# Patient Record
Sex: FEMALE | Race: BLACK OR AFRICAN AMERICAN | Employment: OTHER | URBAN - METROPOLITAN AREA
[De-identification: names, ages, dates, MRNs, and addresses within clinical notes are randomized per-mention and may not be internally consistent; named-entity substitution may affect disease eponyms.]

---

## 2016-06-09 LAB — MAMMOGRAPHY, EXTERNAL: NORMAL

## 2018-04-01 ENCOUNTER — HOSPITAL ENCOUNTER (OUTPATIENT)
Dept: MRI IMAGING | Age: 48
Discharge: HOME | End: 2018-04-01
Attending: ORTHOPAEDIC SURGERY
Payer: OTHER MISCELLANEOUS

## 2018-04-01 DIAGNOSIS — M54.2 PAIN IN NECK: ICD-10-CM

## 2018-04-01 PROCEDURE — 72141 MRI NECK SPINE W/O DYE: CPT

## 2018-05-08 ENCOUNTER — HOSPITAL ENCOUNTER (EMERGENCY)
Age: 48
Discharge: HOME | End: 2018-05-08
Attending: EMERGENCY MEDICINE | Admitting: EMERGENCY MEDICINE
Payer: COMMERCIAL

## 2018-05-08 ENCOUNTER — APPOINTMENT (OUTPATIENT)
Dept: CT IMAGING | Age: 48
End: 2018-05-08
Attending: EMERGENCY MEDICINE
Payer: COMMERCIAL

## 2018-05-08 VITALS
BODY MASS INDEX: 46.99 KG/M2 | TEMPERATURE: 99.4 F | HEART RATE: 81 BPM | SYSTOLIC BLOOD PRESSURE: 129 MMHG | RESPIRATION RATE: 16 BRPM | WEIGHT: 265.21 LBS | OXYGEN SATURATION: 100 % | DIASTOLIC BLOOD PRESSURE: 82 MMHG | HEIGHT: 63 IN

## 2018-05-08 DIAGNOSIS — R68.84 JAW PAIN, NON-TMJ: Primary | ICD-10-CM

## 2018-05-08 PROCEDURE — 70486 CT MAXILLOFACIAL W/O DYE: CPT

## 2018-05-08 PROCEDURE — 99283 EMERGENCY DEPT VISIT LOW MDM: CPT

## 2018-05-08 PROCEDURE — 74011250637 HC RX REV CODE- 250/637: Performed by: EMERGENCY MEDICINE

## 2018-05-08 PROCEDURE — 72125 CT NECK SPINE W/O DYE: CPT

## 2018-05-08 PROCEDURE — 70450 CT HEAD/BRAIN W/O DYE: CPT

## 2018-05-08 RX ORDER — DIAZEPAM 5 MG/1
5 TABLET ORAL
Status: COMPLETED | OUTPATIENT
Start: 2018-05-08 | End: 2018-05-08

## 2018-05-08 RX ORDER — HYDROCODONE BITARTRATE AND HOMATROPINE METHYLBROMIDE 1.5; 5 MG/5ML; MG/5ML
5 SYRUP ORAL
Status: DISCONTINUED | OUTPATIENT
Start: 2018-05-08 | End: 2018-05-08

## 2018-05-08 RX ORDER — DIAZEPAM 5 MG/1
5 TABLET ORAL
Qty: 15 TAB | Refills: 0 | Status: SHIPPED | OUTPATIENT
Start: 2018-05-08 | End: 2018-12-20

## 2018-05-08 RX ADMIN — DIAZEPAM 5 MG: 5 TABLET ORAL at 12:15

## 2018-05-08 NOTE — ED NOTES
Discharge instructions reviewed with patient, copy given by Dr. Karen Duarte. Pt is accomponied by family, denies use of wheelchair.

## 2018-05-08 NOTE — ED NOTES
Dr Valeria Phoenix in triage to evaluate patient. Pt currently has soft cervical collar on.  States spasms in jaw, pain in left shoulder, headache, pain in her neck

## 2018-05-08 NOTE — DISCHARGE INSTRUCTIONS
Head or Face Pain: Care Instructions  Your Care Instructions    Common causes of head or face pain are allergies, stress, and injuries. Other causes include tooth problems and sinus infections. Eating certain foods, such as chocolate or cheese, or drinking certain liquids, such as coffee or cola, can cause head pain for some people. If you have mild head pain, you may not need treatment. It is important to watch your symptoms and talk to your doctor if your pain continues or gets worse. Follow-up care is a key part of your treatment and safety. Be sure to make and go to all appointments, and call your doctor if you are having problems. It's also a good idea to know your test results and keep a list of the medicines you take. How can you care for yourself at home? · Take pain medicines exactly as directed. ¨ If the doctor gave you a prescription medicine for pain, take it as prescribed. ¨ If you are not taking a prescription pain medicine, ask your doctor if you can take an over-the-counter pain medicine. · Take it easy for the next few days or longer if you are not feeling well. · Use a warm, moist towel or heating pad set on low to relax tight muscles in your shoulder and neck. Have someone gently massage your neck and shoulders. · Put ice or a cold pack on the area for 10 to 20 minutes at a time. Put a thin cloth between the ice and your skin. When should you call for help? Call 911 anytime you think you may need emergency care. For example, call if:  ? · You have twitching, jerking, or a seizure. ? · You passed out (lost consciousness). ? · You have symptoms of a stroke. These may include:  ¨ Sudden numbness, tingling, weakness, or loss of movement in your face, arm, or leg, especially on only one side of your body. ¨ Sudden vision changes. ¨ Sudden trouble speaking. ¨ Sudden confusion or trouble understanding simple statements. ¨ Sudden problems with walking or balance.   ¨ A sudden, severe headache that is different from past headaches. ? · You have jaw pain and pain in your chest, shoulder, neck, or arm. ?Call your doctor now or seek immediate medical care if:  ? · You have a fever with a stiff neck or a severe headache. ? · You have nausea and vomiting, or you cannot keep food or liquids down. ? Watch closely for changes in your health, and be sure to contact your doctor if:  ? · Your head or face pain does not get better as expected. Where can you learn more? Go to http://willis-bianka.info/. Enter P568 in the search box to learn more about \"Head or Face Pain: Care Instructions. \"  Current as of: March 20, 2017  Content Version: 11.4  © 4937-1490 [employer]. Care instructions adapted under license by [employer] (which disclaims liability or warranty for this information). If you have questions about a medical condition or this instruction, always ask your healthcare professional. [place] [address] any warranty or liability for your use of this information.
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
## 2018-06-04 ENCOUNTER — HOSPITAL ENCOUNTER (EMERGENCY)
Age: 48
Discharge: HOME | End: 2018-06-04
Attending: EMERGENCY MEDICINE
Payer: COMMERCIAL

## 2018-06-04 VITALS
BODY MASS INDEX: 26.93 KG/M2 | OXYGEN SATURATION: 100 % | RESPIRATION RATE: 16 BRPM | HEIGHT: 63 IN | DIASTOLIC BLOOD PRESSURE: 82 MMHG | WEIGHT: 152 LBS | TEMPERATURE: 97.6 F | SYSTOLIC BLOOD PRESSURE: 130 MMHG | HEART RATE: 102 BPM

## 2018-06-04 DIAGNOSIS — J98.01 ACUTE BRONCHOSPASM: Primary | ICD-10-CM

## 2018-06-04 PROCEDURE — 74011250637 HC RX REV CODE- 250/637: Performed by: EMERGENCY MEDICINE

## 2018-06-04 PROCEDURE — 99283 EMERGENCY DEPT VISIT LOW MDM: CPT

## 2018-06-04 PROCEDURE — 77030029684 HC NEB SM VOL KT MONA -A

## 2018-06-04 PROCEDURE — 74011000250 HC RX REV CODE- 250: Performed by: NURSE PRACTITIONER

## 2018-06-04 PROCEDURE — 94640 AIRWAY INHALATION TREATMENT: CPT

## 2018-06-04 RX ORDER — CETIRIZINE HCL 10 MG
10 TABLET ORAL DAILY
Qty: 30 TAB | Refills: 0 | Status: SHIPPED | OUTPATIENT
Start: 2018-06-04 | End: 2018-12-20 | Stop reason: DRUGHIGH

## 2018-06-04 RX ORDER — ALBUTEROL SULFATE 90 UG/1
2 AEROSOL, METERED RESPIRATORY (INHALATION)
Qty: 1 INHALER | Refills: 0 | Status: SHIPPED | OUTPATIENT
Start: 2018-06-04 | End: 2020-03-28 | Stop reason: SDUPTHER

## 2018-06-04 RX ORDER — BETAMETHASONE SODIUM PHOSPHATE AND BETAMETHASONE ACETATE 3; 3 MG/ML; MG/ML
6 INJECTION, SUSPENSION INTRA-ARTICULAR; INTRALESIONAL; INTRAMUSCULAR; SOFT TISSUE ONCE
Status: DISCONTINUED | OUTPATIENT
Start: 2018-06-04 | End: 2018-06-04 | Stop reason: HOSPADM

## 2018-06-04 RX ORDER — CETIRIZINE HCL 10 MG
10 TABLET ORAL
Status: COMPLETED | OUTPATIENT
Start: 2018-06-04 | End: 2018-06-04

## 2018-06-04 RX ADMIN — CETIRIZINE HYDROCHLORIDE 10 MG: 10 TABLET, FILM COATED ORAL at 12:24

## 2018-06-04 RX ADMIN — ALBUTEROL SULFATE 1 DOSE: 2.5 SOLUTION RESPIRATORY (INHALATION) at 11:52

## 2018-06-04 NOTE — ED NOTES
Pt given discharge instructions, patient education, prescriptions, and follow up information. Pt states understanding. All questions answered. Pt discharged to home in private vehicle, ambulatory. Pt A/Ox4, RA, pain controlled. Pt anxious to leave r/t family concerns at home, refused IM medication, see MAR.

## 2018-06-04 NOTE — DISCHARGE INSTRUCTIONS
Wheezing or Bronchoconstriction: Care Instructions  Your Care Instructions  Wheezing is a whistling noise made during breathing. It occurs when the small airways, or bronchial tubes, that lead to your lungs swell or contract (spasm) and become narrow. This narrowing is called bronchoconstriction. When your airways constrict, it is hard for air to pass through and this makes it hard for you to breathe. Wheezing and bronchoconstriction can be caused by many problems, including:  · An infection such as the flu or a cold. · Allergies such as hay fever. · Diseases such as asthma or chronic obstructive pulmonary disease. · Smoking. Treatment for your wheezing depends on what is causing the problem. Your wheezing may get better without treatment. But you may need to pay attention to things that cause your wheezing and avoid them. Or you may need medicine to help treat the wheezing and to reduce the swelling or to relieve spasms in your lungs. Follow-up care is a key part of your treatment and safety. Be sure to make and go to all appointments, and call your doctor if you are having problems. It is also a good idea to know your test results and keep a list of the medicines you take. How can you care for yourself at home? · Take your medicine exactly as prescribed. Call your doctor if you think you are having a problem with your medicine. You will get more details on the specific medicine your doctor prescribes. · If your doctor prescribed antibiotics, take them as directed. Do not stop taking them just because you feel better. You need to take the full course of antibiotics. · Breathe moist air from a humidifier, hot shower, or sink filled with hot water. This may help ease your symptoms and make it easier for you to breathe. · If you have congestion in your nose and throat, drinking plenty of fluids, especially hot fluids, may help relieve your symptoms.  If you have kidney, heart, or liver disease and have to limit fluids, talk with your doctor before you increase the amount of fluids you drink. · If you have mucus in your airways, it may help to breathe deeply and cough. · Do not smoke or allow others to smoke around you. Smoking can make your wheezing worse. If you need help quitting, talk to your doctor about stop-smoking programs and medicines. These can increase your chances of quitting for good. · Avoid things that may cause your wheezing. These may include colds, smoke, air pollution, dust, pollen, pets, cockroaches, stress, and cold air. When should you call for help? Call 911 anytime you think you may need emergency care. For example, call if:  ? · You have severe trouble breathing. ? · You passed out (lost consciousness). ?Call your doctor now or seek immediate medical care if:  ? · You cough up yellow, dark brown, or bloody mucus (sputum). ? · You have new or worse shortness of breath. ? · Your wheezing is not getting better or it gets worse after you start taking your medicine. ? Watch closely for changes in your health, and be sure to contact your doctor if:  ? · You do not get better as expected. Where can you learn more? Go to http://willis-bianka.info/. Enter 454 8935 in the search box to learn more about \"Wheezing or Bronchoconstriction: Care Instructions. \"  Current as of: May 12, 2017  Content Version: 11.4  © 7516-2630 [employer]. Care instructions adapted under license by [employer] (which disclaims liability or warranty for this information). If you have questions about a medical condition or this instruction, always ask your healthcare professional. [address] any warranty or liability for your use of this information.

## 2018-06-04 NOTE — ED PROVIDER NOTES
Patient is a 51 y.o. female presenting with wheezing. Wheezing    Associated symptoms include chest pain. Pertinent negatives include no abdominal pain, no vomiting, no diarrhea, no dysuria, no headaches, no sore throat and no neck pain. Pt reports that she has been packing up household items from her apartment and has been exposed to large amounts of mold. She states that her asthma has been well controlled until this exposure. She was attempting to have her prescheduled physical therapy this morning but was audibly wheezing. Denies fever, cold symptoms, jheadache,neck pain, visual changes, focal weakness or rash. Denies any difficulty breathing, difficulty swallowing, SOB, chest pain or abdominal pain. Denies any nausea, vomiting or diarrhea. Pt. Reports that she has not had any medications today prior to arrival.  Old charts reviewed.         Past Medical History:   Diagnosis Date    Arthritis     Asthma     B[facility] disorder ([facility] [address]     Depression     IBS (irritable bowel syndrome)     Migraine     Neurological disorder     migraines    Peptic ulcer     Polyp cervix     Snoring     TIA (transient ischemic attack)        Past Surgical History:   Procedure Laterality Date    HX CERVICAL FUSION      C4-C7    HX CHOLECYSTECTOMY      HX CYST REMOVAL      from under both arms    HX HERNIA REPAIR  2009    HX ORTHOPAEDIC      left knee meniscus repair     HX ORTHOPAEDIC      ORIF left fibula    HX TUBAL LIGATION      HYSTEROSCOPY DIAGNOSTIC      x2         Family History:   Problem Relation Age of Onset    Stroke Father     Hypertension Father     Heart Disease Maternal Grandmother     Cancer Maternal Grandmother      brain and colon    Cancer Maternal Uncle      5 w/ brain Luis Fernando  MS Other      1st cousin    Bipolar Disorder Other        Social History     Social History    Marital status: SINGLE     Spouse name: N/A    Number of children: N/A    Years of education: N/A     Occupational History    Not on file. Social History Main Topics    Smoking status: Current Some Day Smoker     Packs/day: 0.25    Smokeless tobacco: Never Used    Alcohol use Yes      Comment: rare    Drug use: No    Sexual activity: Yes     Other Topics Concern    Not on file     Social History Narrative         ALLERGIES: Latex; Peanut; Shellfish containing products; Morphine; Nystatin; Dilaudid [hydromorphone (bulk)]; Flexeril [cyclobenzaprine]; Other medication; Phenergan [promethazine]; and Robaxin [methocarbamol]    Review of Systems   Constitutional: Negative for activity change and appetite change. HENT: Negative for facial swelling, sore throat and trouble swallowing. Eyes: Negative. Respiratory: Positive for wheezing. Negative for shortness of breath. Cardiovascular: Positive for chest pain. Gastrointestinal: Negative for abdominal pain, diarrhea and vomiting. Genitourinary: Negative for dysuria. Musculoskeletal: Negative for back pain and neck pain. Skin: Negative for color change. Neurological: Negative for headaches. Psychiatric/Behavioral: Negative. Vitals:    06/04/18 1123   BP: 118/84   Pulse: 89   Resp: 18   Temp: 98.2 °F (36.8 °C)   SpO2: 100%   Weight: 68.9 kg (152 lb)   Height: 5' 3\" (1.6 m)            Physical Exam   Constitutional: She is oriented to person, place, and time. She appears well-nourished. Black female; non smoker   HENT:   Head: Normocephalic. Mouth/Throat: Oropharynx is clear and moist.   Eyes: Pupils are equal, round, and reactive to light. Neck: Normal range of motion. Neck supple. Cardiovascular: Normal rate and regular rhythm. Pulmonary/Chest: Effort normal. She has wheezes. She exhibits tenderness. Abdominal: Soft. Bowel sounds are normal.   Musculoskeletal: Normal range of motion. Neurological: She is alert and oriented to person, place, and time. Skin: Skin is warm and dry. Nursing note and vitals reviewed.        [facility]      ED Course       Procedures      12:20 PM  Pt has been re-examined after nebulizer treatments and states that they are feeling better and have no new complaints. On auscultation, wheezing is significantly improved. Medications, x-rays, diagnosis, follow up plan and return instructions have been reviewed and discussed with the patient. Pt has had the opportunity to ask questions about her care. Patient expresses understanding and agreement with care plan, including zyrtec, one time celestone, nebulizer or inhaler use, follow up and return instructions. Patient agrees to return in 24 hours if her symptoms are not improving or immediately if she has any change in her condition including worsening wheezing or any signs of increasing work of breathing. Thee Naranjo, MARIAM

## 2018-10-11 ENCOUNTER — OFFICE VISIT (OUTPATIENT)
Dept: INTERNAL MEDICINE CLINIC | Age: 48
End: 2018-10-11

## 2018-10-11 VITALS
HEART RATE: 97 BPM | OXYGEN SATURATION: 98 % | BODY MASS INDEX: 48.9 KG/M2 | RESPIRATION RATE: 16 BRPM | DIASTOLIC BLOOD PRESSURE: 84 MMHG | HEIGHT: 63 IN | SYSTOLIC BLOOD PRESSURE: 114 MMHG | WEIGHT: 276 LBS | TEMPERATURE: 98.4 F

## 2018-10-11 DIAGNOSIS — E66.01 OBESITY, MORBID (HCC): ICD-10-CM

## 2018-10-11 DIAGNOSIS — M54.16 LUMBAR RADICULOPATHY, ACUTE: Primary | ICD-10-CM

## 2018-10-11 DIAGNOSIS — F43.10 PTSD (POST-TRAUMATIC STRESS DISORDER): ICD-10-CM

## 2018-10-11 LAB
CHOLEST SERPL-MCNC: 159 MG/DL
GLUCOSE POC: 107 MG/DL
HBA1C MFR BLD HPLC: 5.7 %
HDLC SERPL-MCNC: 59 MG/DL
LDL CHOLESTEROL POC: 78 MG/DL
NON-HDL CHOLESTEROL, 011976: 100
TCHOL/HDL RATIO (POC): 2.7
TRIGL SERPL-MCNC: 111 MG/DL

## 2018-10-11 RX ORDER — PAROXETINE 10 MG/1
10 TABLET, FILM COATED ORAL DAILY
Qty: 30 TAB | Refills: 3 | Status: SHIPPED | OUTPATIENT
Start: 2018-10-11 | End: 2018-12-20

## 2018-10-11 RX ORDER — DICLOFENAC SODIUM 75 MG/1
75 TABLET, DELAYED RELEASE ORAL 2 TIMES DAILY
Qty: 75 TAB | Refills: 6 | Status: SHIPPED | OUTPATIENT
Start: 2018-10-11 | End: 2018-12-20

## 2018-10-11 NOTE — PROGRESS NOTES
1. Have you been to the ER, urgent care clinic since your last visit? Hospitalized since your last visit? 7/8/18/[facility]//back problems 2. Have you seen or consulted any other health care providers outside of the [address] [place] [address] since your last visit? Include any pap smears or colon screening. no 
 
PHQ over the last two weeks 10/11/2018 Little interest or pleasure in doing things Nearly every day Feeling down, depressed, irritable, or hopeless Nearly every day Total Score PHQ 2 6 Trouble falling or staying asleep, or sleeping too much Nearly every day Feeling tired or having little energy Several days Poor appetite, weight loss, or overeating Nearly every day Feeling bad about yourself - or that you are a failure or have let yourself or your family down Nearly every day Trouble concentrating on things such as school, work, reading, or watching TV Nearly every day Moving or speaking so slowly that other people could have noticed; or the opposite being so fidgety that others notice Not at all Thoughts of being better off dead, or hurting yourself in some way Not at all PHQ 9 Score 19 How difficult have these problems made it for you to do your work, take care of your home and get along with others Very difficult Patient goes to behavior health Chief Complaint Patient presents with Ritchie Damon [facility] Kushal  Depression
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
## 2018-10-11 NOTE — MR AVS SNAPSHOT
[address] [place] [id number] 
[phone] Patient: Rosteta Barrientos MRN: [id number] IIU:[id number] Visit Information Date & Time Provider Department Dept. Phone Encounter #  
 10/11/2018  8:45 AM Heydi Crump MD [facility] [phone] [id number] Follow-up Instructions Return in about 2 weeks (around 10/25/2018), or if symptoms worsen or fail to improve. Upcoming Health Maintenance Date Due Pneumococcal 19-64 Medium Risk (1 of 1 - PPSV23) 1/15/1988 DTaP/Tdap/Td series (1 - Tdap) 1/15/1990 PAP AKA CERVICAL CYTOLOGY 1/15/1990 Influenza Age 5 to Adult 8/1/2018 Allergies as of 10/11/2018  Review Complete On: 10/11/2018 By: Becca Rodrigues LPN Severity Noted Reaction Type Reactions Latex  06/16/2010    Hives Peanut High 02/08/2012    Swelling Shellfish Containing Products High 02/08/2012    Anaphylaxis Morphine Medium 10/14/2011    Itching She has had morphine but premedicates with benadryl Nystatin Medium 12/24/2011   Side Effect Rash Dilaudid [Hydromorphone (Bulk)]  06/16/2010    Itching Must take with benadryl Flexeril [Cyclobenzaprine]  10/14/2011    Rash Other Medication  02/08/2012    Rash All fruits except apple, oranges, and pineapple Phenergan [Promethazine]  04/04/2011    Other (comments) Rapid hr  
 Robaxin [Methocarbamol]  10/14/2011    Rash Current Immunizations  Never Reviewed No immunizations on file. Not reviewed this visit You Were Diagnosed With   
  
 Codes Comments Lumbar radiculopathy, acute    -  Primary ICD-10-CM: M54.16 
ICD-9-CM: 724.4 Obesity, morbid ([facility] [address]     ICD-10-CM: E66.01 
ICD-9-CM: 278.01   
 PTSD (post-traumatic stress disorder)     ICD-10-CM: F43.10 ICD-9-CM: 309.81 Vitals BP Pulse Temp Resp Height(growth percentile) Weight(growth percentile) 114/84 97 98.4 °F (36.9 °C) (Oral) 16 5' 3\" (1.6 m) 276 lb (125.2 kg) SpO2 BMI OB Status Smoking Status 98% 48.89 kg/m2 Having regular periods Current Every Day Smoker BMI and BSA Data Body Mass Index Body Surface Area  
 48.89 kg/m 2 2.36 m 2 Preferred Pharmacy Pharmacy Name Phone [address], [address] [phone] Your Updated Medication List  
  
   
This list is accurate as of 10/11/18 10:09 AM.  Always use your most recent med list.  
  
  
  
  
 albuterol 90 mcg/actuation inhaler Commonly known as:  PROVENTIL HFA, VENTOLIN HFA, PROAIR HFA Take 2 Puffs by inhalation every four (4) hours as needed for Wheezing or Shortness of Breath (cough). cetirizine 10 mg tablet Commonly known as:  ZyrTEC Take 1 Tab by mouth daily. diazePAM 5 mg tablet Commonly known as:  VALIUM Take 1 Tab by mouth every eight (8) hours as needed (spasm). Max Daily Amount: 15 mg.  
  
 diclofenac EC 75 mg EC tablet Commonly known as:  VOLTAREN Take 1 Tab by mouth two (2) times a day. Ferrous Sulfate 47.5 mg iron Tber tablet Commonly known as:  SLOW FE Take 1 Tab by mouth daily. LASIX 40 mg tablet Generic drug:  furosemide Take 40 mg by mouth daily. PARoxetine 10 mg tablet Commonly known as:  PAXIL Take 1 Tab by mouth daily. potassium chloride SA 15 mEq tablet Commonly known as:  KLOR-CON M15 Take 10 mEq by mouth daily. TOPAMAX PO Take  by mouth. Prescriptions Sent to Pharmacy Refills PARoxetine (PAXIL) 10 mg tablet 3 Sig: Take 1 Tab by mouth daily. Class: Normal  
 Pharmacy: [facility], [address] Ph #: [phone] Route: Oral  
 diclofenac EC (VOLTAREN) 75 mg EC tablet 6 Sig: Take 1 Tab by mouth two (2) times a day. Class: Normal  
 Pharmacy: [facility], [address] Ph #: [phone] Route: Oral  
  
We Performed the Following AMB POC GLUCOSE BLOOD, BY GLUCOSE MONITORING DEVICE [28284 CPT(R)] AMB POC HEMOGLOBIN A1C [27543 CPT(R)] AMB POC LIPID PROFILE [97548 CPT(R)] CBC W/O DIFF [62183 CPT(R)] METABOLIC PANEL, COMPREHENSIVE [62105 CPT(R)] Follow-up Instructions Return in about 2 weeks (around 10/25/2018), or if symptoms worsen or fail to improve. Introducing [facility] SERVICES! Dear Mahnaz Ledezma: Thank you for requesting a [employer] account. Our records indicate that you already have an active [employer] account. You can access your account anytime at [email or web address] Did you know that you can access your hospital and ER discharge instructions at any time in [employer]? You can also review all of your test results from your hospital stay or ER visit. Additional Information If you have questions, please visit the Frequently Asked Questions section of the [employer] website at [email or web address] Remember, [employer] is NOT to be used for urgent needs. For medical emergencies, dial 911. Now available from your iPhone and Android! Please provide this summary of care documentation to your next provider. Your primary care clinician is listed as NONE. If you have any questions after today's visit, please call [phone].

## 2018-10-31 ENCOUNTER — OFFICE VISIT (OUTPATIENT)
Dept: INTERNAL MEDICINE CLINIC | Age: 48
End: 2018-10-31

## 2018-10-31 VITALS
WEIGHT: 266 LBS | OXYGEN SATURATION: 99 % | RESPIRATION RATE: 18 BRPM | SYSTOLIC BLOOD PRESSURE: 110 MMHG | HEIGHT: 63 IN | DIASTOLIC BLOOD PRESSURE: 74 MMHG | BODY MASS INDEX: 47.13 KG/M2 | TEMPERATURE: 98.2 F | HEART RATE: 84 BPM

## 2018-10-31 DIAGNOSIS — M47.817 LUMBAR AND SACRAL OSTEOARTHRITIS: ICD-10-CM

## 2018-10-31 DIAGNOSIS — J01.01 ACUTE RECURRENT MAXILLARY SINUSITIS: Primary | ICD-10-CM

## 2018-10-31 DIAGNOSIS — E66.01 OBESITY, MORBID (HCC): ICD-10-CM

## 2018-10-31 DIAGNOSIS — J45.20 MILD INTERMITTENT ASTHMA WITHOUT COMPLICATION: ICD-10-CM

## 2018-10-31 RX ORDER — FLUTICASONE PROPIONATE 50 MCG
2 SPRAY, SUSPENSION (ML) NASAL DAILY
Qty: 1 BOTTLE | Refills: 12 | Status: SHIPPED | OUTPATIENT
Start: 2018-10-31 | End: 2018-12-20 | Stop reason: DRUGHIGH

## 2018-10-31 RX ORDER — PREDNISONE 10 MG/1
TABLET ORAL
Qty: 21 TAB | Refills: 0 | Status: SHIPPED | OUTPATIENT
Start: 2018-10-31 | End: 2018-12-20 | Stop reason: ALTCHOICE

## 2018-10-31 RX ORDER — AMOXICILLIN AND CLAVULANATE POTASSIUM 875; 125 MG/1; MG/1
1 TABLET, FILM COATED ORAL 2 TIMES DAILY
Qty: 14 TAB | Refills: 0 | Status: SHIPPED | OUTPATIENT
Start: 2018-10-31 | End: 2018-11-07

## 2018-10-31 RX ORDER — ALBUTEROL SULFATE 90 UG/1
2 AEROSOL, METERED RESPIRATORY (INHALATION)
Qty: 1 INHALER | Refills: 12 | Status: SHIPPED | OUTPATIENT
Start: 2018-10-31 | End: 2018-12-20 | Stop reason: SDUPTHER

## 2018-10-31 NOTE — PROGRESS NOTES
Osmani Allen is a 51 y.o. female and presents with Back Pain and Sinus Infection  . Subjective:  Sinus Pain  Patient complains of achiness, congestion, coryza, facial pain and post nasal drip. Onset of symptoms was a few days ago, gradually worsening since that time. She is drinking plenty of fluids. .  Past history is significant for asthma. Patient is  A  smoker    Back Pain Review:  Patient presents for evaluation of low back problems. Symptoms have been present for several weeks and include pain in lower back (dull, mild in character;9 /10 in severity). Initial inciting event: unknown. Symptoms are worst: at times. Alleviating factors identifiable by patient are lying flat, medication . Exacerbating factors identifiable by patient are bending forwards, bending backwards. Treatments so far initiated by patient: medication Previous lower back problems: reported. Previous workup: noted     Asthma Review:  The patient is being seen for follow up of asthma,  currently stable. Asthma symptoms occur:frequently. Wheezing when present is described as mild and easily relieved with rescue bronchodilator. The patient reports use of a steroid inhaler. Frequency of use of quick-relief meds: rarely. Regimen compliance: The patient reports adherence to this regimen.         Review of Systems  Constitutional: negative for fevers, chills, anorexia and weight loss  Eyes:   negative for visual disturbance and irritation  ENT:   ,nasal congestion,  Respiratory:  cough,  dyspnea,wheezing  CV:   negative for chest pain, palpitations, lower extremity edema  GI:   negative for nausea, vomiting, diarrhea, abdominal pain,melena  Endo:               negative for polyuria,polydipsia,polyphagia,heat intolerance  Genitourinary: negative for frequency, dysuria and hematuria  Integument:  negative for rash and pruritus  Hematologic:  negative for easy bruising and gum/nose bleeding  Musculoskel: negative for myalgias, arthralgias, back pain, muscle weakness, joint pain  Neurological:  negative for headaches, dizziness, vertigo, memory problems and gait   Behavl/Psych: negative for feelings of anxiety, depression, mood changes    Past Medical History:   Diagnosis Date    Arthritis     Asthma     Bipolar disorder (HCC)     Depression     IBS (irritable bowel syndrome)     Migraine     Neurological disorder     migraines    Peptic ulcer     Polyp cervix     Snoring     TIA (transient ischemic attack)      Past Surgical History:   Procedure Laterality Date    HX CERVICAL FUSION      C4-C7    HX CHOLECYSTECTOMY      HX CYST REMOVAL      from under both arms    HX HERNIA REPAIR  2009    HX ORTHOPAEDIC      left knee meniscus repair     HX ORTHOPAEDIC      ORIF left fibula    HX TUBAL LIGATION      HYSTEROSCOPY DIAGNOSTIC      x2     Social History     Socioeconomic History    Marital status: SINGLE     Spouse name: Not on file    Number of children: Not on file    Years of education: Not on file    Highest education level: Not on file   Social Needs    Financial resource strain: Not on file    Food insecurity - worry: Not on file    Food insecurity - inability: Not on file   [employer] needs - medical: Not on file   [employer] needs - non-medical: Not on file   Occupational History    Not on file   Tobacco Use    Smoking status: Current Every Day Smoker     Packs/day: 0.25    Smokeless tobacco: Never Used   Substance and Sexual Activity    Alcohol use: Yes     Comment: rare    Drug use: No    Sexual activity: Not Currently   Other Topics Concern    Not on file   Social History Narrative    Not on file     Family History   Problem Relation Age of Onset    Stroke Father     Hypertension Father     Heart Disease Maternal Grandmother     Cancer Maternal Grandmother         brain and colon    Cancer Maternal Uncle         5 w/ brain Daryle Paget MS Other         1st cousin    Bipolar Disorder Other Current Outpatient Medications   Medication Sig Dispense Refill    albuterol (PROVENTIL HFA, VENTOLIN HFA, PROAIR HFA) 90 mcg/actuation inhaler Take 2 Puffs by inhalation every four (4) hours as needed for Wheezing. 1 Inhaler 12    amoxicillin-clavulanate (AUGMENTIN) 875-125 mg per tablet Take 1 Tab by mouth two (2) times a day for 7 days. 14 Tab 0    fluticasone (FLONASE) 50 mcg/actuation nasal spray 2 Sprays by Both Nostrils route daily. 1 Bottle 12    predniSONE (DELTASONE) 10 mg tablet 6 tabs today and reduce by 1 tab daily 21 Tab 0    Ferrous Sulfate (SLOW FE) 47.5 mg iron TbER tablet Take 1 Tab by mouth daily.  PARoxetine (PAXIL) 10 mg tablet Take 1 Tab by mouth daily. 30 Tab 3    diclofenac EC (VOLTAREN) 75 mg EC tablet Take 1 Tab by mouth two (2) times a day. 75 Tab 6    albuterol (PROVENTIL HFA, VENTOLIN HFA, PROAIR HFA) 90 mcg/actuation inhaler Take 2 Puffs by inhalation every four (4) hours as needed for Wheezing or Shortness of Breath (cough). 1 Inhaler 0    TOPIRAMATE (TOPAMAX PO) Take  by mouth.  potassium chloride SA (KLOR-CON M15) 15 mEq tablet Take 10 mEq by mouth daily.  furosemide (LASIX) 40 mg tablet Take 40 mg by mouth daily.  cetirizine (ZYRTEC) 10 mg tablet Take 1 Tab by mouth daily. (Patient not taking: Reported on 10/31/2018) 30 Tab 0    diazePAM (VALIUM) 5 mg tablet Take 1 Tab by mouth every eight (8) hours as needed (spasm). Max Daily Amount: 15 mg.  (Patient not taking: Reported on 10/31/2018) 15 Tab 0     Allergies   Allergen Reactions    Latex Hives    Peanut Swelling    Shellfish Containing Products Anaphylaxis    Morphine Itching     She has had morphine but premedicates with benadryl    Nystatin Rash    Dilaudid [Hydromorphone (Bulk)] Itching     Must take with benadryl    Flexeril [Cyclobenzaprine] Rash    Other Medication Rash     All fruits except apple, oranges, and pineapple    Phenergan [Promethazine] Other (comments)     Rapid hr    Robaxin [Methocarbamol] Rash       Objective:  Visit Vitals  /74   Pulse 84   Temp 98.2 °F (36.8 °C) (Oral)   Resp 18   Ht 5' 3\" (1.6 m)   Wt 266 lb (120.7 kg)   SpO2 99%   BMI 47.12 kg/m²     Physical Exam:   General appearance - alert, well appearing, and in no distress  Mental status - alert, oriented to person, place, and time  EYE-BRIGIDA, EOMI, corneas normal, no foreign bodies  ENT-ENT exam normal, no neck nodes or sinus tenderness  Nose -Turbinates boggy and mucoid with erythema and edema noted  Mouth - mucous membranes moist, pharynx normal without lesions  Neck - supple, no significant adenopathy   Chest - rare wheezes,, symmetric air entry   Heart - normal rate, regular rhythm, normal S1, S2, no murmurs, rubs, clicks or gallops   Abdomen - soft, nontender, nondistended, no masses or organomegaly  Lymph- no adenopathy palpable  Ext-peripheral pulses normal, no pedal edema, no clubbing or cyanosis  Skin-Warm and dry. no hyperpigmentation, vitiligo, or suspicious lesions  Neuro -alert, oriented, normal speech, no focal findings or movement disorder noted  Neck-normal C-spine, no tenderness, full ROM without pain  Feet-no nail deformities or callus formation with good pulses noted  Back-tenderness lower lumbar spine and sacral spine noted,forward flexion,hyperextension impaired,negative straight leg raise      Results for orders placed or performed in visit on 10/11/18   AMB POC GLUCOSE BLOOD, BY GLUCOSE MONITORING DEVICE   Result Value Ref Range    Glucose  mg/dL   AMB POC HEMOGLOBIN A1C   Result Value Ref Range    Hemoglobin A1c (POC) 5.7 %   AMB POC LIPID PROFILE   Result Value Ref Range    Cholesterol (POC) 159     Triglycerides (POC) 111     HDL Cholesterol (POC) 59     Non-HDL Cholesterol 100     LDL Cholesterol (POC) 78 MG/DL    TChol/HDL Ratio (POC) 2.7        Assessment/Plan:    ICD-10-CM ICD-9-CM    1. Acute recurrent maxillary sinusitis J01.01 461.0    2.  Mild intermittent asthma without complication M36.27 492.66    3. Obesity, morbid ([facility] [address] E66.01 278.01      Orders Placed This Encounter    albuterol (PROVENTIL HFA, VENTOLIN HFA, PROAIR HFA) 90 mcg/actuation inhaler     Sig: Take 2 Puffs by inhalation every four (4) hours as needed for Wheezing. Dispense:  1 Inhaler     Refill:  12    amoxicillin-clavulanate (AUGMENTIN) 875-125 mg per tablet     Sig: Take 1 Tab by mouth two (2) times a day for 7 days. Dispense:  14 Tab     Refill:  0    fluticasone (FLONASE) 50 mcg/actuation nasal spray     Si Sprays by Both Nostrils route daily. Dispense:  1 Bottle     Refill:  12    predniSONE (DELTASONE) 10 mg tablet     Si tabs today and reduce by 1 tab daily     Dispense:  21 Tab     Refill:  0     lose weight, increase physical activity, follow low fat diet, follow low salt diet, continue present plan,Take 81mg aspirin daily  Patient Instructions   [employer]-front[employer]k Activation    Thank you for requesting access to [employer]front[employer]k. Please follow the instructions below to securely access and download your online medical record. [employer] allows you to send messages to your doctor, view your test results, renew your prescriptions, schedule appointments, and more. How Do I Sign Up? 1. In your internet browser, go to [email or web address]  2. Click on the First Time User? Click Here link in the Sign In box. You will be redirect to the New Member Sign Up page. 3. Enter your [employer]k Access Code exactly as it appears below. You will not need to use this code after youve completed the sign-up process. If you do not sign up before the expiration date, you must request a new code. [employer]-front[employer]k Access Code: Activation code not generated  Current I-frontdesk Status: Active (This is the date your I-frontdesk access code will )    4. Enter the last four digits of your Social Security Number (xxxx) and Date of Birth (mm/dd/yyyy) as indicated and click Submit. You will be taken to the next sign-up page.   5. Create a MyChart ID. This will be your [employer] login ID and cannot be changed, so think of one that is secure and easy to remember. 6. Create a [employer] password. You can change your password at any time. 7. Enter your Password Reset Question and Answer. This can be used at a later time if you forget your password. 8. Enter your e-mail address. You will receive e-mail notification when new information is available in [address] 9. Click Sign Up. You can now view and download portions of your medical record. 10. Click the Download Summary menu link to download a portable copy of your medical information. Additional Information    If you have questions, please visit the Frequently Asked Questions section of the [employer] website at [email or web address] Remember, [employer] is NOT to be used for urgent needs. For medical emergencies, dial 911. Follow-up Disposition:  Return in about 4 weeks (around 11/28/2018), or if symptoms worsen or fail to improve. I have reviewed with the patient details of the assessment and plan and all questions were answered. Relevent patient education was performed. The most recent lab findings were reviewed with the patient. An After Visit Summary was printed and given to the patient.

## 2018-10-31 NOTE — PROGRESS NOTES
1. Have you been to the ER, urgent care clinic since your last visit? Hospitalized since your last visit?no    2. Have you seen or consulted any other health care providers outside of the [facility] since your last visit? Include any pap smears or colon screening.  No    PHQ over the last two weeks 10/11/2018   PHQ Not Done Patient Decline   Little interest or pleasure in doing things Several days   Feeling down, depressed, irritable, or hopeless Several days   Total Score PHQ 2 2   Trouble falling or staying asleep, or sleeping too much Several days   Feeling tired or having little energy Several days   Poor appetite, weight loss, or overeating Not at all   Feeling bad about yourself - or that you are a failure or have let yourself or your family down Not at all   Trouble concentrating on things such as school, work, reading, or watching TV Not at all   Moving or speaking so slowly that other people could have noticed; or the opposite being so fidgety that others notice Not at all   Thoughts of being better off dead, or hurting yourself in some way Not at all   PHQ 9 Score 4   How difficult have these problems made it for you to do your work, take care of your home and get along with others Not difficult at all       Chief Complaint   Patient presents with    Back Pain    Sinus Infection

## 2018-10-31 NOTE — PATIENT INSTRUCTIONS
[employer] Activation    Thank you for requesting access to [employer]. Please follow the instructions below to securely access and download your online medical record. Thu[employer] allows you to send messages to your doctor, view your test results, renew your prescriptions, schedule appointments, and more. How Do I Sign Up? 1. In your internet browser, go to [email or web address]  2. Click on the First Time User? Click Here link in the Sign In box. You will be redirect to the New Member Sign Up page. 3. Enter your [employer] Access Code exactly as it appears below. You will not need to use this code after youve completed the sign-up process. If you do not sign up before the expiration date, you must request a new code. [employer]cy Access Code: Activation code not generated  Current Thucy Status: Active (This is the date your [employer] access code will )    4. Enter the last four digits of your Social Security Number (xxxx) and Date of Birth (mm/dd/yyyy) as indicated and click Submit. You will be taken to the next sign-up page. 5. Create a [employer] ID. This will be your [employer] login ID and cannot be changed, so think of one that is secure and easy to remember. 6. Create a [employer] password. You can change your password at any time. 7. Enter your Password Reset Question and Answer. This can be used at a later time if you forget your password. 8. Enter your e-mail address. You will receive e-mail notification when new information is available in [address] 9. Click Sign Up. You can now view and download portions of your medical record. 10. Click the Download Summary menu link to download a portable copy of your medical information. Additional Information    If you have questions, please visit the Frequently Asked Questions section of the [employer] website at [email or web address] Remember, [employer] is NOT to be used for urgent needs. For medical emergencies, dial 911.

## 2018-11-16 ENCOUNTER — OFFICE VISIT (OUTPATIENT)
Dept: INTERNAL MEDICINE CLINIC | Age: 48
End: 2018-11-16

## 2018-11-16 VITALS
RESPIRATION RATE: 16 BRPM | TEMPERATURE: 98.1 F | SYSTOLIC BLOOD PRESSURE: 110 MMHG | DIASTOLIC BLOOD PRESSURE: 84 MMHG | BODY MASS INDEX: 47.66 KG/M2 | OXYGEN SATURATION: 98 % | HEART RATE: 73 BPM | WEIGHT: 269 LBS | HEIGHT: 63 IN

## 2018-11-16 DIAGNOSIS — M54.16 LUMBAR RADICULAR PAIN: ICD-10-CM

## 2018-11-16 DIAGNOSIS — Z23 ENCOUNTER FOR IMMUNIZATION: Primary | ICD-10-CM

## 2018-11-16 RX ORDER — POTASSIUM CHLORIDE 1125 MG/1
10 TABLET, EXTENDED RELEASE ORAL DAILY
Status: CANCELLED | OUTPATIENT
Start: 2018-11-16

## 2018-11-16 RX ORDER — POTASSIUM CHLORIDE 20 MEQ/1
20 TABLET, EXTENDED RELEASE ORAL 2 TIMES DAILY
Qty: 60 TAB | Refills: 12 | Status: SHIPPED | OUTPATIENT
Start: 2018-11-16 | End: 2018-12-20 | Stop reason: DRUGHIGH

## 2018-11-16 NOTE — PROGRESS NOTES
1. Have you been to the ER, urgent care clinic since your last visit? Hospitalized since your last visit?no    2. Have you seen or consulted any other health care providers outside of the [facility] since your last visit? Include any pap smears or colon screening. No    PHQ over the last two weeks 10/11/2018   PHQ Not Done Patient Decline   Little interest or pleasure in doing things Several days   Feeling down, depressed, irritable, or hopeless Several days   Total Score PHQ 2 2   Trouble falling or staying asleep, or sleeping too much Several days   Feeling tired or having little energy Several days   Poor appetite, weight loss, or overeating Not at all   Feeling bad about yourself - or that you are a failure or have let yourself or your family down Not at all   Trouble concentrating on things such as school, work, reading, or watching TV Not at all   Moving or speaking so slowly that other people could have noticed; or the opposite being so fidgety that others notice Not at all   Thoughts of being better off dead, or hurting yourself in some way Not at all   PHQ 9 Score 4   How difficult have these problems made it for you to do your work, take care of your home and get along with others Not difficult at all     Dr. Kelly Tyson. Is aware of patients positive depression screening. After obtaining consent, and per verbal order from Dr. Mata Parker., patient received influenza vaccine given by Becca Chowdary LPN in Right Deltoid. Influenza Vaccine 0.5 mL IM now. Patient was observed for 10 minutes post injection. Patient tolerated injection well.

## 2018-11-16 NOTE — PATIENT INSTRUCTIONS
[employer] Activation    Thank you for requesting access to ADOP. Please follow the instructions below to securely access and download your online medical record. ADOP allows you to send messages to your doctor, view your test results, renew your prescriptions, schedule appointments, and more. How Do I Sign Up? 1. In your internet browser, go to [email or web address]  2. Click on the First Time User? Click Here link in the Sign In box. You will be redirect to the New Member Sign Up page. 3. Enter your ADOP Access Code exactly as it appears below. You will not need to use this code after youve completed the sign-up process. If you do not sign up before the expiration date, you must request a new code. ADOP Access Code: Activation code not generated  Current ADOP Status: Active (This is the date your ADOP access code will )    4. Enter the last four digits of your Social Security Number (xxxx) and Date of Birth (mm/dd/yyyy) as indicated and click Submit. You will be taken to the next sign-up page. 5. Create a ADOP ID. This will be your ADOP login ID and cannot be changed, so think of one that is secure and easy to remember. 6. Create a ADOP password. You can change your password at any time. 7. Enter your Password Reset Question and Answer. This can be used at a later time if you forget your password. 8. Enter your e-mail address. You will receive e-mail notification when new information is available in [address] 9. Click Sign Up. You can now view and download portions of your medical record. 10. Click the Download Summary menu link to download a portable copy of your medical information. Additional Information    If you have questions, please visit the Frequently Asked Questions section of the ADOP website at [email or web address] Remember, ADOP is NOT to be used for urgent needs. For medical emergencies, dial 911.
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
## 2018-11-29 ENCOUNTER — HOSPITAL ENCOUNTER (OUTPATIENT)
Dept: PHYSICAL THERAPY | Age: 48
Discharge: HOME | End: 2018-11-29
Payer: SUBSIDIZED

## 2018-11-29 PROCEDURE — 97162 PT EVAL MOD COMPLEX 30 MIN: CPT | Performed by: PHYSICAL THERAPIST

## 2018-11-29 PROCEDURE — 97014 ELECTRIC STIMULATION THERAPY: CPT | Performed by: PHYSICAL THERAPIST
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
## 2018-12-04 ENCOUNTER — HOSPITAL ENCOUNTER (OUTPATIENT)
Dept: PHYSICAL THERAPY | Age: 48
Discharge: HOME | End: 2018-12-04
Payer: SUBSIDIZED

## 2018-12-04 PROCEDURE — 97014 ELECTRIC STIMULATION THERAPY: CPT

## 2018-12-04 NOTE — PROGRESS NOTES
PT DAILY TREATMENT NOTE - [facility] 2-15    Patient Name: Chana Tanner  Date:2018  : 1970  [x]  Patient  Verified  Payor: Inga Howard / Plan: [employer] % / Product Type: Helga /    In time:10:30A  Out time:11:00A  Total Treatment Time (min): 30  Total Timed Codes (min): 30  1:1 Treatment Time ( only): --   Visit #: 2     Treatment Area: Back pain [M54.9]    SUBJECTIVE  Pain Level (0-10 scale): 10/10  Any medication changes, allergies to medications, adverse drug reactions, diagnosis change, or new procedure performed?: [x] No    [] Yes (see summary sheet for update)  Subjective functional status/changes:   [] No changes reported  Pt reported feeling worse after last session stating she feels her \"spine has shifted to the R\". Additionally pt reports difficulty with breathing. Will have moments when back will spasm on R side and she has to stay still until it subsides. Did take a tramadol 10 min before therapy session. Relieving factors include laying on R side.  Pt reports if she lays on R side if feels like her spine is being \"pushed back where it is supposed to be\"     OBJECTIVE    Modality rationale: decrease pain and increase tissue extensibility to improve the patients ability to decrease R sided back pain   Type Additional Details   15[x] Estim: []Att   [x]Unatt    []TENS instruct                  [x]IFC  []Premod   []NMES                     []Other:  []w/US   []w/ice   [x]w/heat  Position:R S/L with pillow between knees  Location: R side of low back   []  Traction: [] Cervical       []Lumbar                       [] Prone          []Supine                       []Intermittent   []Continuous Lbs:  [] before manual  [] after manual  []w/heat   []  Ultrasound: []Continuous   [] Pulsed                       at: []1MHz   []3MHz Location:  W/cm2:   [] Paraffin         Location:   []w/heat   []  Ice     []  Heat  []  Ice massage Position:  Location:   []  Laser  []  Other: Position:  Location: []  Vasopneumatic Device Pressure:       [] lo [] med [] hi   Temperature:      [x] Skin assessment post-treatment:  [x]intact []redness- no adverse reaction    []redness - adverse reaction:     -- min Therapeutic Exercise:  [x] See flow sheet :   Rationale: increase ROM, increase strength and improve coordination to improve the patients ability to improve function and mobility    With   [] TE   [] TA   [] neuro   [] other: Patient Education: [x] Review HEP    [] Progressed/Changed HEP based on:   [] positioning   [] body mechanics   [] transfers   [] heat/ice application    [] other:      Other Objective/Functional Measures: Pt's sitting posture shifted to R side with L trunk sidebending. Pain Level (0-10 scale) post treatment: 7/10    ASSESSMENT/Changes in Function:   Evaluating therapist Karen Yin, PT, DPT spoke with pt at beginning of session regarding current status. Pt was advised by PT to FU with MD instead of participation in PT session. Pt reported she did not want to miss a PT session. No exercises today due to pt's pain level and intolerance to activity. Heat and IFC only. Pt reported once laying on her R side she could breath better. Pt reported her asthma may be a contributing factor to her breathing,  though she reports she does not usually have problems with it during this time of year. Pt educated on shifting away from R side while in seated position and reorient herself to mid line. Pt reported sitting in neutral was too painful and she did not want to fight her body. \"I have been trying, but it does not want to sit right. \" by end of session pt was sitting more oriented to midline with reports of less pain. Pt advised to call MD regarding trouble with breathing.      Patient will continue to benefit from skilled PT services to modify and progress therapeutic interventions, address functional mobility deficits, address ROM deficits, address strength deficits, analyze and address soft tissue restrictions, analyze and cue movement patterns, analyze and modify body mechanics/ergonomics and assess and modify postural abnormalities to attain remaining goals. [x]  See Plan of Care  []  See progress note/recertification  []  See Discharge Summary         Progress towards goals / Updated goals:  Short Term Goals: To be accomplished in 4 weeks:  1) Pt will be Independent with HEP  2) Pt will be able to perform supine exercises   3) Pt will be able to Ambulate greater than 10  minutes without pain greater than 5/10     Long Term Goals: To be accomplished in 6-8 weeks:  1)  Pt will be able to Sit greater than 20 minutes without increased pain  2) Pt will be able to Stand greater than 10  minutes without increase of pain  3) Pt will be able to Ambulate greater than 20 minutes without increase of pain  4) Pt will have FOTO improvement by 10 points.          PLAN  [x]  Upgrade activities as tolerated     [x]  Continue plan of care  []  Update interventions per flow sheet       []  Discharge due to:_  []  Other:_      Kiersten Herbert PTA, OPTA 12/4/2018  11:26 AM

## 2018-12-06 ENCOUNTER — HOSPITAL ENCOUNTER (OUTPATIENT)
Dept: PHYSICAL THERAPY | Age: 48
Discharge: HOME | End: 2018-12-06
Payer: SUBSIDIZED

## 2018-12-06 PROCEDURE — 97530 THERAPEUTIC ACTIVITIES: CPT

## 2018-12-06 PROCEDURE — 97110 THERAPEUTIC EXERCISES: CPT

## 2018-12-06 NOTE — PROGRESS NOTES
PT DAILY TREATMENT NOTE - [facility] 2-15    Patient Name: Philip Adrian  Date:2018  : 1970  [x]  Patient  Verified  Payor: Pili Congress / Plan: [facility] % / Product Type: Irina Satsuma /    In time:11:00A  Out time:11:40A  Total Treatment Time (min): 40  Total Timed Codes (min): 40  1:1 Treatment Time ( only): --   Visit #: 3     Treatment Area: Back pain [M54.9]    SUBJECTIVE  Pain Level (0-10 scale): 7/10  Any medication changes, allergies to medications, adverse drug reactions, diagnosis change, or new procedure performed?: [x] No    [] Yes (see summary sheet for update)  Subjective functional status/changes:   [] No changes reported  Pt reports that she has a respiratory infection which is why she was having the pain and difficulty breathing. Pt still reports high level of pain on R side. Pt reports that this morning she could not bear weight on the R side when sitting on something hard.  Pt presented with increased trunk flexion and hesitated movements when trying to  a cup she dropped onto the floor    OBJECTIVE    Modality rationale: decrease pain and increase tissue extensibility to improve the patients ability to decrease low back pain   Min Type Additional Details   --   [x] Estim: []Att   [x]Unatt    []TENS instruct                  [x]IFC  []Premod   []NMES                     []Other:  []w/US   []w/ice   [x]w/heat  Position:R S/L  Location: low back      []  Traction: [] Cervical       []Lumbar                       [] Prone          []Supine                       []Intermittent   []Continuous Lbs:  [] before manual  [] after manual  []w/heat    []  Ultrasound: []Continuous   [] Pulsed                       at: []1MHz   []3MHz Location:  W/cm2:    [] Paraffin         Location:   []w/heat    []  Ice     []  Heat  []  Ice massage Position:  Location:    []  Laser  []  Other: Position:  Location:      []  Vasopneumatic Device Pressure:       [] lo [] med [] hi   Temperature:      [x] Skin assessment post-treatment:  [x]intact []redness- no adverse reaction    15 min Therapeutic Exercise:  [x] See flow sheet :   Rationale: increase ROM, increase strength and improve coordination to improve the patients ability to improve function and mobility    25 min Therapeutic Activity:  [x] See flow sheet : sitting posture relative to midline training. Training in proper sit to supine transfers, sit to stand trasnfers   Rationale: increase ROM, increase strength and improve coordination to improve the patients ability to improve function and mobility    With   [] TE   [] TA   [] neuro   [] other: Patient Education: [x] Review HEP    [] Progressed/Changed HEP based on:   [] positioning   [] body mechanics   [] transfers   [] heat/ice application    [] other:      Other Objective/Functional Measures: pt presentation at beginning of session for seated posture increase L trunk SB with decrease weight on R glut and increase R shoulder hike. Pain Level (0-10 scale) post treatment: 7/10    ASSESSMENT/Changes in Function:   Pt reported increase in pain when trying to shift to midline initially. Pt was pushing into R trunk SB causing an increase in pain/spasm in R sided lumbar spine. Pt given tactile cues for correct positioning of sitting and pt's pain dissipated allowing her to sit with neutral posture for 5 min. Pt encouraged to use a mirror and work on orientation to midline at home. Pt able to get into supine position and tolerated LTR and PPT with minimal increase in pain and discomfort. Pt sat up with increase in R lateral shift and increase in L SB with reports of pain along lower L ribs. Pt stated \"my spine is shifted to the R pulling on my L side keeping me from sitting up straight. \" pt required tactile cueing for slow reorientation to midline again. Pt declined modalities at the end of today's session stating the e-stim makes her hurt worse after she leaves. May try again on Monday.  Pt demonstrated increase in lumbar flexion, L SB and trunk rotation and used hands to walk up legs during sit to stand trasnfers. Pt encouraged to keep weight even through B LE and squeeze gluts when standing to offload back. 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 Pt is concerned she is not going to be able to do it because of the required sit to stand and lifting 50 pounds. Patient will continue to benefit from skilled PT services to modify and progress therapeutic interventions, address functional mobility deficits, address ROM deficits, address strength deficits, analyze and address soft tissue restrictions, analyze and cue movement patterns, analyze and modify body mechanics/ergonomics and assess and modify postural abnormalities to attain remaining goals. [x]  See Plan of Care  []  See progress note/recertification  []  See Discharge Summary         Progress towards goals / Updated goals:  Short Term Goals: To be accomplished in 4 weeks:  1) Pt will be Independent with HEP  2) Pt will be able to perform supine exercises   3) Pt will be able to Ambulate greater than 10  minutes without pain greater than 5/10     Long Term Goals: To be accomplished in 6-8 weeks:  1)  Pt will be able to Sit greater than 20 minutes without increased pain  2) Pt will be able to Stand greater than 10  minutes without increase of pain  3) Pt will be able to Ambulate greater than 20 minutes without increase of pain  4) Pt will have FOTO improvement by 10 points.          PLAN  [x]  Upgrade activities as tolerated     [x]  Continue plan of care  []  Update interventions per flow sheet       []  Discharge due to:_  []  Other:_      Jessica Witt PTA, OPTA 12/6/2018  11:26 AM

## 2018-12-11 ENCOUNTER — APPOINTMENT (OUTPATIENT)
Dept: PHYSICAL THERAPY | Age: 48
End: 2018-12-11
Payer: SUBSIDIZED

## 2018-12-13 ENCOUNTER — HOSPITAL ENCOUNTER (OUTPATIENT)
Dept: PHYSICAL THERAPY | Age: 48
Discharge: HOME | End: 2018-12-13
Payer: SUBSIDIZED

## 2018-12-13 PROCEDURE — 97116 GAIT TRAINING THERAPY: CPT

## 2018-12-13 PROCEDURE — 97535 SELF CARE MNGMENT TRAINING: CPT

## 2018-12-13 NOTE — PROGRESS NOTES
PT DAILY TREATMENT NOTE - [facility] 2-15    Patient Name: Racquel Conway  Date:2018  : 1970  [x]  Patient  Verified  Payor: Trenton Kelly / Plan: [facility] % / Product Type: Aurea Smyth /    In time:11:10A  Out time: 12:00P  Total Treatment Time (min): 50  Total Timed Codes (min): 50  1:1 Treatment Time ( only): --   Visit #: 4     Treatment Area: Back pain [M54.9]    SUBJECTIVE  Pain Level (0-10 scale): 9/10  Any medication changes, allergies to medications, adverse drug reactions, diagnosis change, or new procedure performed?: [x] No    [] Yes (see summary sheet for update)  Subjective functional status/changes:   [] No changes reported  Pt reported that she did not feel better or worse after last session and into Friday morning. Pt reported that she took her last Tramadol before bed Thursday night. Pt reported riding 2 hours Friday night to [place] to visit brother in senior care on Saturday. Pt stated Friday night she sept on sister's couch. She reports feeling a pop in her back when she sat down in a chair at the senior care which increased her pain. She cut her visit with her brother short to lay down in the back seat of her car. Pt reported riding back from [place] Saturday afternoon. Pt reported that Saturday into  she got out of bed and felt another pop. Pt states that \"my spine is slipping forward some more, I can feel it\" Since then she is only able to lay on her stomach for relief. Pt reports she is not able to put a pillow under her stomach. She sleeps with her arms tucked underneath.     OBJECTIVE    Modality rationale: decrease pain and increase tissue extensibility to improve the patients ability to decrease low back pain   Min Type Additional Details   --   [x] Estim: []Att   [x]Unatt    []TENS instruct                  [x]IFC  []Premod   []NMES                     []Other:  []w/US   []w/ice   [x]w/heat  Position:R S/L  Location: low back      []  Traction: [] Cervical       []Lumbar [] Prone          []Supine                       []Intermittent   []Continuous Lbs:  [] before manual  [] after manual  []w/heat    []  Ultrasound: []Continuous   [] Pulsed                       at: []1MHz   []3MHz Location:  W/cm2:    [] Paraffin         Location:   []w/heat    []  Ice     []  Heat  []  Ice massage Position:  Location:    []  Laser  []  Other: Position:  Location:      []  Vasopneumatic Device Pressure:       [] lo [] med [] hi   Temperature:      [x] Skin assessment post-treatment:  [x]intact []redness- no adverse reaction    -- min Therapeutic Exercise:  [x] See flow sheet :   Rationale: increase ROM, increase strength and improve coordination to improve the patients ability to improve function and mobility    -- min Therapeutic Activity:  [x] See flow sheet : sitting posture relative to midline training. Training in proper sit to supine transfers, sit to stand trasnfers   Rationale: increase ROM, increase strength and improve coordination to improve the patients ability to improve function and mobility    15 min Gait Training:  [x] See flow sheet : training on use of SPC ambulated 15' x 2 MIN A x2 with VC on proper sequence of AD and foot placement, focus on weight shifting using SPC to allow R foot clearance.     Rationale: increase ROM, increase strength and improve coordination to improve the patients ability to improve function and mobility    35  With   [] TE   [] TA   [] neuro   [] other: Patient Education: [] Review HEP    [] Progressed/Changed HEP based on:   [x] positioning   [x] body mechanics   [x] transfers   [] heat/ice application    [x] other: discussed at length of symptom management taking rest breaks during trips, clinic's cancellation policy, safety precautions with use of AD to maximize safety during ambulation       Other Objective/Functional Measures: --    Pain Level (0-10 scale) post treatment: 7/10    ASSESSMENT/Changes in Function:   Attempted seated toe raises, LAQ and seated marches with patient today. Pt unable to perform on R side secondary to pain and reports that \"I cannot move it. I am trying but it won't move. \" Pt ambulated into clinic demonstrating increase R toe drag and minimal foot clearing on R side. Pt reported throwing away her walker and cane last month after her last episode where her toe dragged. Pt reported she does not want to use AD because \" they do not look cute. \" pt broke down in tears when education on the importance of using AD for safety stating \"I hate them, I don't want to use them\". Pt demonstrated increased difficulty with proper use of SPC stating that holding the cane in her L hand \"did not feel right. \" pt stated she is not going to get another walker but may consider getting another SPC. Pt reported driving herself to therapy today. Pt wanted to cancel her appointment due her pain and inability to [facility] well\" but stated that she was fearful if she cancelled her appointment she would be kicked out of therapy. Pt informed on clinic's cancellation policy. Pt did not want to take another method of transportation home stating its she would be extra careful driving. Patient will continue to benefit from skilled PT services to modify and progress therapeutic interventions, address functional mobility deficits, address ROM deficits, address strength deficits, analyze and address soft tissue restrictions, analyze and cue movement patterns, analyze and modify body mechanics/ergonomics and assess and modify postural abnormalities to attain remaining goals. [x]  See Plan of Care  []  See progress note/recertification  []  See Discharge Summary         Progress towards goals / Updated goals:  Short Term Goals: To be accomplished in 4 weeks:  1) Pt will be Independent with HEP  2) Pt will be able to perform supine exercises   3) Pt will be able to Ambulate greater than 10  minutes without pain greater than 5/10     Long Term Goals:  To be accomplished in 6-8 weeks:  1)  Pt will be able to Sit greater than 20 minutes without increased pain  2) Pt will be able to Stand greater than 10  minutes without increase of pain  3) Pt will be able to Ambulate greater than 20 minutes without increase of pain  4) Pt will have FOTO improvement by 10 points.          PLAN  [x]  Upgrade activities as tolerated     [x]  Continue plan of care  []  Update interventions per flow sheet       []  Discharge due to:_  []  Other:_      Glen Schmidt PTA, OPTA 12/13/2018  11:26 AM

## 2018-12-14 ENCOUNTER — OFFICE VISIT (OUTPATIENT)
Dept: INTERNAL MEDICINE CLINIC | Age: 48
End: 2018-12-14

## 2018-12-14 VITALS
HEART RATE: 87 BPM | OXYGEN SATURATION: 99 % | SYSTOLIC BLOOD PRESSURE: 104 MMHG | WEIGHT: 268 LBS | BODY MASS INDEX: 47.48 KG/M2 | RESPIRATION RATE: 16 BRPM | HEIGHT: 63 IN | DIASTOLIC BLOOD PRESSURE: 74 MMHG | TEMPERATURE: 98.1 F

## 2018-12-14 DIAGNOSIS — R29.898 WEAKNESS OF RIGHT LOWER EXTREMITY: ICD-10-CM

## 2018-12-14 DIAGNOSIS — M54.16 LUMBAR RADICULOPATHY: Primary | ICD-10-CM

## 2018-12-14 NOTE — PROGRESS NOTES
Leg Problem (right)       Subjective:   HPI     51year old Ms. Nava Dominique presents today with c/o worsening right leg weakness and lumbar pain she describes as an 8/10. Back Pain   She is using her cane, but walking and bending are difficult. It takes about a minute for her to get up from a sitting position, and straighten her spine to be able to walk. She continues with physical therapy 2 times a week and at this point she does not know if it is helping. She reports she does not have an appt with Dr. Dru Pearson or cannot schedule until January as he does not accept her [facility] or [facility]. This provider called other providers on patient's behalf and they,  as well, do not accept the Care Card. [employer] [address] [place] [address] was also accessed to obtain referral or resources in the [employer] system who may be able to see patient and was informed there is none in the orthopedic or neurosurgery realm. I explained to patient some of the specialist may accept a payment plan. She states she is not able to use [facility] services due pending legal issues. Past Medical History:   Diagnosis Date    Arthritis     Asthma     Bipolar disorder ([facility] [address]     Depression     IBS (irritable bowel syndrome)     Migraine     Neurological disorder     migraines    Peptic ulcer     Polyp cervix     Snoring     TIA (transient ischemic attack)        Past Surgical History:   Procedure Laterality Date    HX CERVICAL FUSION      C4-C7    HX CHOLECYSTECTOMY      HX CYST REMOVAL      from under both arms    HX HERNIA REPAIR  2009    HX ORTHOPAEDIC      left knee meniscus repair     HX ORTHOPAEDIC      ORIF left fibula    HX TUBAL LIGATION      HYSTEROSCOPY DIAGNOSTIC      x2       Prior to Admission medications    Medication Sig Start Date End Date Taking? Authorizing Provider   Ferrous Sulfate (SLOW FE) 47.5 mg iron TbER tablet Take 1 Tab by mouth nightly.    Yes Provider, Historical   albuterol (PROVENTIL HFA, VENTOLIN HFA, PROAIR HFA) 90 mcg/actuation inhaler Take 2 Puffs by inhalation every four (4) hours as needed for Wheezing or Shortness of Breath (cough). 6/4/18  Yes Zeferino Shin NP   furosemide (LASIX) 40 mg tablet Take 40 mg by mouth daily. Yes Other, MD Amina   cetirizine (ZYRTEC) 10 mg tablet Take 10 mg by mouth daily as needed for Allergies. Provider, Historical   fluticasone (FLONASE ALLERGY RELIEF) 50 mcg/actuation nasal spray 2 Sprays by Both Nostrils route daily as needed for Rhinitis. Provider, Historical   topiramate (TOPAMAX) 200 mg tablet Take 200 mg by mouth daily. Provider, Historical   potassium chloride SR (KLOR-CON 10) 10 mEq tablet Take 20 mEq by mouth daily. With Lasix. Provider, Historical   cyanocobalamin (VITAMIN B12) 100 mcg tablet Take 100 mcg by mouth daily. Provider, Historical   therapeutic multivitamin (THERA) tablet Take 1 Tab by mouth daily.     Provider, Historical        Allergies   Allergen Reactions    Latex Hives    Peanut Swelling    Shellfish Containing Products Anaphylaxis    Morphine Itching     She has had morphine but premedicates with benadryl    Nystatin Rash    Dilaudid [Hydromorphone (Bulk)] Itching     Must take with benadryl    Flexeril [Cyclobenzaprine] Rash    Other Medication Rash     All fruits except apple, oranges, and pineapple    Phenergan [Promethazine] Other (comments)     Rapid hr    Robaxin [Methocarbamol] Rash        Social History     Socioeconomic History    Marital status: SINGLE     Spouse name: Not on file    Number of children: Not on file    Years of education: Not on file    Highest education level: Not on file   Social Needs    Financial resource strain: Not on file    Food insecurity - worry: Not on file    Food insecurity - inability: Not on file   [employer] needs - medical: Not on file   [employer] needs - non-medical: Not on file   Occupational History    Not on file   Tobacco Use    Smoking status: Current Every Day Smoker     Packs/day: 0.25    Smokeless tobacco: Current User   Substance and Sexual Activity    Alcohol use: Yes     Comment: rare    Drug use: No    Sexual activity: Not Currently   Other Topics Concern    Not on file   Social History Narrative    Not on file        Family History   Problem Relation Age of Onset    Stroke Father     Hypertension Father     Heart Disease Maternal Grandmother     Cancer Maternal Grandmother         brain and colon    Cancer Maternal Uncle         5 w/ brain Tamar Plunk    MS Other         1st cousin    Bipolar Disorder Other           Review of Systems   Constitutional: Negative for chills, fever and malaise/fatigue. HENT: Negative for congestion, ear discharge, ear pain, nosebleeds, sinus pain and sore throat. Eyes: Negative for blurred vision, pain, discharge and redness. Respiratory: Negative for cough, shortness of breath and wheezing. Cardiovascular: Negative for chest pain, palpitations and leg swelling. Gastrointestinal: Negative for abdominal pain, constipation, diarrhea, heartburn, nausea and vomiting. Genitourinary: Negative for dysuria and flank pain. Musculoskeletal: Positive for back pain. Lower back pain with sciatica and right lower extremity weakness. She drags her right foot. Skin: Negative for rash. Neurological: Negative for dizziness, weakness and headaches. Psychiatric/Behavioral: Negative for depression. Objective:     Vitals:    12/14/18 0838   BP: 104/74   Pulse: 87   Resp: 16   Temp: 98.1 °F (36.7 °C)   TempSrc: Oral   SpO2: 99%   Weight: 268 lb (121.6 kg)   Height: 5' 3\" (1.6 m)        Physical Exam   Constitutional:   obesity   HENT:   Head: Normocephalic and atraumatic. Eyes: Conjunctivae are normal. Pupils are equal, round, and reactive to light. Neck: Normal range of motion. Neck supple. Cardiovascular: Normal rate, regular rhythm and normal heart sounds.    Pulmonary/Chest: Effort normal and breath sounds normal. No respiratory distress. She has no wheezes. She exhibits no tenderness. Musculoskeletal: She exhibits tenderness and deformity. Tenderness and pain in the lower back on palpation and movement with right leg weakness and foot drop and dragging of the right foot. Nursing note and vitals reviewed. Assessment/ Plan:       ICD-10-CM ICD-9-CM    1. Lumbar radiculopathy M54.16 724.4    2. Weakness of right lower extremity R29.898 729.89 AMB SUPPLY ORDER        Orders Placed This Encounter    AMB SUPPLY ORDER     One Back Brace      DX: Lumbar radiculopathy with Chronic Back Pain, right lower extremity weakness        I have reviewed the patient's medical history in detail and updated the computerized patient record. Discussed with patient the possibility of use of a back brace which may be beneficial as patient states she hears a \"pop\" sometimes when she changes position. An order was given, but encouraged her to also speak with the physical therapist about its use. Patient was cautioned about driving and the increased risk for falls due to weakness in her right extremity. TC to Physical Therapist Assistant, Margaux Fought concerning order for Back Brace and recommendations for other equipment that may be helpful due to right leg weakness and foot drop. She will discuss with Hernan Montgomery PT. We had a prolonged discussion about these complex clinical issues and went over the various important aspects to consider. All questions were answered. Advised her to call back,  return to office, or go to the ER if symptoms do not improve, change in nature, or persist, otherwise schedule f/u with either Dr. Zoie Marshall or myself in approximately 2-3 weeks. She was given an after visit summary or informed of [employer] Access which includes patient instructions, diagnoses, current medications, & vitals.     She expressed understanding with the diagnosis and plan and was given the opportunity to ask questions.     Schuyler Soriano, DNP

## 2018-12-14 NOTE — PATIENT INSTRUCTIONS
Back Pain: Care Instructions  Your Care Instructions    Back pain has many possible causes. It is often related to problems with muscles and ligaments of the back. It may also be related to problems with the nerves, discs, or bones of the back. Moving, lifting, standing, sitting, or sleeping in an awkward way can strain the back. Sometimes you don't notice the injury until later. Arthritis is another common cause of back pain. Although it may hurt a lot, back pain usually improves on its own within several weeks. Most people recover in 12 weeks or less. Using good home treatment and being careful not to stress your back can help you feel better sooner. Follow-up care is a key part of your treatment and safety. Be sure to make and go to all appointments, and call your doctor if you are having problems. It's also a good idea to know your test results and keep a list of the medicines you take. How can you care for yourself at home? · Sit or lie in positions that are most comfortable and reduce your pain. Try one of these positions when you lie down:  ? Lie on your back with your knees bent and supported by large pillows. ? Lie on the floor with your legs on the seat of a sofa or chair. ? Lie on your side with your knees and hips bent and a pillow between your legs. ? Lie on your stomach if it does not make pain worse. · Do not sit up in bed, and avoid soft couches and twisted positions. Bed rest can help relieve pain at first, but it delays healing. Avoid bed rest after the first day of back pain. · Change positions every 30 minutes. If you must sit for long periods of time, take breaks from sitting. Get up and walk around, or lie in a comfortable position. · Try using a heating pad on a low or medium setting for 15 to 20 minutes every 2 or 3 hours. Try a warm shower in place of one session with the heating pad. · You can also try an ice pack for 10 to 15 minutes every 2 to 3 hours.  Put a thin cloth between the ice pack and your skin. · Take pain medicines exactly as directed. ? If the doctor gave you a prescription medicine for pain, take it as prescribed. ? If you are not taking a prescription pain medicine, ask your doctor if you can take an over-the-counter medicine. · Take short walks several times a day. You can start with 5 to 10 minutes, 3 or 4 times a day, and work up to longer walks. Walk on level surfaces and avoid hills and stairs until your back is better. · Return to work and other activities as soon as you can. Continued rest without activity is usually not good for your back. · To prevent future back pain, do exercises to stretch and strengthen your back and stomach. Learn how to use good posture, safe lifting techniques, and proper body mechanics. When should you call for help? Call your doctor now or seek immediate medical care if:    · You have new or worsening numbness in your legs.     · You have new or worsening weakness in your legs. (This could make it hard to stand up.)     · You lose control of your bladder or bowels.    Watch closely for changes in your health, and be sure to contact your doctor if:    · You have a fever, lose weight, or don't feel well.     · You do not get better as expected. Where can you learn more? Go to http://willis-bianka.info/. Enter Z652 in the search box to learn more about \"Back Pain: Care Instructions. \"  Current as of: November 29, 2017  Content Version: 11.8  © 1127-8867 [employer]. Care instructions adapted under license by [employer] (which disclaims liability or warranty for this information). If you have questions about a medical condition or this instruction, always ask your healthcare professional. [place] [address] any warranty or liability for your use of this information.          Getting Back to Normal After Low Back Pain: Care Instructions  Your Care Instructions  Almost everyone has low back pain at some time. The good news is that most low back pain will go away in a few days or weeks with some basic self-care. Some people are afraid that doing too much may make their pain worse. In the past, people stayed in bed, thinking this would help their backs. Now doctors think that, in most cases, getting back to your normal activities is good for your back, as long as you avoid doing things that make your pain worse. Follow-up care is a key part of your treatment and safety. Be sure to make and go to all appointments, and call your doctor if you are having problems. It's also a good idea to know your test results and keep a list of the medicines you take. How can you care for yourself at home? Ease back into daily activities  · For the first day or two of pain, take it easy. But as soon as possible, get back to your normal daily life and activities. · Get gentle exercise, such as walking. Movement keeps your spine flexible and helps your muscles stay strong. · If you are an athlete, return to your activity carefully. Choose a low-impact option until your pain is under control. Avoid or change activities that cause pain  · Try to avoid too much bending, heavy lifting, or reaching. These movements put extra stress on your back. · In bed, try lying on your side with a pillow between your knees. Or lie on your back on the floor with a pillow under your knees. · When you sit, place a small pillow, a rolled-up towel, or a lumbar roll in the curve of your back for extra support. · Try putting one foot up on a stool or changing positions every few minutes if you have to stand still for a period of time. Pay attention to body mechanics and posture  Body mechanics are the way you use your body. Posture is the way you sit or stand. · Take extra care when you lift. When you must lift, bend your knees and keep your back straight.  Avoid twisting, and keep the load close to your body.  · Stand or sit tall, with your shoulders back and your stomach pulled in to support your back. Get support when you need it  · Let people know when you need a helping hand. Get family members or friends to help out with tasks you cannot do right now. · Be honest with your doctor about how the pain affects you. · If you have had to take time off work, talk to your doctor and boss about a gradual [id number] plan. Find out if there are other ways you could do your job to avoid hurting your back again. Reduce stress  Worrying about the pain can cause you to tense the muscles in your lower back. This in turn causes more pain. Here are a few things you can do to relax your mind and your muscles:  · Take 10 to 15 minutes to sit quietly and breathe deeply. Try to focus only on your breathing. If you cannot keep thoughts away, think about things that make you feel good. · Get involved in your favorite hobby, or try something new. · Talk to a friend, read a book, or listen to your favorite music. · Find a counselor you like and trust. Talk openly and honestly about your problems. Be willing to make some changes. When should you call for help? Call 911 anytime you think you may need emergency care. For example, call if:    · You are unable to move a leg at all.   [facility] your doctor now or seek immediate medical care if:    · You have new or worse symptoms in your legs, belly, or buttocks. Symptoms may include:  ? Numbness or tingling. ? Weakness. ? Pain.     · You lose bladder or bowel control.    Watch closely for changes in your health, and be sure to contact your doctor if:    · You have a fever, lose weight, or don't feel well.     · You are not getting better as expected. Where can you learn more? Go to http://willis-bianka.info/. Enter X576 in the search box to learn more about \"Getting Back to Normal After Low Back Pain: Care Instructions. \"  Current as of: November 29, 2017  Content Version: 11.8  © 0370-5614 [employer]. Care instructions adapted under license by [employer] (which disclaims liability or warranty for this information). If you have questions about a medical condition or this instruction, always ask your healthcare professional. [address] any warranty or liability for your use of this information.

## 2018-12-14 NOTE — PROGRESS NOTES
1. Have you been to the ER, urgent care clinic since your last visit? Hospitalized since your last visit?no    2. Have you seen or consulted any other health care providers outside of the [address] [place] [address] since your last visit? Include any pap smears or colon screening. No    PHQ over the last two weeks 10/11/2018   PHQ Not Done Patient Decline   Little interest or pleasure in doing things Several days   Feeling down, depressed, irritable, or hopeless Several days   Total Score PHQ 2 2   Trouble falling or staying asleep, or sleeping too much Several days   Feeling tired or having little energy Several days   Poor appetite, weight loss, or overeating Not at all   Feeling bad about yourself - or that you are a failure or have let yourself or your family down Not at all   Trouble concentrating on things such as school, work, reading, or watching TV Not at all   Moving or speaking so slowly that other people could have noticed; or the opposite being so fidgety that others notice Not at all   Thoughts of being better off dead, or hurting yourself in some way Not at all   PHQ 9 Score 4   How difficult have these problems made it for you to do your work, take care of your home and get along with others Not difficult at all       Per Taya Cervantes NP,  verbal order given for needed amb poc labs.   Chief Complaint   Patient presents with    Leg Problem

## 2018-12-18 ENCOUNTER — HOSPITAL ENCOUNTER (OUTPATIENT)
Dept: PHYSICAL THERAPY | Age: 48
Discharge: HOME | End: 2018-12-18
Payer: SUBSIDIZED

## 2018-12-18 PROCEDURE — 97014 ELECTRIC STIMULATION THERAPY: CPT | Performed by: PHYSICAL THERAPIST

## 2018-12-18 NOTE — PROGRESS NOTES
PT DAILY TREATMENT NOTE 2-15    Patient Name: Alexandre Arcos  Date:2018  : 1970  [x]  Patient  Verified  Payor: JM / Plan: [facility] 100% / Product Type: Jm /    In time:9:30a Out time:10:30a  Total Treatment Time (min):60  Visit #: 5    Treatment Area: Back pain [M54.9]    SUBJECTIVE  Pain Level (0-10 scale): 9/10  Any medication changes, allergies to medications, adverse drug reactions, diagnosis change, or new procedure performed?: [x] No    [] Yes (see summary sheet for update)  Subjective functional status/changes:   [] No changes reported  The pt ambulates into PT with SPC that she purchased since last session. She is dragging her right toe along the ground with poor hip flexion and minimal DF. She reports that since she heard and felt a pop last week that her right leg is feeling weaker and she has more numbness in it. She presents with a prescription for a back brace written by PCP on Friday reporting that they want me to be seen by a neuro surgeon, but my insurance doesn't cover one until affer the first of the year. The back brace will help with stability until she is able to be seen.      OBJECTIVE            Modality rationale: decrease pain and increase tissue extensibility to improve the patients ability to decrease low back pain   Min Type Additional Details    15    [x] Estim: []Att   [x]Unatt    []TENS instruct                  [x]IFC  []Premod   []NMES                     []Other:  []w/US   []w/ice   [x]w/heat  Position:prone  Location: low back         []  Traction: [] Cervical       []Lumbar                       [] Prone          []Supine                       []Intermittent   []Continuous Lbs:  [] before manual  [] after manual  []w/heat      []  Ultrasound: []Continuous   [] Pulsed                       at: []1MHz   []3MHz Location:  W/cm2:      [] Paraffin         Location:   []w/heat      []  Ice     []  Heat  []  Ice massage Position:  Location:      []  Laser  []  Other: Position:  Location:         []  Vasopneumatic Device Pressure:       [] lo [] med [] hi   Temperature:        [x] Skin assessment post-treatment:  [x]intact []redness- no adverse reaction     -- min Therapeutic Exercise:  [x] See flow sheet :   Rationale: increase ROM, increase strength and improve coordination to improve the patients ability to improve function and mobility     -- min Therapeutic Activity:  [x] See flow sheet : sitting posture relative to midline training. Training in proper sit to supine transfers, sit to stand trasnfers   Rationale: increase ROM, increase strength and improve coordination to improve the patients ability to improve function and mobility     - min Gait Training:  [x] See flow sheet : training on use of SPC ambulated 15' x 2 MIN A x2 with VC on proper sequence of AD and foot placement, focus on weight shifting using SPC to allow R foot clearance. Rationale: increase ROM, increase strength and improve coordination to improve the patients ability to improve function and mobility     -  With   [] TE   [] TA   [] neuro   [] other: Patient Education: [] Review HEP    [] Progressed/Changed HEP based on:   [] positioning   [] body mechanics   [x] transfers   [] heat/ice application    [x] other:          Other Objective/Functional Measures: --     Pain Level (0-10 scale) post treatment: 7/10     ASSESSMENT/Changes in Function:   Pt had poor tolerance to PT today. Unable to perform any exercises in supine and was unable to tolerate exercises such as passive/active assisted knee flexion, TA, prone on elbows. Feels most comfortable in prone, but increased pain beyond neutral.   Pain in her leg and back  with resisted DF NWB today limiting strength testing. My concern is that her right LE s/s are progressively gotten worse since I have evaluated her and after her recent complaint of her back popping again.   She has not been able to tolerated Therapeutic exercises, or manual techniques to address her impairments. She continues to report she was told her back is unstable and has shifted. I feel it is best to refer her back to MD at this time and will hold PT  until she follows up with MD and or/ neuro surgeon due to severity of her s/s. Patient agreed and feels this is best as well. Recommended she return to MD if any symptoms worsen. Pt has MD appointment scheduled for Thursday Morning for follow up. We have educated her on posture, body mechanics and gait thus far and feel she has a good understanding of our teachings. Plan to reach out in reference to brace to clinic rep.        Patient will continue to benefit from skilled PT services to modify and progress therapeutic interventions, address functional mobility deficits, address ROM deficits, address strength deficits, analyze and address soft tissue restrictions, analyze and cue movement patterns, analyze and modify body mechanics/ergonomics and assess and modify postural abnormalities to attain remaining goals. [x]  See Plan of Care  []  See progress note/recertification  []  See Discharge Summary         Progress towards goals / Updated goals: NOT MET  Short Term Goals: To be accomplished in 4 weeks:  1) Pt will be Independent with HEP  2) Pt will be able to perform supine exercises   3) Pt will be able to Ambulate greater than 10  minutes without pain greater than 5/10     Long Term Goals: To be accomplished in 6-8 weeks: NOT MET  1)  Pt will be able to Sit greater than 20 minutes without increased pain  2) Pt will be able to Stand greater than 10  minutes without increase of pain  3) Pt will be able to Ambulate greater than 20 minutes without increase of pain  4) Pt will have FOTO improvement by 10 points.            PLAN  []  Upgrade activities as tolerated     []  Continue plan of care  []  Update interventions per flow sheet       [x]  Discharge due to:HOLD PT at this time.   []  Other:_ Padma Zuñiga 12/18/2018  11:36 AM

## 2018-12-20 ENCOUNTER — APPOINTMENT (OUTPATIENT)
Dept: PHYSICAL THERAPY | Age: 48
End: 2018-12-20
Payer: SUBSIDIZED

## 2018-12-20 ENCOUNTER — APPOINTMENT (OUTPATIENT)
Dept: MRI IMAGING | Age: 48
DRG: 552 | End: 2018-12-20
Attending: NURSE PRACTITIONER
Payer: SUBSIDIZED

## 2018-12-20 ENCOUNTER — APPOINTMENT (OUTPATIENT)
Dept: CT IMAGING | Age: 48
DRG: 552 | End: 2018-12-20
Attending: NURSE PRACTITIONER
Payer: SUBSIDIZED

## 2018-12-20 ENCOUNTER — OFFICE VISIT (OUTPATIENT)
Dept: INTERNAL MEDICINE CLINIC | Age: 48
End: 2018-12-20

## 2018-12-20 ENCOUNTER — APPOINTMENT (OUTPATIENT)
Dept: GENERAL RADIOLOGY | Age: 48
DRG: 552 | End: 2018-12-20
Attending: NEUROLOGICAL SURGERY
Payer: SUBSIDIZED

## 2018-12-20 ENCOUNTER — HOSPITAL ENCOUNTER (INPATIENT)
Age: 48
LOS: 7 days | Discharge: HOME HEALTH | DRG: 552 | End: 2018-12-27
Attending: EMERGENCY MEDICINE | Admitting: INTERNAL MEDICINE
Payer: SUBSIDIZED

## 2018-12-20 VITALS
RESPIRATION RATE: 16 BRPM | TEMPERATURE: 96.4 F | HEART RATE: 68 BPM | OXYGEN SATURATION: 99 % | BODY MASS INDEX: 47.47 KG/M2 | DIASTOLIC BLOOD PRESSURE: 69 MMHG | SYSTOLIC BLOOD PRESSURE: 128 MMHG | HEIGHT: 63 IN

## 2018-12-20 DIAGNOSIS — M54.50 LUMBAR BACK PAIN: ICD-10-CM

## 2018-12-20 DIAGNOSIS — R29.898 WEAKNESS OF RIGHT LOWER EXTREMITY: ICD-10-CM

## 2018-12-20 DIAGNOSIS — M54.16 LUMBAR RADICULOPATHY: ICD-10-CM

## 2018-12-20 DIAGNOSIS — M54.16 LUMBAR RADICULOPATHY: Primary | ICD-10-CM

## 2018-12-20 DIAGNOSIS — R29.818 NEUROLOGICAL DEFICIT PRESENT: Primary | ICD-10-CM

## 2018-12-20 PROBLEM — G43.909 MIGRAINE: Status: ACTIVE | Noted: 2018-12-20

## 2018-12-20 PROBLEM — M53.9 MULTILEVEL DEGENERATIVE DISC DISEASE: Status: ACTIVE | Noted: 2018-12-20

## 2018-12-20 PROBLEM — M21.371 RIGHT FOOT DROP: Status: ACTIVE | Noted: 2018-12-20

## 2018-12-20 LAB
APPEARANCE UR: ABNORMAL
BACTERIA URNS QL MICRO: NEGATIVE /HPF
BILIRUB UR QL: NEGATIVE
COLOR UR: ABNORMAL
EPITH CASTS URNS QL MICRO: ABNORMAL /LPF
GLUCOSE UR STRIP.AUTO-MCNC: NEGATIVE MG/DL
HCG UR QL: NEGATIVE
HGB UR QL STRIP: ABNORMAL
KETONES UR QL STRIP.AUTO: NEGATIVE MG/DL
LEUKOCYTE ESTERASE UR QL STRIP.AUTO: NEGATIVE
MUCOUS THREADS URNS QL MICRO: ABNORMAL /LPF
NITRITE UR QL STRIP.AUTO: NEGATIVE
PH UR STRIP: 6 [PH] (ref 5–8)
PROT UR STRIP-MCNC: NEGATIVE MG/DL
RBC #/AREA URNS HPF: ABNORMAL /HPF (ref 0–5)
SP GR UR REFRACTOMETRY: 1.03 (ref 1–1.03)
UR CULT HOLD, URHOLD: NORMAL
UROBILINOGEN UR QL STRIP.AUTO: 0.2 EU/DL (ref 0.2–1)
WBC URNS QL MICRO: ABNORMAL /HPF (ref 0–4)

## 2018-12-20 PROCEDURE — 72120 X-RAY BEND ONLY L-S SPINE: CPT

## 2018-12-20 PROCEDURE — 51798 US URINE CAPACITY MEASURE: CPT

## 2018-12-20 PROCEDURE — 74011250636 HC RX REV CODE- 250/636: Performed by: NEUROLOGICAL SURGERY

## 2018-12-20 PROCEDURE — 96374 THER/PROPH/DIAG INJ IV PUSH: CPT

## 2018-12-20 PROCEDURE — 83735 ASSAY OF MAGNESIUM: CPT

## 2018-12-20 PROCEDURE — 36415 COLL VENOUS BLD VENIPUNCTURE: CPT

## 2018-12-20 PROCEDURE — 96375 TX/PRO/DX INJ NEW DRUG ADDON: CPT

## 2018-12-20 PROCEDURE — 72131 CT LUMBAR SPINE W/O DYE: CPT

## 2018-12-20 PROCEDURE — 72148 MRI LUMBAR SPINE W/O DYE: CPT

## 2018-12-20 PROCEDURE — 72114 X-RAY EXAM L-S SPINE BENDING: CPT

## 2018-12-20 PROCEDURE — 74011250636 HC RX REV CODE- 250/636: Performed by: FAMILY MEDICINE

## 2018-12-20 PROCEDURE — 81001 URINALYSIS AUTO W/SCOPE: CPT

## 2018-12-20 PROCEDURE — 65270000029 HC RM PRIVATE

## 2018-12-20 PROCEDURE — 74011250636 HC RX REV CODE- 250/636: Performed by: INTERNAL MEDICINE

## 2018-12-20 PROCEDURE — 99284 EMERGENCY DEPT VISIT MOD MDM: CPT

## 2018-12-20 PROCEDURE — 81025 URINE PREGNANCY TEST: CPT

## 2018-12-20 PROCEDURE — 96376 TX/PRO/DX INJ SAME DRUG ADON: CPT

## 2018-12-20 PROCEDURE — 74011250636 HC RX REV CODE- 250/636: Performed by: NURSE PRACTITIONER

## 2018-12-20 PROCEDURE — 85025 COMPLETE CBC W/AUTO DIFF WBC: CPT

## 2018-12-20 PROCEDURE — 80053 COMPREHEN METABOLIC PANEL: CPT

## 2018-12-20 PROCEDURE — 83605 ASSAY OF LACTIC ACID: CPT

## 2018-12-20 RX ORDER — SODIUM CHLORIDE 0.9 % (FLUSH) 0.9 %
5-10 SYRINGE (ML) INJECTION AS NEEDED
Status: DISCONTINUED | OUTPATIENT
Start: 2018-12-20 | End: 2018-12-27 | Stop reason: HOSPADM

## 2018-12-20 RX ORDER — CETIRIZINE HCL 10 MG
10 TABLET ORAL
COMMUNITY
End: 2020-03-03 | Stop reason: ALTCHOICE

## 2018-12-20 RX ORDER — KETOROLAC TROMETHAMINE 30 MG/ML
30 INJECTION, SOLUTION INTRAMUSCULAR; INTRAVENOUS
Status: COMPLETED | OUTPATIENT
Start: 2018-12-20 | End: 2018-12-20

## 2018-12-20 RX ORDER — MORPHINE SULFATE 10 MG/ML
2 INJECTION, SOLUTION INTRAMUSCULAR; INTRAVENOUS
Status: DISCONTINUED | OUTPATIENT
Start: 2018-12-20 | End: 2018-12-27 | Stop reason: HOSPADM

## 2018-12-20 RX ORDER — TOPIRAMATE 200 MG/1
200 TABLET ORAL DAILY
COMMUNITY
End: 2019-08-07 | Stop reason: SDUPTHER

## 2018-12-20 RX ORDER — DEXAMETHASONE SODIUM PHOSPHATE 4 MG/ML
4 INJECTION, SOLUTION INTRA-ARTICULAR; INTRALESIONAL; INTRAMUSCULAR; INTRAVENOUS; SOFT TISSUE EVERY 6 HOURS
Status: DISCONTINUED | OUTPATIENT
Start: 2018-12-20 | End: 2018-12-22

## 2018-12-20 RX ORDER — POTASSIUM CHLORIDE 750 MG/1
20 TABLET, FILM COATED, EXTENDED RELEASE ORAL DAILY
COMMUNITY
End: 2021-06-07

## 2018-12-20 RX ORDER — MORPHINE SULFATE 2 MG/ML
2 INJECTION, SOLUTION INTRAMUSCULAR; INTRAVENOUS
Status: COMPLETED | OUTPATIENT
Start: 2018-12-20 | End: 2018-12-20

## 2018-12-20 RX ORDER — SODIUM CHLORIDE 0.9 % (FLUSH) 0.9 %
5-10 SYRINGE (ML) INJECTION EVERY 8 HOURS
Status: DISCONTINUED | OUTPATIENT
Start: 2018-12-20 | End: 2018-12-27 | Stop reason: HOSPADM

## 2018-12-20 RX ORDER — THERA TABS 400 MCG
1 TAB ORAL DAILY
COMMUNITY

## 2018-12-20 RX ORDER — HEPARIN SODIUM 5000 [USP'U]/ML
5000 INJECTION, SOLUTION INTRAVENOUS; SUBCUTANEOUS EVERY 8 HOURS
Status: DISCONTINUED | OUTPATIENT
Start: 2018-12-20 | End: 2018-12-22

## 2018-12-20 RX ORDER — KETOROLAC TROMETHAMINE 30 MG/ML
30 INJECTION, SOLUTION INTRAMUSCULAR; INTRAVENOUS
Status: DISCONTINUED | OUTPATIENT
Start: 2018-12-20 | End: 2018-12-20

## 2018-12-20 RX ORDER — NALOXONE HYDROCHLORIDE 0.4 MG/ML
0.4 INJECTION, SOLUTION INTRAMUSCULAR; INTRAVENOUS; SUBCUTANEOUS AS NEEDED
Status: DISCONTINUED | OUTPATIENT
Start: 2018-12-20 | End: 2018-12-27 | Stop reason: HOSPADM

## 2018-12-20 RX ORDER — LORAZEPAM 2 MG/ML
1 INJECTION INTRAMUSCULAR ONCE
Status: COMPLETED | OUTPATIENT
Start: 2018-12-20 | End: 2018-12-20

## 2018-12-20 RX ORDER — DIPHENHYDRAMINE HYDROCHLORIDE 50 MG/ML
12.5 INJECTION, SOLUTION INTRAMUSCULAR; INTRAVENOUS
Status: DISCONTINUED | OUTPATIENT
Start: 2018-12-20 | End: 2018-12-27 | Stop reason: HOSPADM

## 2018-12-20 RX ORDER — FLUTICASONE PROPIONATE 50 MCG
2 SPRAY, SUSPENSION (ML) NASAL
COMMUNITY
End: 2021-08-06 | Stop reason: SDUPTHER

## 2018-12-20 RX ORDER — UREA 10 %
100 LOTION (ML) TOPICAL AS NEEDED
COMMUNITY

## 2018-12-20 RX ADMIN — HEPARIN SODIUM 5000 UNITS: 5000 INJECTION INTRAVENOUS; SUBCUTANEOUS at 19:37

## 2018-12-20 RX ADMIN — MORPHINE SULFATE 2 MG: 10 INJECTION INTRAVENOUS at 19:38

## 2018-12-20 RX ADMIN — SODIUM CHLORIDE 500 ML: 900 INJECTION, SOLUTION INTRAVENOUS at 23:21

## 2018-12-20 RX ADMIN — KETOROLAC TROMETHAMINE 30 MG: 30 INJECTION, SOLUTION INTRAMUSCULAR at 09:56

## 2018-12-20 RX ADMIN — DEXAMETHASONE SODIUM PHOSPHATE 4 MG: 4 INJECTION, SOLUTION INTRAMUSCULAR; INTRAVENOUS at 19:37

## 2018-12-20 RX ADMIN — LORAZEPAM 1 MG: 2 INJECTION INTRAMUSCULAR; INTRAVENOUS at 15:13

## 2018-12-20 RX ADMIN — MORPHINE SULFATE 2 MG: 2 INJECTION, SOLUTION INTRAMUSCULAR; INTRAVENOUS at 11:26

## 2018-12-20 RX ADMIN — MORPHINE SULFATE 2 MG: 2 INJECTION, SOLUTION INTRAMUSCULAR; INTRAVENOUS at 14:56

## 2018-12-20 NOTE — H&P
Hospitalist Admission Note    NAME:  Evy Sin   :   1970   MRN:   [id number]     Date/Time:  2018 6:05 PM    Subjective:     CHIEF COMPLAINT:    Chief Complaint   Patient presents with    Back Pain       HISTORY OF PRESENT ILLNESS:     Mahnaz Ledezma is a 51 y.o.  female with h/o low back pain,morbid obesity,presented to ED complaining of low back pain in the right side associatated with right leg numbness. Patient says that last Friday she was walking to the bathroom and felt a pop in her lower back. Since then she has been dragging her right leg. She has spent most of her time in the bed. [place] days ago she started having weakness of right leg. Also pain sensation in medial aspect of right thigh. Yesterday her daughter bought her a cane since she had difficulty walking. Today morning she felt another pop in the lower back and the pain was so severe prompting her ED visit. Patient says that she had a similar episode in 2018 and spent a week at [facility] ,then was discharged to a rehab facility where she spent one week. At [facility] she was told that she had arthritis and bulging discs. In ED today,CT of lumbar area showed multilevel lumbosacral degenerative disc disease greatest at L4-5,no acute process. ED consulted neurosurgery who recommended starting patient on steroids and will see patient. Patient denying urinary or fecal incontinence.     Past Medical History:   Diagnosis Date    Arthritis     Asthma     Bipolar disorder ([facility] [address]     Depression     IBS (irritable bowel syndrome)     Migraine     Neurological disorder     migraines    Peptic ulcer     Polyp cervix     Snoring     TIA (transient ischemic attack)         Social History     Tobacco Use    Smoking status: Current Every Day Smoker     Packs/day: 0.25    Smokeless tobacco: Current User   Substance Use Topics    Alcohol use: Yes     Comment: rare        Family History   Problem Relation Age of Onset    Stroke Father     Hypertension Father     Heart Disease Maternal Grandmother     Cancer Maternal Grandmother         brain and colon    Cancer Maternal Uncle         5 w/ brain Marlyne Senior    MS Other         1st cousin    Bipolar Disorder Other         Allergies   Allergen Reactions    Latex Hives    Peanut Swelling    Shellfish Containing Products Anaphylaxis    Morphine Itching     She has had morphine but premedicates with benadryl    Nystatin Rash    Dilaudid [Hydromorphone (Bulk)] Itching     Must take with benadryl    Flexeril [Cyclobenzaprine] Rash    Other Medication Rash     All fruits except apple, oranges, and pineapple    Phenergan [Promethazine] Other (comments)     Rapid hr    Robaxin [Methocarbamol] Rash        Prior to Admission medications    Medication Sig Start Date End Date Taking? Authorizing Provider   cetirizine (ZYRTEC) 10 mg tablet Take 10 mg by mouth daily as needed for Allergies. Yes Provider, Historical   fluticasone (FLONASE ALLERGY RELIEF) 50 mcg/actuation nasal spray 2 Sprays by Both Nostrils route daily as needed for Rhinitis. Yes Provider, Historical   topiramate (TOPAMAX) 200 mg tablet Take 200 mg by mouth daily. Yes Provider, Historical   potassium chloride SR (KLOR-CON 10) 10 mEq tablet Take 20 mEq by mouth daily. With Lasix. Yes Provider, Historical   cyanocobalamin (VITAMIN B12) 100 mcg tablet Take 100 mcg by mouth daily. Yes Provider, Historical   therapeutic multivitamin (THERA) tablet Take 1 Tab by mouth daily. Yes Provider, Historical   Ferrous Sulfate (SLOW FE) 47.5 mg iron TbER tablet Take 1 Tab by mouth nightly. Yes Provider, Historical   albuterol (PROVENTIL HFA, VENTOLIN HFA, PROAIR HFA) 90 mcg/actuation inhaler Take 2 Puffs by inhalation every four (4) hours as needed for Wheezing or Shortness of Breath (cough). 6/4/18  Yes rock [facility], NP   furosemide (LASIX) 40 mg tablet Take 40 mg by mouth daily.    Yes Other, MD Amina       REVIEW OF SYSTEMS:    Constitutional:  No fever or weight loss  HEENT:  No headache or visual changes  Cardiovascular:  No chest pain, no palpitations. Respiratory:  No coughing, wheezing, or shortness of breath. GI:  No abdominal pain. No nausea or vomiting. No diarrhea  :  No hematuria or dysuria. No frequency, retention, urinary incontinence. Skin:  No rashes or moles  Musculoskeletal:severe lower back pain. Neuro:  Numbness sensation rt leg. No seizures or syncope  Hematological:  No bruising or bleeding  Endocrine:  No diabetes or thyroid disease  Objective:   VITALS:       Visit Vitals  /84   Pulse 83   Temp 98.1 °F (36.7 °C)   Resp 16   Ht 5' 3\" (1.6 m)   Wt 121.6 kg (268 lb)   SpO2 100%   BMI 47.47 kg/m²       O2 Device: Room air    PHYSICAL EXAM:   General:    Lying in bed in no acute distress. HEENT:  Pupils equal.  Sclera anicteric. Conjunctiva pink. Mucous membranes                           moist  Neck:  Supple. Trachea midline. No accessory muscle use. No thyromegaly. No jugular venous distention  CV:                  Regular rate and rhythm. Lungs:   Clear to auscultation bilaterally. No Wheezing or Rhonchi. No rales. Normal to percussion  Abdomen:   Soft, non-tender. Not distended. Bowel sounds normal. No organomegaly  Extremities: Rt foot drop. No cyanosis. No edema. No clubbing  Back:               Diffuse tenderness lumbar area,staright leg raise positive right at 30 degrees. Neurologic: Alert and oriented X 3. Rt foot drop. Staright leg raise positive. Skin:                Warm and dry. No rashes.        LAB DATA REVIEWED:    Recent Results (from the past 24 hour(s))   URINALYSIS W/MICROSCOPIC    Collection Time: 12/20/18  9:57 AM   Result Value Ref Range    Color YELLOW/STRAW      Appearance CLOUDY (A) CLEAR      Specific gravity 1.029 1.003 - 1.030      pH (UA) 6.0 5.0 - 8.0      Protein NEGATIVE  NEG mg/dL    Glucose NEGATIVE  NEG mg/dL    Ketone NEGATIVE  NEG mg/dL Bilirubin NEGATIVE  NEG      Blood SMALL (A) NEG      Urobilinogen 0.2 0.2 - 1.0 EU/dL    Nitrites NEGATIVE  NEG      Leukocyte Esterase NEGATIVE  NEG      WBC 0-4 0 - 4 /hpf    RBC 5-10 0 - 5 /hpf    Epithelial cells MANY (A) FEW /lpf    Bacteria NEGATIVE  NEG /hpf    Mucus 1+ (A) NEG /lpf   URINE CULTURE HOLD SAMPLE    Collection Time: 12/20/18  9:57 AM   Result Value Ref Range    Urine culture hold        URINE ON HOLD IN MICROBIOLOGY DEPT FOR 3 DAYS. IF UNPRESERVED URINE IS SUBMITTED, IT CANNOT BE USED FOR ADDITIONAL TESTING AFTER 24 HRS, RECOLLECTION WILL BE REQUIRED. HCG URINE, QL. - POC    Collection Time: 12/20/18  9:58 AM   Result Value Ref Range    Pregnancy test,urine (POC) NEGATIVE  NEG         Assessment/Plan:      Principal Problem:    Lumbar radiculopathy (12/20/2018)    Active Problems:    Obesity, morbid ([address] (10/11/2018)      Right foot drop (12/20/2018)      Lower back pain (12/20/2018)      Migraine (12/20/2018)      Multilevel degenerative disc disease (12/20/2018)       ___________________________________________________  PLAN:    1. Lumbar radiculopathy/Intractable lower back pain   -CT lumbar c/w multilevel lumbosacral degenerative disc disease greatest at L4-5. No acute abnormality.   -Morphine for pain prn   -Zofran for nausea. -Decadron 4 mg iv q 6 hrs.   -Neurosurgery consulted,will see patient   -PT/OT    2. Right foot drop   -PT/OT   -May need a splint    3. Obesity, morbid   -Supportive care   -Advised weight loss    4. Migraine   -On topamax    DVT prophylaxis:sc heparin  Full code  Disposition:tbd    __________________________________________________  Admitting Physician: Lily Oshea MD

## 2018-12-20 NOTE — PROGRESS NOTES
Admission Medication Reconciliation:    Information obtained from: Patient and Rx Query    Significant PMH/Disease States:   Past Medical History:   Diagnosis Date    Arthritis     Asthma     Bipolar disorder ([address]     Depression     IBS (irritable bowel syndrome)     Migraine     Neurological disorder     migraines    Peptic ulcer     Polyp cervix     Snoring     TIA (transient ischemic attack)        Chief Complaint for this Admission:    Chief Complaint   Patient presents with    Back Pain       Allergies:  Latex; Peanut; Shellfish containing products; Morphine; Nystatin; Dilaudid [hydromorphone (bulk)]; Flexeril [cyclobenzaprine]; Other medication; Phenergan [promethazine]; and Robaxin [methocarbamol]    Prior to Admission Medications:   Prior to Admission Medications   Prescriptions Last Dose Informant Patient Reported? Taking? Ferrous Sulfate (SLOW FE) 47.5 mg iron TbER tablet 2018 at Unknown time  Yes Yes   Sig: Take 1 Tab by mouth nightly. albuterol (PROVENTIL HFA, VENTOLIN HFA, PROAIR HFA) 90 mcg/actuation inhaler   No Yes   Sig: Take 2 Puffs by inhalation every four (4) hours as needed for Wheezing or Shortness of Breath (cough). cetirizine (ZYRTEC) 10 mg tablet   Yes Yes   Sig: Take 10 mg by mouth daily as needed for Allergies. cyanocobalamin (VITAMIN B12) 100 mcg tablet 2018 at Unknown time  Yes Yes   Sig: Take 100 mcg by mouth daily. fluticasone (FLONASE ALLERGY RELIEF) 50 mcg/actuation nasal spray   Yes Yes   Si Sprays by Both Nostrils route daily as needed for Rhinitis. furosemide (LASIX) 40 mg tablet 2018 at Unknown time  Yes Yes   Sig: Take 40 mg by mouth daily. potassium chloride SR (KLOR-CON 10) 10 mEq tablet 2018 at Unknown time  Yes Yes   Sig: Take 20 mEq by mouth daily. With Lasix. therapeutic multivitamin (THERA) tablet 2018 at Unknown time  Yes Yes   Sig: Take 1 Tab by mouth daily.    topiramate (TOPAMAX) 200 mg tablet 2018 at Unknown time  Yes Yes   Sig: Take 200 mg by mouth daily. Facility-Administered Medications: None         Comments/Recommendations:     Spoke with patient regarding allergies and PTA medications. 1) Reviewed and confirmed allergies. 2) Updated PTA medication list. Added B12 and multivitamin. Modified Zyrtec (changed from daily to daily prn), Slow Fe (changed from daily to QHS), Flonase (changed from daily to daily prn), potassium (changed from 20 mEq BID to daily), and topiramate (added dose and frequency). Removed duplicate albuterol and potassium, diazepam, diclofenac, and paroxetine. Last dose information listed in table above.       Rabia Fleming, PharmD

## 2018-12-20 NOTE — PROGRESS NOTES
Referral Request and Back Pain (slipped, fell; pain result of bending over and reaching for pan )       Subjective:   HPI     51year old Ms. Ira Harris presents in excrusiating back pain and right lower extremity weakness to the point she is dragging her foot and leg. Her pain is rated a 10/10. She is walking with assistance of a cane. PT has discharged her due to these worsening symptoms. She was referred to neurosurgery for further evaluation but cannot afford the office visit and her Medicaid insurance does not start until January. She is unable to stand, sit or walk for prolonged periods. Past Medical History:   Diagnosis Date    Arthritis     Asthma     B[address] [address]     Depression     IBS (irritable bowel syndrome)     Migraine     Neurological disorder     migraines    Peptic ulcer     Polyp cervix     Snoring     TIA (transient ischemic attack)        Past Surgical History:   Procedure Laterality Date    HX CERVICAL FUSION      C4-C7    HX CHOLECYSTECTOMY      HX CYST REMOVAL      from under both arms    HX HERNIA REPAIR  2009    HX ORTHOPAEDIC      left knee meniscus repair     HX ORTHOPAEDIC      ORIF left fibula    HX TUBAL LIGATION      HYSTEROSCOPY DIAGNOSTIC      x2       Prior to Admission medications    Medication Sig Start Date End Date Taking? Authorizing Provider   Ferrous Sulfate (SLOW FE) 47.5 mg iron TbER tablet Take 1 Tab by mouth nightly. Yes Provider, Historical   albuterol (PROVENTIL HFA, VENTOLIN HFA, PROAIR HFA) 90 mcg/actuation inhaler Take 2 Puffs by inhalation every four (4) hours as needed for Wheezing or Shortness of Breath (cough). 6/4/18  Yes Kerri Shin, NP   furosemide (LASIX) 40 mg tablet Take 40 mg by mouth daily. Yes Other, MD Amina   cetirizine (ZYRTEC) 10 mg tablet Take 10 mg by mouth daily as needed for Allergies.     Provider, Historical   fluticasone (FLONASE ALLERGY RELIEF) 50 mcg/actuation nasal spray 2 Sprays by Both Nostrils route daily as needed for Rhinitis. Provider, Historical   topiramate (TOPAMAX) 200 mg tablet Take 200 mg by mouth daily. Provider, Historical   potassium chloride SR (KLOR-CON 10) 10 mEq tablet Take 20 mEq by mouth daily. With Lasix. Provider, Historical   cyanocobalamin (VITAMIN B12) 100 mcg tablet Take 100 mcg by mouth daily. Provider, Historical   therapeutic multivitamin (THERA) tablet Take 1 Tab by mouth daily.     Provider, Historical        Allergies   Allergen Reactions    Latex Hives    Peanut Swelling    Shellfish Containing Products Anaphylaxis    Morphine Itching     She has had morphine but premedicates with benadryl    Nystatin Rash    Dilaudid [Hydromorphone (Bulk)] Itching     Must take with benadryl    Flexeril [Cyclobenzaprine] Rash    Other Medication Rash     All fruits except apple, oranges, and pineapple    Phenergan [Promethazine] Other (comments)     Rapid hr    Robaxin [Methocarbamol] Rash        Social History     Socioeconomic History    Marital status: SINGLE     Spouse name: Not on file    Number of children: Not on file    Years of education: Not on file    Highest education level: Not on file   Social Needs    Financial resource strain: Not on file    Food insecurity - worry: Not on file    Food insecurity - inability: Not on file   [employer] needs - medical: Not on file   [employer] needs - non-medical: Not on file   Occupational History    Not on file   Tobacco Use    Smoking status: Current Every Day Smoker     Packs/day: 0.25    Smokeless tobacco: Current User   Substance and Sexual Activity    Alcohol use: Yes     Comment: rare    Drug use: No    Sexual activity: Not Currently   Other Topics Concern    Not on file   Social History Narrative    Not on file        Family History   Problem Relation Age of Onset    Stroke Father     Hypertension Father     Heart Disease Maternal Grandmother     Cancer Maternal Grandmother         brain and colon    Cancer Maternal Uncle         5 w/ brain Denys Marley    MS Other         1st cousin    Bipolar Disorder Other           Review of Systems   Constitutional: Negative for chills, fever and malaise/fatigue. HENT: Negative for congestion, ear pain, nosebleeds and sore throat. Eyes: Negative for blurred vision, pain, discharge and redness. Respiratory: Negative for cough, shortness of breath and wheezing. Cardiovascular: Negative for chest pain and palpitations. Gastrointestinal: Negative for abdominal pain, nausea and vomiting. Genitourinary: Negative for dysuria. Musculoskeletal: Positive for back pain and myalgias. Severe back back   Skin: Negative for rash. Neurological: Positive for weakness. Negative for dizziness, tingling, sensory change, speech change and headaches. Right lower extremity weakness   Psychiatric/Behavioral: Negative for depression. Objective:     Vitals:    12/20/18 0801   BP: 128/69   Pulse: 68   Resp: 16   Temp: 96.4 °F (35.8 °C)   TempSrc: Oral   SpO2: 99%   Height: 5' 3\" (1.6 m)   PainSc:  10 - Worst pain ever   PainLoc: Back        Physical Exam   Constitutional: She is oriented to person, place, and time. She appears well-nourished. She appears distressed. Morbid obesity   HENT:   Head: Normocephalic and atraumatic. Eyes: Conjunctivae are normal. Pupils are equal, round, and reactive to light. Neck: Normal range of motion. Neck supple. Cardiovascular: Normal rate, regular rhythm and normal heart sounds. Pulmonary/Chest: Effort normal and breath sounds normal.   Musculoskeletal: She exhibits tenderness. She exhibits no edema. Impaired mobility due severe back pain and left lower extremity pain and weakness. Neurological: She is alert and oriented to person, place, and time. No cranial nerve deficit. Left lower extremity pain and weakness with dragging of the left leg and foot. Skin: Skin is warm and dry.  She is not diaphoretic. Psychiatric: She has a normal mood and affect. Nursing note and vitals reviewed. Assessment/ Plan:       ICD-10-CM ICD-9-CM    1. Lumbar radiculopathy M54.16 724.4 MRI LUMB SPINE W WO CONT   2. Weakness of right lower extremity R29.898 729.89 MRI LUMB SPINE W WO CONT        Orders Placed This Encounter    MRI LUMB SPINE W WO CONT     Standing Status:   Future     Standing Expiration Date:   1/20/2020     Order Specific Question:   Is Patient Allergic to Contrast Dye? Answer:   Unknown     Order Specific Question:   Is Patient Pregnant? Answer:   Unknown     Order Specific Question:   STAT Creatinine as indicated     Answer:   Yes      She was advised to call Dr. Tank Jamison office (neurosurgeon) to possibly work out a payment plan until her Medicaid is effective. Patient was advised to go to the ER for urgent evaluation of worsening symptoms, including possible MRI. I have reviewed the patient's medical history in detail and updated the computerized patient record. We had a prolonged discussion about these complex clinical issues and went over the various important aspects to consider. All questions were answered. Advised her to call back, return to office, or go to the ER if symptoms do not improve, change in nature, or persist. Schedule appt in 4-6 wks for f/u back pain, radiculopathy. She was given an after visit summary or informed of Contacts+ Access which includes patient instructions, diagnoses, current medications, & vitals. She expressed understanding with the diagnosis and plan and was given the opportunity to ask questions.     Samir Velazquez DNP

## 2018-12-20 NOTE — ED TRIAGE NOTES
Patient c/o feeling a \"pop\" to her L lower back yesterday while in the kitchen. Reports L leg weakness since the pop.

## 2018-12-20 NOTE — PROGRESS NOTES
Chief Complaint   Patient presents with    Referral Request     1. Have you been to the ER, urgent care clinic since your last visit? Hospitalized since your last visit? No    2. Have you seen or consulted any other health care providers outside of the [facility] since your last visit? Include any pap smears or colon screening.  No

## 2018-12-20 NOTE — ED NOTES
Bedside shift change report given to Amelia Gomez RN (oncoming nurse) by Tam Hernandez RN (offgoing nurse). Report included the following information SBAR, ED Summary, Intake/Output, MAR and Recent Results.

## 2018-12-20 NOTE — PATIENT INSTRUCTIONS
Back Pain: Care Instructions  Your Care Instructions    Back pain has many possible causes. It is often related to problems with muscles and ligaments of the back. It may also be related to problems with the nerves, discs, or bones of the back. Moving, lifting, standing, sitting, or sleeping in an awkward way can strain the back. Sometimes you don't notice the injury until later. Arthritis is another common cause of back pain. Although it may hurt a lot, back pain usually improves on its own within several weeks. Most people recover in 12 weeks or less. Using good home treatment and being careful not to stress your back can help you feel better sooner. Follow-up care is a key part of your treatment and safety. Be sure to make and go to all appointments, and call your doctor if you are having problems. It's also a good idea to know your test results and keep a list of the medicines you take. How can you care for yourself at home? · Sit or lie in positions that are most comfortable and reduce your pain. Try one of these positions when you lie down:  ? Lie on your back with your knees bent and supported by large pillows. ? Lie on the floor with your legs on the seat of a sofa or chair. ? Lie on your side with your knees and hips bent and a pillow between your legs. ? Lie on your stomach if it does not make pain worse. · Do not sit up in bed, and avoid soft couches and twisted positions. Bed rest can help relieve pain at first, but it delays healing. Avoid bed rest after the first day of back pain. · Change positions every 30 minutes. If you must sit for long periods of time, take breaks from sitting. Get up and walk around, or lie in a comfortable position. · Try using a heating pad on a low or medium setting for 15 to 20 minutes every 2 or 3 hours. Try a warm shower in place of one session with the heating pad. · You can also try an ice pack for 10 to 15 minutes every 2 to 3 hours.  Put a thin cloth between the ice pack and your skin. · Take pain medicines exactly as directed. ? If the doctor gave you a prescription medicine for pain, take it as prescribed. ? If you are not taking a prescription pain medicine, ask your doctor if you can take an over-the-counter medicine. · Take short walks several times a day. You can start with 5 to 10 minutes, 3 or 4 times a day, and work up to longer walks. Walk on level surfaces and avoid hills and stairs until your back is better. · Return to work and other activities as soon as you can. Continued rest without activity is usually not good for your back. · To prevent future back pain, do exercises to stretch and strengthen your back and stomach. Learn how to use good posture, safe lifting techniques, and proper body mechanics. When should you call for help? Call your doctor now or seek immediate medical care if:    · You have new or worsening numbness in your legs.     · You have new or worsening weakness in your legs. (This could make it hard to stand up.)     · You lose control of your bladder or bowels.    Watch closely for changes in your health, and be sure to contact your doctor if:    · You have a fever, lose weight, or don't feel well.     · You do not get better as expected. Where can you learn more? Go to http://willis-bianka.info/. Enter M055 in the search box to learn more about \"Back Pain: Care Instructions. \"  Current as of: November 29, 2017  Content Version: 11.8  © 1182-6742 [employer]. Care instructions adapted under license by [employer] (which disclaims liability or warranty for this information). If you have questions about a medical condition or this instruction, always ask your healthcare professional. [place] [address] any warranty or liability for your use of this information.

## 2018-12-20 NOTE — ED PROVIDER NOTES
Martha Medellin is a 51 y.o. female who presents by way of wheelchair to the ED with  c/o right lower back pain. Patient states the pain started on Friday when she was walking to the bathroom and heard a \"pop. \" Patient states she felt immediate pain and spent the weekend in bed. Patient got up Monday and noticed numbness and tingling in her right leg and heard another \"pop. \" She returned to bed and then last night got up and heard another \"pop. \" This pop resulted in right foot drop and numbness and tingling in the right leg. Patient states her foot \"drags. \" Denies any loss of bowel or bladder, states she has positive sensation. Denies fever or chills, denies any nausea or vomiting. Denies use of IV drugs. Has taken 3000 mg Tylenol for pain each day with no relief. PCP: Felicitas Watts., MD    PMHx significant for: Past Medical History:  No date: Arthritis  No date: Asthma  No date: Bipolar disorder ([facility])  No date: Depression  No date: IBS (irritable bowel syndrome)  No date: Migraine  No date: Neurological disorder      Comment:  migraines  No date: Peptic ulcer  No date: Polyp cervix  No date: Snoring  No date: TIA (transient ischemic attack)    PSHx significant for: Past Surgical History:  No date: HX CERVICAL FUSION      Comment:  C4-C7  No date: HX CHOLECYSTECTOMY  No date: HX CYST REMOVAL      Comment:  from under both arms  2009: HX HERNIA REPAIR  No date: HX ORTHOPAEDIC      Comment:  left knee meniscus repair   No date: HX ORTHOPAEDIC      Comment:  ORIF left fibula  No date: HX TUBAL LIGATION  No date: HYSTEROSCOPY DIAGNOSTIC      Comment:  x2      There are no further complaints or symptoms at this time.                 Past Medical History:   Diagnosis Date    Arthritis     Asthma     B[address] ([facility] [address]     Depression     IBS (irritable bowel syndrome)     Migraine     Neurological disorder     migraines    Peptic ulcer     Polyp cervix     Snoring     TIA (transient ischemic attack)        Past Surgical History:   Procedure Laterality Date    HX CERVICAL FUSION      C4-C7    HX CHOLECYSTECTOMY      HX CYST REMOVAL      from under both arms    HX HERNIA REPAIR  2009    HX ORTHOPAEDIC      left knee meniscus repair     HX ORTHOPAEDIC      ORIF left fibula    HX TUBAL LIGATION      HYSTEROSCOPY DIAGNOSTIC      x2         Family History:   Problem Relation Age of Onset    Stroke Father     Hypertension Father     Heart Disease Maternal Grandmother     Cancer Maternal Grandmother         brain and colon    Cancer Maternal Uncle         5 w/ brain [place] MS Other         1st cousin    Bipolar Disorder Other        Social History     Socioeconomic History    Marital status: SINGLE     Spouse name: Not on file    Number of children: Not on file    Years of education: Not on file    Highest education level: Not on file   Social Needs    Financial resource strain: Not on file    Food insecurity - worry: Not on file    Food insecurity - inability: Not on file   [employer] needs - medical: Not on file   [employer] needs - non-medical: Not on file   Occupational History    Not on file   Tobacco Use    Smoking status: Current Every Day Smoker     Packs/day: 0.25    Smokeless tobacco: Never Used   Substance and Sexual Activity    Alcohol use: Yes     Comment: rare    Drug use: No    Sexual activity: Not Currently   Other Topics Concern    Not on file   Social History Narrative    Not on file         ALLERGIES: Latex; Peanut; Shellfish containing products; Morphine; Nystatin; Dilaudid [hydromorphone (bulk)]; Flexeril [cyclobenzaprine]; Other medication; Phenergan [promethazine]; and Robaxin [methocarbamol]    Review of Systems   Constitutional: Positive for activity change (unable to weight bear right lower extremity). Negative for appetite change, chills, diaphoresis, fatigue and fever.    HENT: Negative for congestion, ear discharge, ear pain, sinus pressure, sinus pain, sore throat and trouble swallowing. Eyes: Negative for photophobia, pain, redness and visual disturbance. Respiratory: Negative for chest tightness, shortness of breath and wheezing. Cardiovascular: Negative for chest pain and palpitations. Gastrointestinal: Negative for abdominal distention, abdominal pain, nausea and vomiting. Endocrine: Negative. Genitourinary: Negative for difficulty urinating, flank pain, frequency and urgency. Musculoskeletal: Positive for back pain (lumbar spine). Negative for neck pain and neck stiffness. Skin: Negative for color change, pallor, rash and wound. Allergic/Immunologic: Negative. Neurological: Positive for weakness (right lower extremity with foot drop noted) and numbness (right lower extremity). Negative for dizziness, speech difficulty and headaches. Hematological: Does not bruise/bleed easily. Psychiatric/Behavioral: Negative for behavioral problems. The patient is not nervous/anxious. Vitals:    12/20/18 0916 12/20/18 0929   BP:  130/79   Pulse: 75 78   Resp:  16   Temp:  98.2 °F (36.8 °C)   SpO2: 98% 100%   Height:  5' 3\" (1.6 m)            Physical Exam   Constitutional: She is oriented to person, place, and time. She appears well-developed and well-nourished. She appears distressed (moderate distress related to pain in back). HENT:   Head: Normocephalic and atraumatic. Right Ear: External ear normal.   Left Ear: External ear normal.   Nose: Nose normal.   Mouth/Throat: Oropharynx is clear and moist.   Eyes: Conjunctivae and EOM are normal. Pupils are equal, round, and reactive to light. Right eye exhibits no discharge. Left eye exhibits no discharge. Neck: Normal range of motion. Neck supple. No JVD present. No tracheal deviation present. Cardiovascular: Normal rate, regular rhythm, normal heart sounds and intact distal pulses. Exam reveals no gallop. No murmur heard.   Pulmonary/Chest: Effort normal and breath sounds normal. No respiratory distress. She has no wheezes. She has no rales. She exhibits no tenderness. Abdominal: Soft. Bowel sounds are normal. She exhibits no distension. There is no tenderness. There is no rebound and no guarding. Genitourinary:   Genitourinary Comments: Negative. No s/s cauda equina, no saddle anesthesia. Musculoskeletal: Normal range of motion. She exhibits tenderness (lumbar spine with pain on palpation. No step offs, no deformities. ). She exhibits no edema. Neurological: She is alert and oriented to person, place, and time. Numbness and tingling to the right lower extremity with right foot drop noted. Patient unable to weight bear and right foot is dragging. Skin: Skin is warm and dry. No rash noted. No erythema. No pallor. Psychiatric: She has a normal mood and affect. Her behavior is normal. Judgment and thought content normal.   Nursing note and vitals reviewed. MDM  Number of Diagnoses or Management Options  Lumbar back pain: new and requires workup  Neurological deficit present: new and requires workup  Diagnosis management comments: Plan:  Admit to hospital for IV steroids to hospitalist service. Neurosurgery consulted. Patient in agreement with plan of care. Amount and/or Complexity of Data Reviewed  Clinical lab tests: ordered and reviewed  Tests in the radiology section of CPT®: ordered and reviewed  Discuss the patient with other providers: yes (Discussed plan of care with Dr. Mercedes Garcia, Dr. Donita Boyce and Dr. Lorie Pettit.)          1500:   Awaiting MRI. 1650:  Reviewed MRI.    5:15 PM  Spoke with Dr. Donita Boyce from neurosurgery who will see patient on consult. Admit to hospitalist service. Patient in agreement with plan of care. 5:22 PM  Spoke with Dr. Lorie Pettit who will admit patient to the hospital for IV steroids.     Procedures

## 2018-12-21 LAB
ALBUMIN SERPL-MCNC: 3 G/DL (ref 3.5–5)
ALBUMIN/GLOB SERPL: 0.6 {RATIO} (ref 1.1–2.2)
ALP SERPL-CCNC: 78 U/L (ref 45–117)
ALT SERPL-CCNC: 14 U/L (ref 12–78)
ANION GAP SERPL CALC-SCNC: 8 MMOL/L (ref 5–15)
AST SERPL-CCNC: 12 U/L (ref 15–37)
BASOPHILS # BLD: 0 K/UL (ref 0–0.1)
BASOPHILS NFR BLD: 0 % (ref 0–1)
BILIRUB SERPL-MCNC: 0.2 MG/DL (ref 0.2–1)
BUN SERPL-MCNC: 17 MG/DL (ref 6–20)
BUN/CREAT SERPL: 18 (ref 12–20)
CALCIUM SERPL-MCNC: 8.4 MG/DL (ref 8.5–10.1)
CHLORIDE SERPL-SCNC: 108 MMOL/L (ref 97–108)
CO2 SERPL-SCNC: 22 MMOL/L (ref 21–32)
CREAT SERPL-MCNC: 0.95 MG/DL (ref 0.55–1.02)
DIFFERENTIAL METHOD BLD: ABNORMAL
EOSINOPHIL # BLD: 0 K/UL (ref 0–0.4)
EOSINOPHIL NFR BLD: 0 % (ref 0–7)
ERYTHROCYTE [DISTWIDTH] IN BLOOD BY AUTOMATED COUNT: 13.9 % (ref 11.5–14.5)
GLOBULIN SER CALC-MCNC: 5.1 G/DL (ref 2–4)
GLUCOSE SERPL-MCNC: 139 MG/DL (ref 65–100)
HCT VFR BLD AUTO: 33.4 % (ref 35–47)
HGB BLD-MCNC: 10.6 G/DL (ref 11.5–16)
IMM GRANULOCYTES # BLD: 0 K/UL (ref 0–0.04)
IMM GRANULOCYTES NFR BLD AUTO: 0 % (ref 0–0.5)
LACTATE SERPL-SCNC: 1.7 MMOL/L (ref 0.4–2)
LYMPHOCYTES # BLD: 0.7 K/UL (ref 0.8–3.5)
LYMPHOCYTES NFR BLD: 11 % (ref 12–49)
MAGNESIUM SERPL-MCNC: 1.8 MG/DL (ref 1.6–2.4)
MCH RBC QN AUTO: 30.4 PG (ref 26–34)
MCHC RBC AUTO-ENTMCNC: 31.7 G/DL (ref 30–36.5)
MCV RBC AUTO: 95.7 FL (ref 80–99)
MONOCYTES # BLD: 0.1 K/UL (ref 0–1)
MONOCYTES NFR BLD: 2 % (ref 5–13)
NEUTS SEG # BLD: 6 K/UL (ref 1.8–8)
NEUTS SEG NFR BLD: 87 % (ref 32–75)
NRBC # BLD: 0 K/UL (ref 0–0.01)
NRBC BLD-RTO: 0 PER 100 WBC
PLATELET # BLD AUTO: 271 K/UL (ref 150–400)
PMV BLD AUTO: 11.5 FL (ref 8.9–12.9)
POTASSIUM SERPL-SCNC: 4.1 MMOL/L (ref 3.5–5.1)
PROT SERPL-MCNC: 8.1 G/DL (ref 6.4–8.2)
RBC # BLD AUTO: 3.49 M/UL (ref 3.8–5.2)
RBC MORPH BLD: ABNORMAL
SODIUM SERPL-SCNC: 138 MMOL/L (ref 136–145)
WBC # BLD AUTO: 6.8 K/UL (ref 3.6–11)

## 2018-12-21 PROCEDURE — 97162 PT EVAL MOD COMPLEX 30 MIN: CPT

## 2018-12-21 PROCEDURE — 74011250636 HC RX REV CODE- 250/636: Performed by: NEUROLOGICAL SURGERY

## 2018-12-21 PROCEDURE — 51798 US URINE CAPACITY MEASURE: CPT

## 2018-12-21 PROCEDURE — 97116 GAIT TRAINING THERAPY: CPT

## 2018-12-21 PROCEDURE — 65270000029 HC RM PRIVATE

## 2018-12-21 PROCEDURE — 97530 THERAPEUTIC ACTIVITIES: CPT

## 2018-12-21 PROCEDURE — 74011250636 HC RX REV CODE- 250/636: Performed by: INTERNAL MEDICINE

## 2018-12-21 RX ADMIN — MORPHINE SULFATE 2 MG: 10 INJECTION INTRAVENOUS at 11:27

## 2018-12-21 RX ADMIN — MORPHINE SULFATE 2 MG: 10 INJECTION INTRAVENOUS at 21:06

## 2018-12-21 RX ADMIN — DEXAMETHASONE SODIUM PHOSPHATE 4 MG: 4 INJECTION, SOLUTION INTRAMUSCULAR; INTRAVENOUS at 18:15

## 2018-12-21 RX ADMIN — DEXAMETHASONE SODIUM PHOSPHATE 4 MG: 4 INJECTION, SOLUTION INTRAMUSCULAR; INTRAVENOUS at 23:39

## 2018-12-21 RX ADMIN — Medication 10 ML: at 14:00

## 2018-12-21 RX ADMIN — MORPHINE SULFATE 2 MG: 10 INJECTION INTRAVENOUS at 16:45

## 2018-12-21 RX ADMIN — DEXAMETHASONE SODIUM PHOSPHATE 4 MG: 4 INJECTION, SOLUTION INTRAMUSCULAR; INTRAVENOUS at 11:27

## 2018-12-21 RX ADMIN — Medication 10 ML: at 01:01

## 2018-12-21 RX ADMIN — DEXAMETHASONE SODIUM PHOSPHATE 4 MG: 4 INJECTION, SOLUTION INTRAMUSCULAR; INTRAVENOUS at 06:56

## 2018-12-21 RX ADMIN — HEPARIN SODIUM 5000 UNITS: 5000 INJECTION INTRAVENOUS; SUBCUTANEOUS at 11:27

## 2018-12-21 RX ADMIN — MORPHINE SULFATE 2 MG: 10 INJECTION INTRAVENOUS at 01:01

## 2018-12-21 RX ADMIN — Medication 10 ML: at 06:56

## 2018-12-21 RX ADMIN — MORPHINE SULFATE 2 MG: 10 INJECTION INTRAVENOUS at 06:56

## 2018-12-21 RX ADMIN — HEPARIN SODIUM 5000 UNITS: 5000 INJECTION INTRAVENOUS; SUBCUTANEOUS at 03:00

## 2018-12-21 RX ADMIN — Medication 10 ML: at 23:46

## 2018-12-21 RX ADMIN — Medication 10 ML: at 18:23

## 2018-12-21 RX ADMIN — HEPARIN SODIUM 5000 UNITS: 5000 INJECTION INTRAVENOUS; SUBCUTANEOUS at 18:21

## 2018-12-21 RX ADMIN — DEXAMETHASONE SODIUM PHOSPHATE 4 MG: 4 INJECTION, SOLUTION INTRAMUSCULAR; INTRAVENOUS at 01:01

## 2018-12-21 RX ADMIN — Medication 10 ML: at 21:06

## 2018-12-21 NOTE — PROGRESS NOTES
ANEUDY from [address] [place] [address] NP at [facility] to discuss patient. She request information from [address] [place] [address] records if we have any. Informed at this point no records. If we obtain records will be happy to share.

## 2018-12-21 NOTE — ROUTINE PROCESS
TRANSFER - OUT REPORT:    Verbal report given to Claudia Zhu RN (name) on Tesfaye Richard  being transferred to [facility](unit) for routine care. Report consisted of patients Situation, Background, Assessment and   Recommendations(SBAR). Information from the following report(s) SBAR and Recent Results was reviewed with the receiving nurse. Lines:   Peripheral IV 12/20/18 Left Antecubital (Active)   Site Assessment Clean, dry, & intact 12/20/2018  9:47 AM   Phlebitis Assessment 0 12/20/2018  9:47 AM   Infiltration Assessment 4 12/20/2018  9:47 AM   Dressing Type 4 X 4;Tape;Transparent 12/20/2018  9:47 AM   Hub Color/Line Status Pink;Flushed;Patent 12/20/2018  9:47 AM   Action Taken Blood drawn 12/20/2018  9:47 AM        Opportunity for questions and clarification was provided.       Patient transported with:   [employer]

## 2018-12-21 NOTE — PROGRESS NOTES
TRANSFER - IN REPORT:    Verbal report received from [facility](name) on Jose Manuel Man  being received from ED(unit) for routine progression of care      Report consisted of patients Situation, Background, Assessment and   Recommendations(SBAR). Information from the following report(s) SBAR was reviewed with the receiving nurse. Opportunity for questions and clarification was provided. Assessment completed upon patients arrival to unit and care assumed.

## 2018-12-21 NOTE — PROGRESS NOTES
TRANSFER - OUT REPORT:    Verbal report given to [address] RN(name) on Nidia Arango  being transferred to [facility](unit) for routine progression of care       Report consisted of patients Situation, Background, Assessment and   Recommendations(SBAR). Information from the following report(s) SBAR, Kardex, ED Summary, Intake/Output and MAR was reviewed with the receiving nurse. Lines:   Peripheral IV 12/21/18 Right;Upper Arm (Active)   Site Assessment Clean, dry, & intact 12/21/2018 12:21 PM   Phlebitis Assessment 0 12/21/2018 12:21 PM   Infiltration Assessment 0 12/21/2018 12:21 PM   Dressing Status Clean, dry, & intact 12/21/2018 12:21 PM   Dressing Type Transparent;Tape 12/21/2018 12:21 PM   Hub Color/Line Status Infusing 12/21/2018 12:21 PM   Action Taken Open ports on tubing capped 12/21/2018 12:21 PM   Alcohol Cap Used Yes 12/21/2018 12:21 PM        Opportunity for questions and clarification was provided.       Patient transported with:   Registered Nurse
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
## 2018-12-21 NOTE — ROUTINE PROCESS
Bedside shift change report given to [address] (oncoming nurse) by Lorin Miller (offgoing nurse). Report included the following information SBAR.

## 2018-12-21 NOTE — PROGRESS NOTES
.Bedside and Verbal shift change report given to Dustin Gunter (oncoming nurse) by Anant Mortensen (offgoing nurse). Report included the following information SBAR.

## 2018-12-21 NOTE — PROGRESS NOTES
Primary Nurse Jeremy Vasquez RN and Lisa Patino RN performed a dual skin assessment on this patient No impairment noted  Phani score is 21

## 2018-12-21 NOTE — ROUTINE PROCESS
TRANSFER - IN REPORT:    Verbal report received from [address] [place] [address] RN(name) [facility]  being received from [facility](unit) for routine progression of care      Report consisted of patients Situation, Background, Assessment and   Recommendations(SBAR). Information from the following report(s) SBAR, Kardex, ED Summary, Procedure Summary, Intake/Output, MAR, Accordion and Recent Results was reviewed with the receiving nurse. Opportunity for questions and clarification was provided. Assessment completed upon patients arrival to unit and care assumed.

## 2018-12-21 NOTE — CONSULTS
Spoke to Er md and films reviewed. I do not see any lumbar anatomic pathology that would explain foot drop. Will consult.   Recommend phys therapy and steroids

## 2018-12-21 NOTE — PROGRESS NOTES
Bedside and Verbal shift change report given to Hua Clayton RN (oncoming nurse) by Jay Jay Garrison RN (offgoing nurse). Report included the following information SBAR, Kardex and MAR.

## 2018-12-21 NOTE — PROGRESS NOTES
Pt health information request sent to [facility]. Pt requested fax number to unit to have Tima Lagunas fax over additional pt health information.
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
## 2018-12-22 PROCEDURE — 74011250636 HC RX REV CODE- 250/636: Performed by: INTERNAL MEDICINE

## 2018-12-22 PROCEDURE — 93971 EXTREMITY STUDY: CPT

## 2018-12-22 PROCEDURE — 74011250636 HC RX REV CODE- 250/636: Performed by: NEUROLOGICAL SURGERY

## 2018-12-22 PROCEDURE — 65270000029 HC RM PRIVATE

## 2018-12-22 PROCEDURE — 74011250637 HC RX REV CODE- 250/637: Performed by: HOSPITALIST

## 2018-12-22 PROCEDURE — 74011250636 HC RX REV CODE- 250/636: Performed by: HOSPITALIST

## 2018-12-22 RX ORDER — POLYETHYLENE GLYCOL 3350 17 G/17G
17 POWDER, FOR SOLUTION ORAL DAILY
Status: DISCONTINUED | OUTPATIENT
Start: 2018-12-23 | End: 2018-12-25

## 2018-12-22 RX ORDER — TOPIRAMATE 100 MG/1
200 TABLET, FILM COATED ORAL DAILY
Status: DISCONTINUED | OUTPATIENT
Start: 2018-12-22 | End: 2018-12-27 | Stop reason: HOSPADM

## 2018-12-22 RX ORDER — ALBUTEROL SULFATE 0.83 MG/ML
2.5 SOLUTION RESPIRATORY (INHALATION)
Status: DISCONTINUED | OUTPATIENT
Start: 2018-12-22 | End: 2018-12-27 | Stop reason: HOSPADM

## 2018-12-22 RX ORDER — ALBUTEROL SULFATE 90 UG/1
2 AEROSOL, METERED RESPIRATORY (INHALATION)
Status: DISCONTINUED | OUTPATIENT
Start: 2018-12-22 | End: 2018-12-22 | Stop reason: CLARIF

## 2018-12-22 RX ORDER — ENOXAPARIN SODIUM 100 MG/ML
40 INJECTION SUBCUTANEOUS EVERY 24 HOURS
Status: DISCONTINUED | OUTPATIENT
Start: 2018-12-22 | End: 2018-12-27 | Stop reason: HOSPADM

## 2018-12-22 RX ORDER — FLUTICASONE PROPIONATE 50 MCG
2 SPRAY, SUSPENSION (ML) NASAL DAILY
Status: CANCELLED | OUTPATIENT
Start: 2018-12-23

## 2018-12-22 RX ADMIN — MORPHINE SULFATE 2 MG: 10 INJECTION INTRAVENOUS at 18:29

## 2018-12-22 RX ADMIN — MORPHINE SULFATE 2 MG: 10 INJECTION INTRAVENOUS at 03:26

## 2018-12-22 RX ADMIN — DEXAMETHASONE SODIUM PHOSPHATE 4 MG: 4 INJECTION, SOLUTION INTRAMUSCULAR; INTRAVENOUS at 12:23

## 2018-12-22 RX ADMIN — DEXAMETHASONE SODIUM PHOSPHATE 4 MG: 4 INJECTION, SOLUTION INTRAMUSCULAR; INTRAVENOUS at 06:41

## 2018-12-22 RX ADMIN — Medication 10 ML: at 03:32

## 2018-12-22 RX ADMIN — DEXAMETHASONE SODIUM PHOSPHATE 4 MG: 4 INJECTION, SOLUTION INTRAMUSCULAR; INTRAVENOUS at 18:29

## 2018-12-22 RX ADMIN — MORPHINE SULFATE 2 MG: 10 INJECTION INTRAVENOUS at 14:19

## 2018-12-22 RX ADMIN — HEPARIN SODIUM 5000 UNITS: 5000 INJECTION INTRAVENOUS; SUBCUTANEOUS at 03:30

## 2018-12-22 RX ADMIN — TOPIRAMATE 200 MG: 100 TABLET, FILM COATED ORAL at 09:49

## 2018-12-22 RX ADMIN — LACTULOSE 10 G: 20 SOLUTION ORAL at 18:29

## 2018-12-22 RX ADMIN — Medication 10 ML: at 12:24

## 2018-12-22 RX ADMIN — ENOXAPARIN SODIUM 40 MG: 40 INJECTION SUBCUTANEOUS at 12:23

## 2018-12-22 RX ADMIN — MORPHINE SULFATE 2 MG: 10 INJECTION INTRAVENOUS at 09:49

## 2018-12-22 NOTE — CONSULTS
NEUROLOGY  12/22/2018     Consulted by: Mariza Earl MD        Patient ID:  Mikayla Murrieta  [id number]  51 y.o.  1970    Chief Complaint   Patient presents with    Back Pain       HPI    Mrs. Elisa Mack is a 79-year-old woman here because of worsening low back pain with right leg radicular pain and weakness. She has a very long complicated history. Her records on her chart from [facility] extensive. In summary, she tells me that she was involved in a slip and fall at work in 2004 and she developed bilateral upper extremity radicular symptoms. Ultimately in 2011 she had C4C7 ACDF. Earlier this year in summer time she developed severe pain and subsequent quadriplegia. She was evaluated at [facility]. Neurology and orthopedic spine was involved. Looking at neurology notes it seems like she had some improvement in her symptoms and there was concern for functional deficits. She gets very upset when talking about [facility] and reports that there is some form of investigation going on about records being falsified. She tells me that all the records from [facility] sent here were at the request of her . At the moment she complains of exquisite right-sided low back pain such that she cannot lie supine. She cannot apply any pressure to her low back mainly on the right. Pain and radiating numbness begins in the right groin down to the foot. Cannot lift her leg. MRI lumbar spine done 2 days ago with multilevel changes w/notable facet edema/inflammation. Review of Systems   Eyes: Negative for double vision. Gastrointestinal: Negative for nausea. Musculoskeletal: Positive for back pain. Neurological: Positive for tingling, sensory change and focal weakness. Negative for headaches. All other systems reviewed and are negative.       Past Medical History:   Diagnosis Date    Arthritis     Asthma     Bipolar disorder ([facility] [address]     Depression     IBS (irritable bowel syndrome)     Migraine     Neurological disorder migraines    Peptic ulcer     Polyp cervix     Snoring     TIA (transient ischemic attack)      Family History   Problem Relation Age of Onset    Stroke Father     Hypertension Father     Heart Disease Maternal Grandmother     Cancer Maternal Grandmother         brain and colon    Cancer Maternal Uncle         5 w/ brain Kathy Betancurl    MS Other         1st cousin    Bipolar Disorder Other      Social History     Socioeconomic History    Marital status: SINGLE     Spouse name: Not on file    Number of children: Not on file    Years of education: Not on file    Highest education level: Not on file   Social Needs    Financial resource strain: Not on file    Food insecurity - worry: Not on file    Food insecurity - inability: Not on file   [employer] needs - medical: Not on file   [employer] needs - non-medical: Not on file   Occupational History    Not on file   Tobacco Use    Smoking status: Current Every Day Smoker     Packs/day: 0.25    Smokeless tobacco: Current User   Substance and Sexual Activity    Alcohol use: Yes     Comment: rare    Drug use: No    Sexual activity: Not Currently   Other Topics Concern    Not on file   Social History Narrative    Not on file     Current Facility-Administered Medications   Medication Dose Route Frequency    acetaminophen (TYLENOL) solution 650 mg  650 mg Oral Q4H PRN    topiramate (TOPAMAX) tablet 200 mg  200 mg Oral DAILY    albuterol (PROVENTIL VENTOLIN) nebulizer solution 2.5 mg  2.5 mg Nebulization Q6H PRN    dexamethasone (DECADRON) 4 mg/mL injection 4 mg  4 mg IntraVENous Q6H    sodium chloride (NS) flush 5-10 mL  5-10 mL IntraVENous Q8H    sodium chloride (NS) flush 5-10 mL  5-10 mL IntraVENous PRN    morphine 10 mg/ml injection 2 mg  2 mg IntraVENous Q4H PRN    naloxone (NARCAN) injection 0.4 mg  0.4 mg IntraVENous PRN    diphenhydrAMINE (BENADRYL) injection 12.5 mg  12.5 mg IntraVENous Q4H PRN    heparin (porcine) injection 5,000 Units  5,000 Units SubCUTAneous Q8H     Allergies   Allergen Reactions    Latex Hives    Peanut Swelling    Shellfish Containing Products Anaphylaxis    Morphine Itching     She has had morphine but premedicates with benadryl    Nystatin Rash    Dilaudid [Hydromorphone (Bulk)] Itching     Must take with benadryl    Flexeril [Cyclobenzaprine] Rash    Other Medication Rash     All fruits except apple, oranges, and pineapple    Phenergan [Promethazine] Other (comments)     Rapid hr    Robaxin [Methocarbamol] Rash       Visit Vitals  /75   Pulse 61   Temp 98.7 °F (37.1 °C)   Resp 16   Ht 5' 3\" (1.6 m)   Wt 121.6 kg (268 lb)   SpO2 100%   BMI 47.47 kg/m²     Physical Exam   Constitutional: She is oriented to person, place, and time. She appears well-developed and well-nourished. Very talkative, direct, apropriate   Cardiovascular: Normal rate. Pulmonary/Chest: Effort normal.   Neurological: She is oriented to person, place, and time. Skin: Skin is warm and dry. Psychiatric: Her behavior is normal.   Vitals reviewed. Neurologic Exam     Mental Status   Oriented to person, place, and time. Level of consciousness: alert    Cranial Nerves   Cranial nerves II through XII intact. Motor Exam BUE intact, good spontaneous movt symm    RLE she cannot lift, she can mildly activate R PF of which I can feel volitional movement.    She gives little power to EHL DF, 1/4 approx    LLE intact    No atrophy on extremities, no fasics     Sensory Exam   Reduced RLE     Gait, Coordination, and Reflexes     Gait  Gait: (def 2/2 RLE weakness)Trace DTRs throughout w/o suggestion of myelopathy            Lab Results   Component Value Date/Time    WBC 6.8 12/20/2018 11:12 PM    HGB 10.6 (L) 12/20/2018 11:12 PM    HCT 33.4 (L) 12/20/2018 11:12 PM    PLATELET 052 26/80/3832 11:12 PM    MCV 95.7 12/20/2018 11:12 PM     Lab Results   Component Value Date/Time    Glucose 139 (H) 12/20/2018 11:12 PM Glucose  10/11/2018 10:23 AM    Creatinine 0.95 12/20/2018 11:12 PM      Lab Results   Component Value Date/Time    Cholesterol (POC) 159 10/11/2018 10:24 AM    LDL Cholesterol (POC) 78 10/11/2018 10:24 AM    Triglycerides (POC) 111 10/11/2018 10:24 AM     Lab Results   Component Value Date/Time    ALT (SGPT) 14 12/20/2018 11:12 PM    AST (SGOT) 12 (L) 12/20/2018 11:12 PM    Alk. phosphatase 78 12/20/2018 11:12 PM    Bilirubin, direct 0.1 03/27/2009 06:35 PM    Bilirubin, total 0.2 12/20/2018 11:12 PM    Albumin 3.0 (L) 12/20/2018 11:12 PM    Protein, total 8.1 12/20/2018 11:12 PM    INR 1.1 05/08/2009 03:15 PM    Prothrombin time 11.0 05/08/2009 03:15 PM    PLATELET 520 71/24/4862 11:12 PM        CT Results (maximum last 3): Results from [place] encounter on 12/20/18   CT SPINE LUMB WO CONT    Narrative EXAM:  CT LUMBAR SPINE WITHOUT  CONTRAST    INDICATION: back pain with right foot drop. COMPARISON: None. CONTRAST:  None. TECHNIQUE: Multislice helical CT of the lumbar spine was performed without  intravenous contrast administration. Coronal and sagittal reconstructions were  generated. CT dose reduction was achieved through use of a standardized  protocol tailored for this examination and automatic exposure control for dose  modulation. FINDINGS:    The alignment is within normal limits. There is no fracture or subluxation. The  paravertebral soft tissues are within normal limits. Lower thoracic spine: The spinal canal and neural foramina are widely patent. L1-L2: No significant spinal canal or neural foraminal stenosis. L2-L3: No significant spinal canal or neural foraminal stenosis. L3-L4: Degenerative disc disease is noted at this level with mild spinal  stenosis. L4-L5: Greater degenerative disc disease is noted at this level causing moderate  spinal stenosis. Bilateral neural foraminal narrowing is seen along with mild  facet degenerative changes.     L5-S1: Mild degenerative disc disease is noted at this level without spinal  stenosis. Impression IMPRESSION:  Multilevel lumbosacral degenerative disc disease greatest at L4-5. No acute  abnormality. Results from [place] encounter on 05/08/18   CT SPINE CERV WO CONT    Narrative INDICATION:  Neck pain, chronic, abn neuro exam, xray negative; pt with sudden  onset, left arm weakness, left jaw pain and headache     STUDY:  CT SPINE CERV WO CONT     Examination: Cervical spine CT. No contrast or comparisons. Thin section axial  images were obtained with sagittal and coronal reformats. CT dose reduction was  achieved through use of a standardized protocol tailored for this examination  and automatic exposure control for dose modulation. FINDINGS: Alignment of the cervical spine is normal. There is no bony  destructive lesion or evidence of acute fracture. Paraspinous soft tissues are  within normal limits. Patient is post anterior decompression and fusion of  C4-C7. There is solid osseous incorporation of the interbody fusions at C4-5 and  C5-6. No definite osseous bridging at C6-C7. There is disc height loss and  degeneration of the C3-4 disc level with a diffuse disc osteophytic bulge. This  results in mild narrowing of the canal and mild to moderate foraminal narrowing. The left temporomandibular joint is imaged on this study. The visualized  portions are within normal limits. Impression IMPRESSION:  1. No acute bony abnormality  2. Degenerative changes L3-4  3. Status post C4-C7 anterior decompression and fusion. Solid osseous  incorporation at C4-5 and C5-6. No definite osseous bridging at C6-C7. MRI Results (maximum last 3): Results from [place] encounter on 12/20/18   MRI LUMB SPINE WO CONT    Narrative EXAM: MRI LUMB SPINE WO CONT    INDICATION: neurological deficits, lumbar pain.     COMPARISON: CT performed same day    TECHNIQUE: MR imaging of the lumbar spine was performed using the following  sequences: sagittal T1, T2, STIR;  axial T1, T2.     CONTRAST:  None. FINDINGS:    There is normal alignment of the lumbar spine. Vertebral body heights are  maintained. Marrow signal is normal. Mild disc space narrowing desiccation is  seen at L3-L4 and L4-L5. Mild osteophytic endplate changes are also noted at  these levels. The conus medullaris terminates at L1. Signal and caliber of the distal spinal  cord are within normal limits. The paraspinal soft tissues are within normal limits. Lower thoracic spine: No herniation or stenosis. L1-L2: No herniation or stenosis. L2-L3: No herniation or stenosis. L3-L4: Small disc bulge. Mild/moderate bilateral facet arthropathy. Small,  elongated synovial cyst arises from the medial margin of the right facet joint  measuring 4 x 4 x 10 mm causing minimal focal effacement of the right posterior  thecal sac. Increased synovial fluid and edema is noted in the right greater  than left facet joints, related to active degenerative process. Mild right  neuroforaminal narrowing. No significant spinal canal stenosis. [place] L4-L5: Small disc bulge. Moderate left and mild/moderate right facet  arthropathy. Increased synovial fluid and edema is noted in the facet joints,  related to active degenerative process. Mild bilateral neuroforaminal narrowing. No significant spinal canal stenosis. L5-S1: Mild/moderate facet arthropathy. No neuroforaminal narrowing or spinal  canal stenosis. Impression IMPRESSION:  Multilevel degenerative disc disease and degenerative changes, worse at L3-L4  and L4-L5. Edema is associated with the facet joints at these levels. Mild  neuroforaminal narrowing is noted at L3-L4 and L4-L5. No significant spinal  canal stenosis. Results from [place] encounter on 04/01/18   MRI CERV SPINE WO CONT    Narrative EXAM:  MRI CERV SPINE WO CONT  INDICATION:  pain in neck, neck pain.  Numbness in fingers along with tingling  sensation left and right arm. Unable to turn neck to the left. Headache. No  onset 2/3/18. TECHNIQUE: Sagittal T1, T2, STIR and axial T1 and T2-weighted images of the  cervical spine were obtained. COMPARISON: Cervical spine x-ray 2/12/12  FINDINGS:  Straightening of usual cervical lordosis. The craniocervical junction is  unremarkable. C2-C3: Mild right posterior lateral disc bulge and endplate osteophyte  formation. Minimal central stenosis. C3-C4: Chronic appearing loss of disc space height with broad-based posterior  disc bulging and endplate osteophyte formation. Mild spinal stenosis with  minimal/mild cord compression. At least mild bilateral foramina stenosis. C4-C7: Postoperative findings from anterior discectomy and fusion with plate and  screws. Some metal artifact limits detail. Vertebra are in good alignment. The  central canal appears to be adequately decompressed. C7-T1: Minimal disc bulge. No significant stenosis. T1-T2: No significant disc bulge or stenosis. The spinal cord has normal signal throughout. Impression IMPRESSION:   1. No unusual postoperative findings C4-C7 from ACDF. 2. C3-4 chronic degenerative disc findings with mild stenosis and minimal/mild  cord compression. Mild foramina stenosis. VAS/US/Carotid Doppler Results (maximum last 3): Results from [place] encounter on 12/20/18   DUPLEX LOWER EXT VENOUS RIGHT       PET Results (maximum last 3): No results found for this or any previous visit. Assessment and Plan        67-year-old woman with a long history of spine disease who has current exacerbation of her low back pain with right radicular symptoms. I do not think her right leg symptoms are due to stroke or migraine. I do not have a good therapeutic recommendation for her in this case. I think she needs to be heavily managed by pain specialty.   She might benefit from a course of steroid injections to potentially give her some transient relief. Neurosurgery is already consulted on this case and I believe they will be following up as they were waiting for records from [facility] and imaging. I discussed with her clearly that I have no therapy or intervention to offer and she accepted that. I will sign off. Please call if needed.       [address] [facility], DO  NEUROLOGIST  [employer] [place]  12/22/2018

## 2018-12-22 NOTE — PROGRESS NOTES
Occupational therapy 1008 -   12.22.2018    Orders acknowledged and chart reviewed in prep for OT evaluation. Patient currently SATHYA for vascular access. Will f/u later in PM vs. Tomorrow as able and appropriate. Thank you. Sho Saez MS, OTR/L

## 2018-12-22 NOTE — PROGRESS NOTES
Bedside and Verbal shift change report given to Gene Carlos (oncoming nurse) by Erline Boas RN (offgoing nurse). Report included the following information SBAR, Kardex, Intake/Output and MAR.
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
## 2018-12-22 NOTE — PROGRESS NOTES
Bedside, Verbal and Written shift change report given to Brooklynn (oncoming nurse) by Brenda Taylor (offgoing nurse). Report included the following information SBAR, Kardex, ED Summary, Procedure Summary, Intake/Output, MAR, Accordion, Recent Results and Med Rec Status. 0900 - RN assumed pt care. VSS.  1100 - Neurology consult called to Dr. Sánchez Llamas.

## 2018-12-22 NOTE — PROGRESS NOTES
Fay Raza RN attempted an IV catheter insertion since her previous IV catheter had to be removed because it was no longer functioning. Fay Raza RN was unable to obtain access. Patient stated to Fay Raza that she didn't want to be stuck anymore tonight and that she didn't care about the IV pain medication. Fay Raza RN shared that the patient was very adamant about not wanting anyone to try to stick her anymore.

## 2018-12-22 NOTE — PROCEDURES
[facility]  *** FINAL REPORT ***    Name: Nick Chung  MRN: [id number]    Inpatient  : [address] 1970  HIS Order #: [id number]  [address] [facility] Visit #: [id number]  Date: 22 Dec 2018    TYPE OF TEST: Peripheral Venous Testing    REASON FOR TEST  Right lower extremity swelling    Right Leg:-  Deep venous thrombosis:           No  Superficial venous thrombosis:    No  Deep venous insufficiency:        Not examined  Superficial venous insufficiency: Not examined      INTERPRETATION/FINDINGS  PROCEDURE:  Color duplex ultrasound imaging of lower extremity veins. FINDINGS:       Right: The common femoral, deep femoral, femoral, popliteal,  posterior tibial, peroneal, and great saphenous are patent and without   evidence of thrombus; each is is fully compressible and there is no  narrowing of the flow channel on color Doppler imaging. Phasic flow  is observed in the common femoral vein. Left:   The common femoral vein is patent and without evidence of   thrombus. Phasic flow is observed. This extremity was not otherwise   evaluated. IMPRESSION:  No evidence of right lower extremity vein thrombosis. ADDITIONAL COMMENTS    I have personally reviewed the data relevant to the interpretation of  this  study.     TECHNOLOGIST: Joan Benitez RVT  Signed: 2018 10:26 AM    PHYSICIAN: Sejal Polk MD  Signed: 2018 06:35 AM

## 2018-12-23 PROCEDURE — 74011250636 HC RX REV CODE- 250/636: Performed by: HOSPITALIST

## 2018-12-23 PROCEDURE — 74011000250 HC RX REV CODE- 250: Performed by: HOSPITALIST

## 2018-12-23 PROCEDURE — 97530 THERAPEUTIC ACTIVITIES: CPT

## 2018-12-23 PROCEDURE — G8988 SELF CARE GOAL STATUS: HCPCS

## 2018-12-23 PROCEDURE — 97165 OT EVAL LOW COMPLEX 30 MIN: CPT

## 2018-12-23 PROCEDURE — G8987 SELF CARE CURRENT STATUS: HCPCS

## 2018-12-23 PROCEDURE — 74011250637 HC RX REV CODE- 250/637: Performed by: HOSPITALIST

## 2018-12-23 PROCEDURE — 65270000029 HC RM PRIVATE

## 2018-12-23 PROCEDURE — 97535 SELF CARE MNGMENT TRAINING: CPT

## 2018-12-23 RX ORDER — BISACODYL 5 MG
5 TABLET, DELAYED RELEASE (ENTERIC COATED) ORAL DAILY
Status: DISCONTINUED | OUTPATIENT
Start: 2018-12-23 | End: 2018-12-27 | Stop reason: HOSPADM

## 2018-12-23 RX ORDER — OXYCODONE AND ACETAMINOPHEN 10; 325 MG/1; MG/1
1 TABLET ORAL
Status: DISCONTINUED | OUTPATIENT
Start: 2018-12-23 | End: 2018-12-23

## 2018-12-23 RX ORDER — OXYCODONE AND ACETAMINOPHEN 10; 325 MG/1; MG/1
1 TABLET ORAL
Status: DISCONTINUED | OUTPATIENT
Start: 2018-12-23 | End: 2018-12-25

## 2018-12-23 RX ADMIN — LACTULOSE 10 G: 20 SOLUTION ORAL at 09:26

## 2018-12-23 RX ADMIN — TOPIRAMATE 200 MG: 100 TABLET, FILM COATED ORAL at 09:25

## 2018-12-23 RX ADMIN — Medication 10 ML: at 22:00

## 2018-12-23 RX ADMIN — OXYCODONE HYDROCHLORIDE AND ACETAMINOPHEN 1 TABLET: 10; 325 TABLET ORAL at 08:19

## 2018-12-23 RX ADMIN — OXYCODONE HYDROCHLORIDE AND ACETAMINOPHEN 1 TABLET: 10; 325 TABLET ORAL at 11:45

## 2018-12-23 RX ADMIN — OXYCODONE HYDROCHLORIDE AND ACETAMINOPHEN 1 TABLET: 10; 325 TABLET ORAL at 15:53

## 2018-12-23 RX ADMIN — BISACODYL 5 MG: 5 TABLET, COATED ORAL at 09:26

## 2018-12-23 RX ADMIN — ENOXAPARIN SODIUM 40 MG: 40 INJECTION SUBCUTANEOUS at 17:17

## 2018-12-23 RX ADMIN — OXYCODONE HYDROCHLORIDE AND ACETAMINOPHEN 1 TABLET: 10; 325 TABLET ORAL at 20:24
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
## 2018-12-23 NOTE — PROGRESS NOTES
Was called to pt's room by patient. Said that the numbness in her right leg is worse and that she can not even wiggle her toes now. Could not feel me touching the bottom of her right foot and her leg. Pt's right leg has been week and she could not move it very well last night or today. Pt said that when she got the last three injections of decadron it made her vagina burn and her anus burn and she no longer wants it. Talked with Dr Sarah Em and read him test results and neurology's note. He said he went through all of her records from [facility] and  He does not see a structural cause for her weakness. Gave order to DC decadron.

## 2018-12-23 NOTE — PROGRESS NOTES
After talking to Dr Harinder Rodrigues I went to talk to the patient to let her know that the Dr did not know exactly what to do for her. She said \"TTHAT IS IT, I AM LEAVING I AM GOING HOME AND I WILL GET OUT OF HERE ANYWAY THAT I CAN.

## 2018-12-23 NOTE — PROGRESS NOTES
Pt told us that she was not going to leave tonight after [facility] took her to the bathroom. I told her to let me know if she needed anything and she said \"I Won't\"! Checking on pt hourly.
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
## 2018-12-23 NOTE — PROGRESS NOTES
Pt decided to stay after I told her that her insurance may not cover her hospital bill if she leaves against medical advice. She insisted on having her IV taken out and has refused vital signs.  Had voided x1 with assist.

## 2018-12-23 NOTE — PROGRESS NOTES
Dr Noel Dry aware, supervisor is aware and hospitalist is aware that pt is planning on leaving AMA. Pt refused to sign AMA papers for charge nurse and she told me to get out of her room. Encouraged pt to be careful as I do not want her to fall.

## 2018-12-23 NOTE — PROGRESS NOTES
No IV access at beginning of shift as patient insisted it be removed on previous shift as she was upset and wanted to leave. Patient is difficult to obtain working PIVs on, Vascular Access team called, attempted x 4 but unsuccessful; recommends consult [facility] for EJ. Advised Dr. Reid Villanueva, also advised patient's pain appears well controlled on p.o. Percocet. Received order to continue oral pain medication and not pursue EJ placement. Discussed with patient who agreed.

## 2018-12-23 NOTE — PROGRESS NOTES
No complaints of pain all night. Juany Stains out this am wanting her IV restarted and and wanting pain medicine. Reminded pt that she is a really difficult stick and encouraged her to go with something by mouth because her IV is out. Pt agreed but then when  was in her room she asked to have her IV restarted to be on the safe side for pain to be controlled. Took PD percocet until IV can be restarted.

## 2018-12-24 ENCOUNTER — APPOINTMENT (OUTPATIENT)
Dept: PHYSICAL THERAPY | Age: 48
End: 2018-12-24
Payer: SUBSIDIZED

## 2018-12-24 PROCEDURE — 74011250637 HC RX REV CODE- 250/637: Performed by: HOSPITALIST

## 2018-12-24 PROCEDURE — 74011250636 HC RX REV CODE- 250/636: Performed by: HOSPITALIST

## 2018-12-24 PROCEDURE — 65270000029 HC RM PRIVATE

## 2018-12-24 PROCEDURE — 94760 N-INVAS EAR/PLS OXIMETRY 1: CPT

## 2018-12-24 PROCEDURE — 97530 THERAPEUTIC ACTIVITIES: CPT

## 2018-12-24 RX ORDER — ONDANSETRON 4 MG/1
4 TABLET, ORALLY DISINTEGRATING ORAL
Status: DISCONTINUED | OUTPATIENT
Start: 2018-12-24 | End: 2018-12-27 | Stop reason: HOSPADM

## 2018-12-24 RX ORDER — CALCIUM CARBONATE 200(500)MG
200 TABLET,CHEWABLE ORAL
Status: DISCONTINUED | OUTPATIENT
Start: 2018-12-24 | End: 2018-12-27 | Stop reason: HOSPADM

## 2018-12-24 RX ADMIN — CALCIUM CARBONATE (ANTACID) CHEW TAB 500 MG 200 MG: 500 CHEW TAB at 23:05

## 2018-12-24 RX ADMIN — OXYCODONE HYDROCHLORIDE AND ACETAMINOPHEN 1 TABLET: 10; 325 TABLET ORAL at 09:17

## 2018-12-24 RX ADMIN — TOPIRAMATE 200 MG: 100 TABLET, FILM COATED ORAL at 09:16

## 2018-12-24 RX ADMIN — ONDANSETRON 4 MG: 4 TABLET, ORALLY DISINTEGRATING ORAL at 17:41

## 2018-12-24 RX ADMIN — OXYCODONE HYDROCHLORIDE AND ACETAMINOPHEN 1 TABLET: 10; 325 TABLET ORAL at 01:29

## 2018-12-24 RX ADMIN — LACTULOSE 10 G: 20 SOLUTION ORAL at 09:18

## 2018-12-24 RX ADMIN — OXYCODONE HYDROCHLORIDE AND ACETAMINOPHEN 1 TABLET: 10; 325 TABLET ORAL at 05:18

## 2018-12-24 RX ADMIN — ENOXAPARIN SODIUM 40 MG: 40 INJECTION SUBCUTANEOUS at 12:48
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
## 2018-12-24 NOTE — PROGRESS NOTES
Upon hourly rounds RN found pt in the bathroom asked RN to come back. After few min,pt called out using call conn. RN  Veto Fent to room, found pt sitting at the edge of the bed. Pt stated that she used cane to ambulate to bathroom. Advised to call for assist.while ambulating.

## 2018-12-24 NOTE — PROGRESS NOTES
Pt is comfortable and resting quietly, pain is being well managed by PO percocet. States RLE numbness has decreased since earlier this evening.
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
## 2018-12-24 NOTE — PROGRESS NOTES
I reviewed records from extensive work up at [facility] and all imaging from this admission  At this point I do not see any surgical pathology that I can offer to treat her leg and back  Pain.   Physiatry or pain mgmt as oupt may be beneficial
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
## 2018-12-25 PROCEDURE — 74011250637 HC RX REV CODE- 250/637: Performed by: HOSPITALIST

## 2018-12-25 PROCEDURE — 65270000029 HC RM PRIVATE

## 2018-12-25 PROCEDURE — 74011250636 HC RX REV CODE- 250/636: Performed by: HOSPITALIST

## 2018-12-25 RX ORDER — TIZANIDINE 2 MG/1
2 TABLET ORAL
Status: DISCONTINUED | OUTPATIENT
Start: 2018-12-25 | End: 2018-12-27 | Stop reason: HOSPADM

## 2018-12-25 RX ORDER — TRAMADOL HYDROCHLORIDE 50 MG/1
50 TABLET ORAL
Status: DISCONTINUED | OUTPATIENT
Start: 2018-12-25 | End: 2018-12-26

## 2018-12-25 RX ADMIN — LACTULOSE 10 G: 20 SOLUTION ORAL at 18:00

## 2018-12-25 RX ADMIN — CALCIUM CARBONATE (ANTACID) CHEW TAB 500 MG 200 MG: 500 CHEW TAB at 08:32

## 2018-12-25 RX ADMIN — ENOXAPARIN SODIUM 40 MG: 40 INJECTION SUBCUTANEOUS at 12:47

## 2018-12-25 RX ADMIN — CALCIUM CARBONATE (ANTACID) CHEW TAB 500 MG 200 MG: 500 CHEW TAB at 12:00

## 2018-12-25 RX ADMIN — LACTULOSE 10 G: 20 SOLUTION ORAL at 08:33

## 2018-12-25 RX ADMIN — TRAMADOL HYDROCHLORIDE 50 MG: 50 TABLET, FILM COATED ORAL at 18:57

## 2018-12-25 RX ADMIN — OXYCODONE HYDROCHLORIDE AND ACETAMINOPHEN 1 TABLET: 10; 325 TABLET ORAL at 01:49

## 2018-12-25 RX ADMIN — TOPIRAMATE 200 MG: 100 TABLET, FILM COATED ORAL at 08:32

## 2018-12-25 RX ADMIN — CALCIUM CARBONATE (ANTACID) CHEW TAB 500 MG 200 MG: 500 CHEW TAB at 17:00

## 2018-12-25 RX ADMIN — TRAMADOL HYDROCHLORIDE 50 MG: 50 TABLET, FILM COATED ORAL at 12:47

## 2018-12-25 RX ADMIN — OXYCODONE HYDROCHLORIDE AND ACETAMINOPHEN 1 TABLET: 10; 325 TABLET ORAL at 06:52

## 2018-12-25 NOTE — PROGRESS NOTES
Hospitalist Progress Note  Sadi Weldon MD  Answering service: [phone] from in house phone        Date of Service:  2018  NAME:  Alexandre Arcos  :  1970  MRN:  [id number]      Admission Summary:   51 y.o.  female with h/o low back pain,morbid obesity,presented to ED complaining of low back pain in the right side associatated with right leg numbness. Patient says that last Friday she  and felt a pop in her lower back. Since then she has been dragging her right leg. Was admitted for further eval.         Interval history / Subjective:   Patient still has back pain. States that percocet caused her nausea/vomiting and does not want to take it. States that steroid injections will not help her. Patient states that pain is affecting her psychologically -offered talking to psychiatrist  in the hospital -she declined and said does not want to talk to them. Assessment & Plan:     #Low back pain with right lower extremity pain and weakness:  -MRI lumbar spine: Multilevel degenerative disc disease and degenerative changes, worse at L3-L4 and L4-L5. Edema is associated with the facet joints at these levels. Mild  neuroforaminal narrowing is noted at L3-L4 and L4-L5. No significant spinal  canal stenosis.   -Was on steroids which were later stopped. -Discussed with NSY team - Mehdi Peoples said there is no surgical pathology for intervention.  -Neurologist evaluated the patient.  -Patient does not want to take gabapentin as she had confusion/delerium with it in the past.Does not want NSAIDs as she has irritable bowel syndrome  -Patient does not want percocet as it caused nausea. -will start tramadol,lidocaine patches and prn Zanaflex. -PT/OT eval - recommending rehab    #Asthma:  -prn albuterol. #Migraine:  -will continue topomax. #RLE swelling:resolved  - Doppler negative.       Code status: full  DVT prophylaxis: lovenox    Care Plan discussed with: Patient/Family and Nurse  Disposition: TBD     Hospital Problems  Date Reviewed: 12/20/2018          Codes Class Noted POA    * (Principal) Lumbar radiculopathy ICD-10-CM: M54.16  ICD-9-CM: 724.4  12/20/2018 Unknown        Right foot drop ICD-10-CM: M21.371  ICD-9-CM: 736.79  12/20/2018 Yes        Lower back pain ICD-10-CM: M54.5  ICD-9-CM: 724.2  12/20/2018 Yes        Migraine ICD-10-CM: H82.308  ICD-9-CM: 346.90  12/20/2018 Unknown        Multilevel degenerative disc disease ICD-10-CM: M53.9  ICD-9-CM: 722.6  12/20/2018 Yes        Obesity, morbid ([facility] [address] ICD-10-CM: E66.01  ICD-9-CM: 278.01  10/11/2018 Yes                Review of Systems:   As per HPI      Vital Signs:    Last 24hrs VS reviewed since prior progress note. Most recent are:  Visit Vitals  /72 (BP 1 Location: Left arm, BP Patient Position: At rest;Head of bed elevated (Comment degrees))   Pulse 85   Temp 98.3 °F (36.8 °C)   Resp 16   Ht 5' 3\" (1.6 m)   Wt 121.6 kg (268 lb)   SpO2 100%   BMI 47.47 kg/m²       No intake or output data in the 24 hours ending 12/25/18 1617     Physical Examination:             Constitutional:  No acute distress, cooperative, pleasant    ENT:  Oral mucous moist, oropharynx benign. Neck supple,    Resp:  CTA bilaterally. No wheezing/rhonchi/rales. No accessory muscle use   CV:  Regular rhythm, normal rate, no murmurs, gallops, rubs    GI:  Soft, non distended, non tender. normoactive bowel sounds, no hepatosplenomegaly     Musculoskeletal:  No edema, warm, 2+ pulses throughout    Neurologic: Alert and oriented X 3,CN 2-12 intact, b/l UE strength 5/5, sensation intact on the both the sides today, RLE has difficulty in lifting due to pain,LLE -was able to move against gravity.      Skin:  Good turgor, no rashes or ulcers       Data Review:    Review and/or order of clinical lab test  Review and/or order of tests in the medicine section of CPT      Labs:     No results for input(s): WBC, HGB, HCT, PLT, HGBEXT, HCTEXT, PLTEXT, HGBEXT, HCTEXT, PLTEXT in the last 72 hours. No results for input(s): NA, K, CL, CO2, BUN, CREA, GLU, CA, MG, PHOS, URICA in the last 72 hours. No results for input(s): SGOT, GPT, ALT, AP, TBIL, TBILI, TP, ALB, GLOB, GGT, AML, LPSE in the last 72 hours. No lab exists for component: AMYP, HLPSE  No results for input(s): INR, PTP, APTT in the last 72 hours. No lab exists for component: INREXT, INREXT   No results for input(s): FE, TIBC, PSAT, FERR in the last 72 hours. No results found for: FOL, RBCF   No results for input(s): PH, PCO2, PO2 in the last 72 hours. No results for input(s): CPK, CKNDX, TROIQ in the last 72 hours.     No lab exists for component: CPKMB  No results found for: CHOL, CHOLX, CHLST, CHOLV, HDL, LDL, LDLC, DLDLP, TGLX, TRIGL, TRIGP, CHHD, CHHDX  Lab Results   Component Value Date/Time    Glucose  10/11/2018 10:23 AM     Lab Results   Component Value Date/Time    Color YELLOW/STRAW 12/20/2018 09:57 AM    Appearance CLOUDY (A) 12/20/2018 09:57 AM    Specific gravity 1.029 12/20/2018 09:57 AM    Specific gravity 1.020 07/12/2011 12:15 AM    pH (UA) 6.0 12/20/2018 09:57 AM    Protein NEGATIVE  12/20/2018 09:57 AM    Glucose NEGATIVE  12/20/2018 09:57 AM    Ketone NEGATIVE  12/20/2018 09:57 AM    Bilirubin NEGATIVE  12/20/2018 09:57 AM    Urobilinogen 0.2 12/20/2018 09:57 AM    Nitrites NEGATIVE  12/20/2018 09:57 AM    Leukocyte Esterase NEGATIVE  12/20/2018 09:57 AM    Epithelial cells MANY (A) 12/20/2018 09:57 AM    Bacteria NEGATIVE  12/20/2018 09:57 AM    WBC 0-4 12/20/2018 09:57 AM    RBC 5-10 12/20/2018 09:57 AM         Medications Reviewed:     Current Facility-Administered Medications   Medication Dose Route Frequency    traMADol (ULTRAM) tablet 50 mg  50 mg Oral Q6H PRN    tiZANidine (ZANAFLEX) tablet 2 mg  2 mg Oral Q6H PRN    ondansetron (ZOFRAN ODT) tablet 4 mg  4 mg Oral Q6H PRN    calcium carbonate (TUMS) chewable tablet 200 mg [elemental]  200 mg Oral TID WITH MEALS    bisacodyl (DULCOLAX) tablet 5 mg  5 mg Oral DAILY    acetaminophen (TYLENOL) solution 650 mg  650 mg Oral Q4H PRN    topiramate (TOPAMAX) tablet 200 mg  200 mg Oral DAILY    albuterol (PROVENTIL VENTOLIN) nebulizer solution 2.5 mg  2.5 mg Nebulization Q6H PRN    enoxaparin (LOVENOX) injection 40 mg  40 mg SubCUTAneous Q24H    lactulose (CHRONULAC) solution 10 g  15 mL Oral BID    sodium chloride (NS) flush 5-10 mL  5-10 mL IntraVENous Q8H    sodium chloride (NS) flush 5-10 mL  5-10 mL IntraVENous PRN    morphine 10 mg/ml injection 2 mg  2 mg IntraVENous Q4H PRN    naloxone (NARCAN) injection 0.4 mg  0.4 mg IntraVENous PRN    diphenhydrAMINE (BENADRYL) injection 12.5 mg  12.5 mg IntraVENous Q4H PRN     ______________________________________________________________________  EXPECTED LENGTH OF STAY: 3d 0h  ACTUAL LENGTH OF STAY:          5        Addendum:  In the evening,nurse called and said patient has back pain. I went and evaluated the patient. She said she felt a pop in the back and has left LE pain. I discussed about pain management and rehab again. Patient states that she underwent rehab recently and  thinks it would not  make difference. She said she wants to go home AMA.                 Ulysses Lake MD

## 2018-12-26 PROCEDURE — 97530 THERAPEUTIC ACTIVITIES: CPT

## 2018-12-26 PROCEDURE — 65270000029 HC RM PRIVATE

## 2018-12-26 PROCEDURE — 97535 SELF CARE MNGMENT TRAINING: CPT

## 2018-12-26 PROCEDURE — 74011250637 HC RX REV CODE- 250/637: Performed by: HOSPITALIST

## 2018-12-26 PROCEDURE — 74011250636 HC RX REV CODE- 250/636: Performed by: HOSPITALIST

## 2018-12-26 PROCEDURE — 74011250637 HC RX REV CODE- 250/637: Performed by: INTERNAL MEDICINE

## 2018-12-26 RX ORDER — TRAMADOL HYDROCHLORIDE 50 MG/1
100 TABLET ORAL 3 TIMES DAILY
Status: DISCONTINUED | OUTPATIENT
Start: 2018-12-26 | End: 2018-12-27

## 2018-12-26 RX ADMIN — ENOXAPARIN SODIUM 40 MG: 40 INJECTION SUBCUTANEOUS at 11:09

## 2018-12-26 RX ADMIN — TRAMADOL HYDROCHLORIDE 100 MG: 50 TABLET, FILM COATED ORAL at 21:13

## 2018-12-26 RX ADMIN — CALCIUM CARBONATE (ANTACID) CHEW TAB 500 MG 200 MG: 500 CHEW TAB at 08:00

## 2018-12-26 RX ADMIN — LACTULOSE 10 G: 20 SOLUTION ORAL at 09:51

## 2018-12-26 RX ADMIN — TRAMADOL HYDROCHLORIDE 50 MG: 50 TABLET, FILM COATED ORAL at 03:14

## 2018-12-26 RX ADMIN — TRAMADOL HYDROCHLORIDE 50 MG: 50 TABLET, FILM COATED ORAL at 15:22

## 2018-12-26 RX ADMIN — BENZOCAINE AND MENTHOL 1 LOZENGE: 15; 3.6 LOZENGE ORAL at 19:02

## 2018-12-26 RX ADMIN — LACTULOSE 10 G: 20 SOLUTION ORAL at 17:19

## 2018-12-26 RX ADMIN — TRAMADOL HYDROCHLORIDE 50 MG: 50 TABLET, FILM COATED ORAL at 09:51

## 2018-12-26 RX ADMIN — TOPIRAMATE 200 MG: 100 TABLET, FILM COATED ORAL at 09:51

## 2018-12-26 RX ADMIN — BENZOCAINE AND MENTHOL 1 LOZENGE: 15; 3.6 LOZENGE ORAL at 15:18

## 2018-12-26 RX ADMIN — BISACODYL 5 MG: 5 TABLET, COATED ORAL at 09:51

## 2018-12-26 NOTE — PROGRESS NOTES
Hospitalist Progress Note  Gabriel Davis MD  Answering service: [phone] from in house phone        Date of Service:  2018  NAME:  Evy Sin  :  1970  MRN:  [id number]      Admission Summary:   51 y.o.  female with h/o low back pain,morbid obesity,presented to ED complaining of low back pain in the right side associatated with right leg numbness. Patient says that last Friday she  and felt a pop in her lower back. Since then she has been dragging her right leg. Was admitted for further eval.         Interval history / Subjective:   Patient is seen and examined this afternoon. Patient stated she still having back pain. She also stated she acquired URTI form staff. Seen by [facility] and nothing much that can be done. Assessment & Plan:     # Low back pain with right lower extremity pain and weakness  - MRI lumbar spine: Multilevel degenerative disc disease and degenerative changes, worse at L3-L4 and L4-L5. Edema is associated with the facet joints at these levels. Mild  neuroforaminal narrowing is noted at L3-L4 and L4-L5. No significant spinal  canal stenosis. - Was on steroids which were later stopped. - Discussed with NSY team - Marlyn Demarco said there is no surgical pathology for intervention.  - Neurologist evaluated the patient. - Patient does not want to take gabapentin as she had confusion/delerium with it in the past.Does not want NSAIDs as she has irritable bowel syndrome  - Patient does not want percocet as it caused nausea. - will start tramadol,lidocaine patches and prn Zanaflex.  - PT/OT eval - recommending rehab    # Asthma  -prn albuterol. # Migraine  -will continue topomax. # RLE swelling:resolved  - Doppler negative.       Code status: full  DVT prophylaxis: lovenox    Care Plan discussed with: Patient/Family and Nurse  Disposition: likely home tomorrow, kept for further pain control Hospital Problems  Date Reviewed: 12/20/2018          Codes Class Noted POA    * (Principal) Lumbar radiculopathy ICD-10-CM: M54.16  ICD-9-CM: 724.4  12/20/2018 Unknown        Right foot drop ICD-10-CM: M21.371  ICD-9-CM: 736.79  12/20/2018 Yes        Lower back pain ICD-10-CM: M54.5  ICD-9-CM: 724.2  12/20/2018 Yes        Migraine ICD-10-CM: K34.884  ICD-9-CM: 346.90  12/20/2018 Unknown        Multilevel degenerative disc disease ICD-10-CM: M53.9  ICD-9-CM: 722.6  12/20/2018 Yes        Obesity, morbid ([facility] [address] ICD-10-CM: E66.01  ICD-9-CM: 278.01  10/11/2018 Yes                Review of Systems:   As per HPI      Vital Signs:    Last 24hrs VS reviewed since prior progress note. Most recent are:  Visit Vitals  /82 (BP 1 Location: Left arm, BP Patient Position: At rest)   Pulse 90   Temp 98.6 °F (37 °C)   Resp 16   Ht 5' 3\" (1.6 m)   Wt 121.6 kg (268 lb)   SpO2 100%   BMI 47.47 kg/m²       No intake or output data in the 24 hours ending 12/26/18 1642     Physical Examination:             Constitutional:  No acute distress, cooperative, pleasant    ENT:  Oral mucous moist, oropharynx benign. Neck supple,    Resp:  CTA bilaterally. No wheezing/rhonchi/rales. No accessory muscle use   CV:  Regular rhythm, normal rate, no murmurs, gallops, rubs    GI:  Soft, non distended, non tender. normoactive bowel sounds, no hepatosplenomegaly     Musculoskeletal:  No edema, warm, 2+ pulses throughout    Neurologic: Alert and oriented X 3,CN 2-12 intact, b/l UE strength 5/5, sensation intact on the both the sides today, RLE has difficulty in lifting due to pain,LLE -was able to move against gravity. Skin:  Good turgor, no rashes or ulcers       Data Review:    Review and/or order of clinical lab test  Review and/or order of tests in the medicine section of CPT      Labs:     No results for input(s): WBC, HGB, HCT, PLT, HGBEXT, HCTEXT, PLTEXT, HGBEXT, HCTEXT, PLTEXT in the last 72 hours.   No results for input(s): NA, K, CL, CO2, BUN, CREA, GLU, CA, MG, PHOS, URICA in the last 72 hours. No results for input(s): SGOT, GPT, ALT, AP, TBIL, TBILI, TP, ALB, GLOB, GGT, AML, LPSE in the last 72 hours. No lab exists for component: AMYP, HLPSE  No results for input(s): INR, PTP, APTT in the last 72 hours. No lab exists for component: INREXT, INREXT   No results for input(s): FE, TIBC, PSAT, FERR in the last 72 hours. No results found for: FOL, RBCF   No results for input(s): PH, PCO2, PO2 in the last 72 hours. No results for input(s): CPK, CKNDX, TROIQ in the last 72 hours.     No lab exists for component: CPKMB  No results found for: CHOL, CHOLX, CHLST, CHOLV, HDL, LDL, LDLC, DLDLP, TGLX, TRIGL, TRIGP, CHHD, CHHDX  Lab Results   Component Value Date/Time    Glucose  10/11/2018 10:23 AM     Lab Results   Component Value Date/Time    Color YELLOW/STRAW 12/20/2018 09:57 AM    Appearance CLOUDY (A) 12/20/2018 09:57 AM    Specific gravity 1.029 12/20/2018 09:57 AM    Specific gravity 1.020 07/12/2011 12:15 AM    pH (UA) 6.0 12/20/2018 09:57 AM    Protein NEGATIVE  12/20/2018 09:57 AM    Glucose NEGATIVE  12/20/2018 09:57 AM    Ketone NEGATIVE  12/20/2018 09:57 AM    Bilirubin NEGATIVE  12/20/2018 09:57 AM    Urobilinogen 0.2 12/20/2018 09:57 AM    Nitrites NEGATIVE  12/20/2018 09:57 AM    Leukocyte Esterase NEGATIVE  12/20/2018 09:57 AM    Epithelial cells MANY (A) 12/20/2018 09:57 AM    Bacteria NEGATIVE  12/20/2018 09:57 AM    WBC 0-4 12/20/2018 09:57 AM    RBC 5-10 12/20/2018 09:57 AM         Medications Reviewed:     Current Facility-Administered Medications   Medication Dose Route Frequency    benzocaine-menthol (CEPACOL) lozenge 1 Lozenge  1 Lozenge Mucous Membrane PRN    traMADol (ULTRAM) tablet 50 mg  50 mg Oral Q6H PRN    tiZANidine (ZANAFLEX) tablet 2 mg  2 mg Oral Q6H PRN    ondansetron (ZOFRAN ODT) tablet 4 mg  4 mg Oral Q6H PRN    calcium carbonate (TUMS) chewable tablet 200 mg [elemental]  200 mg Oral TID WITH MEALS    bisacodyl (DULCOLAX) tablet 5 mg  5 mg Oral DAILY    acetaminophen (TYLENOL) solution 650 mg  650 mg Oral Q4H PRN    topiramate (TOPAMAX) tablet 200 mg  200 mg Oral DAILY    albuterol (PROVENTIL VENTOLIN) nebulizer solution 2.5 mg  2.5 mg Nebulization Q6H PRN    enoxaparin (LOVENOX) injection 40 mg  40 mg SubCUTAneous Q24H    lactulose (CHRONULAC) solution 10 g  15 mL Oral BID    sodium chloride (NS) flush 5-10 mL  5-10 mL IntraVENous Q8H    sodium chloride (NS) flush 5-10 mL  5-10 mL IntraVENous PRN    morphine 10 mg/ml injection 2 mg  2 mg IntraVENous Q4H PRN    naloxone (NARCAN) injection 0.4 mg  0.4 mg IntraVENous PRN    diphenhydrAMINE (BENADRYL) injection 12.5 mg  12.5 mg IntraVENous Q4H PRN     ______________________________________________________________________  EXPECTED LENGTH OF STAY: 3d 0h  ACTUAL LENGTH OF STAY:          6               Brenda Cruz MD

## 2018-12-26 NOTE — PROGRESS NOTES
Physical Therapy  Chart reviewed and cleared for therapy per RN, spoke with pt who declined mobility at this time due to increased back and BLE pain, pt reports poor pain control and wants to discuss her pain medication with her doctor, had a discussion with pt regarding discharge recommendations, pt reports her insurance does not pay for rehab, pt reports she lives alone and does not have anyone that can assist her and she has 30 + steps to get to her apartment, pt reports she slipped while in the bathroom, pt admits that she did not call out for assistance when OOB, encouraged pt to call out for assistance when attempting to walk in her room.    Maliha Oseguera PT

## 2018-12-26 NOTE — PROGRESS NOTES
Problem: Self Care Deficits Care Plan (Adult)  Goal: *Acute Goals and Plan of Care (Insert Text)  Occupational Therapy Goals  Initiated 12/23/2018  1. Patient will perform lower body dressing with modified independence within 7 day(s). 2.  Patient will perform standing ADLs x5 minutes with modified independence & no fatigue or LOB within 7 day(s). 3.  Patient will gather 5/5 ADL items at varying heights with modified independence using AE PRN within 7 day(s). 4.  Patient will perform toilet transfers with modified independence within 7 day(s). 5.  Patient will perform all aspects of toileting with modified independence within 7 day(s). 6.  Patient will participate in upper extremity therapeutic exercise/activities with independence for 5 minutes within 7 day(s). 7.  Patient will utilize energy conservation techniques during functional activities with verbal cues within 7 day(s). Occupational Therapy TREATMENT  Patient: Nidia Arango (84 y.o. female)  Date: 12/26/2018  Diagnosis: Lumbar radiculopathy  Lumbar radiculopathy Lumbar radiculopathy      Precautions: Fall(encouraged spinal precautions 2* back pain)  Chart, occupational therapy assessment, plan of care, and goals were reviewed. ASSESSMENT:  Patient received sitting EOB, set up for bathing, hoarse voice reporting MD \"passed her cold on. \" With increased time & verbal/visual cues for positioning, patient able to wash BLEs & doff/don socks on both feet with stool support provided. With continued activity & discussion, patient's voice returning to normal throughout the rest of the session (this therapist familiar with patient). Educated on energy conservation techniques d/t generalized weakness and intermittent R flank pain, patient with good carryover. Patient reporting she is planning to d/c tomorrow, worried about having her daughter expect her to care for her 7 month old granddaughter.  Discussed other family support & delegation of tasks to other friends and family as patient needs to focus on her own recovery, unable to carry her granddaughter up/down 30+ stairs with injury to self and possibly granddaughter, patient acknowledging understanding. Patient left sitting EOB to finish upper body bathing, all needs met, RN aware of session. Patient reporting she has been getting up by herself (despite knowing she would benefit from assistance for safety), encouraged staff supervision for all mobility. Anticipate continued progress with further engagement in ADLs & application of energy conservation techniques, recommend d/c home with [address] [place] [address]. Recommend with nursing patient to complete as able in order to maintain strength, endurance and independence: ADLs with supervision/setup, OOB to chair 3x/day and mobilizing to the bathroom for toileting with 1 assist & SPC. Thank you for your assistance. Progression toward goals:  [x]       Improving appropriately and progressing toward goals  []       Improving slowly and progressing toward goals  []       Not making progress toward goals and plan of care will be adjusted     PLAN:  Patient continues to benefit from skilled intervention to address the above impairments. Continue treatment per established plan of care. Discharge Recommendations:  Home Health  Further Equipment Recommendations for Discharge:  None (patient has DME/AE needed)     SUBJECTIVE:   Patient stated The struggle is real. Phew that took a lot of energy.     OBJECTIVE DATA SUMMARY:   Cognitive/Behavioral Status:  Neurologic State: Alert  Orientation Level: Oriented X4  Cognition: Appropriate for age attention/concentration; Follows commands  Perception: Appears intact  Perseveration: No perseveration noted  Safety/Judgement: Awareness of environment; Fall prevention    Functional Mobility and Transfers for ADLs:  Bed Mobility:  Scooting: Supervision(cues for technique & positioning)    Balance:  Sitting: Intact; Without support    ADL Intervention:    Lower Body Bathing  Lower Body : Supervision/set-up; Compensatory technique training(energy conservation & positioning techniques provided)  Position Performed: Seated edge of bed(stool provided for fabian foot support & balance)  Cues: Verbal cues provided;Visual cues provided         Lower Body Dressing Assistance  Dressing Assistance: Supervision/set up(energy conservation & positioning techniques provided)  Underpants: Compensatory technique training  Pants With Elastic Waist: Compensatory technique training  Pants With Button/Zipper: Compensatory technique training  Socks: Supervision/set-up; Compensatory technique training  Leg Crossed Method Used: No  Position Performed: Bending forward method;Seated edge of bed  Cues: Verbal cues provided;Visual cues provided         Cognitive Retraining  Safety/Judgement: Awareness of environment; Fall prevention       Activity Tolerance:   Good despite fatigue    Please refer to the flowsheet for vital signs taken during this treatment.   After treatment:   [x] Patient left in no apparent distress sitting up EOB  [] Patient left in no apparent distress in bed  [x] Call bell left within reach  [x] Nursing notified  [] Caregiver present  [] Bed alarm activated    COMMUNICATION/COLLABORATION:   The patients plan of care was discussed with: Physical Therapist and Registered Nurse    Nelly Munoz OT  Time Calculation: 29 mins

## 2018-12-26 NOTE — PROGRESS NOTES
had a case conference with NP,PT and nursing. Patient declined speaking to a professional about her symptoms. She did state she will need help at home and thinks that going to rehab might help. Patient is self pay and this may not be an option at this time.

## 2018-12-26 NOTE — PROGRESS NOTES
Pt seen with np and hospitalist.  C/o catching cold from staff. Still c/o disabling back and leg pain. senstive to touch in legs causing severe back pain. I repeated to her my impression that based on the imaging and information I have, I do not feel that she ids a good candidate for surgery nor Is there a clear substrate anatomically that would reliably address her symptoms if corrected.
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
## 2018-12-27 ENCOUNTER — HOME HEALTH ADMISSION (OUTPATIENT)
Dept: HOME HEALTH SERVICES | Facility: HOME HEALTH | Age: 48
End: 2018-12-27
Payer: SUBSIDIZED

## 2018-12-27 ENCOUNTER — APPOINTMENT (OUTPATIENT)
Dept: PHYSICAL THERAPY | Age: 48
End: 2018-12-27
Payer: SUBSIDIZED

## 2018-12-27 VITALS
DIASTOLIC BLOOD PRESSURE: 77 MMHG | RESPIRATION RATE: 16 BRPM | SYSTOLIC BLOOD PRESSURE: 110 MMHG | BODY MASS INDEX: 47.48 KG/M2 | HEART RATE: 101 BPM | OXYGEN SATURATION: 100 % | HEIGHT: 63 IN | WEIGHT: 268 LBS | TEMPERATURE: 97.9 F

## 2018-12-27 PROCEDURE — 97116 GAIT TRAINING THERAPY: CPT

## 2018-12-27 PROCEDURE — 74011250637 HC RX REV CODE- 250/637: Performed by: INTERNAL MEDICINE

## 2018-12-27 PROCEDURE — 74011250637 HC RX REV CODE- 250/637: Performed by: HOSPITALIST

## 2018-12-27 PROCEDURE — 97530 THERAPEUTIC ACTIVITIES: CPT

## 2018-12-27 RX ORDER — TRAMADOL HYDROCHLORIDE 50 MG/1
50 TABLET ORAL ONCE
Status: COMPLETED | OUTPATIENT
Start: 2018-12-27 | End: 2018-12-27

## 2018-12-27 RX ORDER — TRAMADOL HYDROCHLORIDE 50 MG/1
100 TABLET ORAL
Status: DISCONTINUED | OUTPATIENT
Start: 2018-12-27 | End: 2018-12-27 | Stop reason: HOSPADM

## 2018-12-27 RX ORDER — TRAMADOL HYDROCHLORIDE 50 MG/1
100 TABLET ORAL
Qty: 30 TAB | Refills: 0 | Status: SHIPPED | OUTPATIENT
Start: 2018-12-27 | End: 2019-01-03 | Stop reason: SDUPTHER

## 2018-12-27 RX ADMIN — LACTULOSE 10 G: 20 SOLUTION ORAL at 08:47

## 2018-12-27 RX ADMIN — TRAMADOL HYDROCHLORIDE 100 MG: 50 TABLET, FILM COATED ORAL at 11:22

## 2018-12-27 RX ADMIN — TRAMADOL HYDROCHLORIDE 100 MG: 50 TABLET, FILM COATED ORAL at 02:56

## 2018-12-27 RX ADMIN — TRAMADOL HYDROCHLORIDE 50 MG: 50 TABLET, FILM COATED ORAL at 15:00

## 2018-12-27 RX ADMIN — BENZOCAINE AND MENTHOL 1 LOZENGE: 15; 3.6 LOZENGE ORAL at 07:31

## 2018-12-27 RX ADMIN — TOPIRAMATE 200 MG: 100 TABLET, FILM COATED ORAL at 08:47

## 2018-12-27 NOTE — PROGRESS NOTES
Bedside shift change report given to Zack Peres (oncoming nurse) by Opal Myers RN (offgoing nurse). Report included the following information SBAR, Kardex, Intake/Output and Recent Results. 2005: Pt lying in bed, voice raspy with c/o constipation. States she will \"sip on orange juice and try to have a BM. \" Explained to pt to call for assistance if she needed to ambulate to bathroom. Pt voiced understanding, very calm and pleasant. 2120: Pt stated she \"finally had a bowel movement and made it to the bathroom, but peed on the way there. \" Stated difficulty in ambulation, but did not call staff to walk into bathroom. BSC next to pt's bed. Pt not hoarse at this time, sitting up on side of bed reading newspaper. No complaints.

## 2018-12-27 NOTE — PROGRESS NOTES
This cm met with nursing, Nursing supervisor, PT and Patient advocate to discuss patients' discharge plans. Patient advocate asked that I wait to meet with patient when she can as well. Patient has discharge orders to go home today, but was only able to navigate 1 step. Cm met with patient and one of her daughters at the bedside to explain role and offer support. Patient voiced concern over the 32 steps she states she has to navigate at home. Cm asked patient if she lived at the address we had listed [facility]) and she stated she did not, that was her parents' home. She now lives at [address], [place] This is an apartment complex and she lives on the 2nd floor. Cm provided multiple options to explore, to help her come up with a solution. We discussed staying at one of her 4 childrens' homes, staying with her parents, moving into an apartment on the 1st floor. Patient had a response for every suggestion, stated that all of her children had steps to enter their house, and she was currently on a waiting list to move to the 1st floor apartment, but they would not have one available until April. Cm asked patient how she has been able to ambulate all this time, with the stairs. She stated her left leg pain is new, she has only had it since 12/22/2018. Patient confirmed she still sees her PCP, Dr. Tiffani Vernon, and stated she was just awarded Medicaid effective January 1st. Cm informed patient I would put the [address] phone number on the discharge instructions for her to arrange her own transportation. Cm asked patient if she was currently open to a home health agency, and she stated no. Daughter confirmed patient had never been sent home with home health before. They also confirmed patient was not at [facility] in the summer, she was at [facility] for one week. Patient then lost her insurance (when she lost her job) and was discharged home.  Cm will send a referral to [facility] as well, however patient may not be discharged until tomorrow. 3:30pm: [address] can accept patient for 3 visits, starting 12/31/18.     [employer] , [place]

## 2018-12-27 NOTE — DISCHARGE INSTRUCTIONS
Back Pain: Care Instructions  Your Care Instructions    Back pain has many possible causes. It is often related to problems with muscles and ligaments of the back. It may also be related to problems with the nerves, discs, or bones of the back. Moving, lifting, standing, sitting, or sleeping in an awkward way can strain the back. Sometimes you don't notice the injury until later. Arthritis is another common cause of back pain. Although it may hurt a lot, back pain usually improves on its own within several weeks. Most people recover in 12 weeks or less. Using good home treatment and being careful not to stress your back can help you feel better sooner. Follow-up care is a key part of your treatment and safety. Be sure to make and go to all appointments, and call your doctor if you are having problems. It's also a good idea to know your test results and keep a list of the medicines you take. How can you care for yourself at home? · Sit or lie in positions that are most comfortable and reduce your pain. Try one of these positions when you lie down:  ? Lie on your back with your knees bent and supported by large pillows. ? Lie on the floor with your legs on the seat of a sofa or chair. ? Lie on your side with your knees and hips bent and a pillow between your legs. ? Lie on your stomach if it does not make pain worse. · Do not sit up in bed, and avoid soft couches and twisted positions. Bed rest can help relieve pain at first, but it delays healing. Avoid bed rest after the first day of back pain. · Change positions every 30 minutes. If you must sit for long periods of time, take breaks from sitting. Get up and walk around, or lie in a comfortable position. · Try using a heating pad on a low or medium setting for 15 to 20 minutes every 2 or 3 hours. Try a warm shower in place of one session with the heating pad. · You can also try an ice pack for 10 to 15 minutes every 2 to 3 hours.  Put a thin cloth between the ice pack and your skin. · Take pain medicines exactly as directed. ? If the doctor gave you a prescription medicine for pain, take it as prescribed. ? If you are not taking a prescription pain medicine, ask your doctor if you can take an over-the-counter medicine. · Take short walks several times a day. You can start with 5 to 10 minutes, 3 or 4 times a day, and work up to longer walks. Walk on level surfaces and avoid hills and stairs until your back is better. · Return to work and other activities as soon as you can. Continued rest without activity is usually not good for your back. · To prevent future back pain, do exercises to stretch and strengthen your back and stomach. Learn how to use good posture, safe lifting techniques, and proper body mechanics. When should you call for help? Call your doctor now or seek immediate medical care if:    · You have new or worsening numbness in your legs.     · You have new or worsening weakness in your legs. (This could make it hard to stand up.)     · You lose control of your bladder or bowels.    Watch closely for changes in your health, and be sure to contact your doctor if:    · You have a fever, lose weight, or don't feel well.     · You do not get better as expected. Where can you learn more? Go to http://willis-bianka.info/. Enter M437 in the search box to learn more about \"Back Pain: Care Instructions. \"  Current as of: November 29, 2017  Content Version: 11.8  © 2542-5434 [employer]. Care instructions adapted under license by [employer] (which disclaims liability or warranty for this information). If you have questions about a medical condition or this instruction, always ask your healthcare professional. [place] [address] any warranty or liability for your use of this information.

## 2018-12-27 NOTE — PROGRESS NOTES
Problem: Mobility Impaired (Adult and Pediatric)  Goal: *Acute Goals and Plan of Care (Insert Text)  Physical Therapy Goals  Initiated 12/21/2018  1. Patient will move from supine to sit and sit to supine  and roll side to side in bed with modified independence within 7 day(s). 2.  Patient will transfer from bed to chair and chair to bed with modified independence using the least restrictive device within 7 day(s). 3.  Patient will perform sit to stand with modified independence within 7 day(s). 4.  Patient will ambulate with modified independence for 150 feet with the least restrictive device within 7 day(s). 5.  Patient will ascend/descend 32 stairs with 1 handrail(s) with modified independence within 7 day(s). physical Therapy TREATMENT  Patient: Racquel Conway (5 y.o. female)  Date: 12/27/2018  Diagnosis: Lumbar radiculopathy  Lumbar radiculopathy Lumbar radiculopathy      Precautions: Fall(encouraged spinal precautions 2* back pain)  Chart, physical therapy assessment, plan of care and goals were reviewed. ASSESSMENT:  Patient received walking from her bathroom back to her bed upon PT arrival, moaning in pain with intermittent stopping and LLE \"giving way\". Returned to bed and reported pain \"moved more\" to LLE today even though her RLE was her c/o upon admission. Therapy focused on gait and stair training as PT informed of tentative d/c home today. Patient completed gait with SPC x 15' to w/c in hallway, very very slowed belia with 4-5 episodes of the L LE \"giving way\" due to increased pain. Patient able to recover each time without assistance in standing. At stairs, patient trained in several different technqiues but unable to tolerate and safely climb (lateral approach, anterior approach with SPC).   Patient with extreme difficulty tolerating SLS on LLE to transfer RLE to first step and then exhibited inability/difficulty advancing LLE up to the step to complete transfer, required PT to manually assist.  Each attempt at the first step, up and down, took approximately 7-8 minutes. Attempt to teach bump up method on her behind deferred 2/2 safety concerns and judgment that patient would not be able to tolerate that deep of squat/trunk flexion to sit on the steps and would also not be able to get back up from the step. Returned to the room and discussed with patient alternative living arrangements. Patient states she lives alone in a 2nd floor apartment with 32 steps to reach front door. Previously she has stated that her daughter lives with her with her granddaughter but today she denies this and denies that anyone will be able to stay with her or allow her to stay with them. At this time, patient would be unsafe and unable to d/c home due to the [address] her home and lack of support/assistance at home. Would benefit from rehab but limited options due to lack of insurance. Progression toward goals:  []    Improving appropriately and progressing toward goals  []    Improving slowly and progressing toward goals  [x]    Not making progress toward goals and plan of care will be adjusted     PLAN:  Patient continues to benefit from skilled intervention to address the above impairments. Continue treatment per established plan of care. Discharge Recommendations:  Rehab  Further Equipment Recommendations for Discharge:  TBD (RW may be beneficial)     SUBJECTIVE:   Patient stated I just want to go home.     OBJECTIVE DATA SUMMARY:   Critical Behavior:  Neurologic State: Alert  Orientation Level: Oriented X4  Cognition: Appropriate decision making  Safety/Judgement: Awareness of environment, Fall prevention  Functional Mobility Training:  Bed Mobility:     Supine to Sit: (received sitting up)              Transfers:  Sit to Stand: Contact guard assistance;Minimum assistance; Additional time  Stand to Sit: Contact guard assistance                             Balance:  Sitting: Intact; Without support  Standing: Impaired  Standing - Static: Fair;Constant support  Standing - Dynamic : Fair  Ambulation/Gait Training:  Distance (ft): 15 Feet (ft)  Assistive Device: Gait belt;Cane, straight  Ambulation - Level of Assistance: Contact guard assistance        Gait Abnormalities: Decreased step clearance; Antalgic; Step to gait        Base of Support: Shift to right     Speed/Belia: Slow;Shuffled  Step Length: Right shortened;Left shortened                    Stairs:  Number of Stairs Trained: 1(3 attempts)  Stairs - Level of Assistance: Minimum assistance; Moderate assistance;Assist X2;Additional time   Rail Use: Both(tried lateral approach and anterior ascent with SPC)      Pain:  Pain Scale 1: Numeric (0 - 10)  Pain Intensity 1: 0  Pain Location 1: Back  Pain Orientation 1: Lower; Left  Pain Description 1: Aching  Pain Intervention(s) 1: Medication (see MAR)  Activity Tolerance:   Low - 15' with SPC and very slow belia    Please refer to the flowsheet for vital signs taken during this treatment.   After treatment:   []    Patient left in no apparent distress sitting up in chair  [x]    Patient left in no apparent distress in bed  [x]    Call bell left within reach  [x]    Nursing notified  []    Caregiver present  []    Bed alarm activated    COMMUNICATION/COLLABORATION:   The patients plan of care was discussed with: Registered Nurse    Abby Holder, PT   Time Calculation: 40 mins

## 2018-12-27 NOTE — DISCHARGE SUMMARY
Discharge Summary       PATIENT ID: Chana Tanner  MRN: [id number]   YOB: 1970    DATE OF ADMISSION: 12/20/2018  9:32 AM    DATE OF DISCHARGE: 12/27/18  PRIMARY CARE PROVIDER: Klaus Graham MD       DISCHARGING PROVIDER: Fermin Desir MD    To contact this individual call [phone] and ask the  to page. If unavailable ask to be transferred the Adult Hospitalist Department. CONSULTATIONS: IP CONSULT TO NEUROSURGERY  IP CONSULT TO NEUROLOGY      PROCEDURES/SURGERIES: * No surgery found *    IMAGING  Xr Spine Lumb Comp W Bend    Result Date: 12/20/2018  Impression: No acute process. Multilevel lumbar degenerative disc disease. Mri Lumb Spine Wo Cont    Result Date: 12/20/2018  IMPRESSION: Multilevel degenerative disc disease and degenerative changes, worse at L3-L4 and L4-L5. Edema is associated with the facet joints at these levels. Mild neuroforaminal narrowing is noted at L3-L4 and L4-L5. No significant spinal canal stenosis. Ct Spine Lumb Wo Cont    Result Date: 12/20/2018  IMPRESSION: Multilevel lumbosacral degenerative disc disease greatest at L4-5. No acute abnormality. [id number] [facility] COURSE:   # Low back pain with right lower extremity pain and weakness  - MRI lumbar spine: Multilevel degenerative disc disease and degenerative changes, worse at L3-L4 and L4-L5. Edema is associated with the facet joints at these levels. Mild  neuroforaminal narrowing is noted at L3-L4 and L4-L5. No significant spinal  canal stenosis. - Was on steroids which were later stopped. - Discussed with [facility] team - Ishan Chung said there is no surgical pathology for intervention.  - Neurologist evaluated the patient.   - Patient does not want to take gabapentin as she had confusion/delerium with it in the past.Does not want NSAIDs as she has irritable bowel syndrome  - Patient does not want percocet as it caused nausea. - will start tramadol,lidocaine patches and prn Zanaflex.  - PT/OT eval - recommending rehab     # Asthma  -prn albuterol.     # Migraine  -will continue topomax.     # RLE swelling:resolved  - Doppler negative.       DISCHARGE DIAGNOSES / PLAN:    # Low back pain due to Multilevel degenerative disc disease   # Asthma  # Migraine  # RLE swelling:resolved    Discharge plan  - Patient sent home with pain medications and continue physical therapy   - She is advised on weight reduction     Patient Active Problem List   Diagnosis Code    Pain in joint, multiple sites M25.50    Gross hematuria R31.0    Microscopic hematuria R31.29    Obesity, morbid ([facility] [address] E66.01    Lumbar radiculopathy M54.16    Right foot drop M21.371    Lower back pain M54.5    Migraine G43.909    Multilevel degenerative disc disease M53.9               PENDING TEST RESULTS:   At the time of discharge the following test results are still pending: None     FOLLOW UP APPOINTMENTS:    Follow-up Information     Follow up With Specialties Details Why Contact Manuel Hightower MD Internal Medicine In 1 week  [address] [facility] [phone]             ADDITIONAL CARE RECOMMENDATIONS: Follow up with PCP    DIET: Healthy heart diet     ACTIVITY: as tolerated     WOUND CARE: None     EQUIPMENT needed: None       DISCHARGE MEDICATIONS:  Current Discharge Medication List      START taking these medications    Details   traMADol (ULTRAM) 50 mg tablet Take 2 Tabs by mouth every eight (8) hours as needed. Max Daily Amount: 300 mg. Qty: 30 Tab, Refills: 0    Associated Diagnoses: Lumbar radiculopathy         CONTINUE these medications which have NOT CHANGED    Details   cetirizine (ZYRTEC) 10 mg tablet Take 10 mg by mouth daily as needed for Allergies. fluticasone (FLONASE ALLERGY RELIEF) 50 mcg/actuation nasal spray 2 Sprays by Both Nostrils route daily as needed for Rhinitis.       topiramate (TOPAMAX) 200 mg tablet Take 200 mg by mouth daily. potassium chloride SR (KLOR-CON 10) 10 mEq tablet Take 20 mEq by mouth daily. With Lasix. cyanocobalamin (VITAMIN B12) 100 mcg tablet Take 100 mcg by mouth daily. therapeutic multivitamin (THERA) tablet Take 1 Tab by mouth daily. Ferrous Sulfate (SLOW FE) 47.5 mg iron TbER tablet Take 1 Tab by mouth nightly. albuterol (PROVENTIL HFA, VENTOLIN HFA, PROAIR HFA) 90 mcg/actuation inhaler Take 2 Puffs by inhalation every four (4) hours as needed for Wheezing or Shortness of Breath (cough). Qty: 1 Inhaler, Refills: 0      furosemide (LASIX) 40 mg tablet Take 40 mg by mouth daily. All Micro Results     Procedure Component Value Units Date/Time    URINE CULTURE HOLD SAMPLE [[id number]] Collected:  12/20/18 0970    Order Status:  Completed Specimen:  Urine from Serum Updated:  12/20/18 1005     Urine culture hold       URINE ON HOLD IN MICROBIOLOGY DEPT FOR 3 DAYS. IF UNPRESERVED URINE IS SUBMITTED, IT CANNOT BE USED FOR ADDITIONAL TESTING AFTER 24 HRS, RECOLLECTION WILL BE REQUIRED. No results found for this or any previous visit (from the past 24 hour(s)). NOTIFY YOUR PHYSICIAN FOR ANY OF THE FOLLOWING:   Fever over 101 degrees for 24 hours. Chest pain, shortness of breath, fever, chills, nausea, vomiting, diarrhea, change in mentation, falling, weakness, bleeding. Severe pain or pain not relieved by medications. Or, any other signs or symptoms that you may have questions about.     DISPOSITION:  x  Home With:   OT x PT  HH  RN       Long term SNF/Inpatient Rehab    Independent/assisted living    Hospice    Other:       PATIENT CONDITION AT DISCHARGE:     Functional status    Poor     Deconditioned    x Independent      Cognition     Lucid     Forgetful     Dementia      Catheters/lines (plus indication)    Bach     PICC     PEG    x None      Code status    x Full code     DNR      PHYSICAL EXAMINATION AT DISCHARGE:  Constitutional:  No acute distress, cooperative, pleasant    ENT:  Oral mucous moist, oropharynx benign. Neck supple,    Resp:  CTA bilaterally. No wheezing/rhonchi/rales. No accessory muscle use   CV:  Regular rhythm, normal rate, no murmurs, gallops, rubs    GI:  Soft, non distended, non tender. normoactive bowel sounds, no hepatosplenomegaly     Musculoskeletal:  No edema, warm, 2+ pulses throughout    Neurologic: Alert and oriented X 3,CN 2-12 intact                         Skin:  Good turgor, no rashes or ulcers                CHRONIC MEDICAL DIAGNOSES:  Problem List as of 12/27/2018 Date Reviewed: 12/20/2018          Codes Class Noted - Resolved    * (Principal) Lumbar radiculopathy ICD-10-CM: M54.16  ICD-9-CM: 724.4  12/20/2018 - Present        Right foot drop ICD-10-CM: M21.371  ICD-9-CM: 736.79  12/20/2018 - Present        Lower back pain ICD-10-CM: M54.5  ICD-9-CM: 724.2  12/20/2018 - Present        Migraine ICD-10-CM: N17.721  ICD-9-CM: 346.90  12/20/2018 - Present        Multilevel degenerative disc disease ICD-10-CM: M53.9  ICD-9-CM: 722.6  12/20/2018 - Present        Obesity, morbid ([facility] [address] ICD-10-CM: E66.01  ICD-9-CM: 278.01  10/11/2018 - Present        Gross hematuria ICD-10-CM: R31.0  ICD-9-CM: 599.71  6/1/2016 - Present        Microscopic hematuria ICD-10-CM: R31.29  ICD-9-CM: 599.72  6/1/2016 - Present        Pain in joint, multiple sites ICD-10-CM: M25.50  ICD-9-CM: 719.49  2/8/2012 - Present                Discussed with patient and family. Explained the importance of following up, Compliance with medications and recommendations on discharge,Side effect profile of medications were explained. Safety precautions at home and while taking pain medications also explained. All questions answered to the satisfaction of the patient/family. Discussed with consultant(s) who are agreeable to the discharge. Verbal and written instructions on discharge given.  Explained about Discharge medications and side effect profile. Advised patient/family to followup with their pcp for medication refills and preauthorization of medications, Home health orders. checkups,screenign programs as appropriate for age.        Thank you Felicitas Ramos MD for taking care of your patient, Please call with any questions.       Greater than 30 minutes were spent with the patient on counseling and coordination of care    Signed:   Bo Gonzalez MD  12/27/2018  9:23 AM

## 2018-12-27 NOTE — PROGRESS NOTES
Patient expressed to the staff that she acquired cold/sore throat after interacting with me yesterday. I wore a mask and gloves during my interaction. I transitioned patient's care to my colleague Thais Else today.

## 2018-12-31 ENCOUNTER — HOME CARE VISIT (OUTPATIENT)
Dept: SCHEDULING | Facility: HOME HEALTH | Age: 48
End: 2018-12-31
Payer: SUBSIDIZED

## 2018-12-31 PROCEDURE — G0151 HHCP-SERV OF PT,EA 15 MIN: HCPCS

## 2018-12-31 PROCEDURE — 400013 HH SOC

## 2019-01-03 ENCOUNTER — OFFICE VISIT (OUTPATIENT)
Dept: INTERNAL MEDICINE CLINIC | Age: 49
End: 2019-01-03

## 2019-01-03 VITALS
RESPIRATION RATE: 24 BRPM | HEART RATE: 80 BPM | SYSTOLIC BLOOD PRESSURE: 124 MMHG | BODY MASS INDEX: 47.48 KG/M2 | HEIGHT: 63 IN | WEIGHT: 268 LBS | DIASTOLIC BLOOD PRESSURE: 82 MMHG | OXYGEN SATURATION: 99 % | TEMPERATURE: 98 F

## 2019-01-03 DIAGNOSIS — M51.36 DDD (DEGENERATIVE DISC DISEASE), LUMBAR: ICD-10-CM

## 2019-01-03 DIAGNOSIS — Z74.09 IMPAIRED MOBILITY AND ACTIVITIES OF DAILY LIVING: ICD-10-CM

## 2019-01-03 DIAGNOSIS — Z09 HOSPITAL DISCHARGE FOLLOW-UP: Primary | ICD-10-CM

## 2019-01-03 DIAGNOSIS — M54.16 LUMBAR RADICULOPATHY: ICD-10-CM

## 2019-01-03 DIAGNOSIS — M21.371 RIGHT FOOT DROP: ICD-10-CM

## 2019-01-03 DIAGNOSIS — Z78.9 IMPAIRED MOBILITY AND ACTIVITIES OF DAILY LIVING: ICD-10-CM

## 2019-01-03 RX ORDER — TRAMADOL HYDROCHLORIDE 50 MG/1
100 TABLET ORAL
Qty: 30 TAB | Refills: 0 | Status: SHIPPED | OUTPATIENT
Start: 2019-01-03 | End: 2019-03-07

## 2019-01-03 RX ORDER — TOPIRAMATE 200 MG/1
200 TABLET ORAL DAILY
Status: CANCELLED | OUTPATIENT
Start: 2019-01-03

## 2019-01-03 NOTE — PATIENT INSTRUCTIONS
Back Pain: Care Instructions  Your Care Instructions    Back pain has many possible causes. It is often related to problems with muscles and ligaments of the back. It may also be related to problems with the nerves, discs, or bones of the back. Moving, lifting, standing, sitting, or sleeping in an awkward way can strain the back. Sometimes you don't notice the injury until later. Arthritis is another common cause of back pain. Although it may hurt a lot, back pain usually improves on its own within several weeks. Most people recover in 12 weeks or less. Using good home treatment and being careful not to stress your back can help you feel better sooner. Follow-up care is a key part of your treatment and safety. Be sure to make and go to all appointments, and call your doctor if you are having problems. It's also a good idea to know your test results and keep a list of the medicines you take. How can you care for yourself at home? · Sit or lie in positions that are most comfortable and reduce your pain. Try one of these positions when you lie down:  ? Lie on your back with your knees bent and supported by large pillows. ? Lie on the floor with your legs on the seat of a sofa or chair. ? Lie on your side with your knees and hips bent and a pillow between your legs. ? Lie on your stomach if it does not make pain worse. · Do not sit up in bed, and avoid soft couches and twisted positions. Bed rest can help relieve pain at first, but it delays healing. Avoid bed rest after the first day of back pain. · Change positions every 30 minutes. If you must sit for long periods of time, take breaks from sitting. Get up and walk around, or lie in a comfortable position. · Try using a heating pad on a low or medium setting for 15 to 20 minutes every 2 or 3 hours. Try a warm shower in place of one session with the heating pad. · You can also try an ice pack for 10 to 15 minutes every 2 to 3 hours.  Put a thin cloth between the ice pack and your skin. · Take pain medicines exactly as directed. ? If the doctor gave you a prescription medicine for pain, take it as prescribed. ? If you are not taking a prescription pain medicine, ask your doctor if you can take an over-the-counter medicine. · Take short walks several times a day. You can start with 5 to 10 minutes, 3 or 4 times a day, and work up to longer walks. Walk on level surfaces and avoid hills and stairs until your back is better. · Return to work and other activities as soon as you can. Continued rest without activity is usually not good for your back. · To prevent future back pain, do exercises to stretch and strengthen your back and stomach. Learn how to use good posture, safe lifting techniques, and proper body mechanics. When should you call for help? Call your doctor now or seek immediate medical care if:    · You have new or worsening numbness in your legs.     · You have new or worsening weakness in your legs. (This could make it hard to stand up.)     · You lose control of your bladder or bowels.    Watch closely for changes in your health, and be sure to contact your doctor if:    · You have a fever, lose weight, or don't feel well.     · You do not get better as expected. Where can you learn more? Go to http://willis-bianka.info/. Enter P622 in the search box to learn more about \"Back Pain: Care Instructions. \"  Current as of: November 29, 2017  Content Version: 11.8  © 5349-1228 [employer]. Care instructions adapted under license by [employer] (which disclaims liability or warranty for this information). If you have questions about a medical condition or this instruction, always ask your healthcare professional. [place] [address] any warranty or liability for your use of this information. Learning About Degenerative Disc Disease  What is degenerative disc disease?     Degenerative disc disease is not really a disease. It's a term used to describe the normal changes in your spinal discs as you age. Spinal discs are small, spongy discs that separate the bones (vertebrae) that make up the spine. The discs act as shock absorbers for the spine, so it can flex, bend, and twist.  Degenerative disc disease can take place in one or more places along the spine. It most often occurs in the discs in the lower back and the neck. What causes it? As we age, our spinal discs break down, or degenerate. This breakdown causes the symptoms of degenerative disc disease in some people. When the discs break down, they can lose fluid and dry out, and their outer layers can have tiny cracks or tears. This leads to less padding and less space between the bones in the spine. The body reacts to this by making bony growths on the spine called bone spurs. These spurs can press on the spinal nerve roots or spinal cord. This can cause pain and can affect how well the nerves work. What are the symptoms? Many people with degenerative disc disease have no pain. But others have severe pain or other symptoms that limit their activities. Some of the most common symptoms are:  · Pain in the back or neck. Where the pain occurs depends on which discs are affected. · Pain that gets worse when you move, such as bending over, reaching up, or twisting. · Pain that may occur in the rear end (buttocks), arm, or leg if a nerve is pinched. · Numbness or tingling in your arm or leg. How is it diagnosed? A doctor can often diagnose degenerative disc disease while doing a physical exam. If your exam shows no signs of a serious condition, imaging tests (such as an X-ray) aren't likely to help your doctor find the cause of your symptoms. Sometimes degenerative disc disease is found when an X-ray is taken for another reason, such as an injury or other health problem.  But even if the doctor finds degenerative disc disease, that doesn't always mean that you will have symptoms. How is it treated? There are several things you can do at home to manage pain from this problem. · To relieve pain, use ice or heat (whichever feels better) on the affected area. ? Put ice or a cold pack on the area for 10 to 20 minutes at a time. Put a thin cloth between the ice and your skin. ? Put a warm water bottle, a heating pad set on low, or a warm cloth on your back. Put a thin cloth between the heating pad and your skin. Do not go to sleep with a heating pad on your skin. · Ask your doctor if you can take acetaminophen (such as Tylenol) or nonsteroidal anti-inflammatory drugs, such as ibuprofen or naproxen. Your doctor can prescribe stronger medicines if needed. Be safe with medicines. Read and follow all instructions on the label. · Get some exercise every day. Exercise is one of the best ways to help your back feel better and stay better. It's best to start each exercise slowly. You may notice a little soreness, and that's okay. But if an exercise makes your pain worse, stop doing it. Here are things you can try:  ? Walking. It's the simplest and maybe the best activity for your back. It gets your blood moving and helps your muscles stay strong. ? Exercises that gently stretch and strengthen your stomach, back, and leg muscles. The stronger those muscles are, the better they're able to protect your back. If you have constant or severe pain in your back or spine, you may need other treatments, such as physical therapy. In some cases, your doctor may suggest surgery. Follow-up care is a key part of your treatment and safety. Be sure to make and go to all appointments, and call your doctor if you are having problems. It's also a good idea to know your test results and keep a list of the medicines you take. Where can you learn more? Go to http://willis-bianka.info/.   Enter Y160 in the search box to learn more about \"Learning About Degenerative Disc Disease. \"  Current as of: November 29, 2017  Content Version: 11.8  © 6355-6136 [employer]. Care instructions adapted under license by [employer] (which disclaims liability or warranty for this information). If you have questions about a medical condition or this instruction, always ask your healthcare professional. [place] any warranty or liability for your use of this information. Learning About Degenerative Disc Disease  What is degenerative disc disease? Degenerative disc disease is not really a disease. It's a term used to describe the normal changes in your spinal discs as you age. Spinal discs are small, spongy discs that separate the bones (vertebrae) that make up the spine. The discs act as shock absorbers for the spine, so it can flex, bend, and twist.  Degenerative disc disease can take place in one or more places along the spine. It most often occurs in the discs in the lower back and the neck. What causes it? As we age, our spinal discs break down, or degenerate. This breakdown causes the symptoms of degenerative disc disease in some people. When the discs break down, they can lose fluid and dry out, and their outer layers can have tiny cracks or tears. This leads to less padding and less space between the bones in the spine. The body reacts to this by making bony growths on the spine called bone spurs. These spurs can press on the spinal nerve roots or spinal cord. This can cause pain and can affect how well the nerves work. What are the symptoms? Many people with degenerative disc disease have no pain. But others have severe pain or other symptoms that limit their activities. Some of the most common symptoms are:  · Pain in the back or neck. Where the pain occurs depends on which discs are affected. · Pain that gets worse when you move, such as bending over, reaching up, or twisting.   · Pain that may occur in the rear end (buttocks), arm, or leg if a nerve is pinched. · Numbness or tingling in your arm or leg. How is it diagnosed? A doctor can often diagnose degenerative disc disease while doing a physical exam. If your exam shows no signs of a serious condition, imaging tests (such as an X-ray) aren't likely to help your doctor find the cause of your symptoms. Sometimes degenerative disc disease is found when an X-ray is taken for another reason, such as an injury or other health problem. But even if the doctor finds degenerative disc disease, that doesn't always mean that you will have symptoms. How is it treated? There are several things you can do at home to manage pain from this problem. · To relieve pain, use ice or heat (whichever feels better) on the affected area. ? Put ice or a cold pack on the area for 10 to 20 minutes at a time. Put a thin cloth between the ice and your skin. ? Put a warm water bottle, a heating pad set on low, or a warm cloth on your back. Put a thin cloth between the heating pad and your skin. Do not go to sleep with a heating pad on your skin. · Ask your doctor if you can take acetaminophen (such as Tylenol) or nonsteroidal anti-inflammatory drugs, such as ibuprofen or naproxen. Your doctor can prescribe stronger medicines if needed. Be safe with medicines. Read and follow all instructions on the label. · Get some exercise every day. Exercise is one of the best ways to help your back feel better and stay better. It's best to start each exercise slowly. You may notice a little soreness, and that's okay. But if an exercise makes your pain worse, stop doing it. Here are things you can try:  ? Walking. It's the simplest and maybe the best activity for your back. It gets your blood moving and helps your muscles stay strong. ? Exercises that gently stretch and strengthen your stomach, back, and leg muscles. The stronger those muscles are, the better they're able to protect your back.   If you have constant or severe pain in your back or spine, you may need other treatments, such as physical therapy. In some cases, your doctor may suggest surgery. Follow-up care is a key part of your treatment and safety. Be sure to make and go to all appointments, and call your doctor if you are having problems. It's also a good idea to know your test results and keep a list of the medicines you take. Where can you learn more? Go to http://willis-bianka.info/. Enter X662 in the search box to learn more about \"Learning About Degenerative Disc Disease. \"  Current as of: November 29, 2017  Content Version: 11.8  © 6724-7767 [employer]. Care instructions adapted under license by [employer] (which disclaims liability or warranty for this information). If you have questions about a medical condition or this instruction, always ask your healthcare professional. [place] [address] any warranty or liability for your use of this information. Learning About Foot Drop  What is foot drop? Foot drop is a problem that makes you unable to lift the front of your foot normally when you walk. It's called foot drop because the front of the foot drops instead of pointing up when you walk. You can have foot drop in one or both feet. What causes it? Normally, your brain, nerves, and muscles work together to help you walk. Foot drop may occur if you have a problem with your:  · Brain. A stroke or other problem can damage the area of your brain that helps you move your legs and feet. This can cause foot drop. · Nerves. Pressure on or damage to the nerves that help you move your leg and foot can lead to foot drop. This can happen near the knee from a cast or from surgery. Or it can happen to the nerves in the lower back from a problem like a herniated disc. · Muscles. Some kinds of muscular dystrophy or other muscle problems can cause foot drop.   What are the symptoms? When you walk, you may need to bring your hip higher than normal to keep your foot from hitting the ground. When your foot does touch the ground, it may flop down toe first instead of heel first.  You may notice that you often trip. This happens because the front of your foot rubs on the ground, especially on a surface that is uneven like a ledge or carpet. How is foot drop diagnosed? Your doctor will do a physical exam. This will include watching how you walk, checking your reflexes to see how your nerves are working, and checking for weakness in your muscles. Your doctor may order tests, including:  · Nerve tests. These tests show how well and how fast the nerves send electrical signals to your muscles. · Imaging tests, such as an MRI or CT scan. These tests can show pressure on a nerve in your back or can help find problems in your brain. How is foot drop treated? The treatment depends on what is causing the foot drop. If it's from pressure on a nerve near the knee or in the back, removing that pressure may make your foot drop better. This could involve removing a cast or even having surgery to repair a damaged disc or nerve. Nerves can take weeks or months to heal. If you avoid crossing your legs or your ankles, you can help reduce pressure on the nerves that can cause foot drop. Your doctor may prescribe a lightweight leg brace and a shoe insert. They can help keep your foot from dropping when you walk. They can help if you have a short-term problem such as pressure on a nerve or a long-term problem like a stroke. Physical therapy and exercises also can help you walk safely. Follow-up care is a key part of your treatment and safety. Be sure to make and go to all appointments, and call your doctor if you are having problems. It's also a good idea to know your test results and keep a list of the medicines you take. Where can you learn more?   Go to http://jordan.info/. Enter 73 [id number] in the search box to learn more about \"Learning About Foot Drop. \"  Current as of: June 4, 2018  Content Version: 11.8  © 8324-6733 [employer]. Care instructions adapted under license by [employer] (which disclaims liability or warranty for this information). If you have questions about a medical condition or this instruction, always ask your healthcare professional. [place] [address] any warranty or liability for your use of this information.

## 2019-01-03 NOTE — PROGRESS NOTES
Chief Complaint   Patient presents with    Back Pain     f/u from ED     1. Have you been to the ER, urgent care clinic since your last visit? Hospitalized since your last visit? Yes When: 12/20/18 Where: [facility] ED Reason for visit: spinal problem    2. Have you seen or consulted any other health care providers outside of the [address] [place] [address] since your last visit? Include any pap smears or colon screening. No    Pt requesting PCA care from [facility].

## 2019-01-04 NOTE — PROGRESS NOTES
Back Pain (f/u from ED)       Subjective:   HPI     51year old Ms. Alejandro Beltran presents for post hospital discharge f/u for lumbar radiculopathy. She continues to have severe pain with right lower extremity weakness and right foot drop. MRI done in the hospital demonstrated DDD. She  was discharged on Tramadol and referred for home physical therapy. She reports she is in the process of scheduling an appt. with a Dr. Mary Kate Joiner, neurosurgeon. Past Medical History:   Diagnosis Date    Arthritis     Asthma     Bipolar disorder ([address]     Depression     IBS (irritable bowel syndrome)     Migraine     Neurological disorder     migraines    Peptic ulcer     Polyp cervix     Snoring     TIA (transient ischemic attack)        Past Surgical History:   Procedure Laterality Date    HX CERVICAL FUSION      C4-C7    HX CHOLECYSTECTOMY      HX CYST REMOVAL      from under both arms    HX HERNIA REPAIR  2009    HX ORTHOPAEDIC      left knee meniscus repair     HX ORTHOPAEDIC      ORIF left fibula    HX TUBAL LIGATION      HYSTEROSCOPY DIAGNOSTIC      x2       Prior to Admission medications    Medication Sig Start Date End Date Taking? Authorizing Provider   traMADol (ULTRAM) 50 mg tablet Take 2 Tabs by mouth every eight (8) hours as needed. Max Daily Amount: 300 mg. 1/3/19  Yes Nicole Sanchez, NP   topiramate (TOPAMAX) 200 mg tablet Take 200 mg by mouth daily. Yes Provider, Historical   potassium chloride SR (KLOR-CON 10) 10 mEq tablet Take 20 mEq by mouth daily. With Lasix. Yes Provider, Historical   cyanocobalamin (VITAMIN B12) 100 mcg tablet Take 100 mcg by mouth daily. Yes Provider, Historical   therapeutic multivitamin (THERA) tablet Take 1 Tab by mouth daily. Yes Provider, Historical   Ferrous Sulfate (SLOW FE) 47.5 mg iron TbER tablet Take 1 Tab by mouth nightly.    Yes Provider, Historical   albuterol (PROVENTIL HFA, VENTOLIN HFA, PROAIR HFA) 90 mcg/actuation inhaler Take 2 Puffs by inhalation every four (4) hours as needed for Wheezing or Shortness of Breath (cough). 6/4/18  Yes Rolly Shin NP   furosemide (LASIX) 40 mg tablet Take 40 mg by mouth daily. Yes Other, MD Amina   cetirizine (ZYRTEC) 10 mg tablet Take 10 mg by mouth daily as needed for Allergies. Provider, Historical   fluticasone (FLONASE ALLERGY RELIEF) 50 mcg/actuation nasal spray 2 Sprays by Both Nostrils route daily as needed for Rhinitis.     Provider, Historical        Allergies   Allergen Reactions    Latex Hives    Other Food Rash     All fruits except apple, oranges, and pineapple (recorded as Other Medication 2/8/2012)    Peanut Swelling    Shellfish Containing Products Anaphylaxis    Morphine Itching     She has had morphine but premedicates with benadryl    Nystatin Rash    Dilaudid [Hydromorphone (Bulk)] Itching     Must take with benadryl    Flexeril [Cyclobenzaprine] Rash    Other Medication Rash     All fruits except apple, oranges, and pineapple    Phenergan [Promethazine] Other (comments)     Rapid hr    Robaxin [Methocarbamol] Rash        Social History     Socioeconomic History    Marital status: SINGLE     Spouse name: Not on file    Number of children: Not on file    Years of education: Not on file    Highest education level: Not on file   Social Needs    Financial resource strain: Not on file    Food insecurity - worry: Not on file    Food insecurity - inability: Not on file   [employer] needs - medical: Not on file   [employer] needs - non-medical: Not on file   Occupational History    Not on file   Tobacco Use    Smoking status: Current Every Day Smoker     Packs/day: 0.25    Smokeless tobacco: Current User   Substance and Sexual Activity    Alcohol use: Yes     Comment: rare    Drug use: No    Sexual activity: Not Currently   Other Topics Concern    Not on file   Social History Narrative    Not on file        Family History   Problem Relation Age of Onset    Stroke Father     Hypertension Father     Heart Disease Maternal Grandmother     Cancer Maternal Grandmother         brain and colon    Cancer Maternal Uncle         5 w/ brain Shanon Sera    MS Other         1st cousin    Bipolar Disorder Other           Review of Systems   Constitutional: Negative for chills, diaphoresis, fever, malaise/fatigue and weight loss. HENT: Negative for congestion, ear discharge, ear pain, nosebleeds, sinus pain and sore throat. Eyes: Negative for blurred vision, pain, discharge and redness. Respiratory: Negative for cough, shortness of breath and wheezing. Cardiovascular: Negative for chest pain and palpitations. Gastrointestinal: Negative for abdominal pain, constipation, diarrhea, heartburn, nausea and vomiting. Genitourinary: Negative for dysuria. Musculoskeletal: Positive for back pain and myalgias. Skin: Negative for rash. Neurological: Negative for dizziness, tingling, weakness and headaches. Psychiatric/Behavioral: Negative for depression. Objective:     Vitals:    01/03/19 1222   BP: 124/82   Pulse: 80   Resp: 24   Temp: 98 °F (36.7 °C)   TempSrc: Oral   SpO2: 99%   Weight: 268 lb (121.6 kg)   Height: 5' 3\" (1.6 m)   PainSc:  10 - Worst pain ever   PainLoc: Leg   LMP: 01/02/2019        Physical Exam   Constitutional: She is oriented to person, place, and time. She appears well-nourished. HENT:   Head: Normocephalic and atraumatic. Neck: Normal range of motion. Neck supple. Cardiovascular: Normal rate, regular rhythm and normal heart sounds. Pulmonary/Chest: Effort normal and breath sounds normal. No respiratory distress. Musculoskeletal: She exhibits tenderness. Severe impairment in mobility due to back pain and weakness of the right side with foot drop. Neurological: She is alert and oriented to person, place, and time. Skin: Skin is warm and dry. Psychiatric: She has a normal mood and affect. Nursing note and vitals reviewed. Assessment/ Plan:       ICD-10-CM ICD-9-CM    1. Hospital discharge follow-up Z09 V67.59    2. Lumbar radiculopathy M54.16 724.4 traMADol (ULTRAM) 50 mg tablet      REFERRAL TO HOME HEALTH   3. Right foot drop M21.371 736.79 REFERRAL TO HOME HEALTH   4. DDD (degenerative disc disease), lumbar M51.36 722.52 REFERRAL TO [address]   5. Impaired mobility and activities of daily living Z74.09 799.89 [address] This Encounter   [address]     Referral Priority:   Routine     Referral Type:   Home Health Evaluation     Referral Reason:   Continuity of Care     Number of Visits Requested:   1    traMADol (ULTRAM) 50 mg tablet     Sig: Take 2 Tabs by mouth every eight (8) hours as needed. Max Daily Amount: 300 mg. Dispense:  30 Tab     Refill:  0        I have reviewed the patient's medical history in detail and updated the computerized patient record. [facility]. She states she needs an order which was given. We had a prolonged discussion about these complex clinical issues and went over the various important aspects to consider. All questions were answered. Advised her to call back,  return to office, or go to the ER if symptoms do not improve, change in nature, or persist, otherwise schedule appt in one month to f/u lumbar radiculopathy and check blood glucose. She was given an after visit summary or informed of [facility] Access which includes patient instructions, diagnoses, current medications, & vitals. She expressed understanding with the diagnosis and plan and was given the opportunity to ask questions.     Adan Whitley, DNP

## 2019-01-09 ENCOUNTER — HOME CARE VISIT (OUTPATIENT)
Dept: SCHEDULING | Facility: HOME HEALTH | Age: 49
End: 2019-01-09
Payer: SUBSIDIZED

## 2019-01-09 PROCEDURE — G0151 HHCP-SERV OF PT,EA 15 MIN: HCPCS

## 2019-01-10 VITALS
HEART RATE: 67 BPM | TEMPERATURE: 98.2 F | RESPIRATION RATE: 16 BRPM | SYSTOLIC BLOOD PRESSURE: 143 MMHG | DIASTOLIC BLOOD PRESSURE: 67 MMHG | OXYGEN SATURATION: 97 %

## 2019-01-17 ENCOUNTER — HOME CARE VISIT (OUTPATIENT)
Dept: SCHEDULING | Facility: HOME HEALTH | Age: 49
End: 2019-01-17
Payer: SUBSIDIZED

## 2019-01-17 PROCEDURE — G0151 HHCP-SERV OF PT,EA 15 MIN: HCPCS

## 2019-02-18 RX ORDER — FUROSEMIDE 20 MG/1
TABLET ORAL
Qty: 60 TAB | Refills: 0 | Status: SHIPPED | OUTPATIENT
Start: 2019-02-18 | End: 2019-03-24 | Stop reason: SDUPTHER
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
## 2019-03-07 ENCOUNTER — HOSPITAL ENCOUNTER (EMERGENCY)
Age: 49
Discharge: HOME | End: 2019-03-07
Attending: EMERGENCY MEDICINE
Payer: MEDICAID

## 2019-03-07 ENCOUNTER — OFFICE VISIT (OUTPATIENT)
Dept: INTERNAL MEDICINE CLINIC | Age: 49
End: 2019-03-07

## 2019-03-07 VITALS
SYSTOLIC BLOOD PRESSURE: 101 MMHG | DIASTOLIC BLOOD PRESSURE: 80 MMHG | RESPIRATION RATE: 17 BRPM | WEIGHT: 272.2 LBS | HEIGHT: 63 IN | BODY MASS INDEX: 48.23 KG/M2

## 2019-03-07 VITALS
HEART RATE: 75 BPM | SYSTOLIC BLOOD PRESSURE: 127 MMHG | TEMPERATURE: 98 F | OXYGEN SATURATION: 100 % | RESPIRATION RATE: 11 BRPM | DIASTOLIC BLOOD PRESSURE: 73 MMHG

## 2019-03-07 DIAGNOSIS — N93.8 DYSFUNCTIONAL UTERINE BLEEDING: Primary | ICD-10-CM

## 2019-03-07 DIAGNOSIS — R55 SYNCOPE AND COLLAPSE: Primary | ICD-10-CM

## 2019-03-07 DIAGNOSIS — N92.1 MENORRHAGIA WITH IRREGULAR CYCLE: ICD-10-CM

## 2019-03-07 DIAGNOSIS — R42 DIZZINESS: ICD-10-CM

## 2019-03-07 DIAGNOSIS — R42 VERTIGO: ICD-10-CM

## 2019-03-07 LAB
ATRIAL RATE: 75 BPM
CALCULATED P AXIS, ECG09: 0 DEGREES
CALCULATED R AXIS, ECG10: 43 DEGREES
CALCULATED T AXIS, ECG11: 2 DEGREES
DIAGNOSIS, 93000: NORMAL
HGB BLD-MCNC: 11.1 G/DL
P-R INTERVAL, ECG05: 126 MS
Q-T INTERVAL, ECG07: 370 MS
QRS DURATION, ECG06: 78 MS
QTC CALCULATION (BEZET), ECG08: 413 MS
VENTRICULAR RATE, ECG03: 75 BPM

## 2019-03-07 PROCEDURE — 93005 ELECTROCARDIOGRAM TRACING: CPT

## 2019-03-07 PROCEDURE — 99283 EMERGENCY DEPT VISIT LOW MDM: CPT

## 2019-03-07 RX ORDER — SODIUM CHLORIDE 0.9 % (FLUSH) 0.9 %
5-40 SYRINGE (ML) INJECTION EVERY 8 HOURS
Status: DISCONTINUED | OUTPATIENT
Start: 2019-03-07 | End: 2019-03-07 | Stop reason: HOSPADM

## 2019-03-07 RX ORDER — SODIUM CHLORIDE 0.9 % (FLUSH) 0.9 %
5-40 SYRINGE (ML) INJECTION AS NEEDED
Status: DISCONTINUED | OUTPATIENT
Start: 2019-03-07 | End: 2019-03-07 | Stop reason: HOSPADM

## 2019-03-07 NOTE — DISCHARGE INSTRUCTIONS
Patient Education        Lightheadedness or Faintness: Care Instructions  Your Care Instructions  Lightheadedness is a feeling that you are about to faint or \"pass out. \" You do not feel as if you or your surroundings are moving. It is different from vertigo, which is the feeling that you or things around you are spinning or tilting. Lightheadedness usually goes away or gets better when you lie down. If lightheadedness gets worse, it can lead to a fainting spell. It is common to feel lightheaded from time to time. Lightheadedness usually is not caused by a serious problem. It often is caused by a short-lasting drop in blood pressure and blood flow to your head that occurs when you get up too quickly from a seated or lying position. Follow-up care is a key part of your treatment and safety. Be sure to make and go to all appointments, and call your doctor if you are having problems. It's also a good idea to know your test results and keep a list of the medicines you take. How can you care for yourself at home? · Lie down for 1 or 2 minutes when you feel lightheaded. After lying down, sit up slowly and remain sitting for 1 to 2 minutes before slowly standing up. · Avoid movements, positions, or activities that have made you lightheaded in the past.  · Get plenty of rest, especially if you have a cold or flu, which can cause lightheadedness. · Make sure you drink plenty of fluids, especially if you have a fever or have been sweating. · Do not drive or put yourself and others in danger while you feel lightheaded. When should you call for help? Call 911 anytime you think you may need emergency care. For example, call if:    · You have symptoms of a stroke. These may include:  ? Sudden numbness, tingling, weakness, or loss of movement in your face, arm, or leg, especially on only one side of your body. ? Sudden vision changes. ? Sudden trouble speaking.   ? Sudden confusion or trouble understanding simple statements. ? Sudden problems with walking or balance. ? A sudden, severe headache that is different from past headaches.     · You have symptoms of a heart attack. These may include:  ? Chest pain or pressure, or a strange feeling in the chest.  ? Sweating. ? Shortness of breath. ? Nausea or vomiting. ? Pain, pressure, or a strange feeling in the back, neck, jaw, or upper belly or in one or both shoulders or arms. ? Lightheadedness or sudden weakness. ? A fast or irregular heartbeat. After you call 911, the  may tell you to chew 1 adult-strength or 2 to 4 low-dose aspirin. Wait for an ambulance. Do not try to drive yourself.    Watch closely for changes in your health, and be sure to contact your doctor if:    · Your lightheadedness gets worse or does not get better with home care. Where can you learn more? Go to http://willis-bianka.info/. Enter D461 in the search box to learn more about \"Lightheadedness or Faintness: Care Instructions. \"  Current as of: September 23, 2018  Content Version: 11.9  © 7409-9767 [employer]. Care instructions adapted under license by [employer] (which disclaims liability or warranty for this information). If you have questions about a medical condition or this instruction, always ask your healthcare professional. [address] any warranty or liability for your use of this information.

## 2019-03-07 NOTE — PROGRESS NOTES
Dizziness (approx 2 days); Fatigue (periods have been heavier than usual); and Head Pain (increased headaches)       Subjective:   HPI   47year old presents with heavy bleeding from menses and dizziness for 2 days. States head was spinning. Hx of anemia. Denies chest pain. Will get Hbg. Patient lost consciousness for about 2 minutes. Palpable pulse. Called 911. Patient lost consciousness on 2 more occasions while trying to obtain Hbg and at one point became combatant with mood changing to calmness and pleasantry. Patient has a hx of bipolar without current mental f/u or treatment. Past Medical History:   Diagnosis Date    Arthritis     Asthma     Bipolar disorder ([facility] [address]     Depression     IBS (irritable bowel syndrome)     Migraine     Neurological disorder     migraines    Peptic ulcer     Polyp cervix     Snoring     TIA (transient ischemic attack)        Past Surgical History:   Procedure Laterality Date    HX CERVICAL FUSION      C4-C7    HX CHOLECYSTECTOMY      HX CYST REMOVAL      from under both arms    HX HERNIA REPAIR  2009    HX ORTHOPAEDIC      left knee meniscus repair     HX ORTHOPAEDIC      ORIF left fibula    HX TUBAL LIGATION      HYSTEROSCOPY DIAGNOSTIC      x2       Prior to Admission medications    Medication Sig Start Date End Date Taking? Authorizing Provider   furosemide (LASIX) 20 mg tablet TAKE 1 TABLET BY MOUTH TWICE DAILY 2/18/19  Yes Valerie Burleson MD   cetirizine (ZYRTEC) 10 mg tablet Take 10 mg by mouth daily as needed for Allergies. Yes Provider, Historical   fluticasone (FLONASE ALLERGY RELIEF) 50 mcg/actuation nasal spray 2 Sprays by Both Nostrils route daily as needed for Rhinitis. Yes Provider, Historical   topiramate (TOPAMAX) 200 mg tablet Take 200 mg by mouth daily. Yes Provider, Historical   potassium chloride SR (KLOR-CON 10) 10 mEq tablet Take 20 mEq by mouth daily. With Lasix.    Yes Provider, Historical   cyanocobalamin (VITAMIN B12) 100 mcg tablet Take 100 mcg by mouth daily. Yes Provider, Historical   therapeutic multivitamin (THERA) tablet Take 1 Tab by mouth daily. Yes Provider, Historical   Ferrous Sulfate (SLOW FE) 47.5 mg iron TbER tablet Take 1 Tab by mouth nightly. Yes Provider, Historical   albuterol (PROVENTIL HFA, VENTOLIN HFA, PROAIR HFA) 90 mcg/actuation inhaler Take 2 Puffs by inhalation every four (4) hours as needed for Wheezing or Shortness of Breath (cough). 6/4/18  Yes China Shin NP   traMADol (ULTRAM) 50 mg tablet Take 2 Tabs by mouth every eight (8) hours as needed. Max Daily Amount: 300 mg. 1/3/19   Darwin Sanchez NP   furosemide (LASIX) 40 mg tablet Take 40 mg by mouth daily.     Other, MD Amina        Allergies   Allergen Reactions    Latex Hives    Other Food Rash     All fruits except apple, oranges, and pineapple (recorded as Other Medication 2/8/2012)    Peanut Swelling    Shellfish Containing Products Anaphylaxis    Morphine Itching     She has had morphine but premedicates with benadryl    Nystatin Rash    Dilaudid [Hydromorphone (Bulk)] Itching     Must take with benadryl    Flexeril [Cyclobenzaprine] Rash    Other Medication Rash     All fruits except apple, oranges, and pineapple    Phenergan [Promethazine] Other (comments)     Rapid hr    Robaxin [Methocarbamol] Rash        Social History     Socioeconomic History    Marital status: SINGLE     Spouse name: Not on file    Number of children: Not on file    Years of education: Not on file    Highest education level: Not on file   Social Needs    Financial resource strain: Not on file    Food insecurity - worry: Not on file    Food insecurity - inability: Not on file   [employer] needs - medical: Not on file   [employer] needs - non-medical: Not on file   Occupational History    Not on file   Tobacco Use    Smoking status: Current Every Day Smoker     Packs/day: 0.25    Smokeless tobacco: Current User   Substance and Sexual Activity    Alcohol use: Yes     Comment: rare    Drug use: No    Sexual activity: Not Currently   Other Topics Concern    Not on file   Social History Narrative    Not on file        Family History   Problem Relation Age of Onset    Stroke Father     Hypertension Father     Heart Disease Maternal Grandmother     Cancer Maternal Grandmother         brain and colon    Cancer Maternal Uncle         5 w/ brain [place]by [place]oked MS Other         1st cousin    Bipolar Disorder Other           Review of Systems   Constitutional: Positive for malaise/fatigue. Negative for diaphoresis and fever. HENT: Negative for congestion, ear discharge, ear pain, nosebleeds, sinus pain, sore throat and tinnitus. Eyes: Negative for blurred vision. Respiratory: Negative for cough, shortness of breath and wheezing. Cardiovascular: Negative for chest pain and palpitations. Gastrointestinal: Negative for nausea and vomiting. Genitourinary: Negative for dysuria. Musculoskeletal: Positive for back pain. Hx of back pain   Skin: Negative for rash. Neurological: Positive for dizziness, loss of consciousness and headaches. Negative for tingling, tremors, sensory change, speech change, focal weakness and weakness. Psychiatric/Behavioral: Negative for depression, hallucinations, substance abuse and suicidal ideas. The patient is nervous/anxious. Objective:     Vitals:    03/07/19 1047   BP: 101/80   Resp: 17   Weight: 272 lb 3.2 oz (123.5 kg)   Height: 5' 3\" (1.6 m)   LMP: 03/05/2019        Physical Exam   Constitutional: She appears well-nourished. Morbid obesity   HENT:   Head: Normocephalic and atraumatic. Eyes: Pupils are equal, round, and reactive to light. Neck: Normal range of motion. Neck supple. Cardiovascular: Regular rhythm and normal heart sounds. Pulmonary/Chest: Effort normal and breath sounds normal.   Neurological: She is alert. Alert except at loss of consciousness x3.    Skin: Skin is warm and dry. Nursing note and vitals reviewed. Assessment/ Plan:       ICD-10-CM ICD-9-CM    1. Dysfunctional uterine bleeding N93.8 626.8    2. Dizziness R42 780.4    3. Menorrhagia with irregular cycle N92.1 626.2         No orders of the defined types were placed in this encounter. I have reviewed the patient's medical history in detail and updated the computerized patient record. 911 called and patient transported to the ER. We had a prolonged discussion about these complex clinical issues and went over the various important aspects to consider. All questions were answered. Advised her to call back or return to office if symptoms do not improve, change in nature, or persist. Patient to schedule f/u s/p ER evaluation and treatment. She was given an after visit summary or informed of [employer] [facility] which includes patient instructions, diagnoses, current medications, & vitals. She expressed understanding with the diagnosis and plan and was given the opportunity to ask questions.     Shena Marcus, DNP

## 2019-03-07 NOTE — PROGRESS NOTES
Chief Complaint   Patient presents with    Dizziness     approx 2 days    Fatigue     periods have been heavier than usual    Head Pain     increased headaches     1. Have you been to the ER, urgent care clinic since your last visit? Hospitalized since your last visit? No    2. Have you seen or consulted any other health care providers outside of the [address] [place] [address] since your last visit? Include any pap smears or colon screening.  No

## 2019-03-07 NOTE — ED NOTES
Pt brought in via EMS. EMS reports she was at doctors office for an appointment and was getting blood drawn and passed out. Office called EMS and was brought to ED. Has passed out x3 upon arrival to ED. EMS reports possible seizure like possibility.

## 2019-03-07 NOTE — ED NOTES
Discharge instructions reviewed with patient, copy given by Dr. Magali Thompson. Pt to call for uber ride, pt ambulated out to exit area.

## 2019-03-07 NOTE — ED PROVIDER NOTES
EMERGENCY DEPARTMENT HISTORY AND PHYSICAL EXAM      Date: 3/7/2019  Patient Name: Linda Garza    History of Presenting Illness     Chief Complaint   Patient presents with    Syncope       History Provided By: Patient and EMS    HPI: Linda Garza, 47 y.o. female with PMHx significant for migraines, bipolar disorder, TIA, IBS, presents via EMS to the ED with cc of 3 syncopal episodes PTA. EMS reports possible seizure-like activity en route to ED. Pt reports constant lightheadedness and dizziness x 2 days. She reports abnormal menstrual cycle, heavier than usual. Per EMS, pt was at PCP's office for evaluation of sxs when she passed out after a blood draw. Pt passed out a second time and staff called EMS. Upon arrival, pt's vitals were normal and pt did not want to be transported to the ER but she passed out again in the presence of EMS. Pt denies associated CP, SOB, or rash. She denies PMHx of seizures. There are no other complaints, changes, or physical findings at this time. PCP: Talia Sanford MD    No current facility-administered medications on file prior to encounter. Current Outpatient Medications on File Prior to Encounter   Medication Sig Dispense Refill    furosemide (LASIX) 20 mg tablet TAKE 1 TABLET BY MOUTH TWICE DAILY 60 Tab 0    cetirizine (ZYRTEC) 10 mg tablet Take 10 mg by mouth daily as needed for Allergies.  fluticasone (FLONASE ALLERGY RELIEF) 50 mcg/actuation nasal spray 2 Sprays by Both Nostrils route daily as needed for Rhinitis.  topiramate (TOPAMAX) 200 mg tablet Take 200 mg by mouth daily.  potassium chloride SR (KLOR-CON 10) 10 mEq tablet Take 20 mEq by mouth daily. With Lasix.  cyanocobalamin (VITAMIN B12) 100 mcg tablet Take 100 mcg by mouth daily.  therapeutic multivitamin (THERA) tablet Take 1 Tab by mouth daily.  Ferrous Sulfate (SLOW FE) 47.5 mg iron TbER tablet Take 1 Tab by mouth nightly.       albuterol (PROVENTIL HFA, VENTOLIN HFA, PROAIR HFA) 90 mcg/actuation inhaler Take 2 Puffs by inhalation every four (4) hours as needed for Wheezing or Shortness of Breath (cough). 1 Inhaler 0    furosemide (LASIX) 40 mg tablet Take 40 mg by mouth daily. Past History     Past Medical History:  Past Medical History:   Diagnosis Date    Arthritis     Asthma     Bipolar disorder ([address]     Depression     IBS (irritable bowel syndrome)     Migraine     Neurological disorder     migraines    Peptic ulcer     Polyp cervix     Snoring     TIA (transient ischemic attack)        Past Surgical History:  Past Surgical History:   Procedure Laterality Date    HX CERVICAL FUSION      C4-C7    HX CHOLECYSTECTOMY      HX CYST REMOVAL      from under both arms    HX HERNIA REPAIR  2009    HX ORTHOPAEDIC      left knee meniscus repair     HX ORTHOPAEDIC      ORIF left fibula    HX TUBAL LIGATION      HYSTEROSCOPY DIAGNOSTIC      x2       Family History:  Family History   Problem Relation Age of Onset    Stroke Father     Hypertension Father     Heart Disease Maternal Grandmother     Cancer Maternal Grandmother         brain and colon    Cancer Maternal Uncle         5 w/ brain [place]    MS Other         1st cousin    Bipolar Disorder Other        Social History:  Social History     Tobacco Use    Smoking status: Current Every Day Smoker     Packs/day: 0.25    Smokeless tobacco: Current User   Substance Use Topics    Alcohol use: Yes     Comment: rare    Drug use: No       Allergies:   Allergies   Allergen Reactions    Latex Hives    Other Food Rash     All fruits except apple, oranges, and pineapple (recorded as Other Medication 2/8/2012)    Peanut Swelling    Shellfish Containing Products Anaphylaxis    Morphine Itching     She has had morphine but premedicates with benadryl    Nystatin Rash    Dilaudid [Hydromorphone (Bulk)] Itching     Must take with benadryl    Flexeril [Cyclobenzaprine] Rash    Other Medication Rash     All fruits except apple, oranges, and pineapple    Phenergan [Promethazine] Other (comments)     Rapid hr    Robaxin [Methocarbamol] Rash         Review of Systems   Review of Systems   Constitutional: Negative. Negative for activity change, appetite change, chills, fatigue, fever and unexpected weight change. HENT: Negative. Negative for congestion, hearing loss, rhinorrhea, sneezing and voice change. Eyes: Negative. Negative for pain and visual disturbance. Respiratory: Negative. Negative for apnea, cough, choking, chest tightness and shortness of breath. Cardiovascular: Negative. Negative for chest pain and palpitations. Gastrointestinal: Negative. Negative for abdominal distention, abdominal pain, blood in stool, diarrhea, nausea and vomiting. Genitourinary: Positive for menstrual problem (heavier than normal). Negative for difficulty urinating, flank pain, frequency and urgency. No discharge   Musculoskeletal: Negative. Negative for arthralgias, back pain, myalgias and neck stiffness. Skin: Negative. Negative for color change and rash. Neurological: Positive for dizziness, seizures (possible activity), syncope and light-headedness. Negative for speech difficulty, weakness, numbness and headaches. Hematological: Negative for adenopathy. Psychiatric/Behavioral: Negative. Negative for agitation, behavioral problems, dysphoric mood and suicidal ideas. The patient is not nervous/anxious. Physical Exam   Physical Exam   Constitutional: She is oriented to person, place, and time. She appears well-developed and well-nourished. Vomiting on exam. Appears weak. HENT:   Head: Normocephalic and atraumatic. Mouth/Throat: Oropharynx is clear and moist. No oropharyngeal exudate. Eyes: Conjunctivae and EOM are normal. Pupils are equal, round, and reactive to light. Right eye exhibits no discharge. Left eye exhibits no discharge.    Neck: Normal range of motion. Neck supple. Cardiovascular: Normal rate, regular rhythm and intact distal pulses. Exam reveals no gallop and no friction rub. No murmur heard. Pulmonary/Chest: Effort normal and breath sounds normal. No respiratory distress. She has no wheezes. She has no rales. She exhibits no tenderness. Abdominal: Soft. Bowel sounds are normal. She exhibits no distension and no mass. There is no tenderness. There is no rebound and no guarding. Musculoskeletal: Normal range of motion. She exhibits no edema. Lymphadenopathy:     She has no cervical adenopathy. Neurological: She is alert and oriented to person, place, and time. No cranial nerve deficit. Coordination normal.   Skin: Skin is warm and dry. No rash noted. No erythema. Psychiatric: She has a normal mood and affect. Nursing note and vitals reviewed. Diagnostic Study Results     Labs -     Recent Results (from the past 12 hour(s))   EKG, 12 LEAD, INITIAL    Collection Time: 03/07/19 12:48 PM   Result Value Ref Range    Ventricular Rate 75 BPM    Atrial Rate 75 BPM    P-R Interval 126 ms    QRS Duration 78 ms    Q-T Interval 370 ms    QTC Calculation (Bezet) 413 ms    Calculated P Axis 0 degrees    Calculated R Axis 43 degrees    Calculated T Axis 2 degrees    Diagnosis       Normal sinus rhythm  Junctional ST depression, probably normal  When compared with ECG of 23-APR-2012 20:19,  No significant change was found  Confirmed by MINE Shah ([id number]) on 3/7/2019 1:35:14 PM           Medical Decision Making   I am the first provider for this patient. I reviewed the vital signs, available nursing notes, past medical history, past surgical history, family history and social history. Vital Signs-Reviewed the patient's vital signs.   Patient Vitals for the past 12 hrs:   Temp Pulse Resp BP SpO2   03/07/19 1345  75 11 127/73 100 %   03/07/19 1257 98 °F (36.7 °C) 73 14 139/84 100 %   03/07/19 1255    120/81        EKG interpretation: (Preliminary) 12:48  Rhythm: normal sinus rhythm; and regular . Rate (approx.): 75; Axis: normal; AK interval: normal; QRS interval: normal ; ST/T wave: normal.    Records Reviewed: Nursing Notes, Old Medical Records, Previous Radiology Studies and Previous Laboratory Studies    Provider Notes (Medical Decision Making):   DDx: dehydration, electrolyte abnormality, acute blood loss anemia, UTI    ED Course:   Initial assessment performed. The patients presenting problems have been discussed, and they are in agreement with the care plan formulated and outlined with them. I have encouraged them to ask questions as they arise throughout their visit. 2:16 PM  Pt does not want any more treatment. She states she feels okay and wants to get an [employer] home. 2:33 PM  Asked the pt again if she still wishes to go home without further treatment. She states she does. Disposition:  2:33 PM  Patient is leaving against medical advice. Will discharge with instructions and have discussed the risks. PLAN:  1. Current Discharge Medication List        2. Follow-up Information     Follow up With Specialties Details Why Contact Info    Rolando James MD Internal Medicine Call today  [facility]  [phone]      [facility] EMERGENCY DEPT Emergency Medicine Call today  [address] [place] [id number] [phone]        Return to ED if worse     Diagnosis     Clinical Impression:   1. Syncope and collapse        Attestations: This note is prepared by Stephanie Christy, acting as a Scribe for [employer]ima Payton MD: The scribe's documentation has been prepared under my direction and personally reviewed by me in its entirety. I confirm that the notes above accurately reflects all work, treatment, procedures, and medical decision making performed by me.

## 2019-03-07 NOTE — ED NOTES
While performing sitting orthostatic BP, pt had passed out again with seizure like activity. Dr. Stevie Thibodeaux notified.

## 2019-03-07 NOTE — ED NOTES
Walked into pt's room and pt pulling off monitor leads and pt states \"Im going home, I think this is because of my Topamax. \" Discussed with Dr. Clint Khan.

## 2019-03-07 NOTE — ED NOTES
Pt had another near syncopal episode, not responding to verbal stimuli, ammonia capsule broken near pt's nose and pt aroused quickly and is a/o x 4.

## 2019-03-07 NOTE — ED NOTES
While assessing pt and speaking with her, she had multiple episodes of dozing off and lower limb shaking, pt aroused herself each time and is alert.  Tearful and states \"Im just so sacred\"

## 2019-03-25 RX ORDER — FUROSEMIDE 20 MG/1
TABLET ORAL
Qty: 60 TAB | Refills: 0 | Status: SHIPPED | OUTPATIENT
Start: 2019-03-25 | End: 2019-04-28 | Stop reason: SDUPTHER

## 2019-04-05 ENCOUNTER — OFFICE VISIT (OUTPATIENT)
Dept: INTERNAL MEDICINE CLINIC | Age: 49
End: 2019-04-05

## 2019-04-05 VITALS
DIASTOLIC BLOOD PRESSURE: 70 MMHG | HEART RATE: 82 BPM | SYSTOLIC BLOOD PRESSURE: 90 MMHG | TEMPERATURE: 98 F | RESPIRATION RATE: 14 BRPM | HEIGHT: 63 IN | WEIGHT: 275 LBS | OXYGEN SATURATION: 99 % | BODY MASS INDEX: 48.73 KG/M2

## 2019-04-05 DIAGNOSIS — M54.16 LUMBAR RADICULOPATHY: Primary | ICD-10-CM

## 2019-04-05 RX ORDER — AZITHROMYCIN 250 MG/1
TABLET, FILM COATED ORAL
Qty: 6 TAB | Refills: 0 | Status: SHIPPED | OUTPATIENT
Start: 2019-04-05 | End: 2019-04-25 | Stop reason: SDUPTHER
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
## 2019-04-05 NOTE — PROGRESS NOTES
1. Have you been to the ER, urgent care clinic since your last visit? Hospitalized since your last visit? YES    2. Have you seen or consulted any other health care providers outside of the [address] [place] [address] since your last visit? Include any pap smears or colon screening. no    Chief Complaint   Patient presents with    Back Pain    Referral Request    TIFFANIE Villatoro       Per Dr. Trupti Hightower.,  verbal order given for needed amb poc labs.     3 most recent PHQ Screens 10/11/2018   PHQ Not Done Patient Decline   Little interest or pleasure in doing things Several days   Feeling down, depressed, irritable, or hopeless Several days   Total Score PHQ 2 2   Trouble falling or staying asleep, or sleeping too much Several days   Feeling tired or having little energy Several days   Poor appetite, weight loss, or overeating Not at all   Feeling bad about yourself - or that you are a failure or have let yourself or your family down Not at all   Trouble concentrating on things such as school, work, reading, or watching TV Not at all   Moving or speaking so slowly that other people could have noticed; or the opposite being so fidgety that others notice Not at all   Thoughts of being better off dead, or hurting yourself in some way Not at all   PHQ 9 Score 4   How difficult have these problems made it for you to do your work, take care of your home and get along with others Not difficult at all

## 2019-04-05 NOTE — PATIENT INSTRUCTIONS
Back Care and Preventing Injuries: Care Instructions  Your Care Instructions    You can hurt your back doing many everyday activities: lifting a heavy box, bending down to garden, exercising at the gym, and even getting out of bed. But you can keep your back strong and healthy by doing some exercises. You also can follow a few tips for sitting, sleeping, and lifting to avoid hurting your back again. Talk to your doctor before you start an exercise program. Ask for help if you want to learn more about keeping your back healthy. Follow-up care is a key part of your treatment and safety. Be sure to make and go to all appointments, and call your doctor if you are having problems. It's also a good idea to know your test results and keep a list of the medicines you take. How can you care for yourself at home? · Stay at a healthy weight to avoid strain on your lower back. · Do not smoke. Smoking increases the risk of osteoporosis, which weakens the spine. If you need help quitting, talk to your doctor about stop-smoking programs and medicines. These can increase your chances of quitting for good. · Make sure you sleep in a position that maintains your back's normal curves and on a mattress that feels comfortable. Sleep on your side with a pillow between your knees, or sleep on your back with a pillow under your knees. These positions can reduce strain on your back. · When you get out of bed, lie on your side and bend both knees. Drop your feet over the edge of the bed as you push up with both arms. Scoot to the edge of the bed. Make sure your feet are in line with your rear end (buttocks), and then stand up. · If you must stand for a long time, put one foot on a stool, ledge, or box. Exercise to strengthen your back and other muscles  · Get at least 30 minutes of exercise on most days of the week. Walking is a good choice.  You also may want to do other activities, such as running, swimming, cycling, or playing tennis or team sports. · Stretch your back muscles. Here are few exercises to try:  ? Lie on your back with your knees bent and your feet flat on the floor. Gently pull one bent knee to your chest. Put that foot back on the floor, and then pull the other knee to your chest. Hold for 15 to 30 seconds. Repeat 2 to 4 times. ? Do pelvic tilts. Lie on your back with your knees bent. Tighten your stomach muscles. Pull your belly button (navel) in and up toward your ribs. You should feel like your back is pressing to the floor and your hips and pelvis are slightly lifting off the floor. Hold for 6 seconds while breathing smoothly. · Keep your core muscles strong. The muscles of your back, belly (abdomen), and buttocks support your spine. ? Pull in your belly, and imagine pulling your navel toward your spine. Hold this for 6 seconds, then relax. Remember to keep breathing normally as you tense your muscles. ? Do curl-ups. Always do them with your knees bent. Keep your low back on the floor, and curl your shoulders toward your knees using a smooth, slow motion. Keep your arms folded across your chest. If this bothers your neck, try putting your hands behind your neck (not your head), with your elbows spread apart. ? Lie on your back with your knees bent and your feet flat on the floor. Tighten your belly muscles, and then push with your feet and raise your buttocks up a few inches. Hold this position 6 seconds as you continue to breathe normally, then lower yourself slowly to the floor. Repeat 8 to 12 times. ? If you like group exercise, try Pilates or yoga. These classes have poses that strengthen the core muscles. Protect your back when you sit  · Place a small pillow, a rolled-up towel, or a lumbar roll in the curve of your back if you need extra support. · Sit in a chair that is low enough to let you place both feet flat on the floor with both knees nearly level with your hips.  If your chair or desk is too high, use a foot rest to raise your knees. · When driving, keep your knees nearly level with your hips. Sit straight, and drive with both hands on the steering wheel. Your arms should be in a slightly bent position. · Try a kneeling chair, which helps tilt your hips forward. This takes pressure off your lower back. · Try sitting on an exercise ball. It can rock from side to side, which helps keep your back loose. Lift properly  · Squat down, bending at the hips and knees only. If you need to, put one knee to the floor and extend your other knee in front of you, bent at a right angle (half kneeling). · Press your chest straight forward. This helps keep your upper back straight while keeping a slight arch in your low back. · Hold the load as close to your body as possible, at the level of your navel. · Use your feet to change direction, taking small steps. · Lead with your hips as you change direction. Keep your shoulders in line with your hips as you move. Do not twist your body. · Set down your load carefully, squatting with your knees and hips only. When should you call for help? Watch closely for changes in your health, and be sure to contact your doctor if you have any problems. Where can you learn more? Go to http://willis-bianka.info/. Enter S810 in the search box to learn more about \"Back Care and Preventing Injuries: Care Instructions. \"  Current as of: September 20, 2018  Content Version: 11.9  © 6310-4272 [employer]. Care instructions adapted under license by [email or web address] (which disclaims liability or warranty for this information). If you have questions about a medical condition or this instruction, always ask your healthcare professional. [place] [address] any warranty or liability for your use of this information.          Therapeutic Ball: Back Exercises  Your Care Instructions  Here are some examples of typical exercises for your condition. Start each exercise slowly. Ease off the exercise if you start to have pain. Your doctor or physical therapist will tell you when you can start these exercises and which ones will work best for you. To prepare, make sure that your ball is the right size for you. When inflated and firm, it should allow you to sit with your hips and knees bent at about a 90-degree angle (like the letter L). How to do the exercises  Seated position on ball    1. Use this exercise to get used to moving on the ball and to find your best sitting position. 2. Sit comfortably on the ball with your feet about hip-width apart. If you feel unsteady, rest your hands on the ball near your hips. 3. As you do this exercise, try to keep your shoulders and upper body relaxed and still. 4. Using your stomach and back muscles to move your pelvis, roll the ball forward. This will round your back. 5. Still using your stomach and back muscles, roll the ball back. You will arch your back. 6. Repeat this rounding-arching motion a few times. 7. Stop in between the two positions, where your back is not rounded or arched. This is called your neutral position. Pelvic rotation    1. Sit tall on the ball. 2. Slowly rotate your hips in a [place] pattern. Keep the movement focused at your hips. 3. Repeat, but [place] in the other direction. 4. Repeat 8 to 12 times. Postural sitting    1. Use this position to find a stable, relaxed posture on the ball. You can use this position as your starting point for other ball exercises. If you feel unsteady on the ball, start on a chair first.  2. Sit on a ball or chair, with your feet planted straight in front of you. 3. Imagine that a string at the top of your head is pulling you straight up. Think of yourself as 2 inches taller than you are.  4. Slightly tuck your chin. 5. Keep your shoulders back and relaxed. Knee extension    1.  Sit tall on the ball with your feet planted in front of you, hip-width apart. As you do this exercise, avoid slumping your shoulders and arching your back. 2. Rest your hands on the ball near your hip or a steady object next to you. (If you feel very stable on the ball, rest your hands in your lap or at your side.)  3. Slowly straighten one leg at the knee. Slowly lower it back down. Repeat with the other leg. 4. Repeat this exercise 8 to 12 times. Roll-ups    1. Lie on your back with your knees bent, feet resting on the floor. 2. Lay the ball on your thighs. Rest your hands up high on the ball. 3. Raising your head and shoulder blades, roll the ball up your thighs. Exhale as you roll up. 4. If this is hard on your neck, gently support your lower head and upper neck with one hand. Don't use that hand to pull your head up. 5. Repeat 8 to 12 times. Ball curls    1. Lie on your back with your ankles resting on the ball, knees straight. 2. Use your legs to roll the exercise ball toward you. Allow your knees to bend and move closer to your chest.  3. Pause briefly, and then roll the ball to the starting position. Try to keep the ball rolling straight. You will feel the muscles in your lower belly working. 4. Repeat 8 to 12 times. Bridge with ball under legs    1. Lie on your back with your legs up, calves resting on the ball. For more challenge, rest your heels on the ball. 2. Look up at the ceiling, and keep your chin relaxed. You can place a small pillow under your head or neck for comfort. 3. With your arms by your side, press your hands onto the floor for stability. 4. Tighten your belly muscles by pulling in your belly button toward your spine. 5. Push your heels down toward the floor, squeeze your buttocks, and lift your hips off the floor until your shoulders, hips, and knees are all in a straight line. 6. Try to keep the ball steady. Hold for about 6 seconds as you continue to breathe normally.   7. Slowly lower your hips back down to the floor.  8. Repeat 8 to 12 times. Ball curls with bridge    1. Start flat on your back with your ankles resting on the ball. 2. Look up at the ceiling, and keep your chin relaxed. You can place a small pillow under your head or neck for comfort. 3. With your arms by your side, press your hands onto the floor for stability. 4. Tighten your belly muscles by pulling in your belly button toward your spine. 5. Push your heels down toward the floor, squeeze your buttocks, and lift your hips off the floor until your shoulders, hips, and knees are all in a straight line. 6. While holding the bridge position, roll the ball toward you with your heels. Keep your hips as level as you can. 7. Pause briefly, and then roll the ball back out. Try to keep the ball rolling straight. You will feel the muscles in your lower belly working as you straighten your legs. 8. Lower your hips, and return to your starting position. 9. Repeat 8 to 12 times. 10. When you can keep your body and the ball steady throughout this exercise, you're ready for more challenge. Try keeping your hips raised while rolling the ball out, holding the bridge, and rolling back, a few times in a row. Praying amelia    1. Kneel upright with the ball in front of you. 2. To start, clasp your hands together. Rest them on the ball in front of you. 3. As you do this exercise, keep your back and hips straight and tighten your belly and buttocks muscles. Keep your knees in place. 4. Press on the ball with your arms. Lean forward from the knees. This rolls the ball forward. You will bear most of your weight on your arms. 5. If your back starts to ache, you've gone too far. Pull back a bit. 6. Roll back to the start position. 7. Repeat 8 to 12 times. Walk-out plank on ball    1. Kneel over the ball. Place your hands on the floor in front of you. 2. Walk your hands forward until your legs are straight on the ball. This is the plank position.   3. When in plank position, hold your body straight and tighten your belly and buttocks muscles. Keep your chin slightly tucked. 4. Roll as far forward as you can without losing your balance or letting your hips drop. You may stop with the ball under your thighs, or even under your knees or shins. 5. Hold a few seconds, then walk your hands back and return to the start position. 6. Repeat 8 to 12 times. Push-up with thighs on ball    1. Kneel over the ball. Place your hands on the floor in front of you. 2. Walk your hands forward until your legs are straight on the ball. This is the plank position. 3. When in plank position, hold your body straight and tighten your belly and buttocks muscles. Keep your chin slightly tucked. 4. Roll as far forward as you can without losing your balance or letting your hips drop. You may stop with the ball under your thighs, or even under your knees or shins. 5. Bend your elbows. Slowly lower your body toward the ground as far as you can without losing your balance. 6. If your wrists hurt, try moving your hands a little farther apart so they're not right under your shoulders. 7. Slowly straighten your arms. 8. Do 8 to 12 of these push-ups. Wall squat with ball    1. Stand facing away from a wall. Place your feet about shoulder-width apart. 2. Place the ball between your middle back and the wall. Move your feet out in front of you so they are about a foot in front of your hips. 3. Keep your arms at your sides, or put your hands on your hips. 4. Slowly squat down as if you are going to sit in a chair, rolling your back over the ball as you squat. The ball should move with you but stay pressed into the wall. 5. Be sure that your knees do not go in front of your toes as you squat. 6. Hold for 6 seconds. 7. Slowly rise to your standing position. 8. Repeat 8 to 12 times. Child's pose with ball    1.  Kneeling upright with your back straight, rest your hands on the ball in front of you. 2. Breathe out as you bend at the hips, and roll the ball forward. Lower your chest toward the ground, and drop your hips back toward your heels. 3. To stretch your upper back and shoulders, hold this position for 15 to 30 seconds. 4. Repeat 2 to 4 times. Follow-up care is a key part of your treatment and safety. Be sure to make and go to all appointments, and call your doctor if you are having problems. It's also a good idea to know your test results and keep a list of the medicines you take. Where can you learn more? Go to http://willis-bianka.info/. Enter P949 in the search box to learn more about \"Therapeutic Ball: Back Exercises. \"  Current as of: September 20, 2018  Content Version: 11.9  © 4959-4811 [employer]. Care instructions adapted under license by [employer] (which disclaims liability or warranty for this information). If you have questions about a medical condition or this instruction, always ask your healthcare professional. [address] any warranty or liability for your use of this information.

## 2019-04-16 ENCOUNTER — APPOINTMENT (OUTPATIENT)
Dept: PHYSICAL THERAPY | Age: 49
End: 2019-04-16

## 2019-04-25 RX ORDER — AZITHROMYCIN 250 MG/1
TABLET, FILM COATED ORAL
Qty: 6 TAB | Refills: 0 | Status: SHIPPED | OUTPATIENT
Start: 2019-04-25 | End: 2019-05-20

## 2019-04-29 RX ORDER — FUROSEMIDE 20 MG/1
TABLET ORAL
Qty: 60 TAB | Refills: 0 | Status: SHIPPED | OUTPATIENT
Start: 2019-04-29 | End: 2019-05-27 | Stop reason: SDUPTHER

## 2019-05-03 ENCOUNTER — OFFICE VISIT (OUTPATIENT)
Dept: INTERNAL MEDICINE CLINIC | Age: 49
End: 2019-05-03

## 2019-05-03 VITALS
HEART RATE: 81 BPM | RESPIRATION RATE: 16 BRPM | HEIGHT: 63 IN | WEIGHT: 272 LBS | TEMPERATURE: 98.4 F | DIASTOLIC BLOOD PRESSURE: 70 MMHG | SYSTOLIC BLOOD PRESSURE: 110 MMHG | BODY MASS INDEX: 48.2 KG/M2 | OXYGEN SATURATION: 98 %

## 2019-05-03 DIAGNOSIS — M54.16 LUMBAR RADICULOPATHY: ICD-10-CM

## 2019-05-03 DIAGNOSIS — F41.8 DEPRESSION WITH ANXIETY: ICD-10-CM

## 2019-05-03 DIAGNOSIS — M51.36 DDD (DEGENERATIVE DISC DISEASE), LUMBAR: ICD-10-CM

## 2019-05-03 DIAGNOSIS — N80.9 ENDOMETRIOSIS: ICD-10-CM

## 2019-05-03 DIAGNOSIS — Z12.11 ENCOUNTER FOR SCREENING COLONOSCOPY: Primary | ICD-10-CM

## 2019-05-03 NOTE — PATIENT INSTRUCTIONS
[employer]ware Activation    Thank you for requesting access to [facility]. Please follow the instructions below to securely access and download your online medical record. [facility] allows you to send messages to your doctor, view your test results, renew your prescriptions, schedule appointments, and more. How Do I Sign Up? 1. In your internet browser, go to [email or web address]  2. Click on the First Time User? Click Here link in the Sign In box. You will be redirect to the New Member Sign Up page. 3. Enter your [facility] Access Code exactly as it appears below. You will not need to use this code after youve completed the sign-up process. If you do not sign up before the expiration date, you must request a new code. [facility] Access Code: Activation code not generated  Current [facility] Status: Active (This is the date your [facility] access code will )    4. Enter the last four digits of your Social Security Number (xxxx) and Date of Birth (mm/dd/yyyy) as indicated and click Submit. You will be taken to the next sign-up page. 5. Create a [facility] ID. This will be your [facility] login ID and cannot be changed, so think of one that is secure and easy to remember. 6. Create a [facility] password. You can change your password at any time. 7. Enter your Password Reset Question and Answer. This can be used at a later time if you forget your password. 8. Enter your e-mail address. You will receive e-mail notification when new information is available in [address] 9. Click Sign Up. You can now view and download portions of your medical record. 10. Click the Download Summary menu link to download a portable copy of your medical information. Additional Information    If you have questions, please visit the Frequently Asked Questions section of the [facility] website at [email or web address] Remember, [facility] is NOT to be used for urgent needs. For medical emergencies, dial 911.

## 2019-05-03 NOTE — PROGRESS NOTES
1. Have you been to the ER, urgent care clinic since your last visit? Hospitalized since your last visitno      2. Have you seen or consulted any other health care providers outside of the [address] [place] [address] since your last visit? Include any pap smears or colon screening.  no  3 most recent PHQ Screens 10/11/2018   PHQ Not Done Patient Decline   Little interest or pleasure in doing things Several days   Feeling down, depressed, irritable, or hopeless Several days   Total Score PHQ 2 2   Trouble falling or staying asleep, or sleeping too much Several days   Feeling tired or having little energy Several days   Poor appetite, weight loss, or overeating Not at all   Feeling bad about yourself - or that you are a failure or have let yourself or your family down Not at all   Trouble concentrating on things such as school, work, reading, or watching TV Not at all   Moving or speaking so slowly that other people could have noticed; or the opposite being so fidgety that others notice Not at all   Thoughts of being better off dead, or hurting yourself in some way Not at all   PHQ 9 Score 4   How difficult have these problems made it for you to do your work, take care of your home and get along with others Not difficult at all     Chief Complaint   Patient presents with    Back Pain
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
## 2019-05-13 ENCOUNTER — HOSPITAL ENCOUNTER (EMERGENCY)
Age: 49
Discharge: LEFT BEFORE DISCHARGE | End: 2019-05-13
Attending: EMERGENCY MEDICINE
Payer: MEDICAID

## 2019-05-13 PROCEDURE — 75810000275 HC EMERGENCY DEPT VISIT NO LEVEL OF CARE

## 2019-05-20 ENCOUNTER — HOSPITAL ENCOUNTER (EMERGENCY)
Age: 49
Discharge: HOME | End: 2019-05-20
Attending: EMERGENCY MEDICINE | Admitting: EMERGENCY MEDICINE
Payer: MEDICAID

## 2019-05-20 ENCOUNTER — APPOINTMENT (OUTPATIENT)
Dept: ULTRASOUND IMAGING | Age: 49
End: 2019-05-20
Attending: EMERGENCY MEDICINE
Payer: MEDICAID

## 2019-05-20 VITALS
BODY MASS INDEX: 48.44 KG/M2 | RESPIRATION RATE: 16 BRPM | DIASTOLIC BLOOD PRESSURE: 111 MMHG | TEMPERATURE: 98.7 F | HEIGHT: 63 IN | OXYGEN SATURATION: 96 % | SYSTOLIC BLOOD PRESSURE: 199 MMHG | HEART RATE: 93 BPM | WEIGHT: 273.37 LBS

## 2019-05-20 DIAGNOSIS — D25.0 FIBROIDS, SUBMUCOSAL: Primary | ICD-10-CM

## 2019-05-20 DIAGNOSIS — N80.9 ENDOMETRIOSIS: ICD-10-CM

## 2019-05-20 PROCEDURE — 74011250637 HC RX REV CODE- 250/637: Performed by: EMERGENCY MEDICINE

## 2019-05-20 PROCEDURE — 99283 EMERGENCY DEPT VISIT LOW MDM: CPT

## 2019-05-20 PROCEDURE — 76830 TRANSVAGINAL US NON-OB: CPT

## 2019-05-20 RX ORDER — OXYCODONE AND ACETAMINOPHEN 5; 325 MG/1; MG/1
1 TABLET ORAL
Status: COMPLETED | OUTPATIENT
Start: 2019-05-20 | End: 2019-05-20

## 2019-05-20 RX ADMIN — OXYCODONE HYDROCHLORIDE AND ACETAMINOPHEN 1 TABLET: 5; 325 TABLET ORAL at 20:31

## 2019-05-20 NOTE — ED NOTES
Pt arrived from triage having vaginal bleeding x3 weeks. Pt states she has a history of endometriosis. Pt states she is having some lower abd pain, worse with palpation. Pt states her bleeding has stopped.

## 2019-05-21 NOTE — ED NOTES
Patient discharged by Ghada Brennan MD. Patient provided with discharge instructions Rx and instructions on follow up care. Patient out of ED ambulatory accompanied by self.

## 2019-05-21 NOTE — DISCHARGE INSTRUCTIONS
Patient Education        Endometriosis: Care Instructions  Your Care Instructions    Cells that are like the cells that line the inside your womb (uterus) sometimes grow on the outside of the uterus. This is called endometriosis. These clumps of cells can cause pain and problems with your periods. They can become inflamed and may bleed. Scar tissue that forms over time can make it difficult to get pregnant. Medicines and sometimes surgery can relieve pain and help women who want to get pregnant. Follow-up care is a key part of your treatment and safety. Be sure to make and go to all appointments, and call your doctor if you are having problems. It's also a good idea to know your test results and keep a list of the medicines you take. How can you care for yourself at home? · Take your medicines exactly as prescribed. Call your doctor if you think you are having a problem with your medicine. · Take pain medicines exactly as directed. ? If the doctor gave you a prescription medicine for pain, take it as prescribed. ? If you are not taking a prescription pain medicine, ask your doctor if you can take an over-the-counter medicine. · Apply heat, such as a hot water bottle or a heating pad set on low, to your lower belly. Or take a warm bath. Heat may relieve pain. · Lie down and put a pillow under your knees to raise your legs. This will relieve pressure on your back. When should you call for help? Call your doctor now or seek immediate medical care if:    · You have severe vaginal bleeding.     · You have new or worse pain in your belly or pelvis.    Watch closely for changes in your health, and be sure to contact your doctor if:    · You have unusual vaginal bleeding.     · You do not get better as expected. Where can you learn more? Go to http://willis-bianka.info/. Enter L699 in the search box to learn more about \"Endometriosis: Care Instructions. \"  Current as of:  May 14, 2018  Content Version: 11.9  © 1165-2660 [employer]. Care instructions adapted under license by [employer] (which disclaims liability or warranty for this information). If you have questions about a medical condition or this instruction, always ask your healthcare professional. [address] any warranty or liability for your use of this information.

## 2019-05-21 NOTE — ED PROVIDER NOTES
EMERGENCY DEPARTMENT HISTORY AND PHYSICAL EXAM      Date: 5/20/2019  Patient Name: Portia Keyes  Patient Age and Sex: 47 y.o. female     History of Presenting Illness     Chief Complaint   Patient presents with    Vaginal Bleeding     pt reports vaginal bleeding x3 weeks with cramping       History Provided By: Patient    HPI: Portia Keyes is a 50-year-old female with a history of endometriosis and irregular menstruation, presenting with vaginal bleeding for 3 weeks. Patient states that since April has been having on and off heavy bleeding associated with suprapubic abdominal cramping. Does have diclofenac for her back and took that to try to help with pain but it did not resolve the pain. Patient states that she [facility] who did a biopsy but unfortunately it was an insufficient sample. 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 Patient states that she had heavy bleeding today, however it improved by the time she got to the ER. Patient states that she has never had a transfusion because she has refused it every time. She takes iron pills whenever she feels like her blood levels are low. There are no other complaints, changes, or physical findings at this time. PCP: Bernice Valentino., MD    No current facility-administered medications on file prior to encounter. Current Outpatient Medications on File Prior to Encounter   Medication Sig Dispense Refill    furosemide (LASIX) 20 mg tablet TAKE 1 TABLET BY MOUTH TWICE DAILY 60 Tab 0    cetirizine (ZYRTEC) 10 mg tablet Take 10 mg by mouth daily as needed for Allergies.  fluticasone (FLONASE ALLERGY RELIEF) 50 mcg/actuation nasal spray 2 Sprays by Both Nostrils route daily as needed for Rhinitis.       topiramate (TOPAMAX) 200 mg tablet Take 200 mg by mouth daily.  potassium chloride SR (KLOR-CON 10) 10 mEq tablet Take 20 mEq by mouth daily. With Lasix.  cyanocobalamin (VITAMIN B12) 100 mcg tablet Take 100 mcg by mouth daily.  therapeutic multivitamin (THERA) tablet Take 1 Tab by mouth daily.  Ferrous Sulfate (SLOW FE) 47.5 mg iron TbER tablet Take 1 Tab by mouth nightly.  albuterol (PROVENTIL HFA, VENTOLIN HFA, PROAIR HFA) 90 mcg/actuation inhaler Take 2 Puffs by inhalation every four (4) hours as needed for Wheezing or Shortness of Breath (cough). 1 Inhaler 0    furosemide (LASIX) 40 mg tablet Take 40 mg by mouth daily. Past History     Past Medical History:  Past Medical History:   Diagnosis Date    Arthritis     Asthma     Bipolar disorder ([facility] [address]     Depression     IBS (irritable bowel syndrome)     Migraine     Neurological disorder     migraines    Peptic ulcer     Polyp cervix     Snoring     TIA (transient ischemic attack)        Past Surgical History:  Past Surgical History:   Procedure Laterality Date    HX CERVICAL FUSION      C4-C7    HX CHOLECYSTECTOMY      HX CYST REMOVAL      from under both arms    HX HERNIA REPAIR  2009    HX ORTHOPAEDIC      left knee meniscus repair     HX ORTHOPAEDIC      ORIF left fibula    HX TUBAL LIGATION      HYSTEROSCOPY DIAGNOSTIC      x2       Family History:  Family History   Problem Relation Age of Onset    Stroke Father     Hypertension Father     Heart Disease Maternal Grandmother     Cancer Maternal Grandmother         brain and colon    Cancer Maternal Uncle         5 w/ brain [place] Wiley    MS Other         1st cousin    Bipolar Disorder Other        Social History:  Social History     Tobacco Use    Smoking status: Current Some Day Smoker     Packs/day: 0.25    Smokeless tobacco: Current User   Substance Use Topics    Alcohol use: Yes     Comment: rare    Drug use: No       Allergies:   Allergies   Allergen Reactions    Latex Hives    Other Food Rash All fruits except apple, oranges, and pineapple (recorded as Other Medication 2/8/2012)    Peanut Swelling    Shellfish Containing Products Anaphylaxis    Morphine Itching     She has had morphine but premedicates with benadryl    Nystatin Rash    Dilaudid [Hydromorphone (Bulk)] Itching     Must take with benadryl    Flexeril [Cyclobenzaprine] Rash    Other Medication Rash     All fruits except apple, oranges, and pineapple    Phenergan [Promethazine] Other (comments)     Rapid hr    Robaxin [Methocarbamol] Rash         Review of Systems   Review of Systems   Constitutional: Negative for chills and fever. Respiratory: Negative for cough and shortness of breath. Cardiovascular: Negative for chest pain. Gastrointestinal: Positive for abdominal pain. Negative for constipation, diarrhea, nausea and vomiting. Genitourinary: Positive for vaginal bleeding. Negative for vaginal discharge and vaginal pain. Neurological: Negative for weakness and numbness. All other systems reviewed and are negative. Physical Exam   Physical Exam   Constitutional: She is oriented to person, place, and time. She appears well-developed and well-nourished. HENT:   Head: Normocephalic and atraumatic. No conjunctival pallor   Eyes: Conjunctivae and EOM are normal.   Neck: Normal range of motion. Neck supple. Cardiovascular: Normal rate and regular rhythm. Pulmonary/Chest: Effort normal and breath sounds normal. No respiratory distress. Abdominal: Soft. She exhibits no distension. There is tenderness. Tender to palpation over the suprapubic region   Musculoskeletal: Normal range of motion. Neurological: She is alert and oriented to person, place, and time. Skin: Skin is warm and dry. Psychiatric: She has a normal mood and affect. Nursing note and vitals reviewed. Diagnostic Study Results     Labs -   No results found for this or any previous visit (from the past 12 hour(s)).     Radiologic Studies -   US TRANSVAGINAL   Final Result   IMPRESSION:        1. Two subserosal fibroids measuring 2.5 cm and 2.2 cm.   2. Normal appearance of the left ovary. The right ovary was not well-visualized   due to overlying bowel gas. CT Results  (Last 48 hours)    None        CXR Results  (Last 48 hours)    None            Medical Decision Making   I am the first provider for this patient. I reviewed the vital signs, available nursing notes, past medical history, past surgical history, family history and social history. Vital Signs-Reviewed the patient's vital signs. Patient Vitals for the past 12 hrs:   Temp Pulse Resp BP SpO2   05/20/19 1849 98.7 °F (37.1 °C) 93 16 (!) 199/111 96 %       Records Reviewed: Nursing Notes and Old Medical Records    Provider Notes (Medical Decision Making):   Patient presents with lower abdominal pain and vaginal bleeding. DDx: pregnancy, ectopic, ovarian torsion, cyst, endometriosis, fibroid, PCOS, dysmenorrhea/menorrhagia. ED Course:   Initial assessment performed. The patients presenting problems have been discussed, and they are in agreement with the care plan formulated and outlined with them. I have encouraged them to ask questions as they arise throughout their visit. Critical Care Time:   0    Disposition:  Discharge Note:  The patient has been re-evaluated and is ready for discharge. Reviewed available results with patient. Counseled patient on diagnosis and care plan. Patient has expressed understanding, and all questions have been answered. Patient agrees with plan and agrees to follow up as recommended, or to return to the ED if their symptoms worsen. Discharge instructions have been provided and explained to the patient, along with reasons to return to the ED. PLAN:  1. Discharge Medication List as of 5/20/2019  8:16 PM        2.    Follow-up Information     Follow up With Specialties Details Why Contact Info    OB GYN  Schedule an appointment as soon as possible for a visit          3. Return to ED if worse     Diagnosis     Clinical Impression:   1. Fibroids, submucosal    2. Endometriosis        Attestations:    Michelle Lepe M.D. Please note that this dictation was completed with [employer]ioSeal, the computer voice recognition software. Quite often unanticipated grammatical, syntax, homophones, and other interpretive errors are inadvertently transcribed by the computer software. Please disregard these errors. Please excuse any errors that have escaped final proofreading. Thank you.

## 2019-05-28 RX ORDER — FUROSEMIDE 20 MG/1
TABLET ORAL
Qty: 60 TAB | Refills: 0 | Status: SHIPPED | OUTPATIENT
Start: 2019-05-28 | End: 2019-08-07 | Stop reason: ALTCHOICE

## 2019-07-14 ENCOUNTER — APPOINTMENT (OUTPATIENT)
Dept: ULTRASOUND IMAGING | Age: 49
End: 2019-07-14
Attending: EMERGENCY MEDICINE
Payer: MEDICAID

## 2019-07-14 ENCOUNTER — HOSPITAL ENCOUNTER (EMERGENCY)
Age: 49
Discharge: HOME | End: 2019-07-14
Attending: EMERGENCY MEDICINE
Payer: MEDICAID

## 2019-07-14 VITALS
SYSTOLIC BLOOD PRESSURE: 129 MMHG | OXYGEN SATURATION: 100 % | BODY MASS INDEX: 47.73 KG/M2 | TEMPERATURE: 97.6 F | DIASTOLIC BLOOD PRESSURE: 75 MMHG | HEIGHT: 63 IN | WEIGHT: 269.4 LBS | RESPIRATION RATE: 16 BRPM | HEART RATE: 82 BPM

## 2019-07-14 DIAGNOSIS — D25.9 UTERINE LEIOMYOMA, UNSPECIFIED LOCATION: ICD-10-CM

## 2019-07-14 DIAGNOSIS — R10.2 PELVIC PAIN: ICD-10-CM

## 2019-07-14 DIAGNOSIS — Z72.0 TOBACCO ABUSE: ICD-10-CM

## 2019-07-14 DIAGNOSIS — R10.2 VAGINAL PAIN: ICD-10-CM

## 2019-07-14 DIAGNOSIS — Z97.5 IUD (INTRAUTERINE DEVICE) IN PLACE: Primary | ICD-10-CM

## 2019-07-14 DIAGNOSIS — Z71.6 ENCOUNTER FOR SMOKING CESSATION COUNSELING: ICD-10-CM

## 2019-07-14 PROCEDURE — 74011250637 HC RX REV CODE- 250/637: Performed by: EMERGENCY MEDICINE

## 2019-07-14 PROCEDURE — 96372 THER/PROPH/DIAG INJ SC/IM: CPT

## 2019-07-14 PROCEDURE — 99283 EMERGENCY DEPT VISIT LOW MDM: CPT

## 2019-07-14 PROCEDURE — 76830 TRANSVAGINAL US NON-OB: CPT

## 2019-07-14 PROCEDURE — 74011250636 HC RX REV CODE- 250/636: Performed by: EMERGENCY MEDICINE

## 2019-07-14 RX ORDER — NAPROXEN SODIUM 220 MG
220 TABLET ORAL 2 TIMES DAILY WITH MEALS
Qty: 20 TAB | Refills: 0 | Status: SHIPPED | OUTPATIENT
Start: 2019-07-14 | End: 2019-08-07 | Stop reason: ALTCHOICE

## 2019-07-14 RX ORDER — OXYCODONE HYDROCHLORIDE 5 MG/1
5 TABLET ORAL
Status: COMPLETED | OUTPATIENT
Start: 2019-07-14 | End: 2019-07-14

## 2019-07-14 RX ORDER — KETOROLAC TROMETHAMINE 30 MG/ML
30 INJECTION, SOLUTION INTRAMUSCULAR; INTRAVENOUS
Status: COMPLETED | OUTPATIENT
Start: 2019-07-14 | End: 2019-07-14

## 2019-07-14 RX ADMIN — KETOROLAC TROMETHAMINE 30 MG: 30 INJECTION, SOLUTION INTRAMUSCULAR at 14:53

## 2019-07-14 RX ADMIN — OXYCODONE HYDROCHLORIDE 5 MG: 5 TABLET ORAL at 14:52

## 2019-07-14 NOTE — ED PROVIDER NOTES
EMERGENCY DEPARTMENT HISTORY AND PHYSICAL EXAM      Date: 7/14/2019  Patient Name: Jacquelynne Kehr  Patient Age and Sex: 47 y.o. female    History of Presenting Illness     Chief Complaint   Patient presents with    Vaginal Pain     complains of vaginal pain: per pt she believes that her IUD has come out       History Provided By: Patient    HPI: Jacquelynne Kehr, 47 y.o. female with past medical history as documented below presents to the ED with c/o of acute onset of vaginal pain and discomfort. Patient states that she had an IUD placed 2 days ago by her OB/GYN. She states that tonight she felt onset of lower abdominal discomfort and cramping. She thought that her IUD came out. She has noticed some blood in her pads. She denies any recent vigorous sexual intercourse. She denies any vaginal discharge. She denies any concerns for pregnancy. She reports pain is currently 3 out of 10 intensity and mild. She has taken no medications prior to arrival for symptoms. She denies any concerns for STDs. Pt denies any other alleviating or exacerbating factors. Additionally, pt specifically denies any recent fever, chills, headache, nausea, vomiting, CP, SOB, lightheadedness, dizziness, numbness, weakness, BLE swelling, heart palpitations, urinary sxs, diarrhea, constipation, melena, hematochezia, cough, or congestion. PCP: Manas Polk MD    There are no other complaints, changes or physical findings at this time.      Past History   Past Medical History:  Past Medical History:   Diagnosis Date    Arthritis     Asthma     Bipolar disorder ([facility] [address]     Depression     IBS (irritable bowel syndrome)     Migraine     Neurological disorder     migraines    Peptic ulcer     Polyp cervix     Snoring     TIA (transient ischemic attack)        Past Surgical History:  Past Surgical History:   Procedure Laterality Date    HX CERVICAL FUSION      C4-C7    HX CHOLECYSTECTOMY      HX CYST REMOVAL from under both arms    HX HERNIA REPAIR  2009    HX ORTHOPAEDIC      left knee meniscus repair     HX ORTHOPAEDIC      ORIF left fibula    HX TUBAL LIGATION      HYSTEROSCOPY DIAGNOSTIC      x2       Family History:  Family History   Problem Relation Age of Onset    Stroke Father     Hypertension Father     Heart Disease Maternal Grandmother     Cancer Maternal Grandmother         brain and colon    Cancer Maternal Uncle         5 w/ brain Elnor Messing MS Other         1st cousin    Bipolar Disorder Other        Social History:  Social History     Tobacco Use    Smoking status: Current Some Day Smoker     Packs/day: 0.25    Smokeless tobacco: Current User   Substance Use Topics    Alcohol use: Yes     Comment: rare    Drug use: No       Allergies: Allergies   Allergen Reactions    Latex Hives    Other Food Rash     All fruits except apple, oranges, and pineapple (recorded as Other Medication 2/8/2012)    Peanut Swelling    Shellfish Containing Products Anaphylaxis    Morphine Itching     She has had morphine but premedicates with benadryl    Nystatin Rash    Dilaudid [Hydromorphone (Bulk)] Itching     Must take with benadryl    Flexeril [Cyclobenzaprine] Rash    Other Medication Rash     All fruits except apple, oranges, and pineapple    Phenergan [Promethazine] Other (comments)     Rapid hr    Robaxin [Methocarbamol] Rash       Current Medications:  No current facility-administered medications on file prior to encounter. Current Outpatient Medications on File Prior to Encounter   Medication Sig Dispense Refill    furosemide (LASIX) 20 mg tablet TAKE 1 TABLET BY MOUTH TWICE DAILY 60 Tab 0    cetirizine (ZYRTEC) 10 mg tablet Take 10 mg by mouth daily as needed for Allergies.  fluticasone (FLONASE ALLERGY RELIEF) 50 mcg/actuation nasal spray 2 Sprays by Both Nostrils route daily as needed for Rhinitis.  topiramate (TOPAMAX) 200 mg tablet Take 200 mg by mouth daily.       potassium chloride SR (KLOR-CON 10) 10 mEq tablet Take 20 mEq by mouth daily. With Lasix.  cyanocobalamin (VITAMIN B12) 100 mcg tablet Take 100 mcg by mouth daily.  therapeutic multivitamin (THERA) tablet Take 1 Tab by mouth daily.  Ferrous Sulfate (SLOW FE) 47.5 mg iron TbER tablet Take 1 Tab by mouth nightly.  albuterol (PROVENTIL HFA, VENTOLIN HFA, PROAIR HFA) 90 mcg/actuation inhaler Take 2 Puffs by inhalation every four (4) hours as needed for Wheezing or Shortness of Breath (cough). 1 Inhaler 0    furosemide (LASIX) 40 mg tablet Take 40 mg by mouth daily. Review of Systems   Review of Systems   Constitutional: Negative. Negative for chills and fever. HENT: Negative. Negative for congestion, facial swelling, rhinorrhea, sore throat, trouble swallowing and voice change. Eyes: Negative. Respiratory: Negative. Negative for apnea, cough, chest tightness, shortness of breath and wheezing. Cardiovascular: Negative. Negative for chest pain, palpitations and leg swelling. Gastrointestinal: Negative. Negative for abdominal distention, abdominal pain, blood in stool, constipation, diarrhea, nausea and vomiting. Endocrine: Negative. Negative for cold intolerance, heat intolerance and polyuria. Genitourinary: Positive for vaginal pain. Negative for difficulty urinating, dysuria, flank pain, frequency, hematuria and urgency. Musculoskeletal: Negative. Negative for arthralgias, back pain, myalgias, neck pain and neck stiffness. Skin: Negative. Negative for color change and rash. Neurological: Negative. Negative for dizziness, syncope, facial asymmetry, speech difficulty, weakness, light-headedness, numbness and headaches. Hematological: Negative. Does not bruise/bleed easily. Psychiatric/Behavioral: Negative. Negative for confusion and self-injury. The patient is not nervous/anxious.         Physical Exam   Physical Exam   Constitutional: She is oriented to person, place, and time. She appears well-developed and well-nourished. No distress. HENT:   Head: Normocephalic and atraumatic. Mouth/Throat: Oropharynx is clear and moist. No oropharyngeal exudate. Eyes: Pupils are equal, round, and reactive to light. Conjunctivae and EOM are normal.   Neck: Normal range of motion. Cardiovascular: Normal rate, regular rhythm and normal heart sounds. Exam reveals no gallop and no friction rub. No murmur heard. Pulmonary/Chest: Effort normal and breath sounds normal. No respiratory distress. She has no wheezes. She has no rales. She exhibits no tenderness. Abdominal: Soft. Bowel sounds are normal. She exhibits no distension and no mass. There is no tenderness. There is no rebound and no guarding. Genitourinary:   Genitourinary Comments: Pelvic exam chaperoned by RN:    No bleeding, no lesions;  IUD strings in place, cervical os closed  Bimanual without adnexal masses, no CMT   Musculoskeletal: Normal range of motion. She exhibits no edema, tenderness or deformity. Neurological: She is alert and oriented to person, place, and time. She displays normal reflexes. No cranial nerve deficit. She exhibits normal muscle tone. Coordination normal.   Skin: Skin is warm. No rash noted. She is not diaphoretic. Psychiatric: She has a normal mood and affect. Nursing note and vitals reviewed. Diagnostic Study Results     Labs -  No results found for this or any previous visit (from the past 24 hour(s)). Radiologic Studies -   No orders to display     CT Results  (Last 48 hours)    None        CXR Results  (Last 48 hours)    None          Medical Decision Making   I am the first provider for this patient. I reviewed the vital signs, available nursing notes, past medical history, past surgical history, family history and social history. Vital Signs-Reviewed the patient's vital signs.   Patient Vitals for the past 24 hrs:   Temp Pulse Resp BP SpO2   07/14/19 1310 97.6 °F (36.4 °C) 82 16 129/75 100 %       Pulse Oximetry Analysis - 100% on RA    Cardiac Monitor:   Rate: 82 bpm  Rhythm: Normal Sinus Rhythm      Records Reviewed: Nursing Notes, Old Medical Records, Previous electrocardiograms, Previous Radiology Studies and Previous Laboratory Studies    Provider Notes (Medical Decision Making):   Patient presents with lower abdominal pain and concerns for IUD migration; will check US, perform pelvic. Provide pain control and reassess. ED Course:   Initial assessment performed. The patients presenting problems have been discussed, and they are in agreement with the care plan formulated and outlined with them. I have encouraged them to ask questions as they arise throughout their visit. TOBACCO COUNSELING:   Upon evaluation, pt expressed that they are a current tobacco user. For approximately 10 minutes, pt has been counseled on the dangers of smoking and was encouraged to quit as soon as possible in order to decrease further risks to their health. Pt has conveyed their understanding of the risks involved should they continue to use tobacco products. ALCOHOL/SUBSTANCE ABUSE COUNSELING:  Upon evaluation, pt endorsed recent alcohol/illicit drug use. For approximately 15 minutes, pt has been counseled on the dangers of alcohol and illicit drug use on their health, and they were encouraged to quit as soon as possible in order to decrease further risks to their health. Pt has conveyed their understanding of the risks involved should they continue to use these products. I reviewed our electronic medical record system for any past medical records that were available that may contribute to the patient's current condition, the nursing notes and vital signs from today's visit.   Sandy Rosario MD    Medications Administered During ED Course:  Medications   oxyCODONE IR (ROXICODONE) tablet 5 mg (5 mg Oral Given 7/14/19 1452)   ketorolac (TORADOL) injection 30 mg (30 mg IntraMUSCular Given 7/14/19 1055)     Procedure Note - Pelvic Exam:    Performed by: Gabrielle Edwards MD  Chaperoned by: Price Li RN  Pelvic exam was performed using bimanual and speculum. Further findings noted in physical exam.   The procedure took 1-15 minutes, and pt tolerated well. Progress Note:  Patient has been reassessed and reports feeling better and symptoms have improved after ED treatment. Kiara Lundberg is able to tolerate PO and ambulate per baseline. Kiara Lundberg final labs and imaging have been reviewed with her. She has been counseled regarding her diagnosis. She verbally conveys understanding and agreement of the signs, symptoms, diagnosis, treatment and prognosis and additionally agrees to follow up as recommended with Dr. Barbara Mena MD in 24 - 48 hours. She also agrees with the care-plan and conveys that all of her questions have been answered. I have also put together some discharge instructions for her that include: 1) educational information regarding their diagnosis, 2) how to care for their diagnosis at home, as well a 3) list of reasons why they would want to return to the ED prior to their follow-up appointment, should their condition change. I have answered all questions to the patient's satisfaction. Strict return precautions given. She both understood and agreed with plan as discussed above. Vital signs stable for discharge. Disposition: DISCHARGE     The pt is ready for discharge. The pt's signs, symptoms, diagnosis, and discharge instructions have been discussed and pt has conveyed their understanding. The pt is to follow up as recommended or return to ER should their symptoms worsen. Plan has been discussed and pt is in agreement. PLAN:  1.    Discharge Medication List as of 7/14/2019  4:44 PM      CONTINUE these medications which have NOT CHANGED    Details   !! furosemide (LASIX) 20 mg tablet TAKE 1 TABLET BY MOUTH TWICE DAILY, Normal, Disp-60 Tab, R-0 cetirizine (ZYRTEC) 10 mg tablet Take 10 mg by mouth daily as needed for Allergies. , Historical Med      fluticasone (FLONASE ALLERGY RELIEF) 50 mcg/actuation nasal spray 2 Sprays by Both Nostrils route daily as needed for Rhinitis., Historical Med      topiramate (TOPAMAX) 200 mg tablet Take 200 mg by mouth daily. , Historical Med      potassium chloride SR (KLOR-CON 10) 10 mEq tablet Take 20 mEq by mouth daily. With Lasix., Historical Med      cyanocobalamin (VITAMIN B12) 100 mcg tablet Take 100 mcg by mouth daily. , Historical Med      therapeutic multivitamin (THERA) tablet Take 1 Tab by mouth daily. , Historical Med      Ferrous Sulfate (SLOW FE) 47.5 mg iron TbER tablet Take 1 Tab by mouth nightly., Historical Med      albuterol (PROVENTIL HFA, VENTOLIN HFA, PROAIR HFA) 90 mcg/actuation inhaler Take 2 Puffs by inhalation every four (4) hours as needed for Wheezing or Shortness of Breath (cough). , Print, Disp-1 Inhaler, R-0      !! furosemide (LASIX) 40 mg tablet Take 40 mg by mouth daily. , Historical Med       !! - Potential duplicate medications found. Please discuss with provider. 2.   Follow-up Information     Follow up With Specialties Details Why Contact Info    Jaky Jimenez MD Internal Medicine   [address]   [phone]      [facility] Emergency Medicine  As needed, If symptoms worsen [address] [place] [address]  [phone]          Return to ED if worse  Diagnosis     Clinical Impression:   1. IUD (intrauterine device) in place    2. Vaginal pain    3. Uterine leiomyoma, unspecified location    4. Pelvic pain    5. Tobacco abuse    6. Encounter for smoking cessation counseling        Attestation:  I personally performed the services described in this documentation on this date 7/14/2019 for patient, Kimberly Aschoff.   Sera MD Ashlee    Please note that this dictation was completed with [employer], the computer voice recognition software. Quite often unanticipated grammatical, syntax, homophones, and other interpretive errors are inadvertently transcribed by the computer software. Please disregard these errors. Please excuse any errors that have escaped final proofreading. This note will not be viewable in [address]

## 2019-07-14 NOTE — DISCHARGE INSTRUCTIONS
Thank you for allowing us to take care of you today! We hope we addressed all of your concerns and needs. We strive to provide excellent quality care in the Emergency Department. You will receive a survey after your visit to evaluate the care you were provided. Should you receive a survey from us, we invite you to share your experience and tell us what made it excellent. It was a pleasure serving you, we invite you to share your experience with us, in our pursuit for excellence, should you be selected to receive a survey. The exam and treatment you received in the Emergency Department were for an urgent problem and are not intended as complete care. It is important that you follow up with a doctor, nurse practitioner, or physician assistant for ongoing care. If your symptoms become worse or you do not improve as expected and you are unable to reach your usual health care provider, you should return to the Emergency Department. We are available 24 hours a day. Please take your discharge instructions with you when you go to your follow-up appointment. If you have any problem arranging a follow-up appointment, contact the Emergency Department immediately. If a prescription has been provided, please have it filled as soon as possible to prevent a delay in treatment. Read the entire medication instruction sheet provided to you by the pharmacy. If you have any questions or reservations about taking the medication due to side effects or interactions with other medications, please call your primary care physician or contact the ER to speak with the charge nurse. Make an appointment with your family doctor or the physician you were referred to for follow-up of this visit as instructed on your discharge paperwork, as this is mandatory follow-up. Return to the ER if you are unable to be seen or if you are unable to be seen in a timely manner.     If you have any problem arranging the follow-up visit, contact the Emergency Department immediately. I hope you feel better and thank you again for allow us to provide you with excellent care today at [place] [address].! Warmest regards,    Reed East MD  Emergency Medicine Physician  [place] [address].              Patient Education        Intrauterine Device (IUD) for Birth Control: Care Instructions  Your Care Instructions    The intrauterine device (IUD) is used to prevent pregnancy. It's a small, plastic, T-shaped device. Your doctor places the IUD in your uterus. You are using either a hormonal IUD or a copper IUD. · Hormonal IUDs prevent pregnancy for 3 to 5 years, depending on which IUD is used. Once you have it, you don't have to do anything else to prevent pregnancy. · The copper IUD prevents pregnancy for 10 years. Once you have it, you don't have to do anything to prevent pregnancy. A string tied to the end of the IUD hangs down through the opening of the uterus (called the cervix) into the vagina. You can check that the IUD is in place by feeling for the string. The IUD usually stays in the uterus until your doctor removes it. Follow-up care is a key part of your treatment and safety. Be sure to make and go to all appointments, and call your doctor if you are having problems. It's also a good idea to know your test results and keep a list of the medicines you take. How can you care for yourself at home? How do you use the IUD? · Your doctor inserts the IUD. This takes only a few minutes and can be done at your doctor's office. · Your doctor may have you feel for the IUD string right after insertion, to be sure you know what it feels like. · Check for the string after every period. ? Insert a finger into your vagina and feel for the cervix, which is at the top of the vagina and feels harder than the rest of your vagina (some women say it feels like the tip of your nose). ?  You should be able to feel the thin, plastic string coming out of the opening of your cervix. If you cannot feel the string, it doesn't always mean that the IUD is out of place. Sometimes the string is just difficult to feel or has been pulled up into the cervical canal (which will not harm you). · Your doctor may want to see you 4 to 6 weeks after the IUD insertion, to make sure it is in place. What if you think the IUD is not in place? Always read the label for specific instructions. Here are some basic guidelines:  · Call your doctor and use backup birth control, such as a condom, or don't have intercourse until you know the IUD is working. · If you have had intercourse and do not wish to become pregnant, you can use emergency contraception, such as the morning-after pill (Plan B). You can use emergency contraception for up to 5 days after having had intercourse, but it works best if you take it right away. What else do you need to know? · The IUD has side effects. ? The hormonal IUD usually reduces menstrual flow and cramping over time. It can also cause spotting, mood swings, and breast tenderness. ? The copper IUD can cause longer and heavier periods. · After an IUD is first put in, you may have some mild cramping and light spotting for 1 to 2 days. · The IUD doesn't protect against sexually transmitted infections (STIs), such as herpes or HIV/AIDS. If you're not sure whether your sex partner might have an STI, use a condom to protect against disease. When should you call for help? Call your doctor now or seek immediate medical care if:    · You have pain in your belly or pelvis.     · You have severe vaginal bleeding. This means that you are soaking through your usual pads or tampons each hour for 2 or more hours.     · You have vaginal discharge that smells bad.     · You have a fever.    Watch closely for changes in your health, and be sure to contact your doctor if you have any problems.   Where can you learn more? Go to http://willis-bianka.info/. Enter E006 in the search box to learn more about \"Intrauterine Device (IUD) for Birth Control: Care Instructions. \"  Current as of: September 5, 2018  Content Version: 11.9  © 2859-4675 [employer], [employer]. Care instructions adapted under license by [employer] (which disclaims liability or warranty for this information). If you have questions about a medical condition or this instruction, always ask your healthcare professional. [address] any warranty or liability for your use of this information.

## 2019-07-14 NOTE — ED NOTES
Pelvic exam performed by Dr. Olvera Sender. Patient tolerated fairly well. Pain still 10/10. Warm blanket provided for abdomen for comfort.

## 2019-08-07 ENCOUNTER — OFFICE VISIT (OUTPATIENT)
Dept: INTERNAL MEDICINE CLINIC | Age: 49
End: 2019-08-07

## 2019-08-07 VITALS
DIASTOLIC BLOOD PRESSURE: 70 MMHG | HEART RATE: 73 BPM | RESPIRATION RATE: 16 BRPM | SYSTOLIC BLOOD PRESSURE: 110 MMHG | BODY MASS INDEX: 48.37 KG/M2 | OXYGEN SATURATION: 99 % | HEIGHT: 63 IN | TEMPERATURE: 98.6 F | WEIGHT: 273 LBS

## 2019-08-07 DIAGNOSIS — T63.331D BROWN RECLUSE SPIDER BITE OR STING, ACCIDENTAL OR UNINTENTIONAL, SUBSEQUENT ENCOUNTER: Primary | ICD-10-CM

## 2019-08-07 RX ORDER — TOPIRAMATE 200 MG/1
200 TABLET ORAL DAILY
Qty: 30 TAB | Refills: 6 | Status: SHIPPED | OUTPATIENT
Start: 2019-08-07 | End: 2020-03-03 | Stop reason: SDUPTHER

## 2019-08-07 NOTE — PROGRESS NOTES
Jacquelyn Pittman is a 47 y.o. female and presents with Insect Bite (Lt arm)    Subjective:  She sustaine a spider bite of the lt.ar.  She was seen at [facility] and released  Asthma Review:  The patient is being seen for follow up of asthma,  currently stable. Asthma symptoms occur: infrequently. Wheezing when present is described as mild and easily relieved with rescue bronchodilator. The patient reports use of a steroid inhaler. Frequency of use of quick-relief meds: rarely. Regimen compliance: The patient reports adherence to this regimen. Depression Review:  Patient is seen for followup of depression. Ongoing depressed mood, psychomotor retardation, feelings of worthlessness/guilt and difficulty concentrating Treatment includes no medication and no other therapies. She denies recurrent thoughts of death and suicidal thoughts without plan. She experiences the following side effects from the treatment: none. She also has a history of complex syndrome migraines and is on medication    Review of Systems  Constitutional: negative for fevers, chills, anorexia and weight loss  Eyes:   negative for visual disturbance and irritation  ENT:   negative for tinnitus,sore throat,nasal congestion,ear pains. hoarseness  Respiratory:  negative for cough, hemoptysis, dyspnea,wheezing  CV:   negative for chest pain, palpitations, lower extremity edema  GI:   negative for nausea, vomiting, diarrhea, abdominal pain,melena  Endo:               negative for polyuria,polydipsia,polyphagia,heat intolerance  Genitourinary: negative for frequency, dysuria and hematuria  Integument:  negative for rash and pruritus  Hematologic:  negative for easy bruising and gum/nose bleeding  Musculoskel: myalgias, arthralgias, back pain, , joint pain  Neurological:  negative for headaches, dizziness, vertigo, memory problems and gait   Behavl/Psychfeelings of anxiety, depression, mood changes    Past Medical History: Diagnosis Date    Arthritis     Asthma     Bipolar disorder ([facility] [address]     Depression     IBS (irritable bowel syndrome)     Migraine     Neurological disorder     migraines    Peptic ulcer     Polyp cervix     Snoring     TIA (transient ischemic attack)      Past Surgical History:   Procedure Laterality Date    HX CERVICAL FUSION      C4-C7    HX CHOLECYSTECTOMY      HX CYST REMOVAL      from under both arms    HX HERNIA REPAIR  2009    HX ORTHOPAEDIC      left knee meniscus repair     HX ORTHOPAEDIC      ORIF left fibula    HX TUBAL LIGATION      HYSTEROSCOPY DIAGNOSTIC      x2     Social History     Socioeconomic History    Marital status: SINGLE     Spouse name: Not on file    Number of children: Not on file    Years of education: Not on file    Highest education level: Not on file   Tobacco Use    Smoking status: Current Some Day Smoker     Packs/day: 0.25    Smokeless tobacco: Current User   Substance and Sexual Activity    Alcohol use: Yes     Comment: rare    Drug use: No    Sexual activity: Not Currently     Family History   Problem Relation Age of Onset    Stroke Father     Hypertension Father     Heart Disease Maternal Grandmother     Cancer Maternal Grandmother         brain and colon    Cancer Maternal Uncle         5 w/ brain [place]    MS Other         1st cousin    Bipolar Disorder Other      Current Outpatient Medications   Medication Sig Dispense Refill    topiramate (TOPAMAX) 200 mg tablet Take 1 Tab by mouth daily. 30 Tab 6    cetirizine (ZYRTEC) 10 mg tablet Take 10 mg by mouth daily as needed for Allergies.  fluticasone (FLONASE ALLERGY RELIEF) 50 mcg/actuation nasal spray 2 Sprays by Both Nostrils route daily as needed for Rhinitis.  potassium chloride SR (KLOR-CON 10) 10 mEq tablet Take 20 mEq by mouth daily. With Lasix.  cyanocobalamin (VITAMIN B12) 100 mcg tablet Take 100 mcg by mouth daily.       therapeutic multivitamin (THERA) tablet Take 1 Tab by mouth daily.  Ferrous Sulfate (SLOW FE) 47.5 mg iron TbER tablet Take 1 Tab by mouth nightly.  albuterol (PROVENTIL HFA, VENTOLIN HFA, PROAIR HFA) 90 mcg/actuation inhaler Take 2 Puffs by inhalation every four (4) hours as needed for Wheezing or Shortness of Breath (cough). 1 Inhaler 0    furosemide (LASIX) 40 mg tablet Take 40 mg by mouth daily.        Allergies   Allergen Reactions    Latex Hives    Other Food Rash     All fruits except apple, oranges, and pineapple (recorded as Other Medication 2/8/2012)    Peanut Swelling    Shellfish Containing Products Anaphylaxis    Morphine Itching     She has had morphine but premedicates with benadryl    Nystatin Rash    Dilaudid [Hydromorphone (Bulk)] Itching     Must take with benadryl    Flexeril [Cyclobenzaprine] Rash    Other Medication Rash     All fruits except apple, oranges, and pineapple    Phenergan [Promethazine] Other (comments)     Rapid hr    Robaxin [Methocarbamol] Rash       Objective:  Visit Vitals  /70 (BP 1 Location: Right arm, BP Patient Position: Sitting)   Pulse 73   Temp 98.6 °F (37 °C) (Oral)   Resp 16   Ht 5' 3\" (1.6 m)   Wt 273 lb (123.8 kg)   SpO2 99%   BMI 48.36 kg/m²     Physical Exam:   General appearance - alert, well appearing, and in moderate distress  Mental status - alert, oriented to person, place, and time  EYE-BRIGIDA, EOMI, corneas normal, no foreign bodies  ENT-ENT exam normal, no neck nodes or sinus tenderness  Nose - normal and patent, no erythema, discharge or polyps  Mouth - mucous membranes moist, pharynx normal without lesions  Neck - supple, no significant adenopathy   Chest - clear to auscultation, no wheezes, rales or rhonchi, symmetric air entry   Heart - normal rate, regular rhythm, normal S1, S2, no murmurs, rubs, clicks or gallops   Abdomen - soft, nontender, nondistended, no masses or organomegaly  Lymph- no adenopathy palpable  Ext-peripheral pulses normal, no pedal edema, no clubbing or cyanosis  Skin-Warm and dry. no hyperpigmentation, vitiligo, or suspicious lesions  Neuro -alert, oriented, normal speech, no focal findings or movement disorder noted  Neck-normal C-spine, no tenderness, full ROM without pain  Feet-no nail deformities or callus formation with good pulses noted  Back-tenderness lower lumbar spine and sacral spine noted,forward flexion,hyperextension impaired,positive straight leg raise  Rt. lower leg weakness  Lt. foresrm blisters    Results for orders placed or performed during the hospital encounter of 03/07/19   EKG, 12 LEAD, INITIAL   Result Value Ref Range    Ventricular Rate 75 BPM    Atrial Rate 75 BPM    P-R Interval 126 ms    QRS Duration 78 ms    Q-T Interval 370 ms    QTC Calculation (Bezet) 413 ms    Calculated P Axis 0 degrees    Calculated R Axis 43 degrees    Calculated T Axis 2 degrees    Diagnosis       Normal sinus rhythm  Junctional ST depression, probably normal  When compared with ECG of 23-APR-2012 20:19,  No significant change was found  Confirmed by MINE Wilson ([id number]) on 3/7/2019 1:35:14 PM         Assessment/Plan:  No diagnosis found. Orders Placed This Encounter    topiramate (TOPAMAX) 200 mg tablet     Sig: Take 1 Tab by mouth daily. Dispense:  30 Tab     Refill:  6     lose weight, increase physical activity, follow low fat diet, follow low salt diet, continue present plan,Take 81mg aspirin daily  There are no Patient Instructions on file for this visit. I have reviewed with the patient details of the assessment and plan and all questions were answered. Relevent patient education was performed. The most recent lab findings were reviewed with the patient. An After Visit Summary was printed and given to the patient.

## 2019-08-07 NOTE — PATIENT INSTRUCTIONS
[employer] Activation    Thank you for requesting access to [email or web address]. Please follow the instructions below to securely access and download your online medical record. [email or web address] allows you to send messages to your doctor, view your test results, renew your prescriptions, schedule appointments, and more. How Do I Sign Up? 1. In your internet browser, go to [email or web address]  2. Click on the First Time User? Click Here link in the Sign In box. You will be redirect to the New Member Sign Up page. 3. Enter your [email or web address] Access Code exactly as it appears below. You will not need to use this code after youve completed the sign-up process. If you do not sign up before the expiration date, you must request a new code. [email or web address] Access Code: Activation code not generated  Current [email or web address] Status: Active (This is the date your [email or web address] access code will )    4. Enter the last four digits of your Social Security Number (xxxx) and Date of Birth (mm/dd/yyyy) as indicated and click Submit. You will be taken to the next sign-up page. 5. Create a [email or web address] ID. This will be your [email or web address] login ID and cannot be changed, so think of one that is secure and easy to remember. 6. Create a [email or web address] password. You can change your password at any time. 7. Enter your Password Reset Question and Answer. This can be used at a later time if you forget your password. 8. Enter your e-mail address. You will receive e-mail notification when new information is available in [address] 9. Click Sign Up. You can now view and download portions of your medical record. 10. Click the Download Summary menu link to download a portable copy of your medical information. Additional Information    If you have questions, please visit the Frequently Asked Questions section of the [email or web address] website at [email or web address] Remember, [email or web address] is NOT to be used for urgent needs. For medical emergencies, dial 911.

## 2019-08-07 NOTE — PROGRESS NOTES
Chief Complaint   Patient presents with    Insect Bite     Lt arm     1. Have you been to the ER, urgent care clinic since your last visit? Hospitalized since your last visit? Yes When: 08/04/19 [facility] Reason for visit: spider bite    2. Have you seen or consulted any other health care providers outside of the [address] [place] [address] since your last visit? Include any pap smears or colon screening.  Yes When: 08/04/19 [facility] Reason for visit: spider bite

## 2019-08-07 NOTE — LETTER
NOTIFICATION RETURN TO WORK / SCHOOL 
 
8/7/2019 1:06 PM 
 
Ms. Jey Reyna [address] [place] [id number] To Whom It May Concern: 
 
Jey Reyna is currently under the care of [address] [place] [address]. She will return to work/school on: 8/12/2019 If there are questions or concerns please have the patient contact our office. Sincerely, Elijah High MD

## 2019-11-12 ENCOUNTER — OFFICE VISIT (OUTPATIENT)
Dept: INTERNAL MEDICINE CLINIC | Age: 49
End: 2019-11-12

## 2019-11-12 VITALS
WEIGHT: 271 LBS | OXYGEN SATURATION: 99 % | BODY MASS INDEX: 48.02 KG/M2 | HEIGHT: 63 IN | HEART RATE: 90 BPM | TEMPERATURE: 98.6 F | DIASTOLIC BLOOD PRESSURE: 8 MMHG | SYSTOLIC BLOOD PRESSURE: 110 MMHG | RESPIRATION RATE: 16 BRPM

## 2019-11-12 DIAGNOSIS — E66.01 OBESITY, MORBID (HCC): ICD-10-CM

## 2019-11-12 DIAGNOSIS — B35.1 ONYCHOMYCOSIS OF GREAT TOE: ICD-10-CM

## 2019-11-12 DIAGNOSIS — M79.674 GREAT TOE PAIN, RIGHT: ICD-10-CM

## 2019-11-12 DIAGNOSIS — M54.16 LUMBAR RADICULOPATHY: Primary | ICD-10-CM

## 2019-11-12 DIAGNOSIS — R53.82 CHRONIC FATIGUE: ICD-10-CM

## 2019-11-12 DIAGNOSIS — Z12.11 SCREENING FOR COLON CANCER: ICD-10-CM

## 2019-11-12 DIAGNOSIS — M51.36 DDD (DEGENERATIVE DISC DISEASE), LUMBAR: ICD-10-CM

## 2019-11-12 RX ORDER — PREDNISONE 10 MG/1
TABLET ORAL
Qty: 21 TAB | Refills: 0 | Status: SHIPPED | OUTPATIENT
Start: 2019-11-12 | End: 2019-12-13

## 2019-11-12 RX ORDER — TERBINAFINE HYDROCHLORIDE 250 MG/1
250 TABLET ORAL DAILY
Qty: 90 TAB | Refills: 0 | Status: SHIPPED | OUTPATIENT
Start: 2019-11-12 | End: 2019-12-13

## 2019-11-12 NOTE — PROGRESS NOTES
1. Have you been to the ER, urgent care clinic since your last visit? Hospitalized since your last visit?no    2. Have you seen or consulted any other health care providers outside of the [address] [place] [address] since your last visit? Include any pap smears or colon screening.  No    3 most recent PHQ Screens 10/11/2018   PHQ Not Done Patient Decline   Little interest or pleasure in doing things Several days   Feeling down, depressed, irritable, or hopeless Several days   Total Score PHQ 2 2   Trouble falling or staying asleep, or sleeping too much Several days   Feeling tired or having little energy Several days   Poor appetite, weight loss, or overeating Not at all   Feeling bad about yourself - or that you are a failure or have let yourself or your family down Not at all   Trouble concentrating on things such as school, work, reading, or watching TV Not at all   Moving or speaking so slowly that other people could have noticed; or the opposite being so fidgety that others notice Not at all   Thoughts of being better off dead, or hurting yourself in some way Not at all   PHQ 9 Score 4   How difficult have these problems made it for you to do your work, take care of your home and get along with others Not difficult at all     Chief Complaint   Patient presents with    Back Pain    Leg Pain     right
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
## 2019-11-12 NOTE — PATIENT INSTRUCTIONS
[employer] Activation    Thank you for requesting access to [employer]. Please follow the instructions below to securely access and download your online medical record. [employer] allows you to send messages to your doctor, view your test results, renew your prescriptions, schedule appointments, and more. How Do I Sign Up? 1. In your internet browser, go to [email or web address]  2. Click on the First Time User? Click Here link in the Sign In box. You will be redirect to the New Member Sign Up page. 3. Enter your [employer] Access Code exactly as it appears below. You will not need to use this code after youve completed the sign-up process. If you do not sign up before the expiration date, you must request a new code. [employer] Access Code: Activation code not generated  Current [employer] Status: Active (This is the date your [employer] access code will )    4. Enter the last four digits of your Social Security Number (xxxx) and Date of Birth (mm/dd/yyyy) as indicated and click Submit. You will be taken to the next sign-up page. 5. Create a [employer] ID. This will be your [employer] login ID and cannot be changed, so think of one that is secure and easy to remember. 6. Create a [employer] password. You can change your password at any time. 7. Enter your Password Reset Question and Answer. This can be used at a later time if you forget your password. 8. Enter your e-mail address. You will receive e-mail notification when new information is available in [address] 9. Click Sign Up. You can now view and download portions of your medical record. 10. Click the Download Summary menu link to download a portable copy of your medical information. Additional Information    If you have questions, please visit the Frequently Asked Questions section of the [employer] website at [email or web address] Remember, [employer] is NOT to be used for urgent needs. For medical emergencies, dial 911.

## 2019-11-13 LAB
T4 FREE SERPL-MCNC: 1.12 NG/DL (ref 0.82–1.77)
TSH SERPL DL<=0.005 MIU/L-ACNC: 3.02 UIU/ML (ref 0.45–4.5)
URATE SERPL-MCNC: 5.8 MG/DL (ref 2.5–7.1)

## 2019-11-17 LAB — HEMOCCULT STL QL IA: NEGATIVE

## 2019-11-29 ENCOUNTER — HOSPITAL ENCOUNTER (EMERGENCY)
Age: 49
Discharge: HOME | End: 2019-11-29
Attending: EMERGENCY MEDICINE
Payer: MEDICAID

## 2019-11-29 ENCOUNTER — APPOINTMENT (OUTPATIENT)
Dept: ULTRASOUND IMAGING | Age: 49
End: 2019-11-29
Attending: PHYSICIAN ASSISTANT
Payer: MEDICAID

## 2019-11-29 ENCOUNTER — APPOINTMENT (OUTPATIENT)
Dept: CT IMAGING | Age: 49
End: 2019-11-29
Attending: PHYSICIAN ASSISTANT
Payer: MEDICAID

## 2019-11-29 VITALS
OXYGEN SATURATION: 99 % | SYSTOLIC BLOOD PRESSURE: 142 MMHG | HEIGHT: 63 IN | HEART RATE: 80 BPM | RESPIRATION RATE: 16 BRPM | BODY MASS INDEX: 47.77 KG/M2 | DIASTOLIC BLOOD PRESSURE: 76 MMHG | WEIGHT: 269.62 LBS | TEMPERATURE: 97.9 F

## 2019-11-29 DIAGNOSIS — Z30.431 IUD CHECK UP: Primary | ICD-10-CM

## 2019-11-29 DIAGNOSIS — R10.2 PELVIC PAIN: ICD-10-CM

## 2019-11-29 LAB
APPEARANCE UR: CLEAR
BACTERIA URNS QL MICRO: NEGATIVE /HPF
BILIRUB UR QL: NEGATIVE
CLUE CELLS VAG QL WET PREP: NORMAL
COLOR UR: ABNORMAL
EPITH CASTS URNS QL MICRO: ABNORMAL /LPF
GLUCOSE UR STRIP.AUTO-MCNC: NEGATIVE MG/DL
HGB UR QL STRIP: ABNORMAL
HYALINE CASTS URNS QL MICRO: ABNORMAL /LPF (ref 0–5)
KETONES UR QL STRIP.AUTO: NEGATIVE MG/DL
KOH PREP SPEC: NORMAL
LEUKOCYTE ESTERASE UR QL STRIP.AUTO: NEGATIVE
NITRITE UR QL STRIP.AUTO: NEGATIVE
PH UR STRIP: 6 [PH] (ref 5–8)
PROT UR STRIP-MCNC: NEGATIVE MG/DL
RBC #/AREA URNS HPF: ABNORMAL /HPF (ref 0–5)
SERVICE CMNT-IMP: NORMAL
SP GR UR REFRACTOMETRY: 1.02 (ref 1–1.03)
T VAGINALIS VAG QL WET PREP: NORMAL
UA: UC IF INDICATED,UAUC: ABNORMAL
UROBILINOGEN UR QL STRIP.AUTO: 1 EU/DL (ref 0.2–1)
WBC URNS QL MICRO: ABNORMAL /HPF (ref 0–4)

## 2019-11-29 PROCEDURE — 99284 EMERGENCY DEPT VISIT MOD MDM: CPT

## 2019-11-29 PROCEDURE — 76830 TRANSVAGINAL US NON-OB: CPT

## 2019-11-29 PROCEDURE — 87210 SMEAR WET MOUNT SALINE/INK: CPT

## 2019-11-29 PROCEDURE — 87491 CHLMYD TRACH DNA AMP PROBE: CPT

## 2019-11-29 PROCEDURE — 81001 URINALYSIS AUTO W/SCOPE: CPT

## 2019-11-29 PROCEDURE — 74176 CT ABD & PELVIS W/O CONTRAST: CPT

## 2019-11-29 PROCEDURE — 74011250637 HC RX REV CODE- 250/637: Performed by: PHYSICIAN ASSISTANT

## 2019-11-29 RX ORDER — NAPROXEN 500 MG/1
500 TABLET ORAL 2 TIMES DAILY WITH MEALS
Qty: 10 TAB | Refills: 0 | Status: SHIPPED | OUTPATIENT
Start: 2019-11-29 | End: 2019-12-13

## 2019-11-29 RX ORDER — HYDROCODONE BITARTRATE AND ACETAMINOPHEN 5; 325 MG/1; MG/1
1 TABLET ORAL
Qty: 12 TAB | Refills: 0 | Status: SHIPPED | OUTPATIENT
Start: 2019-11-29 | End: 2019-11-30

## 2019-11-29 RX ORDER — HYDROCODONE BITARTRATE AND ACETAMINOPHEN 10; 325 MG/1; MG/1
1 TABLET ORAL
Status: COMPLETED | OUTPATIENT
Start: 2019-11-29 | End: 2019-11-29

## 2019-11-29 RX ADMIN — HYDROCODONE BITARTRATE AND ACETAMINOPHEN 1 TABLET: 10; 325 TABLET ORAL at 11:49

## 2019-11-29 NOTE — ED PROVIDER NOTES
EMERGENCY DEPARTMENT HISTORY AND PHYSICAL EXAM      Date: 11/29/2019  Patient Name: Vidal Maldonado    History of Presenting Illness     Chief Complaint   Patient presents with    IUD/Intrauterine Device     The patient presents to the ED with concerns that her IUD has broken and / or is out of place. History Provided By: Patient    HPI: Vidal Maldonado, 47 y.o. female with PMHx significant for arthritis, IBS, IUD placement. Patient states that she is been having some pelvic pain. She states that she called her gynecologist and they advised to come to the emergency department for ultrasound, check IUD placement, also for STD check. Patient states that she has been having this pelvic pain intermittently for greater than a week and she has not been able to fill the IUD strings. She does state that her pain is 8 out of 10 and she denies any nausea, vomiting, burning urination, vaginal discharge at this time. She did state that one episode of vaginal bleeding did occur. Patient is scheduled for hysterectomy within 3 weeks. Please note for examination and HPI were completed I did introduce myself as the physician assistant. Please note that this dictation was completed with [employer], the computer voice recognition software. Quite often unanticipated grammatical, syntax, homophones, and other interpretive errors are inadvertently transcribed by the computer software. Please disregard these errors. Please excuse any errors that have escaped final proofreading. For further clarification on any chart please contact myself. Thank you. There are no other complaints, changes, or physical findings at this time. PCP: Yvonne Byrnes MD    No current facility-administered medications on file prior to encounter.       Current Outpatient Medications on File Prior to Encounter   Medication Sig Dispense Refill    predniSONE (DELTASONE) 10 mg tablet 6 tabs today and reduce by 1 tab daily 21 Tab 0  terbinafine HCl (LAMISIL) 250 mg tablet Take 1 Tab by mouth daily. 90 Tab 0    topiramate (TOPAMAX) 200 mg tablet Take 1 Tab by mouth daily. 30 Tab 6    cetirizine (ZYRTEC) 10 mg tablet Take 10 mg by mouth daily as needed for Allergies.  fluticasone (FLONASE ALLERGY RELIEF) 50 mcg/actuation nasal spray 2 Sprays by Both Nostrils route daily as needed for Rhinitis.  potassium chloride SR (KLOR-CON 10) 10 mEq tablet Take 20 mEq by mouth daily. With Lasix.  cyanocobalamin (VITAMIN B12) 100 mcg tablet Take 100 mcg by mouth daily.  therapeutic multivitamin (THERA) tablet Take 1 Tab by mouth daily.  Ferrous Sulfate (SLOW FE) 47.5 mg iron TbER tablet Take 1 Tab by mouth nightly.  albuterol (PROVENTIL HFA, VENTOLIN HFA, PROAIR HFA) 90 mcg/actuation inhaler Take 2 Puffs by inhalation every four (4) hours as needed for Wheezing or Shortness of Breath (cough). 1 Inhaler 0    furosemide (LASIX) 40 mg tablet Take 40 mg by mouth daily.          Past History     Past Medical History:  Past Medical History:   Diagnosis Date    Arthritis     Asthma     Bipolar disorder ([address]     Depression     IBS (irritable bowel syndrome)     Migraine     Neurological disorder     migraines    Peptic ulcer     Polyp cervix     Snoring     TIA (transient ischemic attack)        Past Surgical History:  Past Surgical History:   Procedure Laterality Date    HX CERVICAL FUSION      C4-C7    HX CHOLECYSTECTOMY      HX CYST REMOVAL      from under both arms    HX HERNIA REPAIR  2009    HX ORTHOPAEDIC      left knee meniscus repair     HX ORTHOPAEDIC      ORIF left fibula    HX TUBAL LIGATION      HYSTEROSCOPY DIAGNOSTIC      x2       Family History:  Family History   Problem Relation Age of Onset    Stroke Father     Hypertension Father     Heart Disease Maternal Grandmother     Cancer Maternal Grandmother         brain and colon    Cancer Maternal Uncle         5 w/ brain Carlos Bass MS Other 1st cousin    Bipolar Disorder Other        Social History:  Social History     Tobacco Use    Smoking status: Current Some Day Smoker     Packs/day: 0.25    Smokeless tobacco: Current User   Substance Use Topics    Alcohol use: Yes     Comment: rare    Drug use: No       Allergies: Allergies   Allergen Reactions    Latex Hives    Other Food Rash     All fruits except apple, oranges, and pineapple (recorded as Other Medication 2/8/2012)    Peanut Swelling    Shellfish Containing Products Anaphylaxis    Morphine Itching     She has had morphine but premedicates with benadryl    Nystatin Rash    Dilaudid [Hydromorphone (Bulk)] Itching     Must take with benadryl    Flexeril [Cyclobenzaprine] Rash    Other Medication Rash     All fruits except apple, oranges, and pineapple    Phenergan [Promethazine] Other (comments)     Rapid hr    Robaxin [Methocarbamol] Rash         Review of Systems   Review of Systems   Genitourinary: Positive for vaginal bleeding and vaginal pain. All other systems reviewed and are negative. Physical Exam   Physical Exam  Vitals signs and nursing note reviewed. Constitutional:       Appearance: She is well-developed. HENT:      Head: Normocephalic and atraumatic. Eyes:      Conjunctiva/sclera: Conjunctivae normal.      Pupils: Pupils are equal, round, and reactive to light. Neck:      Musculoskeletal: Normal range of motion and neck supple. Thyroid: No thyromegaly. Cardiovascular:      Rate and Rhythm: Normal rate and regular rhythm. Heart sounds: Normal heart sounds. No murmur. Pulmonary:      Effort: Pulmonary effort is normal. No respiratory distress. Breath sounds: Normal breath sounds. No stridor. No wheezing. Abdominal:      General: Bowel sounds are normal.      Palpations: Abdomen is soft. Tenderness: There is no tenderness. Genitourinary:     Comments: Pelvic exam completed.   Patient did not have any IUD strings appreciated from cervix. No adnexal or cervical motion tenderness appreciated. Scant vaginal discharge appreciated that is clear and white. Musculoskeletal: Normal range of motion. General: No tenderness. Lymphadenopathy:      Cervical: No cervical adenopathy. Skin:     General: Skin is warm. Neurological:      Mental Status: She is alert and oriented to person, place, and time. Deep Tendon Reflexes: Reflexes are normal and symmetric. Psychiatric:         Judgment: Judgment normal.         Diagnostic Study Results     Labs -     Recent Results (from the past 12 hour(s))   URINALYSIS W/ REFLEX CULTURE    Collection Time: 11/29/19 11:43 AM   Result Value Ref Range    Color YELLOW/STRAW      Appearance CLEAR CLEAR      Specific gravity 1.025 1.003 - 1.030      pH (UA) 6.0 5.0 - 8.0      Protein NEGATIVE  NEG mg/dL    Glucose NEGATIVE  NEG mg/dL    Ketone NEGATIVE  NEG mg/dL    Bilirubin NEGATIVE  NEG      Blood TRACE (A) NEG      Urobilinogen 1.0 0.2 - 1.0 EU/dL    Nitrites NEGATIVE  NEG      Leukocyte Esterase NEGATIVE  NEG      WBC 0-4 0 - 4 /hpf    RBC 0-5 0 - 5 /hpf    Epithelial cells FEW FEW /lpf    Bacteria NEGATIVE  NEG /hpf    UA:UC IF INDICATED CULTURE NOT INDICATED BY UA RESULT CNI      Hyaline cast 0-2 0 - 5 /lpf   MADELEINE, OTHER SOURCES    Collection Time: 11/29/19 11:43 AM   Result Value Ref Range    Special Requests: NO SPECIAL REQUESTS      KOH NO YEAST SEEN     WET PREP    Collection Time: 11/29/19 11:43 AM   Result Value Ref Range    Clue cells CLUE CELLS PRESENT      Wet prep NO TRICHOMONAS SEEN         Radiologic Studies -   US TRANSVAGINAL   Final Result    impression: Uterine fibroids. CT ABD PELV WO CONT   Final Result   IMPRESSION: No renal calculi or evidence of obstructive uropathy. CT Results  (Last 48 hours)               11/29/19 1234  CT ABD PELV WO CONT Final result    Impression:  IMPRESSION: No renal calculi or evidence of obstructive uropathy. Narrative:  INDICATION: IUD,        Exam: Noncontrast CT of the abdomen and pelvis is performed with 5 mm   collimation. Sagittal and coronal reformatted images were also performed. CT dose reduction was achieved through the use of a standardized protocol   tailored for this examination and automatic exposure control for dose   modulation. Direct comparison is made to prior CT dated July 2011. FINDINGS:       The visualized lung bases are clear. Abdomen:        Liver: The liver is normal on noncontrast images. Spleen: The spleen is normal on noncontrast images. Adrenals: The adrenals are normal on noncontrast images. Pancreas: The pancreas is normal on noncontrast images. Gallbladder: The gallbladder is surgically absent. Kidneys: There is no perinephric stranding, hydronephrosis or hydroureter. No   renal, ureteral bladder calculus is visualized. Bowel: No thickened or dilated loop of large or small bowel seen. Appendix: The appendix is normal.       Pelvis: Urinary bladder is partially filled and grossly normal.       Miscellaneous: There is a small fat containing left ventral wall hernia,   unchanged. There is no free intraperitoneal gas or fluid. There is no focal   fluid collection to suggest abscess. IUD is present in the uterus. CXR Results  (Last 48 hours)    None            Medical Decision Making   I am the first provider for this patient. I reviewed the vital signs, available nursing notes, past medical history, past surgical history, family history and social history. Vital Signs-Reviewed the patient's vital signs. Patient Vitals for the past 12 hrs:   Temp Pulse Resp BP SpO2   11/29/19 0854 97.9 °F (36.6 °C) 76 18 137/80 99 %       Records Reviewed: Nursing Notes    Provider Notes (Medical Decision Making):    At this time is there is no visualization of the IUD strings I did perform CT scan which she did find that the device is in the uterus. Also did show that patient did not have any abnormalities noted on her ultrasound results. I did advise patient at this time we will go ahead and discharge in stable condition to follow-up with gynecologist for further evaluation. Patient voiced her understanding agreement to treatment plan will be discharged at this time. As she is sexually active I did state that we would send out gonorrhea chlamydia and those test results are currently pending and she does have a conduit with the IUD of having PID especially she has a strings however at this time does not. She does not have any anterior abdominal pains no McBurney's point tenderness, Stewart sign or any CVA tenderness noted at this time either so so low clinical suspicion for any cholecystitis, appendicitis, pyelonephritis     ED Course:   Initial assessment performed. The patients presenting problems have been discussed, and they are in agreement with the care plan formulated and outlined with them. I have encouraged them to ask questions as they arise throughout their visit. Critical Care Time: None    Disposition:  dispo for home     PLAN:  1. Current Discharge Medication List      START taking these medications    Details   naproxen (NAPROSYN) 500 mg tablet Take 1 Tab by mouth two (2) times daily (with meals). Qty: 10 Tab, Refills: 0      HYDROcodone-acetaminophen (NORCO) 5-325 mg per tablet Take 1 Tab by mouth every six (6) hours as needed for Pain for up to 1 day. Max Daily Amount: 4 Tabs. Qty: 12 Tab, Refills: 0    Associated Diagnoses: IUD check up; Pelvic pain           2.    Follow-up Information     Follow up With Specialties Details Why Contact Info    Tone Madrigal., MD Internal Medicine   [address]  [phone]      [facility] EMERGENCY DEPT Emergency Medicine  If symptoms worsen [address]  [address]   [address] [address] [phone]    [facility]stetrics & Gynecology   [place]  [address] [facility]  [phone]        Return to ED if worse     Diagnosis     Clinical Impression:   1. IUD check up    2. Pelvic pain          Please note that this dictation was completed with [employer], the computer voice recognition software. Quite often unanticipated grammatical, syntax, homophones, and other interpretive errors are inadvertently transcribed by the computer software. Please disregards these errors. Please excuse any errors that have escaped final proofreading. This note will not be viewable in [address]

## 2019-11-29 NOTE — ED NOTES
I have reviewed discharge instructions with the patient. The patient verbalized understanding. Ambulatory out of ED at this time with daughter to drive home.

## 2019-12-03 LAB
C TRACH DNA SPEC QL NAA+PROBE: NEGATIVE
N GONORRHOEA DNA SPEC QL NAA+PROBE: NEGATIVE
SAMPLE TYPE: NORMAL
SERVICE CMNT-IMP: NORMAL
SPECIMEN SOURCE: NORMAL

## 2019-12-12 ENCOUNTER — OFFICE VISIT (OUTPATIENT)
Dept: INTERNAL MEDICINE CLINIC | Age: 49
End: 2019-12-12

## 2019-12-12 VITALS
WEIGHT: 277.9 LBS | RESPIRATION RATE: 16 BRPM | DIASTOLIC BLOOD PRESSURE: 64 MMHG | BODY MASS INDEX: 49.24 KG/M2 | SYSTOLIC BLOOD PRESSURE: 110 MMHG | OXYGEN SATURATION: 99 % | HEIGHT: 63 IN | TEMPERATURE: 97 F | HEART RATE: 78 BPM

## 2019-12-12 DIAGNOSIS — F41.9 ANXIETY: ICD-10-CM

## 2019-12-12 DIAGNOSIS — F43.10 PTSD (POST-TRAUMATIC STRESS DISORDER): Primary | ICD-10-CM

## 2019-12-12 RX ORDER — CITALOPRAM 20 MG/1
20 TABLET, FILM COATED ORAL DAILY
Qty: 30 TAB | Refills: 3 | Status: SHIPPED | OUTPATIENT
Start: 2019-12-12 | End: 2021-06-07

## 2019-12-12 NOTE — PATIENT INSTRUCTIONS
Anxiety Disorder: Care Instructions Your Care Instructions Anxiety is a normal reaction to stress. Difficult situations can cause you to have symptoms such as sweaty palms and a nervous feeling. In an anxiety disorder, the symptoms are far more severe. Constant worry, muscle tension, trouble sleeping, nausea and diarrhea, and other symptoms can make normal daily activities difficult or impossible. These symptoms may occur for no reason, and they can affect your work, school, or social life. Medicines, counseling, and self-care can all help. Follow-up care is a key part of your treatment and safety. Be sure to make and go to all appointments, and call your doctor if you are having problems. It's also a good idea to know your test results and keep a list of the medicines you take. How can you care for yourself at home? · Take medicines exactly as directed. Call your doctor if you think you are having a problem with your medicine. · Go to your counseling sessions and follow-up appointments. · Recognize and accept your anxiety. Then, when you are in a situation that makes you anxious, say to yourself, \"This is not an emergency. I feel uncomfortable, but I am not in danger. I can keep going even if I feel anxious. \" · Be kind to your body: 
? Relieve tension with exercise or a massage. ? Get enough rest. 
? Avoid alcohol, caffeine, nicotine, and illegal drugs. They can increase your anxiety level and cause sleep problems. ? Learn and do relaxation techniques. See below for more about these techniques. · Engage your mind. Get out and do something you enjoy. Go to a funny movie, or take a walk or hike. Plan your day. Having too much or too little to do can make you anxious. · Keep a record of your symptoms. Discuss your fears with a good friend or family member, or join a support group for people with similar problems. Talking to others sometimes relieves stress. · Get involved in social groups, or volunteer to help others. Being alone sometimes makes things seem worse than they are. · Get at least 30 minutes of exercise on most days of the week to relieve stress. Walking is a good choice. You also may want to do other activities, such as running, swimming, cycling, or playing tennis or team sports. Relaxation techniques Do relaxation exercises 10 to 20 minutes a day. You can play soothing, relaxing music while you do them, if you wish. · Tell others in your house that you are going to do your relaxation exercises. Ask them not to disturb you. · Find a comfortable place, away from all distractions and noise. · Lie down on your back, or sit with your back straight. · Focus on your breathing. Make it slow and steady. · Breathe in through your nose. Breathe out through either your nose or mouth. · Breathe deeply, filling up the area between your navel and your rib cage. Breathe so that your belly goes up and down. · Do not hold your breath. · Breathe like this for 5 to 10 minutes. Notice the feeling of calmness throughout your whole body. As you continue to breathe slowly and deeply, relax by doing the following for another 5 to 10 minutes: · Tighten and relax each muscle group in your body. You can begin at your toes and work your way up to your head. · Imagine your muscle groups relaxing and becoming heavy. · Empty your mind of all thoughts. · Let yourself relax more and more deeply. · Become aware of the state of calmness that surrounds you. · When your relaxation time is over, you can bring yourself back to alertness by moving your fingers and toes and then your hands and feet and then stretching and moving your entire body. Sometimes people fall asleep during relaxation, but they usually wake up shortly afterward.  
· Always give yourself time to return to full alertness before you drive a car or do anything that might cause an accident if you are not fully alert. Never play a relaxation tape while you drive a car. When should you call for help? Call 911 anytime you think you may need emergency care. For example, call if: 
  · You feel you cannot stop from hurting yourself or someone else.  
Félix Becerril the numbers for these national suicide hotlines: 5-293-593-TALK (9-869.558.8896) and 4-873-KFRPIDM (0-681.283.5277). If you or someone you know talks about suicide or feeling hopeless, get help right away. 
 Watch closely for changes in your health, and be sure to contact your doctor if: 
  · You have anxiety or fear that affects your life.  
  · You have symptoms of anxiety that are new or different from those you had before. Where can you learn more? Go to http://willis[employer]bianka.info/. Enter P754 in the search box to learn more about \"Anxiety Disorder: Care Instructions. \" Current as of: May 28, 2019 Content Version: 12.2 © 5922-0908 [employer]. Care instructions adapted under license by [employer] (which disclaims liability or warranty for this information). If you have questions about a medical condition or this instruction, always ask your healthcare professional. [address] any warranty or liability for your use of this information. Post-Traumatic Stress Disorder (PTSD): Care Instructions Your Care Instructions Post-traumatic stress disorder (PTSD) is a mental condition that can result from being in or seeing a traumatic or terrifying event. These events can include combat, a terrorist attack, a natural disaster, a serious accident, an assault, or a rape. If you have PTSD, you may often relive the experience in nightmares or flashbacks. These are clear and frightening memories of the event. You may also have trouble sleeping. PTSD affects people in very different ways.  It can interfere with daily activities such as work or school, and it can make you withdraw from friends or loved ones. Follow-up care is a key part of your treatment and safety. Be sure to make and go to all appointments, and call your doctor if you are having problems. It's also a good idea to know your test results and keep a list of the medicines you take. How can you care for yourself at home? · Take medicines exactly as directed. Call your doctor if you think you are having a problem with your medicine. · Go to your counseling sessions and follow-up appointments. · Recognize and accept your anxiety. Then, when you are in a situation that makes you anxious, say to yourself, \"This is not an emergency. I feel uncomfortable, but I am not in danger. I can keep going even if I feel anxious. \" · Be kind to your body: 
? Relieve tension with exercise or a massage. ? Get enough rest. 
? Avoid alcohol, caffeine, nicotine, and illegal drugs. They can increase your anxiety level and cause sleep problems. ? Learn and do relaxation techniques. See below for more about these techniques. · Engage your mind. Get out and do something you enjoy. Go to a [employer] movie, or take a walk or hike. Plan your day. Having too much or too little to do can make you anxious. · Keep a record of your symptoms. Discuss your fears with a good friend or family member, or join a support group for people with similar problems. Talking to others sometimes relieves stress. · Get involved in social groups, or volunteer to help others. Being alone sometimes makes things seem worse than they are. · Get at least 30 minutes of exercise on most days of the week. Walking is a good choice. You also may want to do other activities, such as running, swimming, cycling, or playing tennis or team sports. · Keep the numbers for these national suicide hotlines: 9-005-630-TALK (0-419.527.4264) and 0-834-GUEXBFK (7-964.568.4340).  If you or someone you know talks about suicide or feeling hopeless, get help right away. Relaxation techniques Do relaxation exercises 10 to 20 minutes a day. You can play soothing, relaxing music while you do them, if you wish. · Tell others in your house that you are going to do your relaxation exercises. Ask them not to disturb you. · Find a comfortable place, away from all distractions and noise. · Lie down on your back, or sit with your back straight. · Focus on your breathing. Make it slow and steady. · Breathe in through your nose. Breathe out through either your nose or mouth. · Breathe deeply, filling up the area between your navel and your rib cage. Breathe so that your belly goes up and down. · Do not hold your breath. · Breathe like this for 5 to 10 minutes. Notice the feeling of calmness throughout your whole body. As you continue to breathe slowly and deeply, relax by doing the following for another 5 to 10 minutes: · Tighten and relax each muscle group in your body. You can begin at your toes and work your way up to your head. · Imagine your muscle groups relaxing and becoming heavy. · Empty your mind of all thoughts. · Let yourself relax more and more deeply. · Become aware of the state of calmness that surrounds you. · When your relaxation time is over, you can bring yourself back to alertness by moving your fingers and toes and then your hands and feet and then stretching and moving your entire body. Sometimes people fall asleep during relaxation, but they usually wake up shortly afterward. · Always give yourself time to return to full alertness before you drive a car or do anything that might cause an accident if you are not fully alert. Never play a relaxation tape while you drive a car. When should you call for help? Call 911 anytime you think you may need emergency care. For example, call if: 
  · You feel you cannot stop from hurting yourself or someone else.  Watch closely for changes in your health, and be sure to contact your doctor if: 
  · Your PTSD symptoms are getting worse.  
  · You have new or worsening symptoms of anxiety.  
  · You are not getting better as expected. Where can you learn more? Go to http://willis-bianka.info/. Kristen Ivan in the search box to learn more about \"Post-Traumatic Stress Disorder (PTSD): Care Instructions. \" Current as of: May 28, 2019 Content Version: 12.2 © 6182-7603 [employer]. Care instructions adapted under license by [employer] (which disclaims liability or warranty for this information). If you have questions about a medical condition or this instruction, always ask your healthcare professional. [address] any warranty or liability for your use of this information.

## 2019-12-12 NOTE — LETTER
NOTIFICATION OF RETURN TO WORK / SCHOOL 
 
12/12/2019 Ms. Danny Coyne [address] [id number] To Whom It May Concern: 
 
Danny Coyne was evaluated today for a mental health condition. She will return to work 12/16/19. If there are questions or concerns please have the patient contact our office. Sincerely, Candice Alberto NP

## 2019-12-12 NOTE — PROGRESS NOTES
Chief Complaint   Patient presents with    Medication Refill    Post Traumatic Stress Disorder    Abdominal Pain     1. Have you been to the ER, urgent care clinic since your last visit? Hospitalized since your last visit? 2. Have you seen or consulted any other health care providers outside of the [address] [place] [address] since your last visit? Include any pap smears or colon screening.

## 2019-12-13 ENCOUNTER — HOSPITAL ENCOUNTER (OUTPATIENT)
Dept: SURGERY | Age: 49
Setting detail: OUTPATIENT SURGERY
Discharge: HOME | End: 2019-12-13
Payer: MEDICAID

## 2019-12-13 VITALS
DIASTOLIC BLOOD PRESSURE: 52 MMHG | SYSTOLIC BLOOD PRESSURE: 120 MMHG | HEART RATE: 65 BPM | BODY MASS INDEX: 48.2 KG/M2 | TEMPERATURE: 98.3 F | RESPIRATION RATE: 15 BRPM | OXYGEN SATURATION: 97 % | WEIGHT: 272 LBS | HEIGHT: 63 IN

## 2019-12-13 LAB
ABO + RH BLD: NORMAL
ANION GAP SERPL CALC-SCNC: 4 MMOL/L (ref 5–15)
APPEARANCE UR: CLEAR
BACTERIA URNS QL MICRO: NEGATIVE /HPF
BILIRUB UR QL: NEGATIVE
BLOOD GROUP ANTIBODIES SERPL: NORMAL
BUN SERPL-MCNC: 12 MG/DL (ref 6–20)
BUN/CREAT SERPL: 12 (ref 12–20)
CALCIUM SERPL-MCNC: 9.2 MG/DL (ref 8.5–10.1)
CHLORIDE SERPL-SCNC: 106 MMOL/L (ref 97–108)
CO2 SERPL-SCNC: 30 MMOL/L (ref 21–32)
COLOR UR: NORMAL
CREAT SERPL-MCNC: 1.04 MG/DL (ref 0.55–1.02)
EPITH CASTS URNS QL MICRO: NORMAL /LPF
ERYTHROCYTE [DISTWIDTH] IN BLOOD BY AUTOMATED COUNT: 13.2 % (ref 11.5–14.5)
GLUCOSE SERPL-MCNC: 83 MG/DL (ref 65–100)
GLUCOSE UR STRIP.AUTO-MCNC: NEGATIVE MG/DL
HCT VFR BLD AUTO: 40.8 % (ref 35–47)
HGB BLD-MCNC: 13 G/DL (ref 11.5–16)
HGB UR QL STRIP: NEGATIVE
HYALINE CASTS URNS QL MICRO: NORMAL /LPF (ref 0–5)
KETONES UR QL STRIP.AUTO: NEGATIVE MG/DL
LEUKOCYTE ESTERASE UR QL STRIP.AUTO: NEGATIVE
MCH RBC QN AUTO: 31.3 PG (ref 26–34)
MCHC RBC AUTO-ENTMCNC: 31.9 G/DL (ref 30–36.5)
MCV RBC AUTO: 98.3 FL (ref 80–99)
NITRITE UR QL STRIP.AUTO: NEGATIVE
NRBC # BLD: 0 K/UL (ref 0–0.01)
NRBC BLD-RTO: 0 PER 100 WBC
PH UR STRIP: 6.5 [PH] (ref 5–8)
PLATELET # BLD AUTO: 238 K/UL (ref 150–400)
PMV BLD AUTO: 12.2 FL (ref 8.9–12.9)
POTASSIUM SERPL-SCNC: 3.9 MMOL/L (ref 3.5–5.1)
PROT UR STRIP-MCNC: NEGATIVE MG/DL
RBC # BLD AUTO: 4.15 M/UL (ref 3.8–5.2)
RBC #/AREA URNS HPF: NORMAL /HPF (ref 0–5)
SODIUM SERPL-SCNC: 140 MMOL/L (ref 136–145)
SP GR UR REFRACTOMETRY: 1.01 (ref 1–1.03)
SPECIMEN EXP DATE BLD: NORMAL
UA: UC IF INDICATED,UAUC: NORMAL
UROBILINOGEN UR QL STRIP.AUTO: 0.2 EU/DL (ref 0.2–1)
WBC # BLD AUTO: 4.6 K/UL (ref 3.6–11)
WBC URNS QL MICRO: NORMAL /HPF (ref 0–4)

## 2019-12-13 PROCEDURE — 80048 BASIC METABOLIC PNL TOTAL CA: CPT

## 2019-12-13 PROCEDURE — 81001 URINALYSIS AUTO W/SCOPE: CPT

## 2019-12-13 PROCEDURE — 85027 COMPLETE CBC AUTOMATED: CPT

## 2019-12-13 PROCEDURE — 36415 COLL VENOUS BLD VENIPUNCTURE: CPT

## 2019-12-13 PROCEDURE — 86900 BLOOD TYPING SEROLOGIC ABO: CPT

## 2019-12-13 NOTE — PERIOP NOTES
During PAT bautista, pt stated she may have had a hx of MRSA on left knee. She is not sure if it was MRSA or Staph. Obtained MRSA culture. [id number]/332 Kajal Brambila notified of above info and Requested new order of 3grams Ancef for pt's BMI.

## 2019-12-13 NOTE — PROGRESS NOTES
Medication Refill; Post Traumatic Stress Disorder; and Abdominal Pain       Subjective:   HPI   47year old Ms. Aleja Caruso presents with concern for flare of her PTSD. She reports an anger episode at her place of employment and was advised by her employer to be evaluated before return to work. She requests medication to help with PTSD symptoms. She has taken Paxil in the past which apparently was not helpful. Anxiety review:  Patient complains of anger and anxiety issues and hx of PTSD. She denies recurrent thoughts of death and suicidal thoughts without plan. She experiences the following side effects from the treatment. Past Medical History:   Diagnosis Date    Arthritis     Asthma     Seasonal     Bipolar disorder (HCC)     IBS (irritable bowel syndrome)     Migraine     Peptic ulcer     PTSD (post-traumatic stress disorder)     Snoring     TIA (transient ischemic attack)     As of 12/13/19, pt states she never had one and that her migraine caused symptoms of TIA       Past Surgical History:   Procedure Laterality Date    HX CERVICAL FUSION      C4-C7    HX CHOLECYSTECTOMY      HX COLONOSCOPY  2010    HX CYST REMOVAL      from under both arms    HX HEENT Left 2013    orbital    HX HERNIA REPAIR  2009    HX MENISCUS REPAIR Left     x2    HX ORTHOPAEDIC Left     ORIF left fibula    HX TUBAL LIGATION      HX TUBAL LIGATION  1994    HYSTEROSCOPY DIAGNOSTIC      x2       Prior to Admission medications    Medication Sig Start Date End Date Taking? Authorizing Provider   OTHER Indications: bladderweck   Yes Provider, Historical   citalopram (CELEXA) 20 mg tablet Take 1 Tab by mouth daily. 12/12/19  Yes Nicole Sanchez NP   terbinafine HCl (LAMISIL) 250 mg tablet Take 1 Tab by mouth daily. 11/12/19 12/13/19 Yes Bharti Ng MD   topiramate (TOPAMAX) 200 mg tablet Take 1 Tab by mouth daily.  8/7/19  Yes Bharti Ng MD   cetirizine (ZYRTEC) 10 mg tablet Take 10 mg by mouth daily as needed for Allergies. Yes Provider, Historical   fluticasone (FLONASE ALLERGY RELIEF) 50 mcg/actuation nasal spray 2 Sprays by Both Nostrils route daily as needed for Rhinitis. Yes Provider, Historical   cyanocobalamin (VITAMIN B12) 100 mcg tablet Take 100 mcg by mouth daily. Yes Provider, Historical   therapeutic multivitamin (THERA) tablet Take 1 Tab by mouth daily. Yes Provider, Historical   Ferrous Sulfate (SLOW FE) 47.5 mg iron TbER tablet Take 1 Tab by mouth nightly. Yes Provider, Historical   albuterol (PROVENTIL HFA, VENTOLIN HFA, PROAIR HFA) 90 mcg/actuation inhaler Take 2 Puffs by inhalation every four (4) hours as needed for Wheezing or Shortness of Breath (cough). 6/4/18  Yes Ronald Shin NP   furosemide (LASIX) 40 mg tablet Take 40 mg by mouth daily. Yes Other, MD Amina   naproxen (NAPROSYN) 500 mg tablet Take 1 Tab by mouth two (2) times daily (with meals). 11/29/19 12/13/19  Willis Mills PA-C   predniSONE (DELTASONE) 10 mg tablet 6 tabs today and reduce by 1 tab daily 11/12/19 12/13/19  Ciaran Cosby MD   potassium chloride SR (KLOR-CON 10) 10 mEq tablet Take 20 mEq by mouth daily. With Lasix.     Provider, Historical        Allergies   Allergen Reactions    Latex Hives    Other Food Rash     All fruits except apple, oranges, and pineapple (recorded as Other Medication 2/8/2012)    Peanut Swelling    Shellfish Containing Products Anaphylaxis    Morphine Itching     She has had morphine but premedicates with benadryl    Nystatin Rash    Dilaudid [Hydromorphone (Bulk)] Itching     Must take with benadryl    Flagyl [Metronidazole] Contact Dermatitis    Flexeril [Cyclobenzaprine] Rash    Other Medication Rash     All fruits except apple, oranges, and pineapple    Phenergan [Promethazine] Other (comments)     Rapid hr    Robaxin [Methocarbamol] Rash        Social History     Socioeconomic History    Marital status: SINGLE     Spouse name: Not on file    Number of children: Not on file    Years of education: Not on file    Highest education level: Not on file   Occupational History    Not on file   Social Needs    Financial resource strain: Not on file    Food insecurity:     Worry: Not on file     Inability: Not on file    Transportation needs:     Medical: Not on file     Non-medical: Not on file   Tobacco Use    Smoking status: Former Smoker     Packs/day: 0.25     Last attempt to quit: 12/10/2019    Smokeless tobacco: Current User   Substance and Sexual Activity    Alcohol use: Yes     Comment: rare    Drug use: No    Sexual activity: Not Currently   Lifestyle    Physical activity:     Days per week: Not on file     Minutes per session: Not on file    Stress: Not on file   Relationships    Social connections:     Talks on phone: Not on file     Gets together: Not on file     Attends Restorationism service: Not on file     Active member of club or organization: Not on file     Attends meetings of clubs or organizations: Not on file     Relationship status: Not on file    Intimate partner violence:     Fear of current or ex partner: Not on file     Emotionally abused: Not on file     Physically abused: Not on file     Forced sexual activity: Not on file   Other Topics Concern    Not on file   Social History Narrative    Not on file        Family History   Problem Relation Age of Onset    Stroke Father     Hypertension Father     Heart Disease Maternal Grandmother     Cancer Maternal Grandmother         brain and colon    Cancer Maternal Uncle         5 w/ brain Barbara Nova    MS Other         1st cousin    Bipolar Disorder Other           Review of Systems   Constitutional: Negative for fever and malaise/fatigue. HENT: Negative for congestion, ear discharge, ear pain, nosebleeds, sinus pain and sore throat. Eyes: Negative for blurred vision, pain, discharge and redness. Respiratory: Negative for cough, shortness of breath and wheezing. Cardiovascular: Negative for chest pain and palpitations. Gastrointestinal: Negative for abdominal pain, constipation, diarrhea, heartburn, nausea and vomiting. Genitourinary: Negative for dysuria. Musculoskeletal: Negative for myalgias. Skin: Negative for rash. Neurological: Negative for dizziness and headaches. Endo/Heme/Allergies: Negative for polydipsia. Does not bruise/bleed easily. Psychiatric/Behavioral: The patient is nervous/anxious. Anger and PTSD issues       Objective:     Vitals:    12/12/19 1108   BP: 110/64   Pulse: 78   Resp: 16   Temp: 97 °F (36.1 °C)   TempSrc: Oral   SpO2: 99%   Weight: 277 lb 14.4 oz (126.1 kg)   Height: 5' 3\" (1.6 m)   PainSc:   7   PainLoc: Back        Physical Exam  Vitals signs and nursing note reviewed. Constitutional:       General: She is not in acute distress. Appearance: Normal appearance. She is obese. She is not ill-appearing. HENT:      Head: Normocephalic and atraumatic. Right Ear: External ear normal.      Left Ear: External ear normal.      Nose: Nose normal. No congestion or rhinorrhea. Mouth/Throat:      Mouth: Mucous membranes are moist.      Pharynx: Oropharynx is clear. No oropharyngeal exudate or posterior oropharyngeal erythema. Eyes:      Conjunctiva/sclera: Conjunctivae normal.      Pupils: Pupils are equal, round, and reactive to light. Neck:      Musculoskeletal: Normal range of motion and neck supple. Cardiovascular:      Pulses: Normal pulses. Heart sounds: Normal heart sounds. Abdominal:      General: Bowel sounds are normal.      Palpations: Abdomen is soft. Musculoskeletal: Normal range of motion. Skin:     General: Skin is warm and dry. Neurological:      Mental Status: She is alert and oriented to person, place, and time. Psychiatric:         Mood and Affect: Mood normal.      Comments: Reports PTSD flare          Assessment/ Plan:       ICD-10-CM ICD-9-CM    1.  PTSD (post-traumatic stress disorder) F43.10 309.81 citalopram (CELEXA) 20 mg tablet   2. Anxiety F41.9 300.00 citalopram (CELEXA) 20 mg tablet        Orders Placed This Encounter    OTHER     Sig: Indications: bladderweck    citalopram (CELEXA) 20 mg tablet     Sig: Take 1 Tab by mouth daily. Dispense:  30 Tab     Refill:  3        I have reviewed the patient's medical history in detail and updated the computerized patient record. Thorough and lengthy discussed of various treatments for anger, anxiety and PTSD. Patient reports trying a lot of the medications discussed and is concerned about the meds causing weight gain. She as decided to try a trial of Celexa. 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. Work note requested and given to patient. We had a prolonged discussion about these complex clinical issues and went over the various important aspects to consider. All questions were answered. Advised her to call back or return to office if symptoms do not improve, change in nature, or persist. Schedule appt in 6 wks for f/u PTSD; med eval (Celexa) or keep appt with Dr. Margarette Ortiz. She was given an after visit summary or informed of [employer] which includes patient instructions, diagnoses, current medications, & vitals. She expressed understanding with the diagnosis and plan and was given the opportunity to ask questions.     Som Vergara, DNP

## 2019-12-13 NOTE — PERIOP NOTES
Preventing Infections Before and After  Your Surgery    IMPORTANT INSTRUCTIONS    Please read and follow these instructions carefully. If you are unable to comply with the below instructions your procedure will be cancelled. Every Night for Three (3) nights before your surgery:  1. Shower with an antibacterial soap, such as Dial, or the soap provided at your preassessment appointment. A shower is better than a bath for cleaning your skin. 2. If needed, ask someone to help you reach all areas of your body. Dont forget to clean your belly button with every shower. The night before your surgery: If you lose your Hibiclens/chlorhexidine please contact surgery center or you can purchase it at a local pharmacy  1. On the night before your surgery, shower with an antibacterial soap, such as Dial, or the soap provided at your preassessment appointment. 2. With one packet of Hibiclens/Chlorhexidine in hand, turn water off.  3. Apply Hibiclens antiseptic skin cleanser with a clean, freshly washed washcloth. ? Gently apply to your body from chin to toes (except the genital area) and especially the area(s) where your incision(s) will be. ? Leave Hibiclens/Chlorhexidine on your skin for at least 20 seconds. CAUTION: If needed, Hibiclens/chlorhexidine may be used to clean the folds of skin of the legs (such as in the area of the groin) and on your buttocks and hips. However, do not use Hibiclens/Chlorhexidine above the neck or in the genital area (your bottom) or put inside any area of your body. 4. Turn the water back on and rinse. 5. Dry gently with a clean, freshly washed towel. 6. After your shower, do not use any powder, deodorant, perfumes or lotion. 7. Use clean, freshly washed towels and washcloths every time you shower. 8. Wear clean, freshly washed pajamas to bed the night before surgery. 9. Sleep on clean, freshly washed sheets. 10. Do not allow pets to sleep in your bed with you.     The Morning of your surgery:  1. Shower again thoroughly with an antibacterial soap, such as Dial or the soap provided at your preassessment appointment. If needed, ask someone for help to reach all areas of your body. Dont forget to clean your belly button! Rinse. 2. Dry gently with a clean, freshly washed towel. 3. After your shower, do not use any powder, deodorant, perfumes or lotion prior to surgery. 4. Put on clean, freshly washed clothing. Tips to help prevent infections after your surgery:  1. Protect your surgical wound from germs:  ? Hand washing is the most important thing you and your caregivers can do to prevent infections. ? Keep your bandage clean and dry! ? Do not touch your surgical wound. 2. Use clean, freshly washed towels and washcloths every time you shower; do not share bath linens with others. 3. Until your surgical wound is healed, wear clothing and sleep on bed linens each day that are clean and freshly washed. 4. Do not allow pets to sleep in your bed with you or touch your surgical wound. 5. Do not smoke  smoking delays wound healing. This may be a good time to stop smoking. 6. If you have diabetes, it is important for you to manage your blood sugar levels properly before your surgery as well as after your surgery. Poorly managed blood sugar levels slow down wound healing and prevent you from healing completely. If you lose your Hibiclens/chlorhexidine, please call the [facility], or it is available for purchase at your pharmacy.                ___________________      ___________________      ________________  (Signature of Patient)          (Witness)                   (Date and Time)

## 2019-12-14 LAB
BACTERIA SPEC CULT: NORMAL
BACTERIA SPEC CULT: NORMAL
SERVICE CMNT-IMP: NORMAL

## 2019-12-17 NOTE — PERIOP NOTES
Spoke to [place] over the phone following up about pt's possible hx of MRSA and that culture indicates no MRSA present and requested new orders with 3g of Ancef. Willem called back stating Dr. Angelo Galvan wants pt to have Ancef and will refax order for the 3 grams.

## 2019-12-20 ENCOUNTER — ANESTHESIA EVENT (OUTPATIENT)
Dept: SURGERY | Age: 49
End: 2019-12-20
Payer: MEDICAID

## 2019-12-20 NOTE — PERIOP NOTES
Informed patient that someone had to stay in the waiting area for her during her procedure and someone (family/friend) needs to be there to drive her home and she could not take a taxi or uber home. Patient understood and stated she will talk with her father and see if he can stay during the procedure and and drive her home.

## 2019-12-22 NOTE — H&P
Pre-operative Evaluation / History & Physical    Sent From: Sent To: [address]    Baldev olivarez, Natanael   Phone: [phone] Fax: [phone]     Patient Information  Patient Name Wing Shukri Sex F   1970 Age 51yo  Address [address]  [address] Phone H: [phone]  W: [phone]  M: [phone]  [address] ([facility] - HMO)  ID: [id number]  Policy Bhandari: Patti Madera  Secondary Insurance None recorded. Pre-Op Visit and Medical History  Chief Complaint Pre op  History of Present Illness   46 yo presents for pre op related to uterine leiomyoma    [facility] [address]    PATs completed 19    patient reports pain has become increasingly worse since last visit    ED visit 3 weeks ago w/ CT scan and US: Transvaginal pelvic sonography is performed. The uterus measured 8.4 x 4.6 x 6.2 cm, 2 fibroids are seen in the fundus measuring 1.7 x 1.6 cm, 2.6 x 2.1 cm. The stripe is 5 mm, IUD is in place. hx of umbilical hernia repair    Past Medical History Discussed Past Medical History  Migraines: Y - with aura  Obesity: Y  Surgical History Reviewed Surgical History  Neck spine fusion - fusion 3-7 cervical vertebra  Vaginal delivery of fetus - x4  Plastic repair procedure - face - left side  Cholecystectomy (Gallbladder)  Hernia repair - umbilical  Orthopedic Surgery - ankle - left  Orthopedic Surgery - knee - left x2  Laparoscopy  Gynecological / Obstetrical History Reviewed GYN History  Date of LMP: 10/02/2019 (Notes: still ongoing). Duration of Flow (days): 14. Flow: Heavy. Menses Monthly: N (Notes: Not with Mirena). Menstrual Cramps: severe (Notes: severe before Mirena). Premenstrual Syndrome: Y. Date of Last Pap Smear: 2019 (Notes: normal). Date of HPV testin2019 (Notes: negative).   Abnormal Pap: Y (Notes: multiple abnormal paps/colpos - declined f/u). Any Treatment for Abnormal Pap?: Y. Age at Menarche: 14. Sexually Active?: Y.  STIs/STDs: Y (Notes: Chlamydia). Current Birth Control Method: IUD (Notes: Mirena 2019 [facility]). Date of Last Mammogram: 1999 (Notes: normal per pt). Date of Last Colonoscopy: 1999 (Notes: normal per pt). Endometriosis: Y (Notes: laparoscopy). Fibroids: Yes (Notes: [facility] ER found fibroid in past. US 2019 for IUD placement found fibroids: 1. R lat ant subserous 20.4 mm x 23.3 mm x 22.3 mm 2. L lat wall intermural 13.1 mm x 12.8 mm x 16.4 mm 3. fundal intermural 15.9 mm x 22.8 mm x 20.8 mm.). \"cysts outside of uterus/lesion on Rt.  ovary\" ~2000 Dr. Florencio Buck. (did not f/u)  Social History Discussed Social History  OB/GYN Social History  Chewing tobacco: none  Tobacco Smoking Status: Current some day smoker  Smoker (1 PPW) (Notes: <1 ppw)  Smokeless Tobacco Status: Never used smokeless tobacco  E-cigarette/Vape Status: Never used electronic cigarettes  Most Recent Tobacco Use Screenin2019  Marital status:   Are you working: Yes (Notes: [employer])  Occupation: [employer]  On average, how many days per week do you engage in moderate to strenuous EXERCISE (like walking fast, running, jogging, dancing, swimming, biking, or other activities that cause a light or heavy sweat)?: 1  How often do you have a DRINK containing ALCOHOL?: 4 or more times a week  Family History Discussed Family History  Maternal Uncle - Malignant tumor of colon                   Maternal Grandmother                  - Endometriosis (clinical)    - Diabetes mellitus  Mother - Endometriosis (clinical)    - Hypertensive disorder  Maternal Aunt - Endometriosis (clinical)  Father - Hypertensive disorder  Paternal Grandmother - Diabetes mellitus  Allergies List   Reviewed Allergies  FLAGYL: Rash (Severe) - blisters   LATEX: Hives   Medications   Reviewed Medications  CeleBREX 400 mg capsule  Take 1 capsule(s) twice a day by oral route. 12/17/19   prescribed                   citalopram 20 mg tablet  Take 1 tablet(s) every day by oral route. 12/17/19   entered  iron  07/12/19   entered  Mirena 20 mcg/24 hours (5 yrs) 52 mg intrauterine device  Take by intrauterine route. Internal Note: inserted 6/7/2019 08/02/19   entered  ProAir HFA 90 mcg/actuation aerosol inhaler  INHALE 2 PUFFS PO Q 4 H PRF WHEEZING  12/06/18   filled  Probiotic  10/24/19   entered  vitamin B complex  07/12/19   entered  Review of Systems ROS as noted in the HPI  Vital Signs   Ht:                  5 ft 5 in (165.1 cm) 12/17/2019 07:39 am  Wt:                  269.5 lbs (122.24 kg) 12/17/2019 07:42 am  BMI:                  44.8 12/17/2019 07:42 am  BP:                  124/80 12/17/2019 07:47 am  Physical Exam   Patient is a 50-year-old female. Constitutional: General Appearance: well developed and nourished and pleasant. Level of Distress: no acute distress. Ambulation: ambulating normally. Head: Head: normal scalp and examination of the face and normocephalic, atraumatic, and no temporal wasting. Eyes: External Eye no discharge or eye discharge, proptosis, or ptosis. Sclera: non-icteric. Extraocular Movements extraocular movements intact. Ears, Nose, Mouth, Throat: Ears normal hearing. Nose: no external nose lesions. Neck: Appearance trachea midline. Thyroid: no enlargement. Lungs / Chest: Respiratory effort: unlabored. Auscultation: no wheezing, rales/crackles, or rhonchi. Cardiovascular: Rate And Rhythm: regular. Heart Sounds: no murmurs. Extremities: no clubbing, cyanosis, or edema. Abdomen: Inspection and Palpation: no tenderness, guarding, masses, or rebound tenderness and soft and non-distended. Hernia: none palpable. Lymphatics: General Lymphatics: no lymphadenopathy.     Extremities: Extremities: no swelling or inflammation of extremities. Musculoskeletal System: General Musculoskeletal full range of motion. Skin: General Skin no rash or suspicious lesions. Neurologic: Gait and Station: normal gait and station. Cranial Nerves: grossly intact. Mental Status Exam: Orientation oriented to person, place, and time. Affect: appropriate affect. Language and Speech: normal speech and comprehension. Lab Results   Assessment and Plan   1. Uterine leiomyoma -  Desires definitive therapy, has failed conservative management with an IUD. Still having significant discomfort, unrelieved by tylenol. Cannot take NSAIDs because of stomach upset. Would like to try celebrex. We will plan total laparoscopic hysterectomy. We discussed the risks and benefits of ovarian conservation and the pt would like to have her ovaries left in place as long as they are normal. She understands that oopherectomy would mean immediate menopause. We also discussed supracervical vs total laparoscopic hyst, and she would like to proceed with the total procedure. She understands that if surgical complications necessitate supracervical hyst, that there is a risk of cyclic menstrual bleeding. I reviewed with the patient indications, alternatives, risks, and benefits of proposed procedure, the risks of which include injury to bowel, bladder, nerves, blood vessels, or ureters, injury to any other intraabdominal structure, risk of bleeding and infection, and inherent risks of anesthesia, including death. The patient indicates understanding of these risks, and agrees to the proposed procedure. She is instructed to stay NPO after midnight the night before surgery. D25.9: Leiomyoma of uterus, unspecified  Celebrex 400 mg capsule -  Take 1 capsule(s) twice a day by oral route.      Qty: 60 capsule(s)     Refills: 0     Pharmacy: [facility] #[id number]      Return to Office  Dolly Smith MD for Surgery at [facility] on 12/23/2019 at 10:00 AM  Jez Moreno MD for Established Patient at [facility] Office on 01/06/2020 at 04:30 PM  Jez Moreno MD for Established Patient at [facility] Office on 02/03/2020 at 04:30 PM  Current Problems (Diagnoses) Reviewed Problems  Uterine leiomyoma - Onset: 12/17/2019  Irregular periods - Onset: 04/17/2019    Total Laparoscopic Hysterectomy Bilateral Salpingectomy 12/23/2019 [address] [place] [address] 10:00am CLARIBEL ALMEIDA on 12/13/2019 @ 11:00am    Electronically Signed by: Alejandra Perla MD    _____________________________________________   Ordered/Documented by:   Visit Date: 12/17/2019       The History and Physical is reviewed today. The patient is seen and examined and no changes are required.   Kirk Lan MD  12/23/2019  9:26 AM

## 2019-12-23 ENCOUNTER — ANESTHESIA (OUTPATIENT)
Dept: SURGERY | Age: 49
End: 2019-12-23
Payer: MEDICAID

## 2019-12-23 ENCOUNTER — HOSPITAL ENCOUNTER (OUTPATIENT)
Age: 49
Setting detail: OBSERVATION
Discharge: HOME | End: 2019-12-24
Attending: OBSTETRICS & GYNECOLOGY | Admitting: OBSTETRICS & GYNECOLOGY
Payer: MEDICAID

## 2019-12-23 DIAGNOSIS — D21.9 FIBROIDS: Primary | ICD-10-CM

## 2019-12-23 PROBLEM — N92.0 MENORRHAGIA: Status: ACTIVE | Noted: 2019-12-23

## 2019-12-23 LAB — HCG UR QL: NEGATIVE

## 2019-12-23 PROCEDURE — 77030010507 HC ADH SKN DERMBND J&J -B: Performed by: OBSTETRICS & GYNECOLOGY

## 2019-12-23 PROCEDURE — 77030018778 HC MANIP UTER VCAR CNMD -B: Performed by: OBSTETRICS & GYNECOLOGY

## 2019-12-23 PROCEDURE — 77030008684 HC TU ET CUF COVD -B: Performed by: ANESTHESIOLOGY

## 2019-12-23 PROCEDURE — 77030038613 HC SUT PDS STRATA SPIRL J&J -B: Performed by: OBSTETRICS & GYNECOLOGY

## 2019-12-23 PROCEDURE — 77030018684: Performed by: OBSTETRICS & GYNECOLOGY

## 2019-12-23 PROCEDURE — 77030027744 HC PWDR HEMSTAT ARISTA ABSRB 5GM BARD -D: Performed by: OBSTETRICS & GYNECOLOGY

## 2019-12-23 PROCEDURE — 74011250636 HC RX REV CODE- 250/636: Performed by: ANESTHESIOLOGY

## 2019-12-23 PROCEDURE — 99218 HC RM OBSERVATION: CPT

## 2019-12-23 PROCEDURE — 77030020255 HC SOL INJ LR 1000ML BG: Performed by: OBSTETRICS & GYNECOLOGY

## 2019-12-23 PROCEDURE — 74011000250 HC RX REV CODE- 250: Performed by: NURSE ANESTHETIST, CERTIFIED REGISTERED

## 2019-12-23 PROCEDURE — 76030000004 HC AMB SURG OR TIME 2 TO 2.5: Performed by: OBSTETRICS & GYNECOLOGY

## 2019-12-23 PROCEDURE — 74011250636 HC RX REV CODE- 250/636: Performed by: NURSE ANESTHETIST, CERTIFIED REGISTERED

## 2019-12-23 PROCEDURE — 77030008606 HC TRCR ENDOSC KII AMR -B: Performed by: OBSTETRICS & GYNECOLOGY

## 2019-12-23 PROCEDURE — 76060000064 HC AMB SURG ANES 2 TO 2.5 HR: Performed by: OBSTETRICS & GYNECOLOGY

## 2019-12-23 PROCEDURE — 77030016151 HC PROTCTR LNS DFOG COVD -B: Performed by: OBSTETRICS & GYNECOLOGY

## 2019-12-23 PROCEDURE — 77030020263 HC SOL INJ SOD CL0.9% LFCR 1000ML: Performed by: OBSTETRICS & GYNECOLOGY

## 2019-12-23 PROCEDURE — 77030027743 HC APPL F/HEMSTAT BARD -B: Performed by: OBSTETRICS & GYNECOLOGY

## 2019-12-23 PROCEDURE — 77030025625 HC DEV TISS SEAL J&J -F: Performed by: OBSTETRICS & GYNECOLOGY

## 2019-12-23 PROCEDURE — 77030021352 HC CBL LD SYS DISP COVD -B: Performed by: OBSTETRICS & GYNECOLOGY

## 2019-12-23 PROCEDURE — 74011250637 HC RX REV CODE- 250/637: Performed by: OBSTETRICS & GYNECOLOGY

## 2019-12-23 PROCEDURE — 74011000250 HC RX REV CODE- 250: Performed by: OBSTETRICS & GYNECOLOGY

## 2019-12-23 PROCEDURE — P9045 ALBUMIN (HUMAN), 5%, 250 ML: HCPCS | Performed by: NURSE ANESTHETIST, CERTIFIED REGISTERED

## 2019-12-23 PROCEDURE — 74011250636 HC RX REV CODE- 250/636: Performed by: OBSTETRICS & GYNECOLOGY

## 2019-12-23 PROCEDURE — 77030008771 HC TU NG SALEM SUMP -A: Performed by: ANESTHESIOLOGY

## 2019-12-23 PROCEDURE — 77030002933 HC SUT MCRYL J&J -A: Performed by: OBSTETRICS & GYNECOLOGY

## 2019-12-23 PROCEDURE — 76210000039 HC AMBSU PH I REC 3.5 TO 4 HR: Performed by: OBSTETRICS & GYNECOLOGY

## 2019-12-23 PROCEDURE — 88307 TISSUE EXAM BY PATHOLOGIST: CPT

## 2019-12-23 PROCEDURE — 77030019908 HC STETH ESOPH SIMS -A: Performed by: ANESTHESIOLOGY

## 2019-12-23 PROCEDURE — 77030026438 HC STYL ET INTUB CARD -A: Performed by: ANESTHESIOLOGY

## 2019-12-23 PROCEDURE — 81025 URINE PREGNANCY TEST: CPT

## 2019-12-23 PROCEDURE — 77030037241 HC PRT ACC BLDLSS AIRSEAL CNMD -B: Performed by: OBSTETRICS & GYNECOLOGY

## 2019-12-23 PROCEDURE — 77030020702 HC ADAPT HARM DISP J&J -B: Performed by: OBSTETRICS & GYNECOLOGY

## 2019-12-23 PROCEDURE — 77030021678 HC GLIDESCP STAT DISP VERT -B: Performed by: ANESTHESIOLOGY

## 2019-12-23 PROCEDURE — 77030005513 HC CATH URETH FOL11 MDII -B: Performed by: OBSTETRICS & GYNECOLOGY

## 2019-12-23 PROCEDURE — 77030008756 HC TU IRR SUC STRY -B: Performed by: OBSTETRICS & GYNECOLOGY

## 2019-12-23 PROCEDURE — 74011250636 HC RX REV CODE- 250/636

## 2019-12-23 RX ORDER — ESMOLOL HYDROCHLORIDE 10 MG/ML
INJECTION INTRAVENOUS AS NEEDED
Status: DISCONTINUED | OUTPATIENT
Start: 2019-12-23 | End: 2019-12-23 | Stop reason: HOSPADM

## 2019-12-23 RX ORDER — DEXAMETHASONE SODIUM PHOSPHATE 4 MG/ML
INJECTION, SOLUTION INTRA-ARTICULAR; INTRALESIONAL; INTRAMUSCULAR; INTRAVENOUS; SOFT TISSUE AS NEEDED
Status: DISCONTINUED | OUTPATIENT
Start: 2019-12-23 | End: 2019-12-23 | Stop reason: HOSPADM

## 2019-12-23 RX ORDER — ACETAMINOPHEN 10 MG/ML
1000 INJECTION, SOLUTION INTRAVENOUS ONCE
Status: COMPLETED | OUTPATIENT
Start: 2019-12-23 | End: 2019-12-23

## 2019-12-23 RX ORDER — SODIUM CHLORIDE 0.9 % (FLUSH) 0.9 %
5-40 SYRINGE (ML) INJECTION EVERY 8 HOURS
Status: DISCONTINUED | OUTPATIENT
Start: 2019-12-23 | End: 2019-12-23 | Stop reason: HOSPADM

## 2019-12-23 RX ORDER — LIDOCAINE HYDROCHLORIDE 20 MG/ML
INJECTION, SOLUTION EPIDURAL; INFILTRATION; INTRACAUDAL; PERINEURAL AS NEEDED
Status: DISCONTINUED | OUTPATIENT
Start: 2019-12-23 | End: 2019-12-23 | Stop reason: HOSPADM

## 2019-12-23 RX ORDER — SODIUM CHLORIDE 0.9 % (FLUSH) 0.9 %
5-40 SYRINGE (ML) INJECTION AS NEEDED
Status: DISCONTINUED | OUTPATIENT
Start: 2019-12-23 | End: 2019-12-24 | Stop reason: HOSPADM

## 2019-12-23 RX ORDER — SODIUM CHLORIDE 0.9 % (FLUSH) 0.9 %
5-40 SYRINGE (ML) INJECTION AS NEEDED
Status: DISCONTINUED | OUTPATIENT
Start: 2019-12-23 | End: 2019-12-23 | Stop reason: HOSPADM

## 2019-12-23 RX ORDER — HYDROMORPHONE HCL/0.9% NACL/PF 0.5 MG/ML
PLASTIC BAG, INJECTION (ML) INTRAVENOUS CONTINUOUS
Status: DISCONTINUED | OUTPATIENT
Start: 2019-12-23 | End: 2019-12-24

## 2019-12-23 RX ORDER — PROPOFOL 10 MG/ML
INJECTION, EMULSION INTRAVENOUS AS NEEDED
Status: DISCONTINUED | OUTPATIENT
Start: 2019-12-23 | End: 2019-12-23 | Stop reason: HOSPADM

## 2019-12-23 RX ORDER — PHENYLEPHRINE HCL IN 0.9% NACL 0.4MG/10ML
SYRINGE (ML) INTRAVENOUS AS NEEDED
Status: DISCONTINUED | OUTPATIENT
Start: 2019-12-23 | End: 2019-12-23 | Stop reason: HOSPADM

## 2019-12-23 RX ORDER — ACETAMINOPHEN 10 MG/ML
INJECTION, SOLUTION INTRAVENOUS
Status: COMPLETED
Start: 2019-12-23 | End: 2019-12-23

## 2019-12-23 RX ORDER — MORPHINE SULFATE 10 MG/ML
2 INJECTION, SOLUTION INTRAMUSCULAR; INTRAVENOUS
Status: DISCONTINUED | OUTPATIENT
Start: 2019-12-23 | End: 2019-12-23 | Stop reason: HOSPADM

## 2019-12-23 RX ORDER — HYDROMORPHONE HCL IN 0.9% NACL 15 MG/30ML
PATIENT CONTROLLED ANALGESIA VIAL INTRAVENOUS CONTINUOUS
Status: DISCONTINUED | OUTPATIENT
Start: 2019-12-23 | End: 2019-12-23 | Stop reason: SDUPTHER

## 2019-12-23 RX ORDER — OXYCODONE HYDROCHLORIDE 5 MG/1
5 TABLET ORAL
Qty: 20 TAB | Refills: 0 | Status: SHIPPED | OUTPATIENT
Start: 2019-12-23 | End: 2019-12-26

## 2019-12-23 RX ORDER — LABETALOL HYDROCHLORIDE 5 MG/ML
INJECTION, SOLUTION INTRAVENOUS AS NEEDED
Status: DISCONTINUED | OUTPATIENT
Start: 2019-12-23 | End: 2019-12-23 | Stop reason: HOSPADM

## 2019-12-23 RX ORDER — BUPIVACAINE HYDROCHLORIDE AND EPINEPHRINE 5; 5 MG/ML; UG/ML
30 INJECTION, SOLUTION EPIDURAL; INTRACAUDAL; PERINEURAL ONCE
Status: COMPLETED | OUTPATIENT
Start: 2019-12-23 | End: 2019-12-23

## 2019-12-23 RX ORDER — FENTANYL CITRATE 50 UG/ML
INJECTION, SOLUTION INTRAMUSCULAR; INTRAVENOUS AS NEEDED
Status: DISCONTINUED | OUTPATIENT
Start: 2019-12-23 | End: 2019-12-23 | Stop reason: HOSPADM

## 2019-12-23 RX ORDER — ROCURONIUM BROMIDE 10 MG/ML
INJECTION, SOLUTION INTRAVENOUS AS NEEDED
Status: DISCONTINUED | OUTPATIENT
Start: 2019-12-23 | End: 2019-12-23 | Stop reason: HOSPADM

## 2019-12-23 RX ORDER — OXYCODONE AND ACETAMINOPHEN 5; 325 MG/1; MG/1
1 TABLET ORAL
Status: DISCONTINUED | OUTPATIENT
Start: 2019-12-23 | End: 2019-12-23 | Stop reason: HOSPADM

## 2019-12-23 RX ORDER — DIPHENHYDRAMINE HYDROCHLORIDE 50 MG/ML
12.5 INJECTION, SOLUTION INTRAMUSCULAR; INTRAVENOUS AS NEEDED
Status: DISCONTINUED | OUTPATIENT
Start: 2019-12-23 | End: 2019-12-23 | Stop reason: HOSPADM

## 2019-12-23 RX ORDER — ALBUMIN HUMAN 50 G/1000ML
SOLUTION INTRAVENOUS AS NEEDED
Status: DISCONTINUED | OUTPATIENT
Start: 2019-12-23 | End: 2019-12-23 | Stop reason: HOSPADM

## 2019-12-23 RX ORDER — HYDROMORPHONE HYDROCHLORIDE 1 MG/ML
INJECTION, SOLUTION INTRAMUSCULAR; INTRAVENOUS; SUBCUTANEOUS
Status: COMPLETED
Start: 2019-12-23 | End: 2019-12-23

## 2019-12-23 RX ORDER — HYDROMORPHONE HYDROCHLORIDE 1 MG/ML
.2-.5 INJECTION, SOLUTION INTRAMUSCULAR; INTRAVENOUS; SUBCUTANEOUS ONCE
Status: COMPLETED | OUTPATIENT
Start: 2019-12-23 | End: 2019-12-23

## 2019-12-23 RX ORDER — SODIUM CHLORIDE, SODIUM LACTATE, POTASSIUM CHLORIDE, CALCIUM CHLORIDE 600; 310; 30; 20 MG/100ML; MG/100ML; MG/100ML; MG/100ML
25 INJECTION, SOLUTION INTRAVENOUS CONTINUOUS
Status: DISCONTINUED | OUTPATIENT
Start: 2019-12-23 | End: 2019-12-23 | Stop reason: HOSPADM

## 2019-12-23 RX ORDER — EPHEDRINE SULFATE/0.9% NACL/PF 50 MG/5 ML
SYRINGE (ML) INTRAVENOUS AS NEEDED
Status: DISCONTINUED | OUTPATIENT
Start: 2019-12-23 | End: 2019-12-23 | Stop reason: HOSPADM

## 2019-12-23 RX ORDER — HYDROMORPHONE HYDROCHLORIDE 2 MG/ML
INJECTION, SOLUTION INTRAMUSCULAR; INTRAVENOUS; SUBCUTANEOUS AS NEEDED
Status: DISCONTINUED | OUTPATIENT
Start: 2019-12-23 | End: 2019-12-23 | Stop reason: HOSPADM

## 2019-12-23 RX ORDER — SODIUM CHLORIDE 0.9 % (FLUSH) 0.9 %
5-40 SYRINGE (ML) INJECTION EVERY 8 HOURS
Status: DISCONTINUED | OUTPATIENT
Start: 2019-12-23 | End: 2019-12-24 | Stop reason: HOSPADM

## 2019-12-23 RX ORDER — SUCCINYLCHOLINE CHLORIDE 20 MG/ML
INJECTION INTRAMUSCULAR; INTRAVENOUS AS NEEDED
Status: DISCONTINUED | OUTPATIENT
Start: 2019-12-23 | End: 2019-12-23 | Stop reason: HOSPADM

## 2019-12-23 RX ORDER — IBUPROFEN 800 MG/1
800 TABLET ORAL
Qty: 30 TAB | Refills: 1 | Status: SHIPPED | OUTPATIENT
Start: 2019-12-23 | End: 2020-03-03 | Stop reason: ALTCHOICE

## 2019-12-23 RX ORDER — MIDAZOLAM HYDROCHLORIDE 1 MG/ML
INJECTION, SOLUTION INTRAMUSCULAR; INTRAVENOUS AS NEEDED
Status: DISCONTINUED | OUTPATIENT
Start: 2019-12-23 | End: 2019-12-23 | Stop reason: HOSPADM

## 2019-12-23 RX ORDER — NEOSTIGMINE METHYLSULFATE 1 MG/ML
INJECTION INTRAVENOUS AS NEEDED
Status: DISCONTINUED | OUTPATIENT
Start: 2019-12-23 | End: 2019-12-23 | Stop reason: HOSPADM

## 2019-12-23 RX ORDER — HYDROMORPHONE HYDROCHLORIDE 1 MG/ML
0.5 INJECTION, SOLUTION INTRAMUSCULAR; INTRAVENOUS; SUBCUTANEOUS ONCE
Status: COMPLETED | OUTPATIENT
Start: 2019-12-23 | End: 2019-12-23

## 2019-12-23 RX ORDER — DIPHENHYDRAMINE HCL 25 MG
25 CAPSULE ORAL
Status: DISCONTINUED | OUTPATIENT
Start: 2019-12-23 | End: 2019-12-24 | Stop reason: HOSPADM

## 2019-12-23 RX ORDER — KETOROLAC TROMETHAMINE 30 MG/ML
INJECTION, SOLUTION INTRAMUSCULAR; INTRAVENOUS AS NEEDED
Status: DISCONTINUED | OUTPATIENT
Start: 2019-12-23 | End: 2019-12-23 | Stop reason: HOSPADM

## 2019-12-23 RX ORDER — FENTANYL CITRATE 50 UG/ML
25 INJECTION, SOLUTION INTRAMUSCULAR; INTRAVENOUS
Status: COMPLETED | OUTPATIENT
Start: 2019-12-23 | End: 2019-12-23

## 2019-12-23 RX ORDER — IBUPROFEN 400 MG/1
800 TABLET ORAL EVERY 8 HOURS
Status: DISCONTINUED | OUTPATIENT
Start: 2019-12-23 | End: 2019-12-24 | Stop reason: SDUPTHER

## 2019-12-23 RX ORDER — NALOXONE HYDROCHLORIDE 0.4 MG/ML
0.2 INJECTION, SOLUTION INTRAMUSCULAR; INTRAVENOUS; SUBCUTANEOUS
Status: DISCONTINUED | OUTPATIENT
Start: 2019-12-23 | End: 2019-12-24 | Stop reason: HOSPADM

## 2019-12-23 RX ORDER — GLYCOPYRROLATE 0.2 MG/ML
INJECTION INTRAMUSCULAR; INTRAVENOUS AS NEEDED
Status: DISCONTINUED | OUTPATIENT
Start: 2019-12-23 | End: 2019-12-23 | Stop reason: HOSPADM

## 2019-12-23 RX ORDER — ONDANSETRON 2 MG/ML
INJECTION INTRAMUSCULAR; INTRAVENOUS AS NEEDED
Status: DISCONTINUED | OUTPATIENT
Start: 2019-12-23 | End: 2019-12-23 | Stop reason: HOSPADM

## 2019-12-23 RX ORDER — LIDOCAINE HYDROCHLORIDE 10 MG/ML
0.1 INJECTION, SOLUTION EPIDURAL; INFILTRATION; INTRACAUDAL; PERINEURAL AS NEEDED
Status: DISCONTINUED | OUTPATIENT
Start: 2019-12-23 | End: 2019-12-23 | Stop reason: HOSPADM

## 2019-12-23 RX ADMIN — ALBUMIN (HUMAN) 250 ML: 2.5 SOLUTION INTRAVENOUS at 10:10

## 2019-12-23 RX ADMIN — HYDROMORPHONE HYDROCHLORIDE 0.5 MG: 1 INJECTION, SOLUTION INTRAMUSCULAR; INTRAVENOUS; SUBCUTANEOUS at 13:31

## 2019-12-23 RX ADMIN — SODIUM CHLORIDE, SODIUM LACTATE, POTASSIUM CHLORIDE, AND CALCIUM CHLORIDE 25 ML/HR: 600; 310; 30; 20 INJECTION, SOLUTION INTRAVENOUS at 15:05

## 2019-12-23 RX ADMIN — FENTANYL CITRATE 25 MCG: 50 INJECTION, SOLUTION INTRAMUSCULAR; INTRAVENOUS at 12:35

## 2019-12-23 RX ADMIN — ACETAMINOPHEN 1000 MG: 10 INJECTION, SOLUTION INTRAVENOUS at 12:57

## 2019-12-23 RX ADMIN — ONDANSETRON HYDROCHLORIDE 4 MG: 2 INJECTION, SOLUTION INTRAMUSCULAR; INTRAVENOUS at 11:23

## 2019-12-23 RX ADMIN — PROPOFOL 150 MG: 10 INJECTION, EMULSION INTRAVENOUS at 10:00

## 2019-12-23 RX ADMIN — KETOROLAC TROMETHAMINE 30 MG: 30 INJECTION, SOLUTION INTRAMUSCULAR; INTRAVENOUS at 11:36

## 2019-12-23 RX ADMIN — PHENYLEPHRINE HYDROCHLORIDE 25 MCG/MIN: 10 INJECTION INTRAVENOUS at 11:08

## 2019-12-23 RX ADMIN — HYDROMORPHONE HYDROCHLORIDE 0.3 MG: 2 INJECTION, SOLUTION INTRAMUSCULAR; INTRAVENOUS; SUBCUTANEOUS at 10:45

## 2019-12-23 RX ADMIN — HYDROMORPHONE HYDROCHLORIDE 0.5 MG: 1 INJECTION, SOLUTION INTRAMUSCULAR; INTRAVENOUS; SUBCUTANEOUS at 16:06

## 2019-12-23 RX ADMIN — SODIUM CHLORIDE, SODIUM LACTATE, POTASSIUM CHLORIDE, AND CALCIUM CHLORIDE 25 ML/HR: 600; 310; 30; 20 INJECTION, SOLUTION INTRAVENOUS at 08:55

## 2019-12-23 RX ADMIN — FENTANYL CITRATE 25 MCG: 50 INJECTION, SOLUTION INTRAMUSCULAR; INTRAVENOUS at 12:26

## 2019-12-23 RX ADMIN — Medication 80 MCG: at 11:06

## 2019-12-23 RX ADMIN — PROPOFOL 100 MG: 10 INJECTION, EMULSION INTRAVENOUS at 11:17

## 2019-12-23 RX ADMIN — Medication 10 MG: at 10:27

## 2019-12-23 RX ADMIN — GLYCOPYRROLATE 0.1 MG: 0.2 INJECTION, SOLUTION INTRAMUSCULAR; INTRAVENOUS at 10:00

## 2019-12-23 RX ADMIN — FENTANYL CITRATE 25 MCG: 50 INJECTION, SOLUTION INTRAMUSCULAR; INTRAVENOUS at 10:00

## 2019-12-23 RX ADMIN — IBUPROFEN 400 MG: 400 TABLET ORAL at 19:41

## 2019-12-23 RX ADMIN — LABETALOL HYDROCHLORIDE 5 MG: 5 INJECTION, SOLUTION INTRAVENOUS at 10:49

## 2019-12-23 RX ADMIN — CEFAZOLIN 3 G: 1 INJECTION, POWDER, FOR SOLUTION INTRAMUSCULAR; INTRAVENOUS; PARENTERAL at 10:03

## 2019-12-23 RX ADMIN — MEPERIDINE HYDROCHLORIDE 12.5 MG: 25 INJECTION INTRAMUSCULAR; INTRAVENOUS; SUBCUTANEOUS at 12:49

## 2019-12-23 RX ADMIN — Medication 10 MG: at 10:28

## 2019-12-23 RX ADMIN — LIDOCAINE HYDROCHLORIDE 100 MG: 20 INJECTION, SOLUTION INTRAVENOUS at 10:00

## 2019-12-23 RX ADMIN — ROCURONIUM BROMIDE 45 MG: 10 INJECTION INTRAVENOUS at 10:05

## 2019-12-23 RX ADMIN — Medication 3 AMPULE: at 09:08

## 2019-12-23 RX ADMIN — NEOSTIGMINE METHYLSULFATE 3 MG: 1 INJECTION INTRAVENOUS at 11:54

## 2019-12-23 RX ADMIN — Medication 80 MCG: at 10:27

## 2019-12-23 RX ADMIN — MEPERIDINE HYDROCHLORIDE 12.5 MG: 25 INJECTION INTRAMUSCULAR; INTRAVENOUS; SUBCUTANEOUS at 12:44

## 2019-12-23 RX ADMIN — SODIUM CHLORIDE, SODIUM LACTATE, POTASSIUM CHLORIDE, AND CALCIUM CHLORIDE: 600; 310; 30; 20 INJECTION, SOLUTION INTRAVENOUS at 11:09

## 2019-12-23 RX ADMIN — Medication 10 MG: at 11:06

## 2019-12-23 RX ADMIN — HYDROMORPHONE HYDROCHLORIDE 0.5 MG: 2 INJECTION, SOLUTION INTRAMUSCULAR; INTRAVENOUS; SUBCUTANEOUS at 12:15

## 2019-12-23 RX ADMIN — GLYCOPYRROLATE 0.3 MG: 0.2 INJECTION, SOLUTION INTRAMUSCULAR; INTRAVENOUS at 11:54

## 2019-12-23 RX ADMIN — GLYCOPYRROLATE 0.1 MG: 0.2 INJECTION, SOLUTION INTRAMUSCULAR; INTRAVENOUS at 10:26

## 2019-12-23 RX ADMIN — SUGAMMADEX 200 MG: 100 INJECTION, SOLUTION INTRAVENOUS at 12:06

## 2019-12-23 RX ADMIN — PROPOFOL 50 MG: 10 INJECTION, EMULSION INTRAVENOUS at 10:04

## 2019-12-23 RX ADMIN — FENTANYL CITRATE 25 MCG: 50 INJECTION, SOLUTION INTRAMUSCULAR; INTRAVENOUS at 12:38

## 2019-12-23 RX ADMIN — Medication 80 MCG: at 10:59

## 2019-12-23 RX ADMIN — ROCURONIUM BROMIDE 5 MG: 10 INJECTION INTRAVENOUS at 10:00

## 2019-12-23 RX ADMIN — HYDROMORPHONE HYDROCHLORIDE 0.5 MG: 1 INJECTION, SOLUTION INTRAMUSCULAR; INTRAVENOUS; SUBCUTANEOUS at 14:06

## 2019-12-23 RX ADMIN — MIDAZOLAM HYDROCHLORIDE 2 MG: 1 INJECTION, SOLUTION INTRAMUSCULAR; INTRAVENOUS at 09:49

## 2019-12-23 RX ADMIN — SUCCINYLCHOLINE CHLORIDE 140 MG: 20 INJECTION, SOLUTION INTRAMUSCULAR; INTRAVENOUS at 10:00

## 2019-12-23 RX ADMIN — Medication 40 MCG: at 10:28

## 2019-12-23 RX ADMIN — ESMOLOL HYDROCHLORIDE 10 MG: 10 INJECTION, SOLUTION INTRAVENOUS at 10:41

## 2019-12-23 RX ADMIN — HYDROMORPHONE HYDROCHLORIDE 0.2 MG: 2 INJECTION, SOLUTION INTRAMUSCULAR; INTRAVENOUS; SUBCUTANEOUS at 10:23

## 2019-12-23 RX ADMIN — Medication: at 16:39

## 2019-12-23 RX ADMIN — ESMOLOL HYDROCHLORIDE 10 MG: 10 INJECTION, SOLUTION INTRAVENOUS at 10:47

## 2019-12-23 RX ADMIN — ACETAMINOPHEN 1000 MG: 10 INJECTION, SOLUTION INTRAVENOUS at 08:56

## 2019-12-23 RX ADMIN — GLYCOPYRROLATE 0.2 MG: 0.2 INJECTION, SOLUTION INTRAMUSCULAR; INTRAVENOUS at 10:27

## 2019-12-23 RX ADMIN — FENTANYL CITRATE 75 MCG: 50 INJECTION, SOLUTION INTRAMUSCULAR; INTRAVENOUS at 10:04

## 2019-12-23 RX ADMIN — ESMOLOL HYDROCHLORIDE 10 MG: 10 INJECTION, SOLUTION INTRAVENOUS at 10:37

## 2019-12-23 RX ADMIN — FENTANYL CITRATE 25 MCG: 50 INJECTION, SOLUTION INTRAMUSCULAR; INTRAVENOUS at 12:22

## 2019-12-23 RX ADMIN — DEXAMETHASONE SODIUM PHOSPHATE 8 MG: 4 INJECTION, SOLUTION INTRAMUSCULAR; INTRAVENOUS at 10:07

## 2019-12-23 NOTE — PERIOP NOTES
Permission received to review discharge instructions with Vanda Broussard,  and Bree Don, father. Pt states not to discuss medical hx or results of surgery with father or . Pt states Dr. Nuvia Nassar can call her mother, Kaykay Orozco with surgery results. Advised Dr. Nuvia Nassar.

## 2019-12-23 NOTE — ANESTHESIA PREPROCEDURE EVALUATION
Relevant Problems   No relevant active problems       Anesthetic History   No history of anesthetic complications            Review of Systems / Medical History  Patient summary reviewed, nursing notes reviewed and pertinent labs reviewed    Pulmonary          Smoker  Asthma        Neuro/Psych       CVA  TIA, headaches and psychiatric history    Comments: Bipolar  Depression  migraines Cardiovascular  Within defined limits                Exercise tolerance: >4 METS     GI/Hepatic/Renal  Within defined limits         PUD     Endo/Other        Morbid obesity, arthritis and anemia     Other Findings   Comments: IBS  Right foot drop  Lumbar radiculopathy           Physical Exam    Airway  Mallampati: II  TM Distance: 4 - 6 cm  Neck ROM: normal range of motion   Mouth opening: Normal     Cardiovascular  Regular rate and rhythm,  S1 and S2 normal,  no murmur, click, rub, or gallop             Dental  No notable dental hx       Pulmonary  Breath sounds clear to auscultation               Abdominal  GI exam deferred       Other Findings            Anesthetic Plan    ASA: 2  Anesthesia type: general    Monitoring Plan: BIS      Induction: Intravenous  Anesthetic plan and risks discussed with: Patient

## 2019-12-23 NOTE — PERIOP NOTES
María Score  1970  [id number]    Situation:  Verbal report given from: KEVIN Zaldivar RN and Dandy Mejia CRNA  Procedure: Procedure(s):  TOTAL LAPAROSCOPIC HYSTERECTOMY, BILATERAL SALPINGECTOMY (LATEX ALLERGY)    Background:    Preoperative diagnosis: FIBROIDS, MENORRHAGIA    Postoperative diagnosis: FIBROIDS, MENORRHAGIA    :  Dr. Gio Bourgeois    Assistant(s): Circ-1: Kim Ortiz RN  Circ-Relief: Jevon Carlson RN  Scrub RN-1: Cesar Stanford RN  Surg Asst-1: Nannette Abarca RN    Specimens:   ID Type Source Tests Collected by Time Destination   1 : UTERUS, CERVIX, AND BILATERAL FALLOPIAN TUBES Preservative Uterus with Bilateral Fallopian Tubes  Buzz James MD 12/23/2019 1037 Pathology       Assessment:  Intra-procedure medications         Anesthesia gave intra-procedure sedation and medications, see anesthesia flow sheet     Intravenous fluids: LR@ KVO     Vital signs stable       Recommendation:    Permission to share finding with mother (patient does not wish  or father to know surgery results)

## 2019-12-23 NOTE — PERIOP NOTES
Patient: Vidal Maldonado MRN: [id number]  SSN: [id number]   YOB: 1970  Age: 47 y.o. Sex: female     Patient is status post Procedure(s):  TOTAL LAPAROSCOPIC HYSTERECTOMY, BILATERAL SALPINGECTOMY (LATEX ALLERGY). Surgeon(s) and Role:     * New Ng MD - Primary    Local/Dose/Irrigation:  SEE MAR                  Peripheral IV 12/23/19 Right Antecubital (Active)   Site Assessment Clean, dry, & intact 12/23/2019  8:54 AM   Phlebitis Assessment 0 12/23/2019  8:54 AM   Infiltration Assessment 0 12/23/2019  8:54 AM   Dressing Status New 12/23/2019  8:54 AM   Dressing Type Transparent;Tape 12/23/2019  8:54 AM   Hub Color/Line Status Pink; Infusing 12/23/2019  8:54 AM            Airway - Endotracheal Tube 12/23/19 Oral (Active)   Line Luke Lips 12/23/2019 12:00 AM                   Dressing/Packing:  Wound Abdomen-Dressing Type: Topical skin adhesive/glue (12/23/19 1148)  Wound Vagina-Dressing Type: Chula-pad (12/23/19 1148)

## 2019-12-23 NOTE — OP NOTES
Operative Note    Patient ID:   Name: Tess Gutierrez    Medical Record Number: [id number]    YOB: 1970    Preoperative Diagnosis: FIBROIDS, MENORRHAGIA    Postoperative Diagnosis: FIBROIDS, MENORRHAGIA    Surgeon:  Chelsea Henderson MD     Assistant:  OR assistant    Anesthesia: General    Procedure: Total Laparoscopic Hysterectomy, bilat salpingectomy, lysis of adhesions. Findings: enlarged uterus, normal BSO and adhesion of the omentum, small bowel, and sigmoid to the abdominal wall from the midline to the right down to the level of the inguinal canal.   (left side not right side)    Estimated Blood Loss: 150    Drains: none    Pathology /Specimens:     ID Type Source Tests Collected by Time Destination   1 : UTERUS, CERVIX, AND BILATERAL FALLOPIAN TUBES Preservative Uterus with Bilateral Fallopian Tubes  Felicity Ortega MD 12/23/2019 1037 Pathology       DVT Prophylaxis: SCD Hose    Antibiotic Prophylaxis: Ancef    After understanding the risks, benefits, and alternatives to the proposed procedure, the patient was taken to the operating room, where she was administered general anesthesia in the supine position. She was then placed in the dorsal lithotomy position and prepped and draped in usual sterile fashion. A bonilla catheter is placed. A sidearm speculum is introduced into the vagina. There cervix is grasped with a single tooth tenaculum. A Yeswareare uterine manipulator is placed and the balloon inflated with air. Gloves are changed and attention is turned to the abdomen. An Allen's point incision is made, and access is gained using the optiview technique with a 5 mm trocar. Pneumoperitoneum is obtained and intraabdominal placement is verified. There was no bleeding and no evidence of injury to the bowel. 5 mm incisions were then made in the left and right lower quadrants and a 5 mm port was placed in the right side.   The left side intended port site was behind the above noted adhesions and so using the two ports and careful blunt and sharp dissection, the adhesions of the omentum and small bowel mesentary were taken down from the abdominal wall to provide better pelvic exposure. Findings were noted as above. The articulating EnSeal was used. The ureters were identified on either side. On the left hand side, the tube and uteroovarian ligament was clamped and divided using the EnSeal. The dissection was then taken down through the round ligament. The anterior peritoneum was incised at the midline. The bladder was dissected off the lower uterine segment and cervix. Posterior peritoneum was taken down, skeletonizing the uterine arteries. The uterine arteries were clamped, coagulated and cut across the ascending branches using EnSeal. Cardinal ligament was developed. The same steps were followed on the right side. The fornices of the vagina are identified via the VCare cup. Colpotomy was made with the Harmonic Hook and carried around the VCare cup. When the specimen was free, it was delivered through the vagina. The bilateral mesosalpinges are divided with the EnSeal to remove both fallopian tubes which are then withdrawn through the trocar. The vaginal cuff was then closed with a running suture of 2-0 Stratafix with care taken to incorporate the angles of the cuff on each side. This stitch is doubled back across the length of the closure. Hemostasis was achieved. The pelvis was irrigated with copious amounts of saline and suctioned away. Hemostasis was assured. Hamzah was placed over the cuff, and extensively over the areas of omentum which had been taken down earlier. The left and right lower trocars were removed under direct visualization. Once the pneumoperitoneum was completely reduced and hemostasis is still effective, the final trocar was removed. Skin of all incisions was closed with 4-0 Monocryl in a subcuticular closure.   0.5% Marcaine with epinephrine is injected at each incision site. Dermabond was applied to the skin. All sponge, lap, needle, and instrument counts were correct times two. Subsequently, the patient was cleaned up, the bonilla was removed, and she was returned to the supine position, awakened, and taken to the recovery room in stable condition.      Signed By: Malinda Martin MD

## 2019-12-23 NOTE — PERIOP NOTES
1213: Patient received to PACU, VSS. Patient drowsy but arouses to voice. Incision sites to abdomen intact. Patient with complaints of pain 10/10. Pain med given at bedside by CRNA.    1222: Patient states no relief of pain - fentanyl 25mcg given at this time. 1226: Patient states no relief of pain - fentanyl 25mcg given. 1235: Patient states no relief of pain - fentanyl 25mcg given. 1238: Fentanyl 25mcg given. Patient states some relief of pain. Patient also repositioned in bed. 1244: Patient still with complaints of pain - demerol 12.5mg given. 1249: Demerol 12.5mg given. Patient resting in stretcher with eyes closed - continues to rate pain as 9/10. Patient tolerating ice chips without issue. 1257: Order received for IV tylenol second dose per Dr. Rito Palomo for pain control. Medication infusing at this time. 1318: Patient rates pain 10/10. Call to dr. Gio Bourgeois, per MD patient to be admitted for pain control. 1331: Dilaudid 0.5mg given for pain control. Patient continues to rate pain as 10/10.     1346: Patient  at bedside. Discharge instructions given. RX for ibuprofen and oxycodone given to patient's .  verbalized understanding of instructions and follow up.  agrees with plan for overnight hospital admission for pain control. 1406: Order received from Dr. Rito Palomo for 0.5mg IV dilaudid. Med given at this time. 1423: Patient resting in stretcher, repositioned for comfort. Patient's  at bedside. Awaiting inpatient bed. 1435: Patient now rates pain 8/10. Patient tolerating liquids. 1545: Report given to Keaton Davalos RN who will be assuming care of patient. Patient transported to room 3186 with belongings,  accompanied patient. Patient settled into inpatient bed after assisted to restroom - patient unable to void at this time. Receiving nurse, Keaton Davalos at bedside.

## 2019-12-23 NOTE — ANESTHESIA POSTPROCEDURE EVALUATION
Procedure(s):  TOTAL LAPAROSCOPIC HYSTERECTOMY, BILATERAL SALPINGECTOMY (LATEX ALLERGY). general, total IV anesthesia    Anesthesia Post Evaluation        Patient location during evaluation: PACU  Note status: Adequate. Level of consciousness: responsive to verbal stimuli and sleepy but conscious  Pain management: satisfactory to patient  Airway patency: patent  Anesthetic complications: no  Cardiovascular status: acceptable  Respiratory status: acceptable  Hydration status: acceptable  Comments: +Post-Anesthesia Evaluation and Assessment    Patient: Rossy Vann MRN: [id number]  SSN: [id number]   YOB: 1970  Age: 47 y.o. Sex: female      Cardiovascular Function/Vital Signs    BP 94/81 (BP 1 Location: Left arm, BP Patient Position: At rest)   Pulse 75   Temp 36.6 °C (97.8 °F)   Resp 12   Ht 5' 3\" (1.6 m)   Wt 121.1 kg (267 lb)   SpO2 94%   BMI 47.30 kg/m²     Patient is status post Procedure(s):  TOTAL LAPAROSCOPIC HYSTERECTOMY, BILATERAL SALPINGECTOMY (LATEX ALLERGY). Nausea/Vomiting: Controlled. Postoperative hydration reviewed and adequate. Pain:  Pain Scale 1: Numeric (0 - 10) (12/23/19 1330)  Pain Intensity 1: 10 (12/23/19 1330)   Managed. Neurological Status:   Neuro (WDL): Exceptions to WDL (12/23/19 1213)   At baseline. Mental Status and Level of Consciousness: Arousable. Pulmonary Status:   O2 Device: Room air (12/23/19 1330)   Adequate oxygenation and airway patent. Complications related to anesthesia: None    Post-anesthesia assessment completed. No concerns.     Signed By: Audrey Cleary MD    12/23/2019  Post anesthesia nausea and vomiting:  controlled      Vitals Value Taken Time   BP 94/81 12/23/2019  1:30 PM   Temp 36.6 °C (97.8 °F) 12/23/2019  1:30 PM   Pulse 75 12/23/2019  1:30 PM   Resp 12 12/23/2019  1:30 PM   SpO2 94 % 12/23/2019  1:30 PM

## 2019-12-23 NOTE — DISCHARGE INSTRUCTIONS
After Care Instructions for Your Laparoscopic Hysterectomy      1. When you are discharged from the hospital, you should plan to eat a bland, light diet for the first 1-2 days. Avoid spicy or greasy foods and high roughage foods such as salads and raw vegetables (these can cause gas and bloating). Drink plenty of non-caffeinated fluids (water is best). You may resume your usual diet gradually. 2. Avoid heavy lifting or straining. Gradually increase your activity. First try walking and doing light activity around the house. Most women can return to work 2-3 weeks after the procedure. You should speak with your doctor about your individual return to work plan and any other restrictions. 3. You may take showers. Please do not take baths until you are seen for your post-operative visit. 4. It is not unusual to experience some discomfort under your ribs and/or soreness of your neck and shoulders over the first 1-2 days. This is due to the gas used in your abdomen during the surgery to help your doctor see your pelvic organs. This gas gets naturally reabsorbed. The right shoulder is often more sore than the left. 5. It is not unusual to have vaginal spotting lasting up to 2 weeks off and on. Some women do not experience any bleeding at all. You should call your doctor immediately if you ever experience heavy vaginal bleeding or gushes of any liquid coming from your vagina that does not seem like the amount you would expect for your normal vaginal discharge. 6. Bruises may appear around the incisions and will gradually resolve as they heal.    7. There are several ways that your incisions may be closed. Most of these do not require the removal of any sutures. Some patients may have skin glue, Dermabond, placed over the incisions. Do not try to peel this off, it will gradually wear off on its own. Other patients will have small paper strips placed over the incisions.   Allow these to fall off on their own or your doctor will remove them. No other special dressing is required. 8. Call the office at [phone] to report any of the following problems: abdominal pain that is increasing in severity despite using the prescribed pain medication, heavy vaginal bleeding, drainage or separation of your incision(s), temperature greater than 100.4 or persistent nausea and vomiting. 9. You should be seen in the office following your surgery. Call for an appointment if this has not already been arranged. At this appointment, the findings noted at the time of your procedure and /or pathology reports will be reviewed. DO NOT TAKE TYLENOL/ACETAMINOPHEN WITH PERCOCET, [facility]ORTAB, [address] TAKE NARCOTIC PAIN MEDICATIONS WITH FOOD     Narcotics tend to be constipating, we suggest taking a stool softener such as Colace or Miralax (follow package instructions). DO NOT DRIVE WHILE TAKING NARCOTIC PAIN MEDICATIONS. DO NOT TAKE SLEEPING MEDICATIONS OR ANTIANXIETY MEDICATIONS WHILE TAKING NARCOTIC PAIN MEDICATIONS,  ESPECIALLY THE NIGHT OF ANESTHESIA! CPAP PATIENTS BE SURE TO WEAR MACHINE WHENEVER NAPPING OR SLEEPING! DISCHARGE SUMMARY from Nurse    The following personal items collected during your admission are returned to you:   Dental Appliance: Dental Appliances: None  Vision: Visual Aid: Glasses, Contacts, At home  Hearing Aid:    Jewelry: Jewelry: Other (comment)(ankle bracelet x2 and toe ring in purse)  Clothing: Clothing: With patient  Other Valuables: Other Valuables: Purse(father)  Valuables sent to safe:        PATIENT INSTRUCTIONS:    After General Anesthesia or Intravenous Sedation, for 24 hours or while taking prescription Narcotics:        Someone should be with you for the next 24 hours. For your own safety, a responsible adult must drive you home. · Limit your activities  · Recommended activity: Rest today, up with assistance today.  Do not climb stairs or shower unattended for the next 24 hours. · Please start with a soft bland diet and advance as tolerated (no nausea) to regular diet. · If you have a sore throat you should try the following: fluids, warm salt water gargles, or throat lozenges. If it does not improve after several days please follow up with your primary physician. · Do not drive and operate hazardous machinery  · Do not make important personal or business decisions  · Do  not drink alcoholic beverages  · If you have not urinated within 8 hours after discharge, please contact your surgeon on call. Report the following to your surgeon:  · Excessive pain, swelling, redness or odor of or around the surgical area  · Temperature over 100.5  · Nausea and vomiting lasting longer than 4 hours or if unable to take medications  · Any signs of decreased circulation or nerve impairment to extremity: change in color, persistent  numbness, tingling, coldness or increase pain      · You will receive a Post Operative Call from one of the Recovery Room Nurses on the day after your surgery to check on you. It is very important for us to know how you are recovering after your surgery. If you have an issue or need to speak with someone, please call your surgeon, do not wait for the post operative call. · You may receive an e-mail or letter in the mail from [employer] regarding your experience with us in the Ambulatory Surgery Unit. Your feedback is valuable to us and we appreciate your participation in the survey. · If the above instructions are not adequate or you are having problems after your surgery, call the physician at their office number. · We wish you a speedy recovery ? What to do at Home:      *  Please give a list of your current medications to your Primary Care Provider. *  Please update this list whenever your medications are discontinued, doses are      changed, or new medications (including over-the-counter products) are added.     *  Please carry medication information at all times in case of emergency situations. If you have not received your influenza and/or pneumococcal vaccine, please follow up with your primary care physician. The discharge information has been reviewed with the patient and caregiver. The patient and caregiver verbalized understanding. TO PREVENT AN INFECTION      1. [address] YOUR HANDS     To prevent infection, good handwashing is the most important thing you or your caregiver can do.  Wash your hands with soap and water or use the hand  we gave you before you touch any wounds. 2. SHOWER     Use the antibacterial soap we gave you when you take a shower.  Shower with this soap until your wounds are healed.  To reach all areas of your body, you may need someone to help you.  Dont forget to clean your belly button with every shower. 3.  USE CLEAN SHEETS     Use freshly cleaned sheets on your bed after surgery.  To keep the surgery site clean, do not allow pets to sleep with you while your wound is still healing. 4. STOP SMOKING     Stop smoking, or at least cut back on smoking     Smoking slows your healing. 5.  CONTROL YOUR BLOOD SUGAR     High blood sugars slow wound healing.  If you are diabetic, control your blood sugar levels before and after your surgery.

## 2019-12-24 VITALS
TEMPERATURE: 98.4 F | SYSTOLIC BLOOD PRESSURE: 109 MMHG | HEART RATE: 65 BPM | DIASTOLIC BLOOD PRESSURE: 59 MMHG | HEIGHT: 63 IN | WEIGHT: 267 LBS | OXYGEN SATURATION: 98 % | RESPIRATION RATE: 16 BRPM | BODY MASS INDEX: 47.31 KG/M2

## 2019-12-24 LAB
ERYTHROCYTE [DISTWIDTH] IN BLOOD BY AUTOMATED COUNT: 13.2 % (ref 11.5–14.5)
HCT VFR BLD AUTO: 32.5 % (ref 35–47)
HGB BLD-MCNC: 10.3 G/DL (ref 11.5–16)
MCH RBC QN AUTO: 31 PG (ref 26–34)
MCHC RBC AUTO-ENTMCNC: 31.7 G/DL (ref 30–36.5)
MCV RBC AUTO: 97.9 FL (ref 80–99)
NRBC # BLD: 0 K/UL (ref 0–0.01)
NRBC BLD-RTO: 0 PER 100 WBC
PLATELET # BLD AUTO: 219 K/UL (ref 150–400)
PMV BLD AUTO: 12 FL (ref 8.9–12.9)
RBC # BLD AUTO: 3.32 M/UL (ref 3.8–5.2)
WBC # BLD AUTO: 11.8 K/UL (ref 3.6–11)

## 2019-12-24 PROCEDURE — 36415 COLL VENOUS BLD VENIPUNCTURE: CPT

## 2019-12-24 PROCEDURE — 74011250637 HC RX REV CODE- 250/637: Performed by: OBSTETRICS & GYNECOLOGY

## 2019-12-24 PROCEDURE — 99218 HC RM OBSERVATION: CPT

## 2019-12-24 PROCEDURE — 85027 COMPLETE CBC AUTOMATED: CPT

## 2019-12-24 PROCEDURE — 74011250636 HC RX REV CODE- 250/636: Performed by: OBSTETRICS & GYNECOLOGY

## 2019-12-24 RX ORDER — HYDROMORPHONE HYDROCHLORIDE 2 MG/1
2 TABLET ORAL
Status: DISCONTINUED | OUTPATIENT
Start: 2019-12-24 | End: 2019-12-24 | Stop reason: HOSPADM

## 2019-12-24 RX ORDER — ALBUTEROL SULFATE 90 UG/1
2 AEROSOL, METERED RESPIRATORY (INHALATION)
Status: DISCONTINUED | OUTPATIENT
Start: 2019-12-24 | End: 2019-12-24 | Stop reason: HOSPADM

## 2019-12-24 RX ORDER — CALCIUM CARBONATE 200(500)MG
200 TABLET,CHEWABLE ORAL
Status: DISCONTINUED | OUTPATIENT
Start: 2019-12-24 | End: 2019-12-24

## 2019-12-24 RX ORDER — IBUPROFEN 400 MG/1
800 TABLET ORAL EVERY 8 HOURS
Status: DISCONTINUED | OUTPATIENT
Start: 2019-12-24 | End: 2019-12-24 | Stop reason: HOSPADM

## 2019-12-24 RX ORDER — SODIUM CHLORIDE, SODIUM LACTATE, POTASSIUM CHLORIDE, CALCIUM CHLORIDE 600; 310; 30; 20 MG/100ML; MG/100ML; MG/100ML; MG/100ML
125 INJECTION, SOLUTION INTRAVENOUS CONTINUOUS
Status: DISCONTINUED | OUTPATIENT
Start: 2019-12-24 | End: 2019-12-24 | Stop reason: HOSPADM

## 2019-12-24 RX ORDER — CALCIUM CARBONATE 200(500)MG
200 TABLET,CHEWABLE ORAL
Status: DISCONTINUED | OUTPATIENT
Start: 2019-12-24 | End: 2019-12-24 | Stop reason: HOSPADM

## 2019-12-24 RX ADMIN — CALCIUM CARBONATE (ANTACID) CHEW TAB 500 MG 200 MG: 500 CHEW TAB at 09:22

## 2019-12-24 RX ADMIN — ALBUTEROL SULFATE 2 PUFF: 90 AEROSOL, METERED RESPIRATORY (INHALATION) at 07:33

## 2019-12-24 RX ADMIN — SODIUM CHLORIDE, SODIUM LACTATE, POTASSIUM CHLORIDE, AND CALCIUM CHLORIDE 125 ML/HR: 600; 310; 30; 20 INJECTION, SOLUTION INTRAVENOUS at 05:16

## 2019-12-24 RX ADMIN — IBUPROFEN 800 MG: 400 TABLET, FILM COATED ORAL at 09:22

## 2019-12-24 RX ADMIN — ALBUTEROL SULFATE 2 PUFF: 90 AEROSOL, METERED RESPIRATORY (INHALATION) at 03:58

## 2019-12-24 RX ADMIN — CALCIUM CARBONATE (ANTACID) CHEW TAB 500 MG 200 MG: 500 CHEW TAB at 04:07

## 2019-12-24 NOTE — PROGRESS NOTES
Gynecology Post Operative Note    Mary Alber    Assessment: Doing well post-op    Plan: Continue routine post-op care  Advance diet  Oral pain medications  Ambulate  Discharge home today with: Medications: ibuprofen, percocet and medications prior to admission Follow up: 2 weeks Diet: as tolerated Activity: no heavy lifting and pelvic rest    Post-op day 1 from Procedure(s):  TOTAL LAPAROSCOPIC HYSTERECTOMY, BILATERAL SALPINGECTOMY (LATEX ALLERGY)  She is without significant complaints. Pain controlled on current medication. Voiding without difficulty. Patient is passing flatus. She is is tolerating her diet. Orders/Charges: post op    Vitals:  Visit Vitals  /59   Pulse 65   Temp 98.4 °F (36.9 °C)   Resp 16   Ht 5' 3\" (1.6 m)   Wt 121.1 kg (267 lb)   LMP 2019   SpO2 98%   BMI 47.30 kg/m²     Temp (24hrs), Av.9 °F (36.6 °C), Min:97.5 °F (36.4 °C), Max:98.4 °F (36.9 °C)      Last 24hr Input/Output:    Intake/Output Summary (Last 24 hours) at 2019 0917  Last data filed at 2019 0730  Gross per 24 hour   Intake 2240 ml   Output 1000 ml   Net 1240 ml          Exam:  Patient without distress. Lungs clear              Abdomen soft, bowel sounds present, expected tenderness. Incision dry and clean without erythema. Lower extremities are negative for swelling, cords, or tenderness.     Labs:   Lab Results   Component Value Date/Time    WBC 11.8 (H) 2019 05:22 AM    WBC 4.6 2019 12:06 PM    WBC 6.8 2018 11:12 PM    WBC 6.8 2013 12:30 PM    WBC 4.8 2012 03:15 PM    WBC 5.9 2012 06:25 PM    WBC 5.3 2012 08:43 PM    WBC 6.8 2011 09:50 PM    WBC 5.0 2011 04:05 PM    WBC 5.7 2010 08:28 PM    WBC 3.8 2009 09:01 AM    WBC 5.2 2009 03:15 PM    WBC 4.9 2009 04:02 PM    WBC 6.4 2009 03:00 AM    WBC 5.0 2009 07:16 PM    WBC 5.4 2009 01:41 PM    WBC 6.2 2009 06:35 PM HGB 10.3 (L) 12/24/2019 05:22 AM    HGB 13.0 12/13/2019 12:06 PM    HGB 10.6 (L) 12/20/2018 11:12 PM    HGB 10.3 (L) 01/19/2013 12:30 PM    HGB 11.0 (L) 06/21/2012 03:15 PM    HGB 10.8 (L) 06/11/2012 06:25 PM    HGB 10.7 (L) 04/23/2012 08:43 PM    HGB 11.4 (L) 07/11/2011 09:50 PM    HGB 11.0 (L) 04/28/2011 04:05 PM    HGB 11.2 (L) 06/16/2010 08:28 PM    HGB 11.4 (L) 05/19/2009 09:01 AM    HGB 11.2 (L) 05/08/2009 03:15 PM    HGB 11.4 (L) 04/12/2009 04:02 PM    HGB 10.5 (L) 04/01/2009 03:00 AM    HGB 11.6 03/31/2009 07:16 PM    HGB 11.1 (L) 03/29/2009 01:41 PM    HGB 11.9 03/27/2009 06:35 PM    HCT 32.5 (L) 12/24/2019 05:22 AM    HCT 40.8 12/13/2019 12:06 PM    HCT 33.4 (L) 12/20/2018 11:12 PM    HCT 31.9 (L) 01/19/2013 12:30 PM    HCT 33.7 (L) 06/21/2012 03:15 PM    HCT 33.3 (L) 06/11/2012 06:25 PM    HCT 33.1 (L) 04/23/2012 08:43 PM    HCT 34.0 (L) 07/11/2011 09:50 PM    HCT 33.9 (L) 04/28/2011 04:05 PM    HCT 33.8 (L) 06/16/2010 08:28 PM    HCT 33.8 (L) 05/19/2009 09:01 AM    HCT 33.1 (L) 05/08/2009 03:15 PM    HCT 34.9 (L) 04/12/2009 04:02 PM    HCT 32.1 (L) 04/01/2009 03:00 AM    HCT 35.6 03/31/2009 07:16 PM    HCT 32.6 (L) 03/29/2009 01:41 PM    HCT 35.4 03/27/2009 06:35 PM    PLATELET 384 86/74/8235 05:22 AM    PLATELET 114 42/28/3383 12:06 PM    PLATELET 874 35/99/9672 11:12 PM    PLATELET 123 66/67/5542 12:30 PM    PLATELET 301 79/40/8739 03:15 PM    PLATELET 039 13/62/7089 06:25 PM    PLATELET 314 33/87/9441 08:43 PM    PLATELET 047 19/45/3124 09:50 PM    PLATELET 854 30/97/3600 04:05 PM    PLATELET 766 47/46/7100 08:28 PM    PLATELET 876 76/10/4053 09:01 AM    PLATELET 658 72/80/2932 03:15 PM    PLATELET 196 92/64/4324 04:02 PM    PLATELET 575 51/14/9297 03:00 AM    PLATELET 003 10/31/3526 07:16 PM    PLATELET 802 58/47/9693 01:41 PM    PLATELET 198 42/15/7796 06:35 PM       Recent Results (from the past 24 hour(s))   CBC W/O DIFF    Collection Time: 12/24/19  5:22 AM   Result Value Ref Range    WBC 11.8 (H) 3.6 - 11.0 K/uL    RBC 3.32 (L) 3.80 - 5.20 M/uL    HGB 10.3 (L) 11.5 - 16.0 g/dL    HCT 32.5 (L) 35.0 - 47.0 %    MCV 97.9 80.0 - 99.0 FL    MCH 31.0 26.0 - 34.0 PG    MCHC 31.7 30.0 - 36.5 g/dL    RDW 13.2 11.5 - 14.5 %    PLATELET 618 551 - 535 K/uL    MPV 12.0 8.9 - 12.9 FL    NRBC 0.0 0  WBC    ABSOLUTE NRBC 0.00 0.00 - 0.01 K/uL

## 2019-12-24 NOTE — ROUTINE PROCESS
Patient discharge prescriptions & instructions given. Verbalized understanding. All questions answered. No distress noted. Signed copy of discharge instructions on paper chart. Will call OB to schedule 2 week follow up.

## 2019-12-24 NOTE — PROGRESS NOTES
Bedside and Verbal shift change report given to B. Delford Dubin, RN  (oncoming nurse) by AGUSTIN Barrios (offgoing nurse). Report included the following information SBAR, Kardex, OR Summary, Procedure Summary, Intake/Output, MAR and Recent Results.

## 2019-12-24 NOTE — PROGRESS NOTES
Pt has only voided twice since coming to the floor 300ml total. S/w Dr Carley Gómez. Orders received for LR 125ml/hr.

## 2019-12-24 NOTE — DISCHARGE SUMMARY
Gynecology Discharge Summary     Patient ID:  Adán Dan  [id number]  47 y.o.  1970    Admit date: 12/23/2019    Discharge date: 12/24/2019     Admission Diagnoses:   Patient Active Problem List   Diagnosis Code    Pain in joint, multiple sites M25.50    Gross hematuria R31.0    Microscopic hematuria R31.29    Obesity, morbid ([facility] [address] E66.01    Lumbar radiculopathy M54.16    Right foot drop M21.371    Lower back pain M54.5    Migraine G43.909    Multilevel degenerative disc disease M53.9    Menorrhagia N92.0       Discharge Diagnoses: There are no discharge diagnoses documented for the most recent discharge. Patient Active Problem List   Diagnosis Code    Pain in joint, multiple sites M25.50    Gross hematuria R31.0    Microscopic hematuria R31.29    Obesity, morbid ([facility] [address] E66.01    Lumbar radiculopathy M54.16    Right foot drop M21.371    Lower back pain M54.5    Migraine G43.909    Multilevel degenerative disc disease M53.9    Menorrhagia N92.0       Procedures for this admission: Procedure(s):  TOTAL LAPAROSCOPIC HYSTERECTOMY, BILATERAL SALPINGECTOMY (LATEX ALLERGY)    Hospital Course:    Disposition: Home or self care    Discharged Condition: stable            Patient Instructions:   Current Discharge Medication List      START taking these medications    Details   oxyCODONE IR (ROXICODONE) 5 mg immediate release tablet Take 1 Tab by mouth every four (4) hours as needed for Pain for up to 3 days. Max Daily Amount: 30 mg.  Qty: 20 Tab, Refills: 0    Associated Diagnoses: Fibroids      ibuprofen (MOTRIN) 800 mg tablet Take 1 Tab by mouth every eight (8) hours as needed for Pain. Qty: 30 Tab, Refills: 1         CONTINUE these medications which have NOT CHANGED    Details   OTHER Indications: bladderweck      citalopram (CELEXA) 20 mg tablet Take 1 Tab by mouth daily. Qty: 30 Tab, Refills: 3    Associated Diagnoses: PTSD (post-traumatic stress disorder);  Anxiety      potassium chloride SR (KLOR-CON 10) 10 mEq tablet Take 20 mEq by mouth daily. With Lasix. cyanocobalamin (VITAMIN B12) 100 mcg tablet Take 100 mcg by mouth daily. therapeutic multivitamin (THERA) tablet Take 1 Tab by mouth daily. Ferrous Sulfate (SLOW FE) 47.5 mg iron TbER tablet Take 1 Tab by mouth nightly. furosemide (LASIX) 40 mg tablet Take 40 mg by mouth daily. topiramate (TOPAMAX) 200 mg tablet Take 1 Tab by mouth daily. Qty: 30 Tab, Refills: 6      cetirizine (ZYRTEC) 10 mg tablet Take 10 mg by mouth daily as needed for Allergies. fluticasone (FLONASE ALLERGY RELIEF) 50 mcg/actuation nasal spray 2 Sprays by Both Nostrils route daily as needed for Rhinitis. albuterol (PROVENTIL HFA, VENTOLIN HFA, PROAIR HFA) 90 mcg/actuation inhaler Take 2 Puffs by inhalation every four (4) hours as needed for Wheezing or Shortness of Breath (cough). Qty: 1 Inhaler, Refills: 0           Activity: No sex for 6 weeks and No heavy lifting for 6 weeks  Diet: Regular Diet  Wound Care: Keep wound clean and dry    Follow-up with horace in 2 weeks.     Signed:  Ivana Prajapati MD  12/24/2019  9:20 AM

## 2019-12-24 NOTE — PROGRESS NOTES
0344  Pt c/o wheezing and to have her rescue inhaler ordered and well as heartburn. RN in to assess pt. O2 sat 98% on room air. S/w Dr Fadi Phipps. Chart reviewed. Orders for Proventil inhaler 2 puffs q4hrs as needed received as well as orders for Tums. 0410  After pt used inhaler o2 sat 98%. And states she feels better now.

## 2019-12-24 NOTE — PROGRESS NOTES
Pt stated she is allergic to catalope and forgot she was allergic and ate a piece. Pt stated she felt itchy in her throat and ear. RN asked if she needed to take some benadryl, the pt stated the itching feeling had resolved and she didn't need any medication. RN reminded pt that if she felt this feeling again to inform the nurse and we could give benadryl.

## 2020-01-08 ENCOUNTER — HOSPITAL ENCOUNTER (EMERGENCY)
Age: 50
Discharge: HOME | End: 2020-01-08
Attending: EMERGENCY MEDICINE
Payer: MEDICAID

## 2020-01-08 ENCOUNTER — APPOINTMENT (OUTPATIENT)
Dept: CT IMAGING | Age: 50
End: 2020-01-08
Attending: PHYSICIAN ASSISTANT
Payer: MEDICAID

## 2020-01-08 VITALS
RESPIRATION RATE: 20 BRPM | TEMPERATURE: 98.3 F | BODY MASS INDEX: 46.22 KG/M2 | SYSTOLIC BLOOD PRESSURE: 116 MMHG | HEIGHT: 64 IN | WEIGHT: 270.73 LBS | OXYGEN SATURATION: 100 % | DIASTOLIC BLOOD PRESSURE: 65 MMHG | HEART RATE: 88 BPM

## 2020-01-08 DIAGNOSIS — N83.202 LEFT OVARIAN CYST: Primary | ICD-10-CM

## 2020-01-08 DIAGNOSIS — N39.0 ACUTE UTI (URINARY TRACT INFECTION): ICD-10-CM

## 2020-01-08 DIAGNOSIS — Z90.710 HISTORY OF HYSTERECTOMY: ICD-10-CM

## 2020-01-08 LAB
ALBUMIN SERPL-MCNC: 3.3 G/DL (ref 3.5–5)
ALBUMIN/GLOB SERPL: 0.7 {RATIO} (ref 1.1–2.2)
ALP SERPL-CCNC: 87 U/L (ref 45–117)
ALT SERPL-CCNC: 18 U/L (ref 12–78)
ANION GAP SERPL CALC-SCNC: 2 MMOL/L (ref 5–15)
APPEARANCE UR: CLEAR
AST SERPL-CCNC: 14 U/L (ref 15–37)
BACTERIA URNS QL MICRO: ABNORMAL /HPF
BASOPHILS # BLD: 0 K/UL (ref 0–0.1)
BASOPHILS NFR BLD: 1 % (ref 0–1)
BILIRUB SERPL-MCNC: 0.5 MG/DL (ref 0.2–1)
BILIRUB UR QL CFM: NEGATIVE
BUN SERPL-MCNC: 18 MG/DL (ref 6–20)
BUN/CREAT SERPL: 21 (ref 12–20)
CALCIUM SERPL-MCNC: 9 MG/DL (ref 8.5–10.1)
CHLORIDE SERPL-SCNC: 109 MMOL/L (ref 97–108)
CO2 SERPL-SCNC: 30 MMOL/L (ref 21–32)
COLOR UR: ABNORMAL
CREAT SERPL-MCNC: 0.87 MG/DL (ref 0.55–1.02)
DIFFERENTIAL METHOD BLD: ABNORMAL
EOSINOPHIL # BLD: 0.1 K/UL (ref 0–0.4)
EOSINOPHIL NFR BLD: 2 % (ref 0–7)
EPITH CASTS URNS QL MICRO: ABNORMAL /LPF
ERYTHROCYTE [DISTWIDTH] IN BLOOD BY AUTOMATED COUNT: 13.7 % (ref 11.5–14.5)
GLOBULIN SER CALC-MCNC: 4.7 G/DL (ref 2–4)
GLUCOSE SERPL-MCNC: 88 MG/DL (ref 65–100)
GLUCOSE UR STRIP.AUTO-MCNC: NEGATIVE MG/DL
HCT VFR BLD AUTO: 34.6 % (ref 35–47)
HGB BLD-MCNC: 11.2 G/DL (ref 11.5–16)
HGB UR QL STRIP: ABNORMAL
IMM GRANULOCYTES # BLD AUTO: 0 K/UL (ref 0–0.04)
IMM GRANULOCYTES NFR BLD AUTO: 0 % (ref 0–0.5)
KETONES UR QL STRIP.AUTO: NEGATIVE MG/DL
LEUKOCYTE ESTERASE UR QL STRIP.AUTO: ABNORMAL
LYMPHOCYTES # BLD: 1.5 K/UL (ref 0.8–3.5)
LYMPHOCYTES NFR BLD: 30 % (ref 12–49)
MCH RBC QN AUTO: 31.8 PG (ref 26–34)
MCHC RBC AUTO-ENTMCNC: 32.4 G/DL (ref 30–36.5)
MCV RBC AUTO: 98.3 FL (ref 80–99)
MONOCYTES # BLD: 0.7 K/UL (ref 0–1)
MONOCYTES NFR BLD: 13 % (ref 5–13)
NEUTS SEG # BLD: 2.7 K/UL (ref 1.8–8)
NEUTS SEG NFR BLD: 54 % (ref 32–75)
NITRITE UR QL STRIP.AUTO: NEGATIVE
NRBC # BLD: 0 K/UL (ref 0–0.01)
NRBC BLD-RTO: 0 PER 100 WBC
PH UR STRIP: 6.5 [PH] (ref 5–8)
PLATELET # BLD AUTO: 274 K/UL (ref 150–400)
PMV BLD AUTO: 11.8 FL (ref 8.9–12.9)
POTASSIUM SERPL-SCNC: 3.8 MMOL/L (ref 3.5–5.1)
PROT SERPL-MCNC: 8 G/DL (ref 6.4–8.2)
PROT UR STRIP-MCNC: NEGATIVE MG/DL
RBC # BLD AUTO: 3.52 M/UL (ref 3.8–5.2)
RBC #/AREA URNS HPF: ABNORMAL /HPF (ref 0–5)
SODIUM SERPL-SCNC: 141 MMOL/L (ref 136–145)
SP GR UR REFRACTOMETRY: 1.02 (ref 1–1.03)
UA: UC IF INDICATED,UAUC: ABNORMAL
UROBILINOGEN UR QL STRIP.AUTO: 1 EU/DL (ref 0.2–1)
WBC # BLD AUTO: 5 K/UL (ref 3.6–11)
WBC URNS QL MICRO: ABNORMAL /HPF (ref 0–4)

## 2020-01-08 PROCEDURE — 96376 TX/PRO/DX INJ SAME DRUG ADON: CPT

## 2020-01-08 PROCEDURE — 87086 URINE CULTURE/COLONY COUNT: CPT

## 2020-01-08 PROCEDURE — 99284 EMERGENCY DEPT VISIT MOD MDM: CPT

## 2020-01-08 PROCEDURE — 74177 CT ABD & PELVIS W/CONTRAST: CPT

## 2020-01-08 PROCEDURE — 74011636320 HC RX REV CODE- 636/320: Performed by: EMERGENCY MEDICINE

## 2020-01-08 PROCEDURE — 85025 COMPLETE CBC W/AUTO DIFF WBC: CPT

## 2020-01-08 PROCEDURE — 81001 URINALYSIS AUTO W/SCOPE: CPT

## 2020-01-08 PROCEDURE — 96374 THER/PROPH/DIAG INJ IV PUSH: CPT

## 2020-01-08 PROCEDURE — 96375 TX/PRO/DX INJ NEW DRUG ADDON: CPT

## 2020-01-08 PROCEDURE — 36415 COLL VENOUS BLD VENIPUNCTURE: CPT

## 2020-01-08 PROCEDURE — 80053 COMPREHEN METABOLIC PANEL: CPT

## 2020-01-08 PROCEDURE — 74011250636 HC RX REV CODE- 250/636: Performed by: PHYSICIAN ASSISTANT

## 2020-01-08 RX ORDER — CEPHALEXIN 500 MG/1
500 CAPSULE ORAL 3 TIMES DAILY
Qty: 21 CAP | Refills: 0 | Status: SHIPPED | OUTPATIENT
Start: 2020-01-08 | End: 2020-01-15

## 2020-01-08 RX ORDER — ONDANSETRON 2 MG/ML
4 INJECTION INTRAMUSCULAR; INTRAVENOUS
Status: COMPLETED | OUTPATIENT
Start: 2020-01-08 | End: 2020-01-08

## 2020-01-08 RX ORDER — SODIUM CHLORIDE 0.9 % (FLUSH) 0.9 %
10 SYRINGE (ML) INJECTION
Status: COMPLETED | OUTPATIENT
Start: 2020-01-08 | End: 2020-01-08

## 2020-01-08 RX ORDER — HYDROMORPHONE HYDROCHLORIDE 1 MG/ML
1 INJECTION, SOLUTION INTRAMUSCULAR; INTRAVENOUS; SUBCUTANEOUS ONCE
Status: COMPLETED | OUTPATIENT
Start: 2020-01-08 | End: 2020-01-08

## 2020-01-08 RX ORDER — HYDROCODONE BITARTRATE AND ACETAMINOPHEN 5; 325 MG/1; MG/1
1 TABLET ORAL
Qty: 10 TAB | Refills: 0 | Status: SHIPPED | OUTPATIENT
Start: 2020-01-08 | End: 2020-01-11

## 2020-01-08 RX ORDER — PHENAZOPYRIDINE HYDROCHLORIDE 200 MG/1
200 TABLET, FILM COATED ORAL 3 TIMES DAILY
Qty: 6 TAB | Refills: 0 | Status: SHIPPED | OUTPATIENT
Start: 2020-01-08 | End: 2020-01-10

## 2020-01-08 RX ADMIN — HYDROMORPHONE HYDROCHLORIDE 1 MG: 1 INJECTION, SOLUTION INTRAMUSCULAR; INTRAVENOUS; SUBCUTANEOUS at 17:42

## 2020-01-08 RX ADMIN — HYDROMORPHONE HYDROCHLORIDE 1 MG: 1 INJECTION, SOLUTION INTRAMUSCULAR; INTRAVENOUS; SUBCUTANEOUS at 19:45

## 2020-01-08 RX ADMIN — ONDANSETRON 4 MG: 2 INJECTION INTRAMUSCULAR; INTRAVENOUS at 17:41

## 2020-01-08 RX ADMIN — IOPAMIDOL 100 ML: 755 INJECTION, SOLUTION INTRAVENOUS at 18:34

## 2020-01-08 RX ADMIN — Medication 10 ML: at 18:34

## 2020-01-08 NOTE — LETTER
[address] 
[facility] EMERGENCY DEPT 
[address] [facility] [id number] [phone] Work/School Note Date: 1/8/2020 To Whom It May concern: 
 
Yandel Mahoney was seen and treated today in the emergency room by the following provider(s): 
Attending Provider: Oleg Telles MD 
Physician Assistant: MIRACLE De Jesus. Yandel Mahoney may return to work on 1/13/2020. Sincerely, 
 
 
 
 
Joseph Guerrero.  Ronak Silva
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
## 2020-01-09 NOTE — DISCHARGE INSTRUCTIONS
Patient Education        Laparoscopic Hysterectomy: What to Expect at Home  Your Recovery    A hysterectomy is surgery to take out the uterus. In some cases, the ovaries and fallopian tubes also are taken out at the same time. You can expect to feel better and stronger each day, but you may need pain medicine for a week or two. You may get tired easily or have less energy than usual. The tiredness may last for several weeks after surgery. You will probably notice that your belly is swollen and puffy. This is common. The swelling will take several weeks to go down. You may take about 4 to 6 weeks to fully recover. It is important to avoid lifting while you are recovering so that you can heal.  This care sheet gives you a general idea about how long it will take for you to recover. But each person recovers at a different pace. Follow the steps below to get better as quickly as possible. How can you care for yourself at home? Activity    · Rest when you feel tired.     · Be active. Walking is a good choice.     · Allow the area to heal. Don't move quickly or lift anything heavy until you are feeling better.     · You may shower 24 to 48 hours after surgery, if your doctor okays it. Pat the incision dry. Do not take a bath for the first 2 weeks, or until your doctor tells you it is okay.     · Ask your doctor when it is okay for you to have sex. Diet    · You can eat your normal diet. If your stomach is upset, try bland, low-fat foods like plain rice, broiled chicken, toast, and yogurt.     · If your bowel movements are not regular right after surgery, try to avoid constipation and straining. Drink plenty of water. Your doctor may suggest fiber, a stool softener, or a mild laxative. Medicines    · Your doctor will tell you if and when you can restart your medicines.  He or she will also give you instructions about taking any new medicines.     · If you take blood thinners, such as warfarin (Coumadin), clopidogrel (Plavix), or aspirin, be sure to talk to your doctor. He or she will tell you if and when to start taking those medicines again. Make sure that you understand exactly what your doctor wants you to do.     · Be safe with medicines. Read and follow all instructions on the label. ? If the doctor gave you a prescription medicine for pain, take it as prescribed. ? If you are not taking a prescription pain medicine, ask your doctor if you can take an over-the-counter medicine.     · If your doctor prescribed antibiotics, take them as directed. Do not stop taking them just because you feel better. You need to take the full course of antibiotics. Incision care    · You may have stitches over the cuts (incisions) the doctor made in your belly.     · If you have strips of tape on the cut (incision) the doctor made, leave the tape on for a week or until it falls off.     · Wash the area daily with warm, soapy water, and pat it dry. Don't use hydrogen peroxide or alcohol. They can slow healing.     · You may cover the area with a gauze bandage if it oozes fluid or rubs against clothing.     · Change the bandage every day. Other instructions    · You may have some light vaginal bleeding. Wear sanitary pads if needed. Do not douche or use tampons.     · Don't have sex until the doctor says it is okay. Follow-up care is a key part of your treatment and safety. Be sure to make and go to all appointments, and call your doctor if you are having problems. It's also a good idea to know your test results and keep a list of the medicines you take. When should you call for help? Call 911 anytime you think you may need emergency care. For example, call if:    · You passed out (lost consciousness).     · You have chest pain, are short of breath, or cough up blood.    Call your doctor now or seek immediate medical care if:    · You have pain that does not get better after you take pain medicine.     · You cannot pass stools or gas.   · You have vaginal discharge that has increased in amount or smells bad.     · You are sick to your stomach or cannot drink fluids.     · You have loose stitches, or your incision comes open.     · Bright red blood has soaked through the bandage over your incision.     · You have signs of infection, such as:  ? Increased pain, swelling, warmth, or redness. ? Red streaks leading from the incision. ? Pus draining from the incision. ? A fever.     · You have bright red vaginal bleeding that soaks one or more pads in an hour, or you have large clots.     · You have signs of a blood clot in your leg (called a deep vein thrombosis), such as:  ? Pain in your calf, back of the knee, thigh, or groin. ? Redness and swelling in your leg or groin.    Watch closely for changes in your health, and be sure to contact your doctor if you have any problems. Where can you learn more? Go to http://willis-bianka.info/. Enter Q131 in the search box to learn more about \"Laparoscopic Hysterectomy: What to Expect at Home. \"  Current as of: February 19, 2019  Content Version: 12.2  © 5923-4044 [employer]. Care instructions adapted under license by [employer] (which disclaims liability or warranty for this information). If you have questions about a medical condition or this instruction, always ask your healthcare professional. [place] [address] any warranty or liability for your use of this information. Patient Education        Functional Ovarian Cyst: Care Instructions  Your Care Instructions    A functional ovarian cyst is a sac that forms on the surface of a woman's ovary during ovulation. The sac holds a maturing egg. Usually the sac goes away after the egg is released.  But if the egg is not released, or if the sac closes up after the egg is released, the sac can swell up with fluid and form a cyst.  Functional ovarian cysts are different than ovarian growths caused by other problems, such as cancer. Most functional ovarian cysts cause no symptoms and go away on their own. Some cause mild pain. Others can cause severe pain when they rupture or bleed. Follow-up care is a key part of your treatment and safety. Be sure to make and go to all appointments, and call your doctor if you are having problems. It's also a good idea to know your test results and keep a list of the medicines you take. How can you care for yourself at home? · Use heat, such as a hot water bottle, a heating pad set on low, or a warm bath, to relax tense muscles and relieve cramping. · Be safe with medicines. Take pain medicines exactly as directed. ? If the doctor gave you a prescription medicine for pain, take it as prescribed. ? If you are not taking a prescription pain medicine, ask your doctor if you can take an over-the-counter medicine. · Avoid constipation. Make sure you drink enough fluids and include fruits, vegetables, and fiber in your diet each day. Constipation does not cause ovarian cysts, but it may make your pelvic pain worse. When should you call for help? Call your doctor now or seek immediate medical care if:    · You have severe vaginal bleeding.     · You have new or worse belly or pelvic pain.    Watch closely for changes in your health, and be sure to contact your doctor if:    · You have unusual vaginal bleeding.     · You do not get better as expected. Where can you learn more? Go to http://willis-bianka.info/. Enter C856 in the search box to learn more about \"Functional Ovarian Cyst: Care Instructions. \"  Current as of: February 19, 2019  Content Version: 12.2  © 8609-3051 [employer]. Care instructions adapted under license by [employer] (which disclaims liability or warranty for this information).  If you have questions about a medical condition or this instruction, always ask your healthcare professional. [employer] disclaims any warranty or liability for your use of this information. Patient Education        Urinary Tract Infection in Women: Care Instructions  Your Care Instructions    A urinary tract infection, or UTI, is a general term for an infection anywhere between the kidneys and the urethra (where urine comes out). Most UTIs are bladder infections. They often cause pain or burning when you urinate. UTIs are caused by bacteria and can be cured with antibiotics. Be sure to complete your treatment so that the infection goes away. Follow-up care is a key part of your treatment and safety. Be sure to make and go to all appointments, and call your doctor if you are having problems. It's also a good idea to know your test results and keep a list of the medicines you take. How can you care for yourself at home? · Take your antibiotics as directed. Do not stop taking them just because you feel better. You need to take the full course of antibiotics. · Drink extra water and other fluids for the next day or two. This may help wash out the bacteria that are causing the infection. (If you have kidney, heart, or liver disease and have to limit fluids, talk with your doctor before you increase your fluid intake.)  · Avoid drinks that are carbonated or have caffeine. They can irritate the bladder. · Urinate often. Try to empty your bladder each time. · To relieve pain, take a hot bath or lay a heating pad set on low over your lower belly or genital area. Never go to sleep with a heating pad in place. To prevent UTIs  · Drink plenty of water each day. This helps you urinate often, which clears bacteria from your system. (If you have kidney, heart, or liver disease and have to limit fluids, talk with your doctor before you increase your fluid intake.)  · Urinate when you need to. · Urinate right after you have sex. · Change sanitary pads often.   · Avoid douches, bubble baths, feminine hygiene sprays, and other feminine hygiene products that have deodorants. · After going to the bathroom, wipe from front to back. When should you call for help? Call your doctor now or seek immediate medical care if:    · Symptoms such as fever, chills, nausea, or vomiting get worse or appear for the first time.     · You have new pain in your back just below your rib cage. This is called flank pain.     · There is new blood or pus in your urine.     · You have any problems with your antibiotic medicine.    Watch closely for changes in your health, and be sure to contact your doctor if:    · You are not getting better after taking an antibiotic for 2 days.     · Your symptoms go away but then come back. Where can you learn more? Go to http://willis-bianka.info/. Enter K794 in the search box to learn more about \"Urinary Tract Infection in Women: Care Instructions. \"  Current as of: December 19, 2018  Content Version: 12.2  © 9314-8579 [employer]. Care instructions adapted under license by [employer] (which disclaims liability or warranty for this information). If you have questions about a medical condition or this instruction, always ask your healthcare professional. [address] any warranty or liability for your use of this information.

## 2020-01-09 NOTE — ED NOTES
Patient (s)  given copy of dc instructions and 1 script(s). Patient (s)  verbalized understanding of instructions and script (s). Patient given a current medication reconciliation form and verbalized understanding of their medications. Patient (s) verbalized understanding of the importance of discussing medications with  his or her physician or clinic they will be following up with. Patient alert and oriented and in no acute distress. Patient discharged home ambulatory with all belongings and . PIV removed from left AC. Cath tip intact.

## 2020-01-10 LAB
BACTERIA SPEC CULT: NORMAL
CC UR VC: NORMAL
SERVICE CMNT-IMP: NORMAL

## 2020-03-03 ENCOUNTER — OFFICE VISIT (OUTPATIENT)
Dept: INTERNAL MEDICINE CLINIC | Age: 50
End: 2020-03-03

## 2020-03-03 VITALS
HEART RATE: 62 BPM | RESPIRATION RATE: 20 BRPM | BODY MASS INDEX: 46.44 KG/M2 | WEIGHT: 272 LBS | TEMPERATURE: 97.4 F | OXYGEN SATURATION: 98 % | HEIGHT: 64 IN | DIASTOLIC BLOOD PRESSURE: 70 MMHG | SYSTOLIC BLOOD PRESSURE: 120 MMHG

## 2020-03-03 DIAGNOSIS — G43.011 INTRACTABLE MIGRAINE WITHOUT AURA AND WITH STATUS MIGRAINOSUS: Primary | ICD-10-CM

## 2020-03-03 DIAGNOSIS — F43.10 PTSD (POST-TRAUMATIC STRESS DISORDER): ICD-10-CM

## 2020-03-03 RX ORDER — TOPIRAMATE 25 MG/1
25 TABLET ORAL DAILY
Qty: 30 TAB | Refills: 0 | Status: SHIPPED | OUTPATIENT
Start: 2020-03-03 | End: 2020-03-27 | Stop reason: SDUPTHER

## 2020-03-03 NOTE — PATIENT INSTRUCTIONS
[employer] Thank you for requesting access to [employer]. Please follow the instructions below to securely access and download your online medical record. [employer] allows you to send messages to your doctor, view your test results, renew your prescriptions, schedule appointments, and more. How Do I Sign Up? 1. In your internet browser, go to [email or web address] 
2. Click on the First Time User? Click Here link in the Sign In box. You will be redirect to the New Member Sign Up page. 3. Enter your [employer] Access Code exactly as it appears below. You will not need to use this code after youve completed the sign-up process. If you do not sign up before the expiration date, you must request a new code. [employer] Access Code: Activation code not generated Current [employer] Status: Active (This is the date your [employer] access code will ) 4. Enter the last four digits of your Social Security Number (xxxx) and Date of Birth (mm/dd/yyyy) as indicated and click Submit. You will be taken to the next sign-up page. 5. Create a [employer] ID. This will be your [employer] login ID and cannot be changed, so think of one that is secure and easy to remember. 6. Create a [employer] password. You can change your password at any time. 7. Enter your Password Reset Question and Answer. This can be used at a later time if you forget your password. 8. Enter your e-mail address. You will receive e-mail notification when new information is available in [address] 9. Click Sign Up. You can now view and download portions of your medical record. 10. Click the Download Summary menu link to download a portable copy of your medical information. Additional Information If you have questions, please visit the Frequently Asked Questions section of the [employer] website at [email or web address] Remember, [employer] is NOT to be used for urgent needs. For medical emergencies, dial 911.

## 2020-03-03 NOTE — PROGRESS NOTES
Gladis Mccarthy is a 45 y.o. female and presents with Headache and Medication Evaluation  . Subjective:     Headache  Patient complains of headache. She does have a headache at this time. She has a history of migraine    Description of Headaches:  Location of pain: frontal  Radiation of pain?:none  Character of pain:aching, dull  Severity of pain: 9/10  Accompanying symptoms: none  Prodromal sx?: none  Rapidity of onset: sudden  Typical duration of individual headache: a few hours  Are most headaches similar in presentation? yes  Typical precipitants:unknown  Temporal Pattern of Headaches:  Started having HAs a few years ago  Worst time of day: evening  Awaken from sleep?: no  Seasonal pattern?: no  Clustering of HAs over time? no  Overall pattern since problem began: gradually worsening    Degree of Functional Impairment: moderate    Current Use of Meds to Treat HA:  Abortive meds? none  Daily use? no  Prophylactic meds? none    Additional Relevant History:  History of head/neck trauma? no  History of head/neck surgery? no  Family h/o headache problems? no  Use of meds that might worsen HAs? no  Exposure to carbon monoxide? no  Substance use: none      She has a history of PTSD. Review of Systems  Constitutional: negative for fevers, chills, anorexia and weight loss  Eyes:   negative for visual disturbance and irritation  ENT:   negative for tinnitus,sore throat,nasal congestion,ear pains. hoarseness  Respiratory:  negative for cough, hemoptysis, dyspnea,wheezing  CV:   negative for chest pain, palpitations, lower extremity edema  GI:   negative for nausea, vomiting, diarrhea, abdominal pain,melena  Endo:               negative for polyuria,polydipsia,polyphagia,heat intolerance  Genitourinary: negative for frequency, dysuria and hematuria  Integument:  negative for rash and pruritus  Hematologic:  negative for easy bruising and gum/nose bleeding  Musculoskel: myalgias, arthralgias, back pain, , joint pain  Neurological:  headaches,    Behavl/Psychfeelings of anxiety, depression, mood changes    Past Medical History:   Diagnosis Date    Arthritis     Asthma     Seasonal     Bipolar disorder (HCC)     IBS (irritable bowel syndrome)     Migraine     Peptic ulcer     PTSD (post-traumatic stress disorder)     Snoring     TIA (transient ischemic attack)     As of 19, pt states she never had one and that her migraine caused symptoms of TIA     Past Surgical History:   Procedure Laterality Date    HX CERVICAL FUSION      C4-C7    HX CHOLECYSTECTOMY      HX COLONOSCOPY      HX CYST REMOVAL      from under both arms    HX HEENT Left 2013    orbital    HX HERNIA REPAIR  2009    HX MENISCUS REPAIR Left     x2    HX ORTHOPAEDIC Left     ORIF left fibula    HX TUBAL LIGATION      HX TUBAL LIGATION  1994    HYSTEROSCOPY DIAGNOSTIC      x2     Social History     Socioeconomic History    Marital status: SINGLE     Spouse name: Not on file    Number of children: Not on file    Years of education: Not on file    Highest education level: Not on file   Tobacco Use    Smoking status: Former Smoker     Packs/day: 0.25     Last attempt to quit: 12/10/2019     Years since quittin.2    Smokeless tobacco: Current User   Substance and Sexual Activity    Alcohol use: Yes     Comment: rare    Drug use: No    Sexual activity: Not Currently     Family History   Problem Relation Age of Onset    Stroke Father     Hypertension Father     Heart Disease Maternal Grandmother     Cancer Maternal Grandmother         brain and colon    Cancer Maternal Uncle         5 w/ brain Dandy Somers    MS Other         1st cousin    Bipolar Disorder Other      Current Outpatient Medications   Medication Sig Dispense Refill    topiramate (TOPAMAX) 25 mg tablet Take 1 Tab by mouth daily. 30 Tab 0    citalopram (CELEXA) 20 mg tablet Take 1 Tab by mouth daily.  30 Tab 3    fluticasone (FLONASE ALLERGY RELIEF) 50 mcg/actuation nasal spray 2 Sprays by Both Nostrils route daily as needed for Rhinitis.  potassium chloride SR (KLOR-CON 10) 10 mEq tablet Take 20 mEq by mouth daily. With Lasix.  cyanocobalamin (VITAMIN B12) 100 mcg tablet Take 100 mcg by mouth daily.  therapeutic multivitamin (THERA) tablet Take 1 Tab by mouth daily.  Ferrous Sulfate (SLOW FE) 47.5 mg iron TbER tablet Take 1 Tab by mouth nightly.  albuterol (PROVENTIL HFA, VENTOLIN HFA, PROAIR HFA) 90 mcg/actuation inhaler Take 2 Puffs by inhalation every four (4) hours as needed for Wheezing or Shortness of Breath (cough). 1 Inhaler 0    furosemide (LASIX) 40 mg tablet Take 40 mg by mouth daily.        Allergies   Allergen Reactions    Latex Hives    Other Food Rash     All fruits except apple, oranges, and pineapple (recorded as Other Medication 2/8/2012)    Peanut Swelling    Shellfish Containing Products Anaphylaxis    Morphine Itching     She has had morphine but premedicates with benadryl    Nystatin Rash    Flagyl [Metronidazole] Contact Dermatitis    Flexeril [Cyclobenzaprine] Rash    Other Medication Rash     All fruits except apple, oranges, and pineapple    Phenergan [Promethazine] Other (comments)     Rapid hr    Robaxin [Methocarbamol] Rash       Objective:  Visit Vitals  /70 (BP 1 Location: Right arm, BP Patient Position: Sitting)   Pulse 62   Temp 97.4 °F (36.3 °C) (Oral)   Resp 20   Ht 5' 4\" (1.626 m)   Wt 272 lb (123.4 kg)   LMP 12/04/2019   SpO2 98%   BMI 46.69 kg/m²     Physical Exam:   General appearance - alert, well appearing, and in moderate distress  Mental status - alert, oriented to person, place, and time  EYE-BRIGIDA, EOMI, corneas normal, no foreign bodies  ENT-ENT exam normal, no neck nodes or sinus tenderness  Nose - normal and patent, no erythema, discharge or polyps  Mouth - mucous membranes moist, pharynx normal without lesions  Neck - supple, no significant adenopathy   Chest - clear to auscultation, no wheezes, rales or rhonchi, symmetric air entry   Heart - normal rate, regular rhythm, normal S1, S2, no murmurs, rubs, clicks or gallops   Abdomen - soft, nontender, nondistended, no masses or organomegaly  Lymph- no adenopathy palpable  Ext-peripheral pulses normal, no pedal edema, no clubbing or cyanosis  Skin-Warm and dry. no hyperpigmentation, vitiligo, or suspicious lesions  Neuro -alert, oriented, normal speech, no focal findings or movement disorder noted  Neck-normal C-spine, no tenderness, full ROM without pain  Feet-no nail deformities or callus formation with good pulses noted  Back-tenderness lower lumbar spine and sacral spine noted,forward flexion,hyperextension impaired,positive straight leg raise      Results for orders placed or performed during the hospital encounter of 01/08/20   CULTURE, URINE   Result Value Ref Range    Special Requests: NO SPECIAL REQUESTS  Reflexed from [id number]        [place] Count [id number]  COLONIES/mL        Culture result: MIXED UROGENITAL KRISH ISOLATED     CBC WITH AUTOMATED DIFF   Result Value Ref Range    WBC 5.0 3.6 - 11.0 K/uL    RBC 3.52 (L) 3.80 - 5.20 M/uL    HGB 11.2 (L) 11.5 - 16.0 g/dL    HCT 34.6 (L) 35.0 - 47.0 %    MCV 98.3 80.0 - 99.0 FL    MCH 31.8 26.0 - 34.0 PG    MCHC 32.4 30.0 - 36.5 g/dL    RDW 13.7 11.5 - 14.5 %    PLATELET 295 416 - 093 K/uL    MPV 11.8 8.9 - 12.9 FL    NRBC 0.0 0  WBC    ABSOLUTE NRBC 0.00 0.00 - 0.01 K/uL    NEUTROPHILS 54 32 - 75 %    LYMPHOCYTES 30 12 - 49 %    MONOCYTES 13 5 - 13 %    EOSINOPHILS 2 0 - 7 %    BASOPHILS 1 0 - 1 %    IMMATURE GRANULOCYTES 0 0.0 - 0.5 %    ABS. NEUTROPHILS 2.7 1.8 - 8.0 K/UL    ABS. LYMPHOCYTES 1.5 0.8 - 3.5 K/UL    ABS. MONOCYTES 0.7 0.0 - 1.0 K/UL    ABS. EOSINOPHILS 0.1 0.0 - 0.4 K/UL    ABS. BASOPHILS 0.0 0.0 - 0.1 K/UL    ABS. IMM.  GRANS. 0.0 0.00 - 0.04 K/UL    DF AUTOMATED     METABOLIC PANEL, COMPREHENSIVE   Result Value Ref Range    Sodium 141 136 - 145 mmol/L Potassium 3.8 3.5 - 5.1 mmol/L    Chloride 109 (H) 97 - 108 mmol/L    CO2 30 21 - 32 mmol/L    Anion gap 2 (L) 5 - 15 mmol/L    Glucose 88 65 - 100 mg/dL    BUN 18 6 - 20 MG/DL    Creatinine 0.87 0.55 - 1.02 MG/DL    BUN/Creatinine ratio 21 (H) 12 - 20      GFR est AA >60 >60 ml/min/1.73m2    GFR est non-AA >60 >60 ml/min/1.73m2    Calcium 9.0 8.5 - 10.1 MG/DL    Bilirubin, total 0.5 0.2 - 1.0 MG/DL    ALT (SGPT) 18 12 - 78 U/L    AST (SGOT) 14 (L) 15 - 37 U/L    Alk. phosphatase 87 45 - 117 U/L    Protein, total 8.0 6.4 - 8.2 g/dL    Albumin 3.3 (L) 3.5 - 5.0 g/dL    Globulin 4.7 (H) 2.0 - 4.0 g/dL    A-G Ratio 0.7 (L) 1.1 - 2.2     URINALYSIS W/ REFLEX CULTURE   Result Value Ref Range    Color DARK YELLOW      Appearance CLEAR CLEAR      Specific gravity 1.025 1.003 - 1.030      pH (UA) 6.5 5.0 - 8.0      Protein NEGATIVE  NEG mg/dL    Glucose NEGATIVE  NEG mg/dL    Ketone NEGATIVE  NEG mg/dL    Blood MODERATE (A) NEG      Urobilinogen 1.0 0.2 - 1.0 EU/dL    Nitrites NEGATIVE  NEG      Leukocyte Esterase MODERATE (A) NEG      WBC 10-20 0 - 4 /hpf    RBC 0-5 0 - 5 /hpf    Epithelial cells FEW FEW /lpf    Bacteria 1+ (A) NEG /hpf    UA:UC IF INDICATED URINE CULTURE ORDERED (A) CNI     BILIRUBIN, CONFIRM   Result Value Ref Range    Bilirubin UA, confirm NEGATIVE  NEG         Assessment/Plan:    ICD-10-CM ICD-9-CM    1. Intractable migraine without aura and with status migrainosus G43.011 346.13 REFERRAL TO NEUROLOGY   2. PTSD (post-traumatic stress disorder) F43.10 309.81      Orders Placed This Encounter    REFERRAL TO NEUROLOGY     Referral Priority:   Routine     Referral Type:   Consultation     Referral Reason:   Specialty Services Required     Referred to Provider:   Bianca Lebron MD     Requested Specialty:   Neurology     Number of Visits Requested:   1    topiramate (TOPAMAX) 25 mg tablet     Sig: Take 1 Tab by mouth daily.      Dispense:  30 Tab     Refill:  0     lose weight, increase physical activity, follow low fat diet, follow low salt diet, continue present plan,Take 81mg aspirin daily  Patient Instructions   MyChart Activation    Thank you for requesting access to [employer]. Please follow the instructions below to securely access and download your online medical record. [employer] allows you to send messages to your doctor, view your test results, renew your prescriptions, schedule appointments, and more. How Do I Sign Up? 1. In your internet browser, go to [email or web address]  2. Click on the First Time User? Click Here link in the Sign In box. You will be redirect to the New Member Sign Up page. 3. Enter your [employer] Access Code exactly as it appears below. You will not need to use this code after youve completed the sign-up process. If you do not sign up before the expiration date, you must request a new code. [employer] Access Code: Activation code not generated  Current [employer] Status: Active (This is the date your [employer] access code will )    4. Enter the last four digits of your Social Security Number (xxxx) and Date of Birth (mm/dd/yyyy) as indicated and click Submit. You will be taken to the next sign-up page. 5. Create a [employer] ID. This will be your [employer] login ID and cannot be changed, so think of one that is secure and easy to remember. 6. Create a [employer] password. You can change your password at any time. 7. Enter your Password Reset Question and Answer. This can be used at a later time if you forget your password. 8. Enter your e-mail address. You will receive e-mail notification when new information is available in [address] 9. Click Sign Up. You can now view and download portions of your medical record. 10. Click the Download Summary menu link to download a portable copy of your medical information.     Additional Information    If you have questions, please visit the Frequently Asked Questions section of the [employer] website at [email or web address] Remember, [facility] is NOT to be used for urgent needs. For medical emergencies, dial 911. Follow-up and Dispositions    · Return in about 3 months (around 6/3/2020), or if symptoms worsen or fail to improve. I have reviewed with the patient details of the assessment and plan and all questions were answered. Relevent patient education was performed. The most recent lab findings were reviewed with the patient. An After Visit Summary was printed and given to the patient.

## 2020-03-03 NOTE — PROGRESS NOTES
Chief Complaint   Patient presents with    Headache    Medication Evaluation         3 most recent PHQ Screens 3/3/2020   PHQ Not Done -   Little interest or pleasure in doing things Not at all   Feeling down, depressed, irritable, or hopeless Not at all   Total Score PHQ 2 0   Trouble falling or staying asleep, or sleeping too much -   Feeling tired or having little energy -   Poor appetite, weight loss, or overeating -   Feeling bad about yourself - or that you are a failure or have let yourself or your family down -   Trouble concentrating on things such as school, work, reading, or watching TV -   Moving or speaking so slowly that other people could have noticed; or the opposite being so fidgety that others notice -   Thoughts of being better off dead, or hurting yourself in some way -   PHQ 9 Score -   How difficult have these problems made it for you to do your work, take care of your home and get along with others -     1. Have you been to the ER, urgent care clinic since your last visit? Hospitalized since your last visit? NO    2. Have you seen or consulted any other health care providers outside of the [address] [place] [address] since your last visit? Include any pap smears or colon screening.  NO

## 2020-03-25 ENCOUNTER — TELEPHONE (OUTPATIENT)
Dept: NEUROLOGY | Age: 50
End: 2020-03-25

## 2020-03-27 RX ORDER — TOPIRAMATE 25 MG/1
25 TABLET ORAL DAILY
Qty: 30 TAB | Refills: 0 | Status: SHIPPED | OUTPATIENT
Start: 2020-03-27 | End: 2020-04-01 | Stop reason: DRUGHIGH

## 2020-03-27 NOTE — TELEPHONE ENCOUNTER
Last visit 03/03/2020 MD Roe Espinoza   Next appointment None   Previous refill encounter(s) 03/03/2020 Topamax #30     Requested Prescriptions     Pending Prescriptions Disp Refills    topiramate (TOPAMAX) 25 mg tablet 30 Tab 0     Sig: Take 1 Tab by mouth daily.

## 2020-03-28 DIAGNOSIS — J45.901 ASTHMA EXACERBATION, MILD: Primary | ICD-10-CM

## 2020-03-28 RX ORDER — ALBUTEROL SULFATE 90 UG/1
AEROSOL, METERED RESPIRATORY (INHALATION)
Qty: 8.5 G | Refills: 11 | Status: SHIPPED | OUTPATIENT
Start: 2020-03-28 | End: 2021-03-28

## 2020-04-01 ENCOUNTER — OFFICE VISIT (OUTPATIENT)
Dept: NEUROLOGY | Age: 50
End: 2020-04-01

## 2020-04-01 ENCOUNTER — TELEPHONE (OUTPATIENT)
Dept: NEUROLOGY | Age: 50
End: 2020-04-01

## 2020-04-01 VITALS
RESPIRATION RATE: 16 BRPM | DIASTOLIC BLOOD PRESSURE: 80 MMHG | TEMPERATURE: 97.5 F | OXYGEN SATURATION: 98 % | BODY MASS INDEX: 46.23 KG/M2 | WEIGHT: 270.8 LBS | HEIGHT: 64 IN | SYSTOLIC BLOOD PRESSURE: 124 MMHG | HEART RATE: 60 BPM

## 2020-04-01 DIAGNOSIS — G43.109 COMPLICATED MIGRAINE: ICD-10-CM

## 2020-04-01 DIAGNOSIS — G43.119 INTRACTABLE MIGRAINE WITH AURA WITHOUT STATUS MIGRAINOSUS: ICD-10-CM

## 2020-04-01 RX ORDER — FREMANEZUMAB-VFRM 225 MG/1.5ML
225 INJECTION SUBCUTANEOUS
Qty: 1 SYRINGE | Refills: 2 | Status: SHIPPED | OUTPATIENT
Start: 2020-04-01 | End: 2020-07-02 | Stop reason: ALTCHOICE

## 2020-04-01 RX ORDER — RIZATRIPTAN BENZOATE 10 MG/1
10 TABLET, ORALLY DISINTEGRATING ORAL
Qty: 12 TAB | Refills: 2 | Status: SHIPPED | OUTPATIENT
Start: 2020-04-01 | End: 2020-04-01

## 2020-04-01 RX ORDER — TOPIRAMATE 100 MG/1
100 TABLET, FILM COATED ORAL 2 TIMES DAILY
Qty: 30 TAB | Refills: 2 | Status: SHIPPED | OUTPATIENT
Start: 2020-04-01 | End: 2020-04-01 | Stop reason: DRUGHIGH

## 2020-04-01 NOTE — TELEPHONE ENCOUNTER
Spoke with pharmacist, ID verified times 2. Patient just refilled a prescription written 8/7/19 Topamax 200 mg on 3/25/20 and then refill another prescription for Topamax 25 mg on 3/37/20. Please advise.

## 2020-04-01 NOTE — TELEPHONE ENCOUNTER
Aruna is requesting a call back for clarification on the Topiramate rx that was just sent over.       [phone]

## 2020-04-02 NOTE — TELEPHONE ENCOUNTER
Spoke with Lupe Chung at [facility] ID of the patient verified times 2. Patient's prescription written by Dr. Lucero Left discontinued, Topamax 100 mg due to patient filled a same medication with on 3/28/20. Mehreen verbalized understanding.
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
## 2020-04-07 RX ORDER — TOPIRAMATE 25 MG/1
25 TABLET ORAL 2 TIMES DAILY
OUTPATIENT
Start: 2020-04-07

## 2020-04-07 NOTE — TELEPHONE ENCOUNTER
----- Message from Ronald Snyder sent at 4/7/2020  1:39 PM EDT -----  Regarding: Dr Gauthier/refill  Pt needs a refill on Topiramate 25 mg, 4 times a day, call into [facility], number on file, pt can be reach at [phone]

## 2020-04-08 ENCOUNTER — TELEPHONE (OUTPATIENT)
Dept: NEUROLOGY | Age: 50
End: 2020-04-08

## 2020-04-08 NOTE — TELEPHONE ENCOUNTER
NOTE: Plan prefers [facility] for CGRP med. Can submit PA for Ajovy, but it will likely deny. Contniue Ajovy or switch to [facility]?

## 2020-04-08 NOTE — TELEPHONE ENCOUNTER
Prior Authorization NOT REQUIRED for Topiramate 100mg (x60 tabs/30 days), according to [facility] via Cover My Meds. [facility] responded with message stating, \"Your PA has been resolved, no additional PA is required. \"     CMM [place]:  Sakina Topete

## 2020-04-15 ENCOUNTER — VIRTUAL VISIT (OUTPATIENT)
Dept: INTERNAL MEDICINE CLINIC | Age: 50
End: 2020-04-15

## 2020-04-15 DIAGNOSIS — J20.9 ACUTE BRONCHITIS, UNSPECIFIED ORGANISM: Primary | ICD-10-CM

## 2020-04-15 RX ORDER — AZITHROMYCIN 250 MG/1
TABLET, FILM COATED ORAL
Qty: 6 TAB | Refills: 0 | Status: SHIPPED | OUTPATIENT
Start: 2020-04-15 | End: 2020-07-01 | Stop reason: ALTCHOICE

## 2020-04-15 NOTE — PROGRESS NOTES
Chief Complaint   Patient presents with    Asthma    Wheezing    Shortness of Breath    Concern For COVID-19 (Coronavirus)     3 most recent PHQ Screens 4/1/2020   PHQ Not Done -   Little interest or pleasure in doing things Not at all   Feeling down, depressed, irritable, or hopeless Not at all   Total Score PHQ 2 0   Trouble falling or staying asleep, or sleeping too much -   Feeling tired or having little energy -   Poor appetite, weight loss, or overeating -   Feeling bad about yourself - or that you are a failure or have let yourself or your family down -   Trouble concentrating on things such as school, work, reading, or watching TV -   Moving or speaking so slowly that other people could have noticed; or the opposite being so fidgety that others notice -   Thoughts of being better off dead, or hurting yourself in some way -   PHQ 9 Score -   How difficult have these problems made it for you to do your work, take care of your home and get along with others -     1. Have you been to the ER, urgent care clinic since your last visit? Hospitalized since your last visit?no    2. Have you seen or consulted any other health care providers outside of the [address] [place] [address] since your last visit? Include any pap smears or colon screening.  no

## 2020-04-15 NOTE — PROGRESS NOTES
Ricardo Ontiveros is a 45 y.o. female and presents with Asthma; Wheezing; Shortness of Breath; and Concern For COVID-19 (Coronavirus)  . Subjective:    Ricardo Ontiveros is a 45 y.o. female being evaluated by a Virtual Visit (video visit) encounter to address concerns as mentioned above. A caregiver was present when appropriate. Due to this being a TeleHealth encounter (During [facility]-18 public health emergency), evaluation of the following organ systems was limited: Vitals/Constitutional/EENT/Resp/CV/GI//MS/Neuro/Skin/Heme-Lymph-Imm. Pursuant to the emergency declaration under the [address] [place] [address] and the [employer] Act, this Virtual Visit was conducted with patient's (and/or legal guardian's) consent, to reduce the risk of exposure to COVID-19 and provide necessary medical care. Services were provided through a video synchronous discussion virtually to substitute for in-person encounter. --Levi Camacho MD on 4/15/2020 at 4:21 PM    An electronic signature was used to authenticate this note. Upper respiratory infection Review:  Ricardo Ontiveros is a 45 y.o. female who complains of nasal congestion,sore throat, productive cough, myalgias,wheezing  for the past few days, gradually worsening since that time. 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 Evaluation to date: none. Treatment to date: OTC products. Relevant PMH: No pertinent additional PMH.         Review of Systems  Constitutional: negative for fevers, chills, anorexia and weight loss  Eyes:   negative for visual disturbance and irritation  ENT:   sore throat,nasal congestionRespiratory:  negative for cough, hemoptysis, dyspnea,wheezing  CV:   negative for chest pain, palpitations, lower extremity edema  GI:   negative for nausea, vomiting, diarrhea, abdominal pain,melena  Endo:               negative for polyuria,polydipsia,polyphagia,heat intolerance  Genitourinary: negative for frequency, dysuria and hematuria  Integument:  negative for rash and pruritus  Hematologic:  negative for easy bruising and gum/nose bleeding  Musculoskel: negative for myalgias, arthralgias, back pain, muscle weakness, joint pain  Neurological:  negative for headaches, dizziness, vertigo, memory problems and gait   Behavl/Psych: negative for feelings of anxiety, depression, mood changes    Past Medical History:   Diagnosis Date    Arthritis     Asthma     Seasonal     Bipolar disorder (HCC)     IBS (irritable bowel syndrome)     Migraine     Peptic ulcer     PTSD (post-traumatic stress disorder)     Snoring     TIA (transient ischemic attack)     As of 19, pt states she never had one and that her migraine caused symptoms of TIA     Past Surgical History:   Procedure Laterality Date    HX CERVICAL FUSION      C4-C7    HX CHOLECYSTECTOMY      HX COLONOSCOPY      HX CYST REMOVAL      from under both arms    HX HEENT Left 2013    orbital    HX HERNIA REPAIR  2009    HX MENISCUS REPAIR Left     x2    HX ORTHOPAEDIC Left     ORIF left fibula    HX TUBAL LIGATION      HX TUBAL LIGATION      HYSTEROSCOPY DIAGNOSTIC      x2     Social History     Socioeconomic History    Marital status: SINGLE     Spouse name: Not on file    Number of children: Not on file    Years of education: Not on file    Highest education level: Not on file   Tobacco Use    Smoking status: Former Smoker     Packs/day: 0.25     Last attempt to quit: 12/10/2019     Years since quittin.3    Smokeless tobacco: Current User   Substance and Sexual Activity    Alcohol use: Yes     Comment: rare    Drug use: No    Sexual activity: Not Currently     Family History   Problem Relation Age of Onset    Stroke Father  Hypertension Father     Heart Disease Maternal Grandmother     Cancer Maternal Grandmother         brain and colon    Cancer Maternal Uncle         5 w/ brain Hazel Spears    MS Other         1st cousin    Bipolar Disorder Other      Current Outpatient Medications   Medication Sig Dispense Refill    fremanezumab-vfrm (Ajovy) 225 mg/1.5 mL syrg 225 mg by SubCUTAneous route every thirty (30) days. 1 Syringe 2    albuterol (PROVENTIL HFA, VENTOLIN HFA, PROAIR HFA) 90 mcg/actuation inhaler INHALE 2 PUFFS BY MOUTH EVERY 4 HOURS AS NEEDED FOR WHEEZING 8.5 g 11    citalopram (CELEXA) 20 mg tablet Take 1 Tab by mouth daily. 30 Tab 3    fluticasone (FLONASE ALLERGY RELIEF) 50 mcg/actuation nasal spray 2 Sprays by Both Nostrils route daily as needed for Rhinitis.  potassium chloride SR (KLOR-CON 10) 10 mEq tablet Take 20 mEq by mouth daily. With Lasix.  cyanocobalamin (VITAMIN B12) 100 mcg tablet Take 100 mcg by mouth daily.  therapeutic multivitamin (THERA) tablet Take 1 Tab by mouth daily.  Ferrous Sulfate (SLOW FE) 47.5 mg iron TbER tablet Take 1 Tab by mouth nightly.  furosemide (LASIX) 40 mg tablet Take 40 mg by mouth daily.        Allergies   Allergen Reactions    Latex Hives    Other Food Rash     All fruits except apple, oranges, and pineapple (recorded as Other Medication 2/8/2012)    Peanut Swelling    Shellfish Containing Products Anaphylaxis    Morphine Itching     She has had morphine but premedicates with benadryl    Nystatin Rash    Flagyl [Metronidazole] Contact Dermatitis    Flexeril [Cyclobenzaprine] Rash    Other Medication Rash     All fruits except apple, oranges, and pineapple    Phenergan [Promethazine] Other (comments)     Rapid hr    Robaxin [Methocarbamol] Rash       Objective:  Visit Vitals  LMP 12/04/2019     Physical Exam:   General appearance - alert, well appearing, and in no distress  Mental status - alert, oriented to person, place, and time  EYE-BRIGIDA, EOMI, corneas normal, no foreign bodies  ENT-ENT exam normal, no neck nodes or sinus tenderness  Nose - normal and patent, no erythema, discharge or polyps  Mouth - mucous membranes moist, pharynx normal without lesions  Neck - supple,     Results for orders placed or performed during the hospital encounter of 01/08/20   CULTURE, URINE   Result Value Ref Range    Special Requests: NO SPECIAL REQUESTS  Reflexed from [id number]        Moon Count [id number]  COLONIES/mL        Culture result: MIXED UROGENITAL KRISH ISOLATED     CBC WITH AUTOMATED DIFF   Result Value Ref Range    WBC 5.0 3.6 - 11.0 K/uL    RBC 3.52 (L) 3.80 - 5.20 M/uL    HGB 11.2 (L) 11.5 - 16.0 g/dL    HCT 34.6 (L) 35.0 - 47.0 %    MCV 98.3 80.0 - 99.0 FL    MCH 31.8 26.0 - 34.0 PG    MCHC 32.4 30.0 - 36.5 g/dL    RDW 13.7 11.5 - 14.5 %    PLATELET 543 681 - 374 K/uL    MPV 11.8 8.9 - 12.9 FL    NRBC 0.0 0  WBC    ABSOLUTE NRBC 0.00 0.00 - 0.01 K/uL    NEUTROPHILS 54 32 - 75 %    LYMPHOCYTES 30 12 - 49 %    MONOCYTES 13 5 - 13 %    EOSINOPHILS 2 0 - 7 %    BASOPHILS 1 0 - 1 %    IMMATURE GRANULOCYTES 0 0.0 - 0.5 %    ABS. NEUTROPHILS 2.7 1.8 - 8.0 K/UL    ABS. LYMPHOCYTES 1.5 0.8 - 3.5 K/UL    ABS. MONOCYTES 0.7 0.0 - 1.0 K/UL    ABS. EOSINOPHILS 0.1 0.0 - 0.4 K/UL    ABS. BASOPHILS 0.0 0.0 - 0.1 K/UL    ABS. IMM. GRANS. 0.0 0.00 - 0.04 K/UL    DF AUTOMATED     METABOLIC PANEL, COMPREHENSIVE   Result Value Ref Range    Sodium 141 136 - 145 mmol/L    Potassium 3.8 3.5 - 5.1 mmol/L    Chloride 109 (H) 97 - 108 mmol/L    CO2 30 21 - 32 mmol/L    Anion gap 2 (L) 5 - 15 mmol/L    Glucose 88 65 - 100 mg/dL    BUN 18 6 - 20 MG/DL    Creatinine 0.87 0.55 - 1.02 MG/DL    BUN/Creatinine ratio 21 (H) 12 - 20      GFR est AA >60 >60 ml/min/1.73m2    GFR est non-AA >60 >60 ml/min/1.73m2    Calcium 9.0 8.5 - 10.1 MG/DL    Bilirubin, total 0.5 0.2 - 1.0 MG/DL    ALT (SGPT) 18 12 - 78 U/L    AST (SGOT) 14 (L) 15 - 37 U/L    Alk.  phosphatase 87 45 - 117 U/L    Protein, total 8.0 6.4 - 8.2 g/dL    Albumin 3.3 (L) 3.5 - 5.0 g/dL    Globulin 4.7 (H) 2.0 - 4.0 g/dL    A-G Ratio 0.7 (L) 1.1 - 2.2     URINALYSIS W/ REFLEX CULTURE   Result Value Ref Range    Color DARK YELLOW      Appearance CLEAR CLEAR      Specific gravity 1.025 1.003 - 1.030      pH (UA) 6.5 5.0 - 8.0      Protein NEGATIVE  NEG mg/dL    Glucose NEGATIVE  NEG mg/dL    Ketone NEGATIVE  NEG mg/dL    Blood MODERATE (A) NEG      Urobilinogen 1.0 0.2 - 1.0 EU/dL    Nitrites NEGATIVE  NEG      Leukocyte Esterase MODERATE (A) NEG      WBC 10-20 0 - 4 /hpf    RBC 0-5 0 - 5 /hpf    Epithelial cells FEW FEW /lpf    Bacteria 1+ (A) NEG /hpf    UA:UC IF INDICATED URINE CULTURE ORDERED (A) CNI     BILIRUBIN, CONFIRM   Result Value Ref Range    Bilirubin UA, confirm NEGATIVE  NEG         Assessment/Plan:    ICD-10-CM ICD-9-CM    1. Acute bronchitis, unspecified organism J20.9 466.0      No orders of the defined types were placed in this encounter. call if any problems,Take 81mg aspirin daily  There are no Patient Instructions on file for this visit. Needs covid-19 testing    I have reviewed with the patient details of the assessment and plan and all questions were answered. Relevent patient education was performed. The most recent lab findings were reviewed with the patient. An After Visit Summary was printed and given to the patient.

## 2020-04-15 NOTE — PATIENT INSTRUCTIONS
[email or web address] Activation Thank you for requesting access to [employer]. Please follow the instructions below to securely access and download your online medical record. [employer] allows you to send messages to your doctor, view your test results, renew your prescriptions, schedule appointments, and more. How Do I Sign Up? 1. In your internet browser, go to [email or web address] 
2. Click on the First Time User? Click Here link in the Sign In box. You will be redirect to the New Member Sign Up page. 3. Enter your [employer] Access Code exactly as it appears below. You will not need to use this code after youve completed the sign-up process. If you do not sign up before the expiration date, you must request a new code. [employer] Access Code: Activation code not generated Current [employer] Status: Active (This is the date your [employer] access code will ) 4. Enter the last four digits of your Social Security Number (xxxx) and Date of Birth (mm/dd/yyyy) as indicated and click Submit. You will be taken to the next sign-up page. 5. Create a [employer] ID. This will be your [employer] login ID and cannot be changed, so think of one that is secure and easy to remember. 6. Create a [employer] password. You can change your password at any time. 7. Enter your Password Reset Question and Answer. This can be used at a later time if you forget your password. 8. Enter your e-mail address. You will receive e-mail notification when new information is available in [address] 9. Click Sign Up. You can now view and download portions of your medical record. 10. Click the Download Summary menu link to download a portable copy of your medical information. Additional Information If you have questions, please visit the Frequently Asked Questions section of the [employer] website at [email or web address] Remember, [employer] is NOT to be used for urgent needs. For medical emergencies, dial 911.

## 2020-05-04 ENCOUNTER — TELEPHONE (OUTPATIENT)
Dept: NEUROLOGY | Age: 50
End: 2020-05-04

## 2020-05-04 NOTE — TELEPHONE ENCOUNTER
Prior Authorization submitted for Ajovy to [employer] via Cover My Med[employer]. Status Pending. Allow 24-72 hours for response.      [facility]: [id number]  Insurance Case #: [id number]

## 2020-05-06 ENCOUNTER — TELEPHONE (OUTPATIENT)
Dept: NEUROLOGY | Age: 50
End: 2020-05-06

## 2020-05-06 NOTE — TELEPHONE ENCOUNTER
Prior Authorization DENIED for Noahy by Braden Ruggiero. Denial reason states:     -Patient must have trial and failure of Emgality    Can submit appeal, or provider can switch patient to [facility]. Denial scanned into media for review.

## 2020-05-14 ENCOUNTER — VIRTUAL VISIT (OUTPATIENT)
Dept: INTERNAL MEDICINE CLINIC | Age: 50
End: 2020-05-14

## 2020-05-14 ENCOUNTER — TELEPHONE (OUTPATIENT)
Dept: INTERNAL MEDICINE CLINIC | Age: 50
End: 2020-05-14

## 2020-05-14 DIAGNOSIS — E66.01 OBESITY, MORBID (HCC): ICD-10-CM

## 2020-05-14 DIAGNOSIS — R19.7 DIARRHEA, UNSPECIFIED TYPE: Primary | ICD-10-CM

## 2020-05-14 NOTE — PATIENT INSTRUCTIONS
[employer] Activation Thank you for requesting access to [employer]. Please follow the instructions below to securely access and download your online medical record. [employer] allows you to send messages to your doctor, view your test results, renew your prescriptions, schedule appointments, and more. How Do I Sign Up? 1. In your internet browser, go to [email or web address] 
2. Click on the First Time User? Click Here link in the Sign In box. You will be redirect to the New Member Sign Up page. 3. Enter your [employer] Access Code exactly as it appears below. You will not need to use this code after youve completed the sign-up process. If you do not sign up before the expiration date, you must request a new code. [employer] Access Code: Activation code not generated Current [employer] Status: Active (This is the date your [employer] access code will ) 4. Enter the last four digits of your Social Security Number (xxxx) and Date of Birth (mm/dd/yyyy) as indicated and click Submit. You will be taken to the next sign-up page. 5. Create a [employer] ID. This will be your [employer] login ID and cannot be changed, so think of one that is secure and easy to remember. 6. Create a [employer] password. You can change your password at any time. 7. Enter your Password Reset Question and Answer. This can be used at a later time if you forget your password. 8. Enter your e-mail address. You will receive e-mail notification when new information is available in [address] 9. Click Sign Up. You can now view and download portions of your medical record. 10. Click the Download Summary menu link to download a portable copy of your medical information. Additional Information If you have questions, please visit the Frequently Asked Questions section of the [employer] website at [email or web address] Remember, [employer] is NOT to be used for urgent needs. For medical emergencies, dial 911.

## 2020-05-14 NOTE — PROGRESS NOTES
David Casiano is a 45 y.o. female being evaluated by a Virtual Visit (video visit) encounter to address concerns as mentioned above. A caregiver was present when appropriate. Due to this being a TeleHealth encounter (During VPXSA-03 public health emergency), evaluation of the following organ systems was limited: Vitals/Constitutional/EENT/Resp/CV/GI//MS/Neuro/Skin/Heme-Lymph-Imm. Pursuant to the emergency declaration under the [address] [place] [address] and the [employer] Act, this Virtual Visit was conducted with patient's (and/or legal guardian's) consent, to reduce the risk of exposure to COVID-19 and provide necessary medical care. Services were provided through a video synchronous discussion virtually to substitute for in-person encounter. --Bela Jarvis MD on 5/14/2020 at 11:50 AM    An electronic signature was used to authenticate this note. David Casiano is a 45 y.o. female and presents with ED Follow-up  . Subjective:  Patient is a 45 y.o. female with a history  of diarrhea over the past a few days. Patient reports having approximately 3 loose stools per day. The stool contains no blood or greasy droplets. Patient reports associated nausea and crampy, periumbilical pain, but denies any vomiting, fevers, or chills. Patient denies any recent sick exposures, travel outside the [place] or consumption of well water. Patient denies any recent change in medications. No recent colonoscopy. She has a pain in her rt. lower side of abdomen  She recently had a negative ct scan      Review of Systems  Constitutional: negative for fevers, chills, anorexia and weight loss  Eyes:   negative for visual disturbance and irritation  ENT:   negative for tinnitus,sore throat,nasal congestion,ear pains. hoarseness  Respiratory:  negative for cough, hemoptysis, dyspnea,wheezing  CV:   negative for chest pain, palpitations, lower extremity edema  GI:   nausea, vomiting, diarrhea, abdominal pain,melena  Endo:               negative for polyuria,polydipsia,polyphagia,heat intolerance  Genitourinary: negative for frequency, dysuria and hematuria  Integument:  negative for rash and pruritus  Hematologic:  negative for easy bruising and gum/nose bleeding  Musculoskel: negative for myalgias, arthralgias, back pain, muscle weakness, joint pain  Neurological:  negative for headaches, dizziness, vertigo, memory problems and gait   Behavl/Psych: negative for feelings of anxiety, depression, mood changes    Past Medical History:   Diagnosis Date    Arthritis     Asthma     Seasonal     Bipolar disorder (HCC)     IBS (irritable bowel syndrome)     Migraine     Peptic ulcer     PTSD (post-traumatic stress disorder)     Snoring     TIA (transient ischemic attack)     As of 19, pt states she never had one and that her migraine caused symptoms of TIA     Past Surgical History:   Procedure Laterality Date    HX CERVICAL FUSION      C4-C7    HX CHOLECYSTECTOMY      HX COLONOSCOPY      HX CYST REMOVAL      from under both arms    HX HEENT Left 2013    orbital    HX HERNIA REPAIR  2009    HX MENISCUS REPAIR Left     x2    HX ORTHOPAEDIC Left     ORIF left fibula    HX TUBAL LIGATION      HX TUBAL LIGATION      HYSTEROSCOPY DIAGNOSTIC      x2     Social History     Socioeconomic History    Marital status: SINGLE     Spouse name: Not on file    Number of children: Not on file    Years of education: Not on file    Highest education level: Not on file   Tobacco Use    Smoking status: Former Smoker     Packs/day: 0.25     Last attempt to quit: 12/10/2019     Years since quittin.4    Smokeless tobacco: Current User   Substance and Sexual Activity    Alcohol use: Yes     Comment: rare    Drug use: No    Sexual activity: Not Currently     Family History   Problem Relation Age of Onset  Stroke Father     Hypertension Father     Heart Disease Maternal Grandmother     Cancer Maternal Grandmother         brain and colon    Cancer Maternal Uncle         5 w/ brain [place]    MS Other         1st cousin    Bipolar Disorder Other      Current Outpatient Medications   Medication Sig Dispense Refill    fremanezumab-vfrm (Ajovy) 225 mg/1.5 mL syrg 225 mg by SubCUTAneous route every thirty (30) days. 1 Syringe 2    albuterol (PROVENTIL HFA, VENTOLIN HFA, PROAIR HFA) 90 mcg/actuation inhaler INHALE 2 PUFFS BY MOUTH EVERY 4 HOURS AS NEEDED FOR WHEEZING 8.5 g 11    citalopram (CELEXA) 20 mg tablet Take 1 Tab by mouth daily. 30 Tab 3    fluticasone (FLONASE ALLERGY RELIEF) 50 mcg/actuation nasal spray 2 Sprays by Both Nostrils route daily as needed for Rhinitis.  potassium chloride SR (KLOR-CON 10) 10 mEq tablet Take 20 mEq by mouth daily. With Lasix.  cyanocobalamin (VITAMIN B12) 100 mcg tablet Take 100 mcg by mouth daily.  therapeutic multivitamin (THERA) tablet Take 1 Tab by mouth daily.  Ferrous Sulfate (SLOW FE) 47.5 mg iron TbER tablet Take 1 Tab by mouth nightly.  furosemide (LASIX) 40 mg tablet Take 40 mg by mouth daily.       azithromycin (ZITHROMAX) 250 mg tablet 2 tabs today and then 1 tab daily for 4 days 6 Tab 0     Allergies   Allergen Reactions    Latex Hives    Other Food Rash     All fruits except apple, oranges, and pineapple (recorded as Other Medication 2/8/2012)    Peanut Swelling    Shellfish Containing Products Anaphylaxis    Morphine Itching     She has had morphine but premedicates with benadryl    Nystatin Rash    Flagyl [Metronidazole] Contact Dermatitis    Flexeril [Cyclobenzaprine] Rash    Other Medication Rash     All fruits except apple, oranges, and pineapple    Phenergan [Promethazine] Other (comments)     Rapid hr    Robaxin [Methocarbamol] Rash       Objective:  Visit Vitals  LMP 12/04/2019     Physical Exam:   General appearance - alert, well appearing, and in no distress  Mental status - alert, oriented to person, place, and time  EYE-BRIGIDA, EOMI, corneas normal, no foreign bodies  ENT-ENT exam normal, no neck nodes or sinus tenderness  Nose - normal and patent, no erythema, discharge or polyps  Mouth - mucous membranes moist, pharynx normal without lesions  Skin-Warm and dry. no hyperpigmentation, vitiligo, or suspicious lesions  Neuro -alert, oriented, normal speech, no focal findings or movement disorder noted  Neck-normal C-spine, no tenderness, full ROM without pain        Results for orders placed or performed during the hospital encounter of 01/08/20   CULTURE, URINE   Result Value Ref Range    Special Requests: NO SPECIAL REQUESTS  Reflexed from [id number]        [place] Count 71845  COLONIES/mL        Culture result: MIXED UROGENITAL KRISH ISOLATED     CBC WITH AUTOMATED DIFF   Result Value Ref Range    WBC 5.0 3.6 - 11.0 K/uL    RBC 3.52 (L) 3.80 - 5.20 M/uL    HGB 11.2 (L) 11.5 - 16.0 g/dL    HCT 34.6 (L) 35.0 - 47.0 %    MCV 98.3 80.0 - 99.0 FL    MCH 31.8 26.0 - 34.0 PG    MCHC 32.4 30.0 - 36.5 g/dL    RDW 13.7 11.5 - 14.5 %    PLATELET 031 823 - 213 K/uL    MPV 11.8 8.9 - 12.9 FL    NRBC 0.0 0  WBC    ABSOLUTE NRBC 0.00 0.00 - 0.01 K/uL    NEUTROPHILS 54 32 - 75 %    LYMPHOCYTES 30 12 - 49 %    MONOCYTES 13 5 - 13 %    EOSINOPHILS 2 0 - 7 %    BASOPHILS 1 0 - 1 %    IMMATURE GRANULOCYTES 0 0.0 - 0.5 %    ABS. NEUTROPHILS 2.7 1.8 - 8.0 K/UL    ABS. LYMPHOCYTES 1.5 0.8 - 3.5 K/UL    ABS. MONOCYTES 0.7 0.0 - 1.0 K/UL    ABS. EOSINOPHILS 0.1 0.0 - 0.4 K/UL    ABS. BASOPHILS 0.0 0.0 - 0.1 K/UL    ABS. IMM.  GRANS. 0.0 0.00 - 0.04 K/UL    DF AUTOMATED     METABOLIC PANEL, COMPREHENSIVE   Result Value Ref Range    Sodium 141 136 - 145 mmol/L    Potassium 3.8 3.5 - 5.1 mmol/L    Chloride 109 (H) 97 - 108 mmol/L    CO2 30 21 - 32 mmol/L    Anion gap 2 (L) 5 - 15 mmol/L    Glucose 88 65 - 100 mg/dL    BUN 18 6 - 20 MG/DL Creatinine 0.87 0.55 - 1.02 MG/DL    BUN/Creatinine ratio 21 (H) 12 - 20      GFR est AA >60 >60 ml/min/1.73m2    GFR est non-AA >60 >60 ml/min/1.73m2    Calcium 9.0 8.5 - 10.1 MG/DL    Bilirubin, total 0.5 0.2 - 1.0 MG/DL    ALT (SGPT) 18 12 - 78 U/L    AST (SGOT) 14 (L) 15 - 37 U/L    Alk. phosphatase 87 45 - 117 U/L    Protein, total 8.0 6.4 - 8.2 g/dL    Albumin 3.3 (L) 3.5 - 5.0 g/dL    Globulin 4.7 (H) 2.0 - 4.0 g/dL    A-G Ratio 0.7 (L) 1.1 - 2.2     URINALYSIS W/ REFLEX CULTURE   Result Value Ref Range    Color DARK YELLOW      Appearance CLEAR CLEAR      Specific gravity 1.025 1.003 - 1.030      pH (UA) 6.5 5.0 - 8.0      Protein NEGATIVE  NEG mg/dL    Glucose NEGATIVE  NEG mg/dL    Ketone NEGATIVE  NEG mg/dL    Blood MODERATE (A) NEG      Urobilinogen 1.0 0.2 - 1.0 EU/dL    Nitrites NEGATIVE  NEG      Leukocyte Esterase MODERATE (A) NEG      WBC 10-20 0 - 4 /hpf    RBC 0-5 0 - 5 /hpf    Epithelial cells FEW FEW /lpf    Bacteria 1+ (A) NEG /hpf    UA:UC IF INDICATED URINE CULTURE ORDERED (A) CNI     BILIRUBIN, CONFIRM   Result Value Ref Range    Bilirubin UA, confirm NEGATIVE  NEG         Assessment/Plan:    ICD-10-CM ICD-9-CM    1. Diarrhea, unspecified type R19.7 787.91    2. Obesity, morbid ([facility] [address] E66.01 278.01      No orders of the defined types were placed in this encounter. lose weight, call if any problems, liquids,Take 81mg aspirin daily  Patient Instructions   [facility] Activation    Thank you for requesting access to [facility]. Please follow the instructions below to securely access and download your online medical record. [facility] allows you to send messages to your doctor, view your test results, renew your prescriptions, schedule appointments, and more. How Do I Sign Up? 1. In your internet browser, go to [email or web address]  2. Click on the First Time User? Click Here link in the Sign In box. You will be redirect to the New Member Sign Up page.   3. Enter your [facility] Access Code exactly as it appears below. You will not need to use this code after youve completed the sign-up process. If you do not sign up before the expiration date, you must request a new code. [employer] Access Code: Activation code not generated  Current [employer] Status: Active (This is the date your [employer] access code will )    4. Enter the last four digits of your Social Security Number (xxxx) and Date of Birth (mm/dd/yyyy) as indicated and click Submit. You will be taken to the next sign-up page. 5. Create a [employer] ID. This will be your [employer] login ID and cannot be changed, so think of one that is secure and easy to remember. 6. Create a [employer] password. You can change your password at any time. 7. Enter your Password Reset Question and Answer. This can be used at a later time if you forget your password. 8. Enter your e-mail address. You will receive e-mail notification when new information is available in [address] 9. Click Sign Up. You can now view and download portions of your medical record. 10. Click the Download Summary menu link to download a portable copy of your medical information. Additional Information    If you have questions, please visit the Frequently Asked Questions section of the [employer] website at [email or web address] Remember, [employer] is NOT to be used for urgent needs. For medical emergencies, dial 911. Follow-up and Dispositions    · Return in about 4 weeks (around 2020). I have reviewed with the patient details of the assessment and plan and all questions were answered. Relevent patient education was performed. The most recent lab findings were reviewed with the patient. An After Visit Summary was printed and given to the patient.

## 2020-05-14 NOTE — TELEPHONE ENCOUNTER
Call from pt who complains of right side pain/  States she has had pain approx 3 days  Initially had chills, fever, fatigue. Unable to get out of bed. on 5/12/2020  Tried to work on 5/13/2020 but had to be taken from work to hospital ED in [place], [place]. Stated hospital was unable to do CT scan to determine what was  Cause of pain. Pt states she was told all labs were \"Normal\".   Only dx was Dehydration and told to follow up with PCP  Appt set for 5/14/2020 11:15 AM.

## 2020-05-14 NOTE — PROGRESS NOTES
Chief Complaint   Patient presents with   [facility] ED Follow-up     3 most recent PHQ Screens 5/14/2020   PHQ Not Done -   Little interest or pleasure in doing things Not at all   Feeling down, depressed, irritable, or hopeless Not at all   Total Score PHQ 2 0   Trouble falling or staying asleep, or sleeping too much -   Feeling tired or having little energy -   Poor appetite, weight loss, or overeating -   Feeling bad about yourself - or that you are a failure or have let yourself or your family down -   Trouble concentrating on things such as school, work, reading, or watching TV -   Moving or speaking so slowly that other people could have noticed; or the opposite being so fidgety that others notice -   Thoughts of being better off dead, or hurting yourself in some way -   PHQ 9 Score -   How difficult have these problems made it for you to do your work, take care of your home and get along with others -     1. Have you been to the ER, urgent care clinic since your last visit? Hospitalized since your last visit? no    2. Have you seen or consulted any other health care providers outside of the [address] [place] [address] since your last visit? Include any pap smears or colon screening.  no

## 2020-07-01 ENCOUNTER — VIRTUAL VISIT (OUTPATIENT)
Dept: INTERNAL MEDICINE CLINIC | Age: 50
End: 2020-07-01

## 2020-07-01 DIAGNOSIS — R60.0 LOCALIZED EDEMA: Primary | ICD-10-CM

## 2020-07-01 DIAGNOSIS — E66.01 OBESITY, MORBID (HCC): ICD-10-CM

## 2020-07-01 RX ORDER — FUROSEMIDE 40 MG/1
TABLET ORAL
Qty: 30 TAB | Refills: 6 | Status: SHIPPED | OUTPATIENT
Start: 2020-07-01 | End: 2020-07-01 | Stop reason: SDUPTHER

## 2020-07-01 RX ORDER — FUROSEMIDE 40 MG/1
TABLET ORAL
Qty: 30 TAB | Refills: 6 | Status: SHIPPED | OUTPATIENT
Start: 2020-07-01 | End: 2021-08-20

## 2020-07-01 NOTE — PROGRESS NOTES
Chief Complaint   Patient presents with    Leg Swelling    Foot Swelling     3 most recent PHQ Screens 5/14/2020   PHQ Not Done -   Little interest or pleasure in doing things Not at all   Feeling down, depressed, irritable, or hopeless Not at all   Total Score PHQ 2 0   Trouble falling or staying asleep, or sleeping too much -   Feeling tired or having little energy -   Poor appetite, weight loss, or overeating -   Feeling bad about yourself - or that you are a failure or have let yourself or your family down -   Trouble concentrating on things such as school, work, reading, or watching TV -   Moving or speaking so slowly that other people could have noticed; or the opposite being so fidgety that others notice -   Thoughts of being better off dead, or hurting yourself in some way -   PHQ 9 Score -   How difficult have these problems made it for you to do your work, take care of your home and get along with others -     1. Have you been to the ER, urgent care clinic since your last visit? Hospitalized since your last visit?no    2. Have you seen or consulted any other health care providers outside of the [address] [place] [address] since your last visit? Include any pap smears or colon screening.  no

## 2020-07-01 NOTE — PROGRESS NOTES
Veneda Claude is a 45 y.o. female and presents with Leg Swelling and Foot Swelling  . Subjective:  She has recurrent swelling of her legs and feet that are worse in the evening and as the day progresses. Review of Systems  Constitutional: negative for fevers, chills, anorexia and weight loss  Eyes:   negative for visual disturbance and irritation  ENT:   negative for tinnitus,sore throat,nasal congestion,ear pains. hoarseness  Respiratory:  negative for cough, hemoptysis, dyspnea,wheezing  CV:   negative for chest pain, palpitations, lower extremity edema  GI:   negative for nausea, vomiting, diarrhea, abdominal pain,melena  Endo:               negative for polyuria,polydipsia,polyphagia,heat intolerance  Genitourinary: negative for frequency, dysuria and hematuria  Integument:  negative for rash and pruritus  Hematologic:  negative for easy bruising and gum/nose bleeding  Musculoskel: negative for myalgias, arthralgias, back pain, muscle weakness, joint pain  Neurological:  negative for headaches, dizziness, vertigo, memory problems and gait   Behavl/Psych: feelings of anxiety, depression, mood changes    Past Medical History:   Diagnosis Date    Arthritis     Asthma     Seasonal     Bipolar disorder (HCC)     IBS (irritable bowel syndrome)     Migraine     Peptic ulcer     PTSD (post-traumatic stress disorder)     Snoring     TIA (transient ischemic attack)     As of 12/13/19, pt states she never had one and that her migraine caused symptoms of TIA     Past Surgical History:   Procedure Laterality Date    HX CERVICAL FUSION      C4-C7    HX CHOLECYSTECTOMY      HX COLONOSCOPY  2010    HX CYST REMOVAL      from under both arms    HX HEENT Left 2013    orbital    HX HERNIA REPAIR  2009    HX MENISCUS REPAIR Left     x2    HX ORTHOPAEDIC Left     ORIF left fibula    HX TUBAL LIGATION      HX TUBAL LIGATION  1994    HYSTEROSCOPY DIAGNOSTIC      x2     Social History     Socioeconomic History    Marital status: SINGLE     Spouse name: Not on file    Number of children: Not on file    Years of education: Not on file    Highest education level: Not on file   Tobacco Use    Smoking status: Former Smoker     Packs/day: 0.25     Last attempt to quit: 12/10/2019     Years since quittin.5    Smokeless tobacco: Current User   Substance and Sexual Activity    Alcohol use: Yes     Comment: rare    Drug use: No    Sexual activity: Not Currently     Family History   Problem Relation Age of Onset    Stroke Father     Hypertension Father     Heart Disease Maternal Grandmother     Cancer Maternal Grandmother         brain and colon    Cancer Maternal Uncle         5 w/ brain Subha Linda    MS Other         1st cousin    Bipolar Disorder Other      Current Outpatient Medications   Medication Sig Dispense Refill    FENUGREEK SEED EXTRACT PO Take  by mouth.  KARINA EXTRACT PO Take  by mouth.  HOP-BLK COH-RHODIOLA-FLAX-PRIM PO Take  by mouth.  furosemide (Lasix) 40 mg tablet Si tab daily 30 Tab 6    albuterol (PROVENTIL HFA, VENTOLIN HFA, PROAIR HFA) 90 mcg/actuation inhaler INHALE 2 PUFFS BY MOUTH EVERY 4 HOURS AS NEEDED FOR WHEEZING 8.5 g 11    citalopram (CELEXA) 20 mg tablet Take 1 Tab by mouth daily. 30 Tab 3    fluticasone (FLONASE ALLERGY RELIEF) 50 mcg/actuation nasal spray 2 Sprays by Both Nostrils route daily as needed for Rhinitis.  potassium chloride SR (KLOR-CON 10) 10 mEq tablet Take 20 mEq by mouth daily. With Lasix.  cyanocobalamin (VITAMIN B12) 100 mcg tablet Take 100 mcg by mouth daily.  therapeutic multivitamin (THERA) tablet Take 1 Tab by mouth daily.  Ferrous Sulfate (SLOW FE) 47.5 mg iron TbER tablet Take 1 Tab by mouth nightly.  fremanezumab-vfrm (Ajovy) 225 mg/1.5 mL syrg 225 mg by SubCUTAneous route every thirty (30) days.  1 Syringe 2     Allergies   Allergen Reactions    Latex Hives    Other Food Rash     All fruits except apple, oranges, and pineapple (recorded as Other Medication 2/8/2012)    Peanut Swelling    Shellfish Containing Products Anaphylaxis    Morphine Itching     She has had morphine but premedicates with benadryl    Nystatin Rash    Flagyl [Metronidazole] Contact Dermatitis    Flexeril [Cyclobenzaprine] Rash    Other Medication Rash     All fruits except apple, oranges, and pineapple    Phenergan [Promethazine] Other (comments)     Rapid hr    Robaxin [Methocarbamol] Rash       Objective:  Visit Vitals  LMP 12/04/2019     Physical Exam:   General appearance - alert, well appearing, and in no distress  Mental status - alert, oriented to person, place, and time  EYE-BRIGIDA, EOMI, corneas normal, no foreign bodies  ENT-ENT exam normal, no neck nodes or sinus tenderness  Nose - normal and patent, no erythema, discharge or polyps  Mouth - mucous membranes moist, pharynx normal without lesions  Neck - supple, no significant adenopathy   Chest - clear to auscultation, no wheezes, rales or rhonchi, symmetric air entry   Heart - normal rate, regular rhythm, normal S1, S2, no murmurs, rubs, clicks or gallops   Abdomen - soft, nontender, nondistended, no masses or organomegaly  Lymph- no adenopathy palpable  Ext-peripheral pulses normal, 2+lower leg edema, no clubbing or cyanosis  Skin-Warm and dry.  no hyperpigmentation, vitiligo, or suspicious lesions  Neuro -alert, oriented, normal speech, no focal findings or movement disorder noted  Neck-normal C-spine, no tenderness, full ROM without pain  Feet-no nail deformities or callus formation with good pulses noted      Results for orders placed or performed during the hospital encounter of 01/08/20   CULTURE, URINE   Result Value Ref Range    Special Requests: NO SPECIAL REQUESTS  Reflexed from [id number]        [place] Count [id number]  COLONIES/mL        Culture result: MIXED UROGENITAL KRISH ISOLATED     CBC WITH AUTOMATED DIFF   Result Value Ref Range    WBC 5.0 3.6 - 11.0 K/uL RBC 3.52 (L) 3.80 - 5.20 M/uL    HGB 11.2 (L) 11.5 - 16.0 g/dL    HCT 34.6 (L) 35.0 - 47.0 %    MCV 98.3 80.0 - 99.0 FL    MCH 31.8 26.0 - 34.0 PG    MCHC 32.4 30.0 - 36.5 g/dL    RDW 13.7 11.5 - 14.5 %    PLATELET 984 377 - 159 K/uL    MPV 11.8 8.9 - 12.9 FL    NRBC 0.0 0  WBC    ABSOLUTE NRBC 0.00 0.00 - 0.01 K/uL    NEUTROPHILS 54 32 - 75 %    LYMPHOCYTES 30 12 - 49 %    MONOCYTES 13 5 - 13 %    EOSINOPHILS 2 0 - 7 %    BASOPHILS 1 0 - 1 %    IMMATURE GRANULOCYTES 0 0.0 - 0.5 %    ABS. NEUTROPHILS 2.7 1.8 - 8.0 K/UL    ABS. LYMPHOCYTES 1.5 0.8 - 3.5 K/UL    ABS. MONOCYTES 0.7 0.0 - 1.0 K/UL    ABS. EOSINOPHILS 0.1 0.0 - 0.4 K/UL    ABS. BASOPHILS 0.0 0.0 - 0.1 K/UL    ABS. IMM. GRANS. 0.0 0.00 - 0.04 K/UL    DF AUTOMATED     METABOLIC PANEL, COMPREHENSIVE   Result Value Ref Range    Sodium 141 136 - 145 mmol/L    Potassium 3.8 3.5 - 5.1 mmol/L    Chloride 109 (H) 97 - 108 mmol/L    CO2 30 21 - 32 mmol/L    Anion gap 2 (L) 5 - 15 mmol/L    Glucose 88 65 - 100 mg/dL    BUN 18 6 - 20 MG/DL    Creatinine 0.87 0.55 - 1.02 MG/DL    BUN/Creatinine ratio 21 (H) 12 - 20      GFR est AA >60 >60 ml/min/1.73m2    GFR est non-AA >60 >60 ml/min/1.73m2    Calcium 9.0 8.5 - 10.1 MG/DL    Bilirubin, total 0.5 0.2 - 1.0 MG/DL    ALT (SGPT) 18 12 - 78 U/L    AST (SGOT) 14 (L) 15 - 37 U/L    Alk.  phosphatase 87 45 - 117 U/L    Protein, total 8.0 6.4 - 8.2 g/dL    Albumin 3.3 (L) 3.5 - 5.0 g/dL    Globulin 4.7 (H) 2.0 - 4.0 g/dL    A-G Ratio 0.7 (L) 1.1 - 2.2     URINALYSIS W/ REFLEX CULTURE   Result Value Ref Range    Color DARK YELLOW      Appearance CLEAR CLEAR      Specific gravity 1.025 1.003 - 1.030      pH (UA) 6.5 5.0 - 8.0      Protein NEGATIVE  NEG mg/dL    Glucose NEGATIVE  NEG mg/dL    Ketone NEGATIVE  NEG mg/dL    Blood MODERATE (A) NEG      Urobilinogen 1.0 0.2 - 1.0 EU/dL    Nitrites NEGATIVE  NEG      Leukocyte Esterase MODERATE (A) NEG      WBC 10-20 0 - 4 /hpf    RBC 0-5 0 - 5 /hpf    Epithelial cells FEW FEW /lpf    Bacteria 1+ (A) NEG /hpf    UA:UC IF INDICATED URINE CULTURE ORDERED (A) CNI     BILIRUBIN, CONFIRM   Result Value Ref Range    Bilirubin UA, confirm NEGATIVE  NEG         Assessment/Plan:    ICD-10-CM ICD-9-CM    1. Localized edema R60.0 782.3    2. Obesity, morbid ([facility] [address] E66.01 278.01      Orders Placed This Encounter    FENUGREEK SEED EXTRACT PO     Sig: Take  by mouth.  KARINA EXTRACT PO     Sig: Take  by mouth.  HOP-BLK COH-RHODIOLA-FLAX-PRIM PO     Sig: Take  by mouth.  DISCONTD: furosemide (Lasix) 40 mg tablet     Sig: Si tab daily     Dispense:  30 Tab     Refill:  6    furosemide (Lasix) 40 mg tablet     Sig: Si tab daily     Dispense:  30 Tab     Refill:  6     lose weight, increase physical activity, follow low fat diet, follow low salt diet, call if any problems,Take 81mg aspirin daily  Patient Instructions   [email or web address] Activation    Thank you for requesting access to [employer]. Please follow the instructions below to securely access and download your online medical record. [employer] allows you to send messages to your doctor, view your test results, renew your prescriptions, schedule appointments, and more. How Do I Sign Up? 1. In your internet browser, go to [email or web address]  2. Click on the First Time User? Click Here link in the Sign In box. You will be redirect to the New Member Sign Up page. 3. Enter your [employer] Access Code exactly as it appears below. You will not need to use this code after youve completed the sign-up process. If you do not sign up before the expiration date, you must request a new code. [employer] Access Code: Activation code not generated  Current [employer] Status: Active (This is the date your [employer] access code will )    4. Enter the last four digits of your Social Security Number (xxxx) and Date of Birth (mm/dd/yyyy) as indicated and click Submit. You will be taken to the next sign-up page. 5. Create a [employer] ID.  This will be your [employer] login ID and cannot be changed, so think of one that is secure and easy to remember. 6. Create a [employer] password. You can change your password at any time. 7. Enter your Password Reset Question and Answer. This can be used at a later time if you forget your password. 8. Enter your e-mail address. You will receive e-mail notification when new information is available in [address] 9. Click Sign Up. You can now view and download portions of your medical record. 10. Click the Download Summary menu link to download a portable copy of your medical information. Additional Information    If you have questions, please visit the Frequently Asked Questions section of the [employer] website at [email or web address] Remember, [employer] is NOT to be used for urgent needs. For medical emergencies, dial 911. Follow-up and Dispositions    · Return in about 4 weeks (around 7/29/2020), or if symptoms worsen or fail to improve. I have reviewed with the patient details of the assessment and plan and all questions were answered. Relevent patient education was performed. The most recent lab findings were reviewed with the patient. An After Visit Summary was printed and given to the patient.

## 2020-07-01 NOTE — PATIENT INSTRUCTIONS
[employer] Activation Thank you for requesting access to [employer]. Please follow the instructions below to securely access and download your online medical record. [employer] allows you to send messages to your doctor, view your test results, renew your prescriptions, schedule appointments, and more. How Do I Sign Up? 1. In your internet browser, go to [email or web address] 
2. Click on the First Time User? Click Here link in the Sign In box. You will be redirect to the New Member Sign Up page. 3. Enter your [employer] Access Code exactly as it appears below. You will not need to use this code after youve completed the sign-up process. If you do not sign up before the expiration date, you must request a new code. [employer] Access Code: Activation code not generated Current [employer] Status: Active (This is the date your [employer] access code will ) 4. Enter the last four digits of your Social Security Number (xxxx) and Date of Birth (mm/dd/yyyy) as indicated and click Submit. You will be taken to the next sign-up page. 5. Create a [employer] ID. This will be your [employer] login ID and cannot be changed, so think of one that is secure and easy to remember. 6. Create a [employer] password. You can change your password at any time. 7. Enter your Password Reset Question and Answer. This can be used at a later time if you forget your password. 8. Enter your e-mail address. You will receive e-mail notification when new information is available in [address] 9. Click Sign Up. You can now view and download portions of your medical record. 10. Click the Download Summary menu link to download a portable copy of your medical information. Additional Information If you have questions, please visit the Frequently Asked Questions section of the [employer] website at [email or web address] Remember, [employer] is NOT to be used for urgent needs. For medical emergencies, dial 911.

## 2020-07-02 ENCOUNTER — VIRTUAL VISIT (OUTPATIENT)
Dept: NEUROLOGY | Age: 50
End: 2020-07-02

## 2020-07-02 DIAGNOSIS — R20.2 PARESTHESIA: ICD-10-CM

## 2020-07-02 DIAGNOSIS — G43.109 COMPLICATED MIGRAINE: ICD-10-CM

## 2020-07-02 DIAGNOSIS — G43.019 INTRACTABLE MIGRAINE WITHOUT AURA AND WITHOUT STATUS MIGRAINOSUS: ICD-10-CM

## 2020-07-02 DIAGNOSIS — G37.9 DEMYELINATING DISEASE (HCC): ICD-10-CM

## 2020-07-02 RX ORDER — TIZANIDINE 4 MG/1
4 TABLET ORAL
Qty: 30 TAB | Refills: 1 | Status: SHIPPED | OUTPATIENT
Start: 2020-07-02 | End: 2021-06-07

## 2020-07-02 RX ORDER — DEXAMETHASONE 4 MG/1
4 TABLET ORAL 2 TIMES DAILY WITH MEALS
Qty: 20 TAB | Refills: 0 | Status: SHIPPED | OUTPATIENT
Start: 2020-07-02 | End: 2021-06-07

## 2020-07-02 RX ORDER — GALCANEZUMAB 120 MG/ML
120 INJECTION, SOLUTION SUBCUTANEOUS
Qty: 1 UNITS | Refills: 2 | Status: SHIPPED | OUTPATIENT
Start: 2020-07-02 | End: 2022-03-30

## 2020-07-02 NOTE — PROGRESS NOTES
Neurology Progress Note  Danny Coyne was seen by synchronous (real-time) audio-video technology on 20. Consent:  She  is aware that this patient-initiated Telehealth encounter is a billable service, with coverage as determined by her insurance carrier. She is aware that she may receive a bill and has provided verbal consent to proceed: Yes    I was in the office while conducting this encounter. Serafin Florence NAME:  Danny Coyne   :   1970   MRN:   [id number]     Date/Time:  2020  Subjective:      Danny Coyne is a 45 y.o. female here today for follow-up for headaches, numbness and tingling sensation. Patient says he has been having persistent headache for nearly 2 weeks, headache is every day according to patient, when she takes the medication it takes the edge off daily headache is still there. She says she tries to use different techniques to reduce the headache frequency but it is not helping. Unfortunately, CGRP-Ajovy that was prescribed for her was been stalled by insurance. Headache is throbbing in nature, mostly frontal, occasional sharp pain coming from the back of the head. Frequency of the headache is daily for past 2 weeks, it is associated with photophobia,  blurry vision, dizziness. She also experiences photopsia. She says she has been using the medication given to her faithfully. Patient noted that she has severe photophobia, lights tend to make the headache worse, more so with sunlight. Patient says a lot of times he does not cover her windshield, it will be difficult for her to see. At this time I will recommend tinted glasses for thispatient's clinical.  Due to change in nature of the headache with numbness and tingling sensation, I will obtain MRI of the brain with and without gadolinium to evaluate for demyelinating disease versus vascular pathology. Patient did not do the lab work, I will reorder the lab work.   She is given short course of steroid, Zanaflex added  I will change Ajovy to Emgality. Review of Systems - General ROS: positive for  - fatigue and sleep disturbance  Psychological ROS: positive for - anxiety, concentration difficulties, depression, mood swings and sleep disturbances  Ophthalmic ROS: positive for - blurry vision, decreased vision, double vision and photophobia  ENT ROS: positive for - headaches, tinnitus, vertigo and visual changes  Allergy and Immunology ROS: negative  Hematological and Lymphatic ROS: negative  Endocrine ROS: negative  Respiratory ROS: no cough, shortness of breath, or wheezing  Cardiovascular ROS: no chest pain or dyspnea on exertion  Gastrointestinal ROS: no abdominal pain, change in bowel habits, or black or bloody stools  Genito-Urinary ROS: no dysuria, trouble voiding, or hematuria  Musculoskeletal ROS: positive for - joint pain, muscle pain and muscular weakness  Neurological ROS: positive for - dizziness, headaches, numbness/tingling and visual changes  Dermatological ROS: negative    Medications reviewed:  Current Outpatient Medications   Medication Sig Dispense Refill    dexAMETHasone (Decadron) 4 mg tablet Take 4 mg by mouth two (2) times daily (with meals). 20 Tab 0    tiZANidine (ZANAFLEX) 4 mg tablet Take 1 Tab by mouth nightly. 30 Tab 1    galcanezumab-gnlm (Emgality Syringe) 120 mg/mL syrg 120 mg by SubCUTAneous route every thirty (30) days. 1 Units 2    FENUGREEK SEED EXTRACT PO Take  by mouth.  KARINA EXTRACT PO Take  by mouth.  HOP-BLK COH-RHODIOLA-FLAX-PRIM PO Take  by mouth.  furosemide (Lasix) 40 mg tablet Si tab daily 30 Tab 6    albuterol (PROVENTIL HFA, VENTOLIN HFA, PROAIR HFA) 90 mcg/actuation inhaler INHALE 2 PUFFS BY MOUTH EVERY 4 HOURS AS NEEDED FOR WHEEZING 8.5 g 11    citalopram (CELEXA) 20 mg tablet Take 1 Tab by mouth daily. 30 Tab 3    fluticasone (FLONASE ALLERGY RELIEF) 50 mcg/actuation nasal spray 2 Sprays by Both Nostrils route daily as needed for Rhinitis.  potassium chloride SR (KLOR-CON 10) 10 mEq tablet Take 20 mEq by mouth daily. With Lasix.  cyanocobalamin (VITAMIN B12) 100 mcg tablet Take 100 mcg by mouth daily.  therapeutic multivitamin (THERA) tablet Take 1 Tab by mouth daily.  Ferrous Sulfate (SLOW FE) 47.5 mg iron TbER tablet Take 1 Tab by mouth nightly. Objective:   Vitals: There were no vitals filed for this visit. Lab Data Reviewed:  Lab Results   Component Value Date/Time    WBC 5.0 01/08/2020 05:33 PM    HCT 34.6 (L) 01/08/2020 05:33 PM    HGB 11.2 (L) 01/08/2020 05:33 PM    PLATELET 164 27/97/2849 05:33 PM       Lab Results   Component Value Date/Time    Sodium 141 01/08/2020 05:33 PM    Potassium 3.8 01/08/2020 05:33 PM    Chloride 109 (H) 01/08/2020 05:33 PM    CO2 30 01/08/2020 05:33 PM    Glucose 88 01/08/2020 05:33 PM    BUN 18 01/08/2020 05:33 PM    Creatinine 0.87 01/08/2020 05:33 PM    Calcium 9.0 01/08/2020 05:33 PM       No components found for: TROPQUANT    No results found for: KARAN      Lab Results   Component Value Date/Time    Hemoglobin A1c (POC) 5.7 10/11/2018 10:25 AM        No results found for: B12LT, FOL, RBCF    No results found for: KARAN, Simone Cipro, XBANA    Lab Results   Component Value Date/Time    Cholesterol (POC) 159 10/11/2018 10:24 AM    HDL Cholesterol (POC) 59 10/11/2018 10:24 AM    LDL Cholesterol (POC) 78 10/11/2018 10:24 AM    Triglycerides (POC) 111 10/11/2018 10:24 AM         CT Results (recent):  Results from [place] encounter on 01/08/20   CT ABD PELV W CONT    Narrative EXAM: CT ABD PELV W CONT    INDICATION: recent SULLY 12/23/2019, now with pain and decreased urine output,  vaginal bleeding. also status post cholecystectomy in the past.    COMPARISON: 11/29/2019     CONTRAST: 100 mL of Isovue-370. TECHNIQUE:   Following the uneventful intravenous administration of contrast, thin axial  images were obtained through the abdomen and pelvis.  Coronal and sagittal  reconstructions were generated. Oral contrast was not administered. CT dose  reduction was achieved through use of a standardized protocol tailored for this  examination and automatic exposure control for dose modulation. FINDINGS:   LUNG BASES: Clear. INCIDENTALLY IMAGED HEART AND MEDIASTINUM: Unremarkable. LIVER: No mass or biliary dilatation. GALLBLADDER: Surgically absent. SPLEEN: No mass. PANCREAS: No mass or ductal dilatation. ADRENALS: Unremarkable. KIDNEYS: There may be tiny nonobstructing intrarenal calculi, versus early  calyceal excretion. STOMACH: Unremarkable. SMALL BOWEL: No dilatation or wall thickening. COLON: No dilatation or wall thickening. APPENDIX: Unremarkable. PERITONEUM: No ascites or pneumoperitoneum. RETROPERITONEUM: No lymphadenopathy or aortic aneurysm. REPRODUCTIVE ORGANS: The uterus is now surgically absent, with a small amount of  fluid and stranding in the surgical bed but no discrete mass, hemorrhage or  fluid collection. Left ovarian cyst.  URINARY BLADDER: No mass or calculus. BONES: No destructive bone lesion. ADDITIONAL COMMENTS: Left paracentral anterior pelvic hernia containing fat  only, stable. Impression IMPRESSION:    1. Status post hysterectomy with postoperative changes in the surgical bed but  no discrete fluid collection, hematoma, mass or other complicating feature is  obvious. 2. Left ovarian cyst incidentally noted. 3. Tiny densities are noted in the kidneys bilaterally. Well nonobstructing  intrarenal calculi are difficult to exclude, this likely represents early  calyceal excretion of contrast.  4. Other incidental and postoperative changes. MRI Results (recent):  Results from [place] encounter on 12/20/18   MRI LUMB SPINE WO CONT    Narrative EXAM: MRI LUMB SPINE WO CONT    INDICATION: neurological deficits, lumbar pain.     COMPARISON: CT performed same day    TECHNIQUE: MR imaging of the lumbar spine was performed using the following  sequences: sagittal T1, T2, STIR;  axial T1, T2.     CONTRAST:  None. FINDINGS:    There is normal alignment of the lumbar spine. Vertebral body heights are  maintained. Marrow signal is normal. Mild disc space narrowing desiccation is  seen at L3-L4 and L4-L5. Mild osteophytic endplate changes are also noted at  these levels. The conus medullaris terminates at L1. Signal and caliber of the distal spinal  cord are within normal limits. The paraspinal soft tissues are within normal limits. Lower thoracic spine: No herniation or stenosis. L1-L2: No herniation or stenosis. L2-L3: No herniation or stenosis. L3-L4: Small disc bulge. Mild/moderate bilateral facet arthropathy. Small,  elongated synovial cyst arises from the medial margin of the right facet joint  measuring 4 x 4 x 10 mm causing minimal focal effacement of the right posterior  thecal sac. Increased synovial fluid and edema is noted in the right greater  than left facet joints, related to active degenerative process. Mild right  neuroforaminal narrowing. No significant spinal canal stenosis. Stephan Savant L4-L5: Small disc bulge. Moderate left and mild/moderate right facet  arthropathy. Increased synovial fluid and edema is noted in the facet joints,  related to active degenerative process. Mild bilateral neuroforaminal narrowing. No significant spinal canal stenosis. L5-S1: Mild/moderate facet arthropathy. No neuroforaminal narrowing or spinal  canal stenosis. Impression IMPRESSION:  Multilevel degenerative disc disease and degenerative changes, worse at L3-L4  and L4-L5. Edema is associated with the facet joints at these levels. Mild  neuroforaminal narrowing is noted at L3-L4 and L4-L5. No significant spinal  canal stenosis. IR Results (recent):  No results found for this or any previous visit.     VAS/US Results (recent):  Results from Hospital Encounter encounter on 12/20/18   DUPLEX LOWER EXT VENOUS RIGHT       PHYSICAL EXAM:  General:    Alert, cooperative, no distress, appears stated age. Head:   Normocephalic, without obvious abnormality, atraumatic. Eyes:   Conjunctivae/corneas clear. Nose:  Nares normal.   Throat:    Lips and tongue normal.  No Thrush  Neck:  Symmetrical,  no adenopathy, thyroid     no JVD. Back:    Symmetric. .  Lungs:   Deferred  Chest wall:   No Accessory muscle use. Heart:   Deferred. Abdomen:   Not distended. Extremities: Extremities normal, atraumatic, No cyanosis. No edema. No clubbing  Skin:      No rashes or lesions. Not Jaundiced  Lymph nodes: Cervical, supraclavicular normal.  Psych:  Good insight. Not depressed. Not anxious or agitated. NEUROLOGICAL EXAM:  Appearance: The patient is well developed, well nourished, provides a coherent history and is in no acute distress. Mental Status: Oriented to time, place and person. Mood and affect appropriate. Cranial Nerves:   Intact visual fields. EOM's full, no nystagmus, no ptosis. Facial movement is symmetric. Hearing is normal bilaterally. are . Tongue is midline. Motor:   Moves all extremities . Cony Chess No fasciculations. Reflexes:   Deferred   Sensory:   Deferred. Gait:  Normal gait. Tremor:   No tremor noted. Cerebellar:  No cerebellar signs present. Assesment  1. Chronic migraine  Emgality    2. Intractable migraine without aura and without status migrainosus  Decadron 4 mg p.o. twice daily for 10 days. MRI of the brain  3. Complicated migraine    - MRI BRAIN W WO CONT; Future    4. Paresthesia    - MRI BRAIN W WO CONT; Future    5. Demyelinating disease ([facility] [address]    - MRI BRAIN W WO CONT; Future    ___________________________________________________  PLAN: Medication and plan discussed with patient      ICD-10-CM ICD-9-CM    1. Chronic migraine G43.709 346.70    2. Intractable migraine without aura and without status migrainosus G43.019 346.11    3.  Complicated migraine R97.005 346.00 MRI BRAIN W WO CONT   4. Paresthesia R20.2 782.0 MRI BRAIN W WO CONT   5. Demyelinating disease ([facility] [address] G37.9 341.9 MRI BRAIN W WO CONT     Follow-up and Dispositions    · Return in about 6 weeks (around 8/13/2020). Pursuant to the emergency declaration under the [address] [place] [address] [facility] authority and the [employer] Act, this Virtual  Visit was conducted, with patient's consent, to reduce the patient's risk of exposure to COVID-19 and provide continuity of care for an established patient.      Services were provided through a video synchronous discussion virtually to substitute for in-person clinic visit.:    ___________________________________________________    Attending Physician: Moisés Brewer MD

## 2020-07-03 ENCOUNTER — HOSPITAL ENCOUNTER (EMERGENCY)
Age: 50
Discharge: HOME | End: 2020-07-03
Attending: EMERGENCY MEDICINE | Admitting: EMERGENCY MEDICINE
Payer: MEDICAID

## 2020-07-03 VITALS
OXYGEN SATURATION: 99 % | RESPIRATION RATE: 18 BRPM | WEIGHT: 265 LBS | HEART RATE: 94 BPM | DIASTOLIC BLOOD PRESSURE: 85 MMHG | HEIGHT: 63 IN | SYSTOLIC BLOOD PRESSURE: 126 MMHG | TEMPERATURE: 98.4 F | BODY MASS INDEX: 46.95 KG/M2

## 2020-07-03 DIAGNOSIS — G43.001 MIGRAINE WITHOUT AURA AND WITH STATUS MIGRAINOSUS, NOT INTRACTABLE: Primary | ICD-10-CM

## 2020-07-03 LAB
COMMENT, HOLDF: NORMAL
SAMPLES BEING HELD,HOLD: NORMAL

## 2020-07-03 PROCEDURE — 74011250636 HC RX REV CODE- 250/636: Performed by: EMERGENCY MEDICINE

## 2020-07-03 PROCEDURE — 36415 COLL VENOUS BLD VENIPUNCTURE: CPT

## 2020-07-03 PROCEDURE — 99283 EMERGENCY DEPT VISIT LOW MDM: CPT

## 2020-07-03 PROCEDURE — 96375 TX/PRO/DX INJ NEW DRUG ADDON: CPT

## 2020-07-03 PROCEDURE — 96374 THER/PROPH/DIAG INJ IV PUSH: CPT

## 2020-07-03 PROCEDURE — 74011000250 HC RX REV CODE- 250: Performed by: EMERGENCY MEDICINE

## 2020-07-03 RX ORDER — DIPHENHYDRAMINE HYDROCHLORIDE 50 MG/ML
25 INJECTION, SOLUTION INTRAMUSCULAR; INTRAVENOUS
Status: COMPLETED | OUTPATIENT
Start: 2020-07-03 | End: 2020-07-03

## 2020-07-03 RX ORDER — KETOROLAC TROMETHAMINE 30 MG/ML
15 INJECTION, SOLUTION INTRAMUSCULAR; INTRAVENOUS
Status: COMPLETED | OUTPATIENT
Start: 2020-07-03 | End: 2020-07-03

## 2020-07-03 RX ORDER — DEXAMETHASONE SODIUM PHOSPHATE 4 MG/ML
10 INJECTION, SOLUTION INTRA-ARTICULAR; INTRALESIONAL; INTRAMUSCULAR; INTRAVENOUS; SOFT TISSUE
Status: COMPLETED | OUTPATIENT
Start: 2020-07-03 | End: 2020-07-03

## 2020-07-03 RX ADMIN — SODIUM CHLORIDE 1000 ML: 900 INJECTION, SOLUTION INTRAVENOUS at 17:58

## 2020-07-03 RX ADMIN — DEXAMETHASONE SODIUM PHOSPHATE 10 MG: 4 INJECTION, SOLUTION INTRA-ARTICULAR; INTRALESIONAL; INTRAMUSCULAR; INTRAVENOUS; SOFT TISSUE at 17:58

## 2020-07-03 RX ADMIN — KETOROLAC TROMETHAMINE 15 MG: 30 INJECTION, SOLUTION INTRAMUSCULAR; INTRAVENOUS at 17:58

## 2020-07-03 RX ADMIN — PROCHLORPERAZINE EDISYLATE 10 MG: 5 INJECTION INTRAMUSCULAR; INTRAVENOUS at 17:58

## 2020-07-03 RX ADMIN — DIPHENHYDRAMINE HYDROCHLORIDE 25 MG: 50 INJECTION, SOLUTION INTRAMUSCULAR; INTRAVENOUS at 17:58

## 2020-07-03 NOTE — ED NOTES
Assumed care of pt via triage. Pt states she has been dealing with complex migraines for several weeks now and has been seeing a neurologist. Pt states she was recently diagnosed with MS and was supposed to get blood work done today but the pressure in her head was nauseating and she just decided to come to ED for evaluation. Pt denies any vision changes or loss of. Pt denies any chest pain or SOB. Pt resting comfortably on stretcher in position of comfort. Pt in no apparent distress at this time. Call bell within reach. Side rails x2. Connected to monitor x2. Pt a/o x4. Stretcher locked in lowest position. Pt aware of plan to await for MD/PA-C/NP assessment, and pt/family verbalizes understanding. Will continue to monitor pt condition.

## 2020-07-03 NOTE — DISCHARGE INSTRUCTIONS
Thank you for visiting our emergency department today. We all do hope that we were able to assist you in your emergent needs today. Please read over your discharge instructions as these contain pertinent information to help you in the healing process. These instructions include a list of prescriptions you were given today. Follow-up information is also noted on your discharge papers. There are attached instructions and information pertaining to the reason why you were seen in the emergency department today. These discharge instructions may not be for exactly why you were here, but may be the closest available instructions that we have. These include important advice for things that you can do at home to feel better, and reasons to return to the emergency department. The evaluation and treatment you received in the emergency department is not always definitive care. If follow-up with your primary care doctor or specialist was recommended, it is important that you make these appointments for follow-up care. You may need further testing, procedures, and/or medications to help you feel better. Further tests may be required that are not available in the emergency department. Failure to make these follow-up appointments may jeopardize your health. The emergency department is here for emergent stabilization and evaluation of life and limb threatening illness and/or injuries. Further care through a specialist or primary care doctor may be required to assist in your healing and complete your treatment and/or evaluation. We may not always be able to make a diagnosis in the emergency department, or things may change that will alter your diagnosis. Our primary goal is to ensure that nothing serious is occurring and that you are stable to continue your treatment and evaluation at home as an outpatient.   Of course, if things change, and you feel worse, you are always encouraged to return to the emergency department for re-evaluation. Lab Results Today:  Recent Results (from the past 8 hour(s))   SAMPLES BEING HELD    Collection Time: 07/03/20  5:04 PM   Result Value Ref Range    SAMPLES BEING HELD LAV,GREN     COMMENT        Add-on orders for these samples will be processed based on acceptable specimen integrity and analyte stability, which may vary by analyte. Radiology Results Today:  No results found.

## 2020-07-13 ENCOUNTER — TELEPHONE (OUTPATIENT)
Dept: NEUROLOGY | Age: 50
End: 2020-07-13

## 2020-07-17 NOTE — TELEPHONE ENCOUNTER
Prior Authorization submitted for [facility] to [facility] via Cover My Meds. Status Pending. Allow 24-72 hours for response.      Critical access hospital Key: [id number]  Case #: [id number]

## 2020-07-20 RX ORDER — TOPIRAMATE 25 MG/1
100 TABLET ORAL
Qty: 360 TAB | Refills: 3 | Status: SHIPPED | OUTPATIENT
Start: 2020-07-20 | End: 2021-06-07

## 2020-07-20 RX ORDER — TOPIRAMATE 25 MG/1
TABLET ORAL
COMMUNITY
End: 2020-07-20 | Stop reason: SDUPTHER

## 2020-07-20 NOTE — TELEPHONE ENCOUNTER
Dr. Paradise Miranda  Patient called   States cannot take decadron or zanaflex for headaches   Headaches are real bad  Seen in ED for headaches 7/3/20   She would like refill for topiramate 25 mg   take 4 tabs once daily   She does not want the 100 mg tab

## 2020-07-20 NOTE — TELEPHONE ENCOUNTER
Prior authorization APPROVED for [facility] by [employer]. Effective dates 07/17/20 - 10/17/20. Case [id number]. Approval document will be scanned into media.  Pharmacy notified of approval.

## 2020-08-20 ENCOUNTER — VIRTUAL VISIT (OUTPATIENT)
Dept: NEUROLOGY | Age: 50
End: 2020-08-20
Payer: COMMERCIAL

## 2020-08-20 DIAGNOSIS — R42 DIZZINESS: ICD-10-CM

## 2020-08-20 DIAGNOSIS — G37.9 DEMYELINATING DISEASE (HCC): ICD-10-CM

## 2020-08-20 DIAGNOSIS — G43.109 COMPLICATED MIGRAINE: ICD-10-CM

## 2020-08-20 DIAGNOSIS — R20.2 PARESTHESIA: ICD-10-CM

## 2020-08-20 DIAGNOSIS — G43.009 MIGRAINE WITHOUT AURA AND WITHOUT STATUS MIGRAINOSUS, NOT INTRACTABLE: ICD-10-CM

## 2020-08-20 PROCEDURE — 99214 OFFICE O/P EST MOD 30 MIN: CPT | Performed by: PSYCHIATRY & NEUROLOGY

## 2020-08-20 NOTE — PROGRESS NOTES
Neurology Progress Note    NAME:  Janey Closs   :   1970   MRN:   [id number]     Date/Time:  2020  Subjective:     Janey Closs is a 45 y.o. female here today for follow-up for headaches, numbness and tingling sensation. Patient today says her headache has decreased some but is still frequent, she is having headaches 2-3 times per week, the CGRP receptor blocker that was prescribed for patient is yet to be authorized or according to patient she has not gotten it. MRI of the brain ordered in the last visit has not been done, patient said that no one has talked with her about the MRI schedule and even no one contacted her about her today's appointment. Patient was not happy about the whole situation . At this time, I will reorder the brain MRI. Patient was given Maxalt ODT for rescue medication  I will send in Emgality to the pharmacy as soon as patient's spouse is available. Headache is throbbing in nature, mostly frontal, occasional sharp pain coming from the back of the head. Frequency of the headache is daily for past 2 weeks, it is associated with photophobia,  blurry vision, dizziness. She also experiences photopsia. She says she has been using the medication given to her faithfully. Patient noted that she has severe photophobia, lights tend to make the headache worse, more so with sunlight. Patient says a lot of times he does not cover her windshield, it will be difficult for her to see. At this time I will recommend tinted glasses for thispatient's clinical.  Due to change in nature of the headache with numbness and tingling sensation, I will obtain MRI of the brain with and without gadolinium to evaluate for demyelinating disease versus vascular pathology.   Blood work is still pending  Review of Systems - General ROS: positive for  - fatigue and sleep disturbance  Psychological ROS: positive for - anxiety, concentration difficulties, depression, mood swings and sleep disturbances  Ophthalmic ROS: positive for - blurry vision, decreased vision, double vision and photophobia  ENT ROS: positive for - headaches, tinnitus, vertigo and visual changes  Allergy and Immunology ROS: negative  Hematological and Lymphatic ROS: negative  Endocrine ROS: negative  Respiratory ROS: no cough, shortness of breath, or wheezing  Cardiovascular ROS: no chest pain or dyspnea on exertion  Gastrointestinal ROS: no abdominal pain, change in bowel habits, or black or bloody stools  Genito-Urinary ROS: no dysuria, trouble voiding, or hematuria  Musculoskeletal ROS: positive for - joint pain, muscle pain and muscular weakness  Neurological ROS: positive for - dizziness, headaches, numbness/tingling and visual changes  Dermatological ROS: negative     Medications reviewed:  Current Outpatient Medications   Medication Sig Dispense Refill    topiramate (TOPAMAX) 25 mg tablet Take 4 Tabs by mouth nightly. Takes 4 tabs once a day 360 Tab 3    dexAMETHasone (Decadron) 4 mg tablet Take 4 mg by mouth two (2) times daily (with meals). 20 Tab 0    tiZANidine (ZANAFLEX) 4 mg tablet Take 1 Tab by mouth nightly. 30 Tab 1    galcanezumab-gnlm (Emgality Syringe) 120 mg/mL syrg 120 mg by SubCUTAneous route every thirty (30) days. 1 Units 2    FENUGREEK SEED EXTRACT PO Take  by mouth.  KARINA EXTRACT PO Take  by mouth.  HOP-BLK COH-RHODIOLA-FLAX-PRIM PO Take  by mouth.  furosemide (Lasix) 40 mg tablet Si tab daily 30 Tab 6    albuterol (PROVENTIL HFA, VENTOLIN HFA, PROAIR HFA) 90 mcg/actuation inhaler INHALE 2 PUFFS BY MOUTH EVERY 4 HOURS AS NEEDED FOR WHEEZING 8.5 g 11    citalopram (CELEXA) 20 mg tablet Take 1 Tab by mouth daily. 30 Tab 3    fluticasone (FLONASE ALLERGY RELIEF) 50 mcg/actuation nasal spray 2 Sprays by Both Nostrils route daily as needed for Rhinitis.  potassium chloride SR (KLOR-CON 10) 10 mEq tablet Take 20 mEq by mouth daily. With Lasix.       cyanocobalamin (VITAMIN B12) 100 mcg tablet Take 100 mcg by mouth daily.  therapeutic multivitamin (THERA) tablet Take 1 Tab by mouth daily.  Ferrous Sulfate (SLOW FE) 47.5 mg iron TbER tablet Take 1 Tab by mouth nightly. Objective:   Vitals: There were no vitals filed for this visit. Lab Data Reviewed:  Lab Results   Component Value Date/Time    WBC 5.0 01/08/2020 05:33 PM    HCT 34.6 (L) 01/08/2020 05:33 PM    HGB 11.2 (L) 01/08/2020 05:33 PM    PLATELET 929 53/24/5592 05:33 PM       Lab Results   Component Value Date/Time    Sodium 141 01/08/2020 05:33 PM    Potassium 3.8 01/08/2020 05:33 PM    Chloride 109 (H) 01/08/2020 05:33 PM    CO2 30 01/08/2020 05:33 PM    Glucose 88 01/08/2020 05:33 PM    BUN 18 01/08/2020 05:33 PM    Creatinine 0.87 01/08/2020 05:33 PM    Calcium 9.0 01/08/2020 05:33 PM       No components found for: TROPQUANT    No results found for: KARAN      Lab Results   Component Value Date/Time    Hemoglobin A1c (POC) 5.7 10/11/2018 10:25 AM        No results found for: B12LT, FOL, RBCF    No results found for: KARAN, Domenica Shames, XBANA    Lab Results   Component Value Date/Time    Cholesterol (POC) 159 10/11/2018 10:24 AM    HDL Cholesterol (POC) 59 10/11/2018 10:24 AM    LDL Cholesterol (POC) 78 10/11/2018 10:24 AM    Triglycerides (POC) 111 10/11/2018 10:24 AM         CT Results (recent):  Results from [place] encounter on 01/08/20   CT ABD PELV W CONT    Narrative EXAM: CT ABD PELV W CONT    INDICATION: recent SULLY 12/23/2019, now with pain and decreased urine output,  vaginal bleeding. also status post cholecystectomy in the past.    COMPARISON: 11/29/2019     CONTRAST: 100 mL of Isovue-370. TECHNIQUE:   Following the uneventful intravenous administration of contrast, thin axial  images were obtained through the abdomen and pelvis. Coronal and sagittal  reconstructions were generated. Oral contrast was not administered.  CT dose  reduction was achieved through use of a standardized protocol tailored for this  examination and automatic exposure control for dose modulation. FINDINGS:   LUNG BASES: Clear. INCIDENTALLY IMAGED HEART AND MEDIASTINUM: Unremarkable. LIVER: No mass or biliary dilatation. GALLBLADDER: Surgically absent. SPLEEN: No mass. PANCREAS: No mass or ductal dilatation. ADRENALS: Unremarkable. KIDNEYS: There may be tiny nonobstructing intrarenal calculi, versus early  calyceal excretion. STOMACH: Unremarkable. SMALL BOWEL: No dilatation or wall thickening. COLON: No dilatation or wall thickening. APPENDIX: Unremarkable. PERITONEUM: No ascites or pneumoperitoneum. RETROPERITONEUM: No lymphadenopathy or aortic aneurysm. REPRODUCTIVE ORGANS: The uterus is now surgically absent, with a small amount of  fluid and stranding in the surgical bed but no discrete mass, hemorrhage or  fluid collection. Left ovarian cyst.  URINARY BLADDER: No mass or calculus. BONES: No destructive bone lesion. ADDITIONAL COMMENTS: Left paracentral anterior pelvic hernia containing fat  only, stable. Impression IMPRESSION:    1. Status post hysterectomy with postoperative changes in the surgical bed but  no discrete fluid collection, hematoma, mass or other complicating feature is  obvious. 2. Left ovarian cyst incidentally noted. 3. Tiny densities are noted in the kidneys bilaterally. Well nonobstructing  intrarenal calculi are difficult to exclude, this likely represents early  calyceal excretion of contrast.  4. Other incidental and postoperative changes. MRI Results (recent):  Results from [place] encounter on 12/20/18   MRI LUMB SPINE WO CONT    Narrative EXAM: MRI LUMB SPINE WO CONT    INDICATION: neurological deficits, lumbar pain. COMPARISON: CT performed same day    TECHNIQUE: MR imaging of the lumbar spine was performed using the following  sequences: sagittal T1, T2, STIR;  axial T1, T2.     CONTRAST:  None.     FINDINGS:    There is normal alignment of the lumbar spine. Vertebral body heights are  maintained. Marrow signal is normal. Mild disc space narrowing desiccation is  seen at L3-L4 and L4-L5. Mild osteophytic endplate changes are also noted at  these levels. The conus medullaris terminates at L1. Signal and caliber of the distal spinal  cord are within normal limits. The paraspinal soft tissues are within normal limits. Lower thoracic spine: No herniation or stenosis. L1-L2: No herniation or stenosis. L2-L3: No herniation or stenosis. L3-L4: Small disc bulge. Mild/moderate bilateral facet arthropathy. Small,  elongated synovial cyst arises from the medial margin of the right facet joint  measuring 4 x 4 x 10 mm causing minimal focal effacement of the right posterior  thecal sac. Increased synovial fluid and edema is noted in the right greater  than left facet joints, related to active degenerative process. Mild right  neuroforaminal narrowing. No significant spinal canal stenosis. [place] L4-L5: Small disc bulge. Moderate left and mild/moderate right facet  arthropathy. Increased synovial fluid and edema is noted in the facet joints,  related to active degenerative process. Mild bilateral neuroforaminal narrowing. No significant spinal canal stenosis. L5-S1: Mild/moderate facet arthropathy. No neuroforaminal narrowing or spinal  canal stenosis. Impression IMPRESSION:  Multilevel degenerative disc disease and degenerative changes, worse at L3-L4  and L4-L5. Edema is associated with the facet joints at these levels. Mild  neuroforaminal narrowing is noted at L3-L4 and L4-L5. No significant spinal  canal stenosis. IR Results (recent):  No results found for this or any previous visit. VAS/US Results (recent):  Results from Hospital Encounter encounter on 12/20/18   DUPLEX LOWER EXT VENOUS RIGHT       PHYSICAL EXAM:  General:    Alert, cooperative, no distress, appears stated age.      Head:   Normocephalic, without obvious abnormality, atraumatic. Eyes:   Conjunctivae/corneas clear. Nose:  Nares normal. .  Throat:    Lips and tongue normal.  No Thrush  Neck:  Symmetrical,  no adenopathy, thyroid. no JVD. Back:    Symmetric. Lungs:   Deferred  Chest wall:   No Accessory muscle use. Heart:   Deferred. Abdomen:    Not distended. Extremities: Extremities normal, atraumatic, No cyanosis. No edema. No clubbing  Skin:     . No rashes or lesions. Not Jaundiced  Lymph nodes: Cervical, supraclavicular normal.  Psych:  Good insight. Not depressed. Not anxious or agitated. NEUROLOGICAL EXAM:  Appearance: The patient is well developed, well nourished, provides a coherent history and is in no acute distress. Mental Status: Oriented to time, place and person. Mood and affect appropriate. Cranial Nerves:   Intact visual fields. EOM's full, no nystagmus, no ptosis. . . Facial movement is symmetric. Hearing is normal bilaterally. Tongue is midline. Motor:  5/5 strength in upper and lower proximal and distal muscles. Normal bulk and tone. No fasciculations. Reflexes:   Deep tendon reflexes 2+/4 and symmetrical.   Sensory:   Normal to touch, pinprick and vibration. Gait:  Normal gait. Tremor:   No tremor noted. Cerebellar:  No cerebellar signs present. Assesment  1. Chronic migraine  Continue management    2. Migraine without aura and without status migrainosus, not intractable  Continue management    3. [facility]inating [facility] ([facility]    - MRI BRAIN W WO CONT; Future    4. Paresthesia    - MRI BRAIN W WO CONT; Future    5. Complicated migraine    - MRI BRAIN W WO CONT; Future    6. Dizziness    - MRI BRAIN W WO CONT; Future    ___________________________________________________  PLAN: Medication and plan discussed with patient      ICD-10-CM ICD-9-CM    1. Chronic migraine  G43.709 346.70    2. Migraine without aura and without status migrainosus, not intractable  G43.009 346.10    3.  Demyelinating disease ([facility]  G37.9 341.9 MRI BRAIN W WO CONT   4. Paresthesia  R20.2 782.0 MRI BRAIN W WO CONT   5. Complicated migraine  A21.003 346.00 MRI BRAIN W WO CONT   6. Dizziness  R42 780.4 MRI BRAIN W WO CONT     Follow-up and Dispositions    · Return in about 6 weeks (around 10/1/2020).          ___________________________________________________    Attending Physician: Xochitl Topete MD

## 2020-09-16 ENCOUNTER — TELEPHONE (OUTPATIENT)
Dept: NEUROLOGY | Age: 50
End: 2020-09-16

## 2020-09-16 ENCOUNTER — TELEPHONE (OUTPATIENT)
Dept: INTERNAL MEDICINE CLINIC | Age: 50
End: 2020-09-16

## 2020-09-16 NOTE — TELEPHONE ENCOUNTER
Pt called complaining of headache, neck pain, blurred vision and nausea after hitting her head yesteray when getting into the car. Advised pt that she should go to the ED for evaluation so she can get immedicate x-rays and lab results. Further advised pt that I would make Provider aware and we could make a follow up appt with Provider once hospital decided next course of action for pt. Explained to pt that if she went to a [employer], we could track what actions are taken by ED. Pt agreed and stated she would go to the ED at a [facility] facility and follow up once she is released.

## 2020-09-16 NOTE — TELEPHONE ENCOUNTER
----- Message from Alcira Unger sent at 9/16/2020 11:45 AM EDT -----  Regarding: FARZAD/MD/TELEPHONE  Contact: [phone]  Level 1/Escalated Issue      Caller's first and last name and relationship (if not the patient): N/A      Best contact number(s):[phone]      What are the symptoms:  Possible concussion, nausea, light sensitiveness, blurry vision, dizziness and numbness in the arm/hand, knot on the rt side of the head and neck numbness      Transfer successful - yes/no (include outcome): No      Transfer declined - yes/no (include reason): No      Was caller advised to seek appropriate level of care - yes/no: Yes      Details to clarify the request: Per patient she hit her head on the car on yesterday and was seeing stars. Per patient is experiencing  nausea, light sensitiveness, blurry vision, dizziness and numbness in the rt arm/hand, knot on the rt side of the head and neck numbness.           Alcira Unger

## 2020-09-17 NOTE — TELEPHONE ENCOUNTER
Returned call to patient, ID verified times 2. Patient made aware to go to the emergency room for evaluation. Patient verbalized understanding. Writer asked which emergency room she will go to the patient stated she will go to [facility].

## 2020-09-25 ENCOUNTER — VIRTUAL VISIT (OUTPATIENT)
Dept: NEUROLOGY | Age: 50
End: 2020-09-25
Payer: COMMERCIAL

## 2020-09-25 ENCOUNTER — TELEPHONE (OUTPATIENT)
Dept: NEUROLOGY | Age: 50
End: 2020-09-25

## 2020-09-25 DIAGNOSIS — G44.311 INTRACTABLE ACUTE POST-TRAUMATIC HEADACHE: ICD-10-CM

## 2020-09-25 DIAGNOSIS — M54.12 CERVICAL RADICULOPATHY: ICD-10-CM

## 2020-09-25 DIAGNOSIS — F07.81 POSTCONCUSSION SYNDROME: Primary | ICD-10-CM

## 2020-09-25 DIAGNOSIS — H46.9 OPTIC NEURITIS, RIGHT: ICD-10-CM

## 2020-09-25 DIAGNOSIS — G37.9 DEMYELINATING DISEASE (HCC): ICD-10-CM

## 2020-09-25 DIAGNOSIS — M54.2 NECK PAIN: ICD-10-CM

## 2020-09-25 PROCEDURE — 99214 OFFICE O/P EST MOD 30 MIN: CPT | Performed by: PSYCHIATRY & NEUROLOGY

## 2020-09-25 RX ORDER — PREDNISONE 20 MG/1
40 TABLET ORAL 3 TIMES DAILY
Qty: 30 TAB | Refills: 0 | Status: SHIPPED | OUTPATIENT
Start: 2020-09-25 | End: 2020-09-30

## 2020-09-25 NOTE — TELEPHONE ENCOUNTER
Patient's call ID verified times 2. Patient stated she was seen at the eye doctor last week. Patient was told she lost part of her vision. Patient stated she is experiencing neck pain and numbness. Patient was added no today for a virtual visit.

## 2020-09-25 NOTE — TELEPHONE ENCOUNTER
----- Message from [employer] sent at 9/25/2020  8:15 AM EDT -----  Regarding: Dr. Drew Rod 1/Escalated Issue      Caller's first and last name and relationship (if not the patient): Patient      Best contact number(s): [phone]      What are the symptoms:  Loss of Vision, Migraine      Transfer successful - yes/no (include outcome): No. No answer       Transfer declined - yes/no (include reason): No. No answer      Was caller advised to seek appropriate level of care - yes/no: Yes.  Patient declined due to billing each time she has gone and there is no resolve       Details to clarify the request:        Bita Macias

## 2020-09-25 NOTE — TELEPHONE ENCOUNTER
----- Message from Dhara Patten sent at 9/25/2020  9:46 AM EDT -----  Regarding: Dr. Sam Parnell Patient return call    Caller's first and last name and relationship (if not the patient):      Best contact number(s): [phone]      Whose call is being returned: Returning missed call from Dr. Jaja Lantigua.       Details to clarify the request:      Dhara Patten

## 2020-09-28 ENCOUNTER — HOSPITAL ENCOUNTER (OUTPATIENT)
Dept: MRI IMAGING | Age: 50
Discharge: HOME | End: 2020-09-28
Payer: COMMERCIAL

## 2020-09-28 DIAGNOSIS — M54.2 NECK PAIN: ICD-10-CM

## 2020-09-28 DIAGNOSIS — G43.109 COMPLICATED MIGRAINE: ICD-10-CM

## 2020-09-28 DIAGNOSIS — R42 DIZZINESS: ICD-10-CM

## 2020-09-28 DIAGNOSIS — G44.311 INTRACTABLE ACUTE POST-TRAUMATIC HEADACHE: ICD-10-CM

## 2020-09-28 DIAGNOSIS — M54.12 CERVICAL RADICULOPATHY: ICD-10-CM

## 2020-09-28 DIAGNOSIS — R20.2 PARESTHESIA: ICD-10-CM

## 2020-09-28 DIAGNOSIS — G37.9 DEMYELINATING DISEASE (HCC): ICD-10-CM

## 2020-09-28 PROCEDURE — 70553 MRI BRAIN STEM W/O & W/DYE: CPT | Performed by: PSYCHIATRY & NEUROLOGY

## 2020-09-28 PROCEDURE — 72141 MRI NECK SPINE W/O DYE: CPT | Performed by: PSYCHIATRY & NEUROLOGY

## 2020-09-28 RX ORDER — GADOTERATE MEGLUMINE 376.9 MG/ML
20 INJECTION INTRAVENOUS
Status: COMPLETED | OUTPATIENT
Start: 2020-09-28 | End: 2020-09-28

## 2020-09-28 RX ADMIN — GADOTERATE MEGLUMINE 20 ML: 376.9 INJECTION INTRAVENOUS at 13:00

## 2020-09-29 ENCOUNTER — TELEPHONE (OUTPATIENT)
Dept: ENDOCRINOLOGY | Age: 50
End: 2020-09-29

## 2020-09-29 NOTE — TELEPHONE ENCOUNTER
----- Message from Ileana Avila sent at 9/29/2020 11:39 AM EDT -----  Regarding: Dr. Georgia Mckeon first and last name: N/A  Reason for call: Questions   Best contact number(s): [phone]  Details to clarify the request: Pt stated that she would like to be established as a np. Pt also stated that her thyroid is really high and wants to know if she needs a referral to be seen ? Pt stated that she would like a call back as soon as possible.

## 2020-10-01 NOTE — PROGRESS NOTES
Neurology Progress Note  Jennifer Caballero was seen by synchronous (real-time) audio-video technology on 20     Consent:  She  is aware that this patient-initiated Telehealth encounter is a billable service, with coverage as determined by her insurance carrier. She is aware that she may receive a bill and has provided verbal consent to proceed: Yes    I was in the office while conducting this encounter. Pursuant to the emergency declaration under the [address] [place] [address] [facility] authority and the [employer] Act, this Virtual  Visit was conducted, with patient's consent, to reduce the patient's risk of exposure to COVID-19 and provide continuity of care for an established patient. Services were provided through a video synchronous discussion virtually to substitute for in-person clinic visit. NAME:  Jennifer Caballero   :   1970   MRN:   [id number]     Date/Time:  2020  Subjective:      Jennifer Caballero is a 45 y.o. female here today for follow-up for headaches, numbness and tingling sensation, head pain and blurry vision. Patient was worked intoday because about 2 weeks ago patient accidentally hit the right side of her head, says she was dazed and apparently blacked out for about 10 minutes, subsequently started having excruciating headache with tingling sensation left side of the face and the face appears swollen. Tingling sensation going down to the neck and she has also been having neck pain. Since the incident, headache has increased in intensity and frequency, headache is throbbing in nature, worse on the right side of the head, associated with dizziness, blurry vision, occasional double vision, nausea, photophobia, phonophobia. She says medication has not helped as headache is nearly daily since the incident.   Patient was noted that she has been having right eye pain, lost vision temporarily in the right. She says she also having the neck pain with tingling sensation going down to the right, she feels as if her arm is weak and numb. Pain from the neck is sharp, persistent. Movement of the neck makes the pain worse. Patient says since the incident, the symptoms have not improved. At this time, I will give patient steroid taper. I will obtain MRI of the brain and cervical spine to evaluate for concussion and cervical radiculopathy as soon as possible. If symptoms continue to get worse, patient is advised to go to the emergency room. Review of Systems - General ROS: positive for  - fatigue and sleep disturbance  Psychological ROS: positive for - anxiety, concentration difficulties, depression, mood swings and sleep disturbances  Ophthalmic ROS: positive for - blurry vision, decreased vision, double vision and photophobia  ENT ROS: positive for - headaches, tinnitus, vertigo and visual changes  Allergy and Immunology ROS: negative  Hematological and Lymphatic ROS: negative  Endocrine ROS: negative  Respiratory ROS: no cough, shortness of breath, or wheezing  Cardiovascular ROS: no chest pain or dyspnea on exertion  Gastrointestinal ROS: no abdominal pain, change in bowel habits, or black or bloody stools  Genito-Urinary ROS: no dysuria, trouble voiding, or hematuria  Musculoskeletal ROS: positive for - joint pain, muscle pain and muscular weakness  Neurological ROS: positive for - dizziness, headaches, numbness/tingling and visual changes  Dermatological ROS: negative       Medications reviewed:  Current Outpatient Medications   Medication Sig Dispense Refill    topiramate (TOPAMAX) 25 mg tablet Take 4 Tabs by mouth nightly. Takes 4 tabs once a day 360 Tab 3    dexAMETHasone (Decadron) 4 mg tablet Take 4 mg by mouth two (2) times daily (with meals). 20 Tab 0    tiZANidine (ZANAFLEX) 4 mg tablet Take 1 Tab by mouth nightly.  30 Tab 1    galcanezumab-gnlm (Emgality Syringe) 120 mg/mL syrg 120 mg by SubCUTAneous route every thirty (30) days. 1 Units 2    FENUGREEK SEED EXTRACT PO Take  by mouth.  KARINA EXTRACT PO Take  by mouth.  HOP-BLK COH-RHODIOLA-FLAX-PRIM PO Take  by mouth.  furosemide (Lasix) 40 mg tablet Si tab daily 30 Tab 6    albuterol (PROVENTIL HFA, VENTOLIN HFA, PROAIR HFA) 90 mcg/actuation inhaler INHALE 2 PUFFS BY MOUTH EVERY 4 HOURS AS NEEDED FOR WHEEZING 8.5 g 11    citalopram (CELEXA) 20 mg tablet Take 1 Tab by mouth daily. 30 Tab 3    fluticasone (FLONASE ALLERGY RELIEF) 50 mcg/actuation nasal spray 2 Sprays by Both Nostrils route daily as needed for Rhinitis.  potassium chloride SR (KLOR-CON 10) 10 mEq tablet Take 20 mEq by mouth daily. With Lasix.  cyanocobalamin (VITAMIN B12) 100 mcg tablet Take 100 mcg by mouth daily.  therapeutic multivitamin (THERA) tablet Take 1 Tab by mouth daily.  Ferrous Sulfate (SLOW FE) 47.5 mg iron TbER tablet Take 1 Tab by mouth nightly. Objective:   Vitals: There were no vitals filed for this visit.             Lab Data Reviewed:  Lab Results   Component Value Date/Time    WBC 5.0 2020 05:33 PM    HCT 34.6 (L) 2020 05:33 PM    HGB 11.2 (L) 2020 05:33 PM    PLATELET 068  05:33 PM       Lab Results   Component Value Date/Time    Sodium 141 2020 05:33 PM    Potassium 3.8 2020 05:33 PM    Chloride 109 (H) 2020 05:33 PM    CO2 30 2020 05:33 PM    Glucose 88 2020 05:33 PM    BUN 18 2020 05:33 PM    Creatinine 0.87 2020 05:33 PM    Calcium 9.0 2020 05:33 PM       No components found for: TROPQUANT    No results found for: KARAN      Lab Results   Component Value Date/Time    Hemoglobin A1c (POC) 5.7 10/11/2018 10:25 AM        No results found for: B12LT, FOL, RBCF    No results found for: KARAN, STEPHAN Lynch    Lab Results   Component Value Date/Time    Cholesterol (POC) 159 10/11/2018 10:24 AM    HDL Cholesterol (POC) 59 10/11/2018 10:24 AM    LDL Cholesterol (POC) 78 10/11/2018 10:24 AM    Triglycerides (POC) 111 10/11/2018 10:24 AM         CT Results (recent):  Results from [place] encounter on 01/[place]/20   CT ABD PELV W CONT    Narrative EXAM: CT ABD PELV W CONT    INDICATION: recent SULLY 12/23/2019, now with pain and decreased urine output,  vaginal bleeding. also status post cholecystectomy in the past.    COMPARISON: 11/29/2019     CONTRAST: 100 mL of Isovue-370. TECHNIQUE:   Following the uneventful intravenous administration of contrast, thin axial  images were obtained through the abdomen and pelvis. Coronal and sagittal  reconstructions were generated. Oral contrast was not administered. CT dose  reduction was achieved through use of a standardized protocol tailored for this  examination and automatic exposure control for dose modulation. FINDINGS:   LUNG BASES: Clear. INCIDENTALLY IMAGED HEART AND MEDIASTINUM: Unremarkable. LIVER: No mass or biliary dilatation. GALLBLADDER: Surgically absent. SPLEEN: No mass. PANCREAS: No mass or ductal dilatation. ADRENALS: Unremarkable. KIDNEYS: There may be tiny nonobstructing intrarenal calculi, versus early  calyceal excretion. STOMACH: Unremarkable. SMALL BOWEL: No dilatation or wall thickening. COLON: No dilatation or wall thickening. APPENDIX: Unremarkable. PERITONEUM: No ascites or pneumoperitoneum. RETROPERITONEUM: No lymphadenopathy or aortic aneurysm. REPRODUCTIVE ORGANS: The uterus is now surgically absent, with a small amount of  fluid and stranding in the surgical bed but no discrete mass, hemorrhage or  fluid collection. Left ovarian cyst.  URINARY BLADDER: No mass or calculus. BONES: No destructive bone lesion. ADDITIONAL COMMENTS: Left paracentral anterior pelvic hernia containing fat  only, stable. Impression IMPRESSION:    1.  Status post hysterectomy with postoperative changes in the surgical bed but  no discrete fluid collection, hematoma, mass or other complicating feature is  obvious. 2. Left ovarian cyst incidentally noted. 3. Tiny densities are noted in the kidneys bilaterally. Well nonobstructing  intrarenal calculi are difficult to exclude, this likely represents early  calyceal excretion of contrast.  4. Other incidental and postoperative changes. MRI Results (recent):  Results from [place] encounter on 09/28/20   MRI BRAIN W WO CONT    Narrative EXAM:  MRI BRAIN W WO CONT    INDICATION:    Migraine, dizziness. COMPARISON:  CT head 5/8/2018. CONTRAST: 20 ml Dotarem. TECHNIQUE:    Multiplanar multisequence acquisition without and with contrast of the brain. FINDINGS:  The ventricles are normal in size and position. The brain parenchyma has normal  signal characteristics. There is no acute infarct, hemorrhage, extra-axial fluid  collection, or mass effect. There is no cerebellar tonsillar herniation. Expected arterial flow-voids are present. No evidence of abnormal enhancement. The paranasal sinuses, mastoid air cells, and middle ears are clear. The orbital  contents are within normal limits. No significant osseous or scalp lesions are  identified. Partially visualized cervical spine ACDF. Impression IMPRESSION:     1. Normal brain MRI. IR Results (recent):  No results found for this or any previous visit. VAS/US Results (recent):  Results from Hospital Encounter encounter on 12/20/18   DUPLEX LOWER EXT VENOUS RIGHT       PHYSICAL EXAM:  General:    Alert, cooperative, no distress, appears stated age. Head:   Normocephalic, without obvious abnormality, atraumatic. Eyes:   Conjunctivae/corneas clear. Nose:  Nares normal. .  Throat:    Lips and tongue normal.  No Thrush  Neck:  Symmetrical,  no adenopathy, thyroid     no JVD. Back:    Symmetric,. Lungs:   Deferred  Chest wall:  No Accessory muscle use. Heart:   Deferred  Abdomen:    Not distended.     Extremities: Extremities normal, atraumatic, No cyanosis. No edema. No clubbing  Skin:      No rashes or lesions. Not Jaundiced  Lymph nodes: Cervical, supraclavicular normal.  Psych:  Good insight. Not depressed. Anxious. Margarito Worley NEUROLOGICAL EXAM:  Appearance: The patient is well developed, well nourished, provides a coherent history and is in no acute distress. Mental Status: Oriented to time, place and person. Mood and affect appropriate. Cranial Nerves:   Intact visual fields. EOM's full, no nystagmus, no ptosis. . Facial movement is symmetric. . Tongue is midline. Motor:   Moves all extremities. No fasciculations. Reflexes:   Deferred. Sensory:   Deferred. Gait:  Normal gait. Tremor:   No tremor noted. Cerebellar:  No cerebellar signs present. Assesment  1. Postconcussion syndrome  Brain MRI    2. Optic neuritis, right  MRI of the brain    3. Cervical radiculopathy    - MRI CERV SPINE WO CONT; Future    4. Chronic migraine  Continue management    5. Demyelinating disease ([facility]  MRI of the brain    6. Intractable acute post-traumatic headache    - MRI CERV SPINE WO CONT; Future    7. Neck pain    - MRI CERV SPINE WO CONT; Future    ___________________________________________________  PLAN: Medication and plan discussed with patient      ICD-10-CM ICD-9-CM    1. Postconcussion syndrome  F07.81 310.2    2. Optic neuritis, right  H46.9 377.30    3. Cervical radiculopathy  M54.12 723.4 MRI CERV SPINE WO CONT   4. Chronic migraine  G43.709 346.70    5. Demyelinating disease ([facility]  G37.9 341.9    6. Intractable acute post-traumatic headache  G44.311 339.21 MRI CERV SPINE WO CONT   7. Neck pain  M54.2 723.1 MRI CERV SPINE WO CONT     Follow-up and Dispositions    · Return in about 2 weeks (around 10/9/2020).              ___________________________________________________    Attending Physician: Curtis Solorio MD

## 2020-10-05 ENCOUNTER — VIRTUAL VISIT (OUTPATIENT)
Dept: INTERNAL MEDICINE CLINIC | Age: 50
End: 2020-10-05
Payer: COMMERCIAL

## 2020-10-05 DIAGNOSIS — J45.20 MILD INTERMITTENT ASTHMA WITHOUT COMPLICATION: ICD-10-CM

## 2020-10-05 DIAGNOSIS — E04.1 THYROID NODULE: Primary | ICD-10-CM

## 2020-10-05 DIAGNOSIS — M12.811 ROTATOR CUFF ARTHROPATHY OF RIGHT SHOULDER: ICD-10-CM

## 2020-10-05 PROCEDURE — 99214 OFFICE O/P EST MOD 30 MIN: CPT | Performed by: INTERNAL MEDICINE

## 2020-10-05 NOTE — PROGRESS NOTES
1. Have you been to the ER, urgent care clinic since your last visit? Hospitalized since your last visit? 2. Have you seen or consulted any other health care providers outside of the [address] [place] [address] since your last visit? Include any pap smears or colon screening.  No    3 most recent PHQ Screens 5/14/2020   PHQ Not Done -   Little interest or pleasure in doing things Not at all   Feeling down, depressed, irritable, or hopeless Not at all   Total Score PHQ 2 0   Trouble falling or staying asleep, or sleeping too much -   Feeling tired or having little energy -   Poor appetite, weight loss, or overeating -   Feeling bad about yourself - or that you are a failure or have let yourself or your family down -   Trouble concentrating on things such as school, work, reading, or watching TV -   Moving or speaking so slowly that other people could have noticed; or the opposite being so fidgety that others notice -   Thoughts of being better off dead, or hurting yourself in some way -   PHQ 9 Score -   How difficult have these problems made it for you to do your work, take care of your home and get along with others -     Chief Complaint   Patient presents with    Results     MRI

## 2020-10-05 NOTE — PROGRESS NOTES
Cesar Ozuna is a 45 y.o. female being evaluated by a Virtual Visit (video visit) encounter to address concerns as mentioned above. A caregiver was present when appropriate. Due to this being a TeleHealth encounter ([facility]), evaluation of the following organ systems was limited: Vitals/Constitutional/EENT/Resp/CV/GI//MS/Neuro/Skin/Heme-Lymph-Imm. Pursuant to the emergency declaration under the [address] [place] [address] and the [employer] Act, this Virtual Visit was conducted with patient's (and/or legal guardian's) consent, to reduce the risk of exposure to COVID-19 and provide necessary medical care. Services were provided through a video synchronous discussion virtually to substitute for in-person encounter. --Charlotte Harper MD on 10/5/2020 at 8:29 AM    An electronic signature was used to authenticate this note. Cesar Ozuna is a 45 y.o. female and presents with Results (MRI)  . Subjective:  Shoulder Pain Review:  Patient complains of right side shoulder pain. The symptoms began several weeks ago Course of symptoms since onset has been gradually worsening. Pain is described as overall severity = moderate. Symptoms were incited by no known event. Patient denies N/A. Therapy to date includes OTC analgesics: effective. She has reported some dysphagia  She states she had a recent mri that revealed a 17mm thyroid nodule  She request a referral    Asthma Review:  The patient is being seen for follow up of asthma,  currently stable. Asthma symptoms occur: infrequently. Wheezing when present is described as mild and easily relieved with rescue bronchodilator. The patient reports use of a steroid inhaler. Frequency of use of quick-relief meds: rarely. Regimen compliance: The patient reports adherence to this regimen.         Review of Systems  Constitutional: negative for fevers, chills, anorexia and weight loss  Eyes:   negative for visual disturbance and irritation  ENT:   negative for tinnitus,sore throat,nasal congestion,ear pains. hoarseness  Respiratory:  negative for cough, hemoptysis, dyspnea,wheezing  CV:   negative for chest pain, palpitations, lower extremity edema  GI:   negative for nausea, vomiting, diarrhea, abdominal pain,melena  Endo:               negative for polyuria,polydipsia,polyphagia,heat intolerance  Genitourinary: negative for frequency, dysuria and hematuria  Integument:  negative for rash and pruritus  Hematologic:  negative for easy bruising and gum/nose bleeding  Musculoskel: joint pain  Neurological:  negative for headaches, dizziness, vertigo, memory problems and gait   Behavl/Psych: negative for feelings of anxiety, depression, mood changes    Past Medical History:   Diagnosis Date    Arthritis     Asthma     Seasonal     Bipolar disorder (HCC)     IBS (irritable bowel syndrome)     Migraine     Peptic ulcer     PTSD (post-traumatic stress disorder)     Snoring     TIA (transient ischemic attack)     As of 12/13/19, pt states she never had one and that her migraine caused symptoms of TIA     Past Surgical History:   Procedure Laterality Date    HX CERVICAL FUSION      C4-C7    HX CHOLECYSTECTOMY      HX COLONOSCOPY  2010    HX CYST REMOVAL      from under both arms    HX HEENT Left 2013    orbital    HX HERNIA REPAIR  2009    HX MENISCUS REPAIR Left     x2    HX ORTHOPAEDIC Left     ORIF left fibula    HX TUBAL LIGATION      HX TUBAL LIGATION  1994    HYSTEROSCOPY DIAGNOSTIC      x2     Social History     Socioeconomic History    Marital status: SINGLE     Spouse name: Not on file    Number of children: Not on file    Years of education: Not on file    Highest education level: Not on file   Tobacco Use    Smoking status: Former Smoker     Packs/day: 0.25     Last attempt to quit: 12/10/2019     Years since quittin.8    Smokeless tobacco: Current User   Substance and Sexual Activity    Alcohol use: Yes     Comment: rare    Drug use: No    Sexual activity: Not Currently     Family History   Problem Relation Age of Onset    Stroke Father     Hypertension Father     Heart Disease Maternal Grandmother     Cancer Maternal Grandmother         brain and colon    Cancer Maternal Uncle         5 w/ brain Abdi Vee    MS Other         1st cousin    Bipolar Disorder Other      Current Outpatient Medications   Medication Sig Dispense Refill    topiramate (TOPAMAX) 25 mg tablet Take 4 Tabs by mouth nightly. Takes 4 tabs once a day 360 Tab 3    dexAMETHasone (Decadron) 4 mg tablet Take 4 mg by mouth two (2) times daily (with meals). 20 Tab 0    galcanezumab-gnlm (Emgality Syringe) 120 mg/mL syrg 120 mg by SubCUTAneous route every thirty (30) days. 1 Units 2    KARINA EXTRACT PO Take  by mouth.  HOP-BLK COH-RHODIOLA-FLAX-PRIM PO Take  by mouth.  furosemide (Lasix) 40 mg tablet Si tab daily 30 Tab 6    fluticasone (FLONASE ALLERGY RELIEF) 50 mcg/actuation nasal spray 2 Sprays by Both Nostrils route daily as needed for Rhinitis.  potassium chloride SR (KLOR-CON 10) 10 mEq tablet Take 20 mEq by mouth daily. With Lasix.  cyanocobalamin (VITAMIN B12) 100 mcg tablet Take 100 mcg by mouth daily.  therapeutic multivitamin (THERA) tablet Take 1 Tab by mouth daily.  Ferrous Sulfate (SLOW FE) 47.5 mg iron TbER tablet Take 1 Tab by mouth nightly.  tiZANidine (ZANAFLEX) 4 mg tablet Take 1 Tab by mouth nightly. (Patient not taking: Reported on 10/5/2020) 30 Tab 1    FENUGREEK SEED EXTRACT PO Take  by mouth.  albuterol (PROVENTIL HFA, VENTOLIN HFA, PROAIR HFA) 90 mcg/actuation inhaler INHALE 2 PUFFS BY MOUTH EVERY 4 HOURS AS NEEDED FOR WHEEZING 8.5 g 11    citalopram (CELEXA) 20 mg tablet Take 1 Tab by mouth daily.  (Patient not taking: Reported on 10/5/2020) 30 Tab 3     Allergies   Allergen Reactions    Latex Hives    Other Food Rash     All fruits except apple, oranges, and pineapple (recorded as Other Medication 2/8/2012)    Peanut Swelling    Shellfish Containing Products Anaphylaxis    Morphine Itching     She has had morphine but premedicates with benadryl    Nystatin Rash    Flagyl [Metronidazole] Contact Dermatitis    Flexeril [Cyclobenzaprine] Rash    Other Medication Rash     All fruits except apple, oranges, and pineapple    Phenergan [Promethazine] Other (comments)     Rapid hr    Robaxin [Methocarbamol] Rash       Objective:  Visit Vitals  LMP 12/04/2019     Physical Exam:   General appearance - alert, well appearing, and in no distress  Mental status - alert, oriented to person, place, and time  EYE-BRIGIDA, EOMI, corneas normal, no foreign bodies  ENT-ENT exam normal, no neck nodes or sinus tenderness  Nose - normal and patent, no erythema, discharge or polyps  Mouth - mucous membranes moist, pharynx normal without lesions  Skin-Warm and dry. no hyperpigmentation, vitiligo, or suspicious lesions  Neuro -alert, oriented, normal speech, no focal findings or movement disorder noted  Neck-normal C-spine, no tenderness, full ROM without pain  Rt.shoulder-decrease rom      Results for orders placed or performed during the hospital encounter of 07/03/20   SAMPLES BEING HELD   Result Value Ref Range    SAMPLES BEING HELD LAV,GREN     COMMENT        Add-on orders for these samples will be processed based on acceptable specimen integrity and analyte stability, which may vary by analyte. Assessment/Plan:    ICD-10-CM ICD-9-CM    1. Thyroid nodule  E04.1 241.0 REFERRAL TO GENERAL SURGERY   2. Rotator cuff arthropathy of right shoulder  M12.811 716.81 XR SHOULDER RT AP/LAT MIN 2 V   3.  Mild intermittent asthma without complication  T39.97 393.17      Orders Placed This Encounter    XR SHOULDER RT AP/LAT MIN 2 V     Standing Status:   Future     Standing Expiration Date:   2020     Order Specific Question:   Reason for Exam     Answer:   pain     Order Specific Question:   Which facility to perform procedure? Answer:   [facility]   [address] Surgery ref [facility]     Referral Priority:   Routine     Referral Type:   Consultation     Referral Reason:   Specialty Services Required     Referred to Provider:   Benito Agrawal MD     Number of Visits Requested:   1     lose weight, follow low fat diet, follow low salt diet, continue present plan,Take 81mg aspirin daily  Patient Instructions   [employer] Activation    Thank you for requesting access to [employer]. Please follow the instructions below to securely access and download your online medical record. [employer] allows you to send messages to your doctor, view your test results, renew your prescriptions, schedule appointments, and more. How Do I Sign Up? 1. In your internet browser, go to [email or web address]  2. Click on the First Time User? Click Here link in the Sign In box. You will be redirect to the New Member Sign Up page. 3. Enter your [employer] Access Code exactly as it appears below. You will not need to use this code after youve completed the sign-up process. If you do not sign up before the expiration date, you must request a new code. [employer] Access Code: Activation code not generated  Current [employer] Status: Active (This is the date your [employer] access code will )    4. Enter the last four digits of your Social Security Number (xxxx) and Date of Birth (mm/dd/yyyy) as indicated and click Submit. You will be taken to the next sign-up page. 5. Create a [employer] ID. This will be your [employer] login ID and cannot be changed, so think of one that is secure and easy to remember. 6. Create a [employer] password. You can change your password at any time. 7. Enter your Password Reset Question and Answer. This can be used at a later time if you forget your password. 8. Enter your e-mail address.  You will receive e-mail notification when new information is available in [address] 9. Click Sign Up. You can now view and download portions of your medical record. 10. Click the Download Summary menu link to download a portable copy of your medical information. Additional Information    If you have questions, please visit the Frequently Asked Questions section of the [employer] website at [email or web address] Remember, [employer] is NOT to be used for urgent needs. For medical emergencies, dial 911. Follow-up and Dispositions    · Return in about 1 week (around 10/12/2020), or if symptoms worsen or fail to improve. I have reviewed with the patient details of the assessment and plan and all questions were answered. Relevent patient education was performed. The most recent lab findings were reviewed with the patient. An After Visit Summary was printed and given to the patient.

## 2020-10-05 NOTE — PATIENT INSTRUCTIONS
[employer]Self Activation Thank you for requesting access to [employer]. Please follow the instructions below to securely access and download your online medical record. [employer] allows you to send messages to your doctor, view your test results, renew your prescriptions, schedule appointments, and more. How Do I Sign Up? 1. In your internet browser, go to [email or web address] 
2. Click on the First Time User? Click Here link in the Sign In box. You will be redirect to the New Member Sign Up page. 3. Enter your Gl[employer].io Access Code exactly as it appears below. You will not need to use this code after youve completed the sign-up process. If you do not sign up before the expiration date, you must request a new code. [employer] Access Code: Activation code not generated Current [employer] Status: Active (This is the date your Gl[employer].io access code will ) 4. Enter the last four digits of your Social Security Number (xxxx) and Date of Birth (mm/dd/yyyy) as indicated and click Submit. You will be taken to the next sign-up page. 5. Create a Glider.io ID. This will be your Glider.io login ID and cannot be changed, so think of one that is secure and easy to remember. 6. Create a Glider.io password. You can change your password at any time. 7. Enter your Password Reset Question and Answer. This can be used at a later time if you forget your password. 8. Enter your e-mail address. You will receive e-mail notification when new information is available in [address] 9. Click Sign Up. You can now view and download portions of your medical record. 10. Click the Download Summary menu link to download a portable copy of your medical information. Additional Information If you have questions, please visit the Frequently Asked Questions section of the [email or web address] website at [email or web address] Remember, [employer]ider.io is NOT to be used for urgent needs. For medical emergencies, dial 911.

## 2020-10-07 ENCOUNTER — OFFICE VISIT (OUTPATIENT)
Dept: SURGERY | Age: 50
End: 2020-10-07
Payer: COMMERCIAL

## 2020-10-07 VITALS
DIASTOLIC BLOOD PRESSURE: 78 MMHG | TEMPERATURE: 98.2 F | BODY MASS INDEX: 45.58 KG/M2 | HEART RATE: 57 BPM | RESPIRATION RATE: 18 BRPM | WEIGHT: 267 LBS | SYSTOLIC BLOOD PRESSURE: 110 MMHG | HEIGHT: 64 IN | OXYGEN SATURATION: 99 %

## 2020-10-07 DIAGNOSIS — E06.9 THYROIDITIS: Primary | ICD-10-CM

## 2020-10-07 PROCEDURE — 99201 PR OFFICE OUTPATIENT NEW 10 MINUTES: CPT | Performed by: SURGERY

## 2020-10-07 NOTE — LETTER
10/7/20 Patient: Reyes [place] YOB: 1970 Date of Visit: 10/7/2020 Polly Sharpe MD 
[facility] [id number] VIA In Basket Dear Polly Sharpe MD, Thank you for referring Ms. Rodolfo Zaragoza to [address] for evaluation. My notes for this consultation are attached. If you have questions, please do not hesitate to call me. I look forward to following your patient along with you. Sincerely, Denice Ye MD

## 2020-10-07 NOTE — PROGRESS NOTES
1. Have you been to the ER, urgent care clinic since your last visit? Hospitalized since your last visit? No    2. Have you seen or consulted any other alth care providers outside of the [address] [place] [address] since your last visit? Include any pap smears or colon screening.   Dr. Humaira Jo neurologist

## 2020-10-14 ENCOUNTER — OFFICE VISIT (OUTPATIENT)
Dept: INTERNAL MEDICINE CLINIC | Age: 50
End: 2020-10-14
Payer: COMMERCIAL

## 2020-10-14 VITALS
DIASTOLIC BLOOD PRESSURE: 70 MMHG | RESPIRATION RATE: 20 BRPM | OXYGEN SATURATION: 98 % | HEIGHT: 64 IN | SYSTOLIC BLOOD PRESSURE: 122 MMHG | HEART RATE: 84 BPM | WEIGHT: 270 LBS | TEMPERATURE: 98.2 F | BODY MASS INDEX: 46.1 KG/M2

## 2020-10-14 DIAGNOSIS — J01.00 ACUTE MAXILLARY SINUSITIS, RECURRENCE NOT SPECIFIED: ICD-10-CM

## 2020-10-14 DIAGNOSIS — J45.20 MILD INTERMITTENT ASTHMA WITHOUT COMPLICATION: ICD-10-CM

## 2020-10-14 DIAGNOSIS — M75.41 ROTATOR CUFF IMPINGEMENT SYNDROME OF RIGHT SHOULDER: Primary | ICD-10-CM

## 2020-10-14 DIAGNOSIS — E04.1 THYROID NODULE: ICD-10-CM

## 2020-10-14 PROCEDURE — 99214 OFFICE O/P EST MOD 30 MIN: CPT | Performed by: INTERNAL MEDICINE

## 2020-10-14 RX ORDER — LIDOCAINE HYDROCHLORIDE 20 MG/ML
2 INJECTION, SOLUTION EPIDURAL; INFILTRATION; INTRACAUDAL; PERINEURAL ONCE
Qty: 2 ML | Refills: 0 | Status: CANCELLED
Start: 2020-10-14 | End: 2020-10-14

## 2020-10-14 RX ORDER — AMOXICILLIN AND CLAVULANATE POTASSIUM 875; 125 MG/1; MG/1
1 TABLET, FILM COATED ORAL 2 TIMES DAILY
Qty: 20 TAB | Refills: 0 | Status: SHIPPED | OUTPATIENT
Start: 2020-10-14 | End: 2021-02-02 | Stop reason: ALTCHOICE

## 2020-10-14 RX ORDER — TRIAMCINOLONE ACETONIDE 40 MG/ML
40 INJECTION, SUSPENSION INTRA-ARTICULAR; INTRAMUSCULAR ONCE
Qty: 1 ML | Refills: 0 | Status: CANCELLED
Start: 2020-10-14 | End: 2020-10-14

## 2020-10-14 NOTE — PATIENT INSTRUCTIONS
[employer] Activation Thank you for requesting access to [facility]. Please follow the instructions below to securely access and download your online medical record. [facility] allows you to send messages to your doctor, view your test results, renew your prescriptions, schedule appointments, and more. How Do I Sign Up? 1. In your internet browser, go to [email or web address] 
2. Click on the First Time User? Click Here link in the Sign In box. You will be redirect to the New Member Sign Up page. 3. Enter your Topmall Access Code exactly as it appears below. You will not need to use this code after youve completed the sign-up process. If you do not sign up before the expiration date, you must request a new code. Topmall Access Code: Activation code not generated Current Topmall Status: Active (This is the date your Topmall access code will ) 4. Enter the last four digits of your Social Security Number (xxxx) and Date of Birth (mm/dd/yyyy) as indicated and click Submit. You will be taken to the next sign-up page. 5. Create a Topmall ID. This will be your Topmall login ID and cannot be changed, so think of one that is secure and easy to remember. 6. Create a Topmall password. You can change your password at any time. 7. Enter your Password Reset Question and Answer. This can be used at a later time if you forget your password. 8. Enter your e-mail address. You will receive e-mail notification when new information is available in [address] 9. Click Sign Up. You can now view and download portions of your medical record. 10. Click the Download Summary menu link to download a portable copy of your medical information. Additional Information If you have questions, please visit the Frequently Asked Questions section of the Topmall website at [email or web address] Remember, Topmall is NOT to be used for urgent needs. For medical emergencies, dial 911.

## 2020-10-14 NOTE — PROGRESS NOTES
Sudha Pack is a 45 y.o. female and presents with Shoulder Pain  . Subjective:  Shoulder Pain Review:  Patient complains of right side shoulder pain. The symptoms began  weeks ago Course of symptoms since onset has been gradually worsening. Pain is described as overall severity Symptoms were incited by no known event. Patient denies N/A. Therapy to date includes OTC analgesics: effective. Sinus Pain  Patient complains of achiness, congestion, facial pain and nasal congestion. Onset of symptoms was a few days ago, gradually worsening since that time. She is drinking plenty of fluids. .  Past history is significant for occasional episodes of bronchitis.  Patient is smoker  (1/4 ppd )    She is under evaluation for a thyroid nodule    Review of Systems  Constitutional: negative for fevers, chills, anorexia and weight loss  Eyes:   negative for visual disturbance and irritation  ENT:   nasal congestion  Respiratory:  negative for cough, hemoptysis, dyspnea,wheezing  CV:   negative for chest pain, palpitations, lower extremity edema  GI:   negative for nausea, vomiting, diarrhea, abdominal pain,melena  Endo:               negative for polyuria,polydipsia,polyphagia,heat intolerance  Genitourinary: negative for frequency, dysuria and hematuria  Integument:  negative for rash and pruritus  Hematologic:  negative for easy bruising and gum/nose bleeding  Musculoskel: negative for myalgias, arthralgias, back pain, muscle weakness, joint pain  Neurological:  negative for headaches, dizziness, vertigo, memory problems and gait   Behavl/Psych: negative for feelings of anxiety, depression, mood changes    Past Medical History:   Diagnosis Date    Arthritis     Asthma     Seasonal     [facility]     IBS (irritable bowel syndrome)     Migraine     Peptic ulcer     PTSD (post-traumatic stress disorder)     Snoring     Thyroiditis 10/7/2020    TIA (transient ischemic attack)     As of 12/13/19, pt states she never had one and that her migraine caused symptoms of TIA     Past Surgical History:   Procedure Laterality Date    HX CERVICAL FUSION      C4-C7    HX CHOLECYSTECTOMY      HX COLONOSCOPY  2010    HX CYST REMOVAL      from under both arms    HX HEENT Left 2013    orbital    HX HERNIA REPAIR  2009    HX MENISCUS REPAIR Left     x2    HX ORTHOPAEDIC Left     ORIF left fibula    HX TUBAL LIGATION      HX TUBAL LIGATION  1994    HYSTEROSCOPY DIAGNOSTIC      x2     Social History     Socioeconomic History    Marital status: SINGLE     Spouse name: Not on file    Number of children: Not on file    Years of education: Not on file    Highest education level: Not on file   Tobacco Use    Smoking status: Current Some Day Smoker     Packs/day: 0.25     Last attempt to quit: 12/10/2019     Years since quittin.8    Smokeless tobacco: Never Used   Substance and Sexual Activity    Alcohol use: Yes     Comment: rare    Drug use: No    Sexual activity: Not Currently     Family History   Problem Relation Age of Onset    Stroke Father     Hypertension Father     Heart Disease Maternal Grandmother     Cancer Maternal Grandmother         brain and colon    Cancer Maternal Uncle         5 w/ brain Clearance Edyta    MS Other         1st cousin    Bipolar Disorder Other      Current Outpatient Medications   Medication Sig Dispense Refill    amoxicillin-clavulanate (AUGMENTIN) 875-125 mg per tablet Take 1 Tab by mouth two (2) times a day. 20 Tab 0    topiramate (TOPAMAX) 25 mg tablet Take 4 Tabs by mouth nightly. Takes 4 tabs once a day 360 Tab 3    dexAMETHasone (Decadron) 4 mg tablet Take 4 mg by mouth two (2) times daily (with meals). 20 Tab 0    galcanezumab-gnlm (Emgality Syringe) 120 mg/mL syrg 120 mg by SubCUTAneous route every thirty (30) days. 1 Units 2    FENUGREEK SEED EXTRACT PO Take  by mouth.  KARINA EXTRACT PO Take  by mouth.  HOP-BLK COH-RHODIOLA-FLAX-PRIM PO Take  by mouth.  furosemide (Lasix) 40 mg tablet Si tab daily 30 Tab 6    albuterol (PROVENTIL HFA, VENTOLIN HFA, PROAIR HFA) 90 mcg/actuation inhaler INHALE 2 PUFFS BY MOUTH EVERY 4 HOURS AS NEEDED FOR WHEEZING 8.5 g 11    citalopram (CELEXA) 20 mg tablet Take 1 Tab by mouth daily. 30 Tab 3    fluticasone (FLONASE ALLERGY RELIEF) 50 mcg/actuation nasal spray 2 Sprays by Both Nostrils route daily as needed for Rhinitis.  potassium chloride SR (KLOR-CON 10) 10 mEq tablet Take 20 mEq by mouth daily. With Lasix.  cyanocobalamin (VITAMIN B12) 100 mcg tablet Take 100 mcg by mouth daily.  therapeutic multivitamin (THERA) tablet Take 1 Tab by mouth daily.  Ferrous Sulfate (SLOW FE) 47.5 mg iron TbER tablet Take 1 Tab by mouth nightly.  tiZANidine (ZANAFLEX) 4 mg tablet Take 1 Tab by mouth nightly.  (Patient not taking: Reported on 10/5/2020) 30 Tab 1     Allergies   Allergen Reactions    Latex Hives    Other Food Rash     All fruits except apple, oranges, and pineapple (recorded as Other Medication 2012)    Peanut Swelling    Shellfish Containing Products Anaphylaxis    Morphine Itching     She has had morphine but premedicates with benadryl    Nystatin Rash    Flagyl [Metronidazole] Contact Dermatitis    Flexeril [Cyclobenzaprine] Rash    Other Medication Rash     All fruits except apple, oranges, and pineapple    Phenergan [Promethazine] Other (comments)     Rapid hr    Robaxin [Methocarbamol] Rash       Objective:  Visit Vitals  /70 (BP 1 Location: Right arm, BP Patient Position: Sitting)   Pulse 84   Temp 98.2 °F (36.8 °C) (Oral)   Resp 20   Ht 5' 3.5\" (1.613 m)   Wt 270 lb (122.5 kg)   LMP 2019   SpO2 98%   BMI 47.08 kg/m²     Physical Exam:   General appearance - alert, well appearing, and in no distress  Mental status - alert, oriented to person, place, and time  EYE-BRIGIDA, EOMI, corneas normal, no foreign bodies  ENT-ENT exam normal, no neck nodes or sinus tenderness  Nose - normal and patent, no erythema, discharge or polyps  Mouth - mucous membranes moist, pharynx normal without lesions  Neck - supple, no significant adenopathy   Chest - clear to auscultation, no wheezes, rales or rhonchi, symmetric air entry   Heart - normal rate, regular rhythm, normal S1, S2, no murmurs, rubs, clicks or gallops   Abdomen - soft, nontender, nondistended, no masses or organomegaly  Lymph- no adenopathy palpable  Ext-peripheral pulses normal, no pedal edema, no clubbing or cyanosis  Skin-Warm and dry. no hyperpigmentation, vitiligo, or suspicious lesions  Neuro -alert, oriented, normal speech, no focal findings or movement disorder noted  Neck-normal C-spine, no tenderness, full ROM without pain  Feet-no nail deformities or callus formation with good pulses noted      Results for orders placed or performed during the hospital encounter of 07/03/20   SAMPLES BEING HELD   Result Value Ref Range    SAMPLES BEING HELD LAV,GREN     COMMENT        Add-on orders for these samples will be processed based on acceptable specimen integrity and analyte stability, which may vary by analyte. Assessment/Plan:    ICD-10-CM ICD-9-CM    1. Rotator cuff impingement syndrome of right shoulder  M75.41 726.10 US EXT NONVAS RT COMP   2. Thyroid nodule  E04.1 241.0    3. Mild intermittent asthma without complication  A35.76 620.72    4. Acute maxillary sinusitis, recurrence not specified  J01.00 461.0      Orders Placed This Encounter    US EXT NONVAS RT COMP     Standing Status:   Future     Standing Expiration Date:   11/14/2020     Order Specific Question:   Specific Body Part     Answer:   rt.shoulder    amoxicillin-clavulanate (AUGMENTIN) 875-125 mg per tablet     Sig: Take 1 Tab by mouth two (2) times a day.      Dispense:  20 Tab     Refill:  0     lose weight, increase physical activity, follow low fat diet, follow low salt diet,Take 81mg aspirin daily  Patient Instructions   MyChart Activation    Thank you for requesting access to [facility]. Please follow the instructions below to securely access and download your online medical record. [facility] [employer] allows you to send messages to your doctor, view your test results, renew your prescriptions, schedule appointments, and more. How Do I Sign Up? 1. In your internet browser, go to [email or web address]  2. Click on the First Time User? Click Here link in the Sign In box. You will be redirect to the New Member Sign Up page. 3. Enter your [facility] Access Code exactly as it appears below. You will not need to use this code after youve completed the sign-up process. If you do not sign up before the expiration date, you must request a new code. [facility] Access Code: Activation code not generated  Current [facility] Status: Active (This is the date your [facility] [employer] access code will )    4. Enter the last four digits of your Social Security Number (xxxx) and Date of Birth (mm/dd/yyyy) as indicated and click Submit. You will be taken to the next sign-up page. 5. Create a [facility] [employer] ID. This will be your [facility] [employer] login ID and cannot be changed, so think of one that is secure and easy to remember. 6. Create a [employer] password. You can change your password at any time. 7. Enter your Password Reset Question and Answer. This can be used at a later time if you forget your password. 8. Enter your e-mail address. You will receive e-mail notification when new information is available in [address] 9. Click Sign Up. You can now view and download portions of your medical record. 10. Click the Download Summary menu link to download a portable copy of your medical information. Additional Information    If you have questions, please visit the Frequently Asked Questions section of the [employer] website at [email or web address] Remember, [facility] [employer] is NOT to be used for urgent needs. For medical emergencies, dial 911.            Follow-up and Dispositions    · Return in about 4 weeks (around 11/11/2020), or if symptoms worsen or fail to improve. I have reviewed with the patient details of the assessment and plan and all questions were answered. Relevent patient education was performed. The most recent lab findings were reviewed with the patient. An After Visit Summary was printed and given to the patient.

## 2020-10-14 NOTE — PROGRESS NOTES
Chief Complaint   Patient presents with    Shoulder Pain     3 most recent PHQ Screens 10/14/2020   PHQ Not Done Patient Decline   Little interest or pleasure in doing things Not at all   Feeling down, depressed, irritable, or hopeless Not at all   Total Score PHQ 2 0   Trouble falling or staying asleep, or sleeping too much -   Feeling tired or having little energy -   Poor appetite, weight loss, or overeating -   Feeling bad about yourself - or that you are a failure or have let yourself or your family down -   Trouble concentrating on things such as school, work, reading, or watching TV -   Moving or speaking so slowly that other people could have noticed; or the opposite being so fidgety that others notice -   Thoughts of being better off dead, or hurting yourself in some way -   PHQ 9 Score -   How difficult have these problems made it for you to do your work, take care of your home and get along with others -     1. Have you been to the ER, urgent care clinic since your last visit? Hospitalized since your last visit? NO    2. Have you seen or consulted any other health care providers outside of the [address] [place] [address] since your last visit? Include any pap smears or colon screening.  NO

## 2020-10-19 ENCOUNTER — TELEPHONE (OUTPATIENT)
Dept: NEUROLOGY | Facility: CLINIC | Age: 50
End: 2020-10-19

## 2020-10-22 ENCOUNTER — HOSPITAL ENCOUNTER (OUTPATIENT)
Dept: ULTRASOUND IMAGING | Age: 50
Discharge: HOME | End: 2020-10-22
Attending: SURGERY
Payer: COMMERCIAL

## 2020-10-22 ENCOUNTER — HOSPITAL ENCOUNTER (OUTPATIENT)
Dept: ULTRASOUND IMAGING | Age: 50
Discharge: HOME | End: 2020-10-22
Attending: INTERNAL MEDICINE
Payer: COMMERCIAL

## 2020-10-22 DIAGNOSIS — M75.41 ROTATOR CUFF IMPINGEMENT SYNDROME OF RIGHT SHOULDER: ICD-10-CM

## 2020-10-22 DIAGNOSIS — E06.9 THYROIDITIS: ICD-10-CM

## 2020-10-22 PROCEDURE — 76881 US COMPL JOINT R-T W/IMG: CPT

## 2020-10-22 PROCEDURE — 76536 US EXAM OF HEAD AND NECK: CPT

## 2020-11-11 ENCOUNTER — OFFICE VISIT (OUTPATIENT)
Dept: INTERNAL MEDICINE CLINIC | Age: 50
End: 2020-11-11
Payer: COMMERCIAL

## 2020-11-11 VITALS
WEIGHT: 272 LBS | HEIGHT: 64 IN | SYSTOLIC BLOOD PRESSURE: 110 MMHG | OXYGEN SATURATION: 98 % | BODY MASS INDEX: 46.44 KG/M2 | TEMPERATURE: 97 F | RESPIRATION RATE: 16 BRPM | HEART RATE: 86 BPM | DIASTOLIC BLOOD PRESSURE: 76 MMHG

## 2020-11-11 DIAGNOSIS — J45.20 MILD INTERMITTENT ASTHMA WITHOUT COMPLICATION: ICD-10-CM

## 2020-11-11 DIAGNOSIS — M75.41 ROTATOR CUFF IMPINGEMENT SYNDROME OF RIGHT SHOULDER: Primary | ICD-10-CM

## 2020-11-11 PROCEDURE — 99213 OFFICE O/P EST LOW 20 MIN: CPT | Performed by: INTERNAL MEDICINE

## 2020-11-11 NOTE — PROGRESS NOTES
Merrick Fuchs is a 45 y.o. female and presents with Shoulder Pain and Results  . Subjective:  Shoulder Pain Review:  Patient complains of right side shoulder pain. The symptoms began months ago Course of symptoms since onset has been gradually worsening. Pain is described as overall severity = severe. Symptoms were incited by no known event. Therapy to date includes OTC analgesics: effective. She cannot raise her arm in all motions. Asthma Review:  The patient is being seen for follow up of asthma,  currently stable. Asthma symptoms occur: infrequently. Wheezing when present is described as mild and easily relieved with rescue bronchodilator. The patient reports use of a steroid inhaler. Frequency of use of quick-relief meds: rarely. Regimen compliance: The patient reports adherence to this regimen. Review of Systems  Constitutional: negative for fevers, chills, anorexia and weight loss  Eyes:   negative for visual disturbance and irritation  ENT:   negative for tinnitus,sore throat,nasal congestion,ear pains. hoarseness  Respiratory:  negative for cough, hemoptysis, dyspnea,wheezing  CV:   negative for chest pain, palpitations, lower extremity edema  GI:   negative for nausea, vomiting, diarrhea, abdominal pain,melena  Endo:               negative for polyuria,polydipsia,polyphagia,heat intolerance  Genitourinary: negative for frequency, dysuria and hematuria  Integument:  negative for rash and pruritus  Hematologic:  negative for easy bruising and gum/nose bleeding  Musculoskel: myalgias, arthralgias,joint pain  Neurological:  negative for headaches, dizziness, vertigo, memory problems and gait   Behavl/Psych: negative for feelings of anxiety, depression, mood changes    Past Medical History:   Diagnosis Date    Arthritis     Asthma     Seasonal     Bipolar disorder (HCC)     IBS (irritable bowel syndrome)     Migraine     Peptic ulcer     PTSD (post-traumatic stress disorder)     Snoring     Thyroiditis 10/7/2020    TIA (transient ischemic attack)     As of 19, pt states she never had one and that her migraine caused symptoms of TIA     Past Surgical History:   Procedure Laterality Date    HX CERVICAL FUSION      C4-C7    HX CHOLECYSTECTOMY      HX COLONOSCOPY      HX CYST REMOVAL      from under both arms    HX HEENT Left 2013    orbital    HX HERNIA REPAIR  2009    HX MENISCUS REPAIR Left     x2    HX ORTHOPAEDIC Left     ORIF left fibula    HX TUBAL LIGATION      HX TUBAL LIGATION      HYSTEROSCOPY DIAGNOSTIC      x2     Social History     Socioeconomic History    Marital status: SINGLE     Spouse name: Not on file    Number of children: Not on file    Years of education: Not on file    Highest education level: Not on file   Tobacco Use    Smoking status: Current Some Day Smoker     Packs/day: 0.25     Last attempt to quit: 12/10/2019     Years since quittin.9    Smokeless tobacco: Never Used   Substance and Sexual Activity    Alcohol use: Yes     Comment: rare    Drug use: No    Sexual activity: Not Currently     Family History   Problem Relation Age of Onset    Stroke Father     Hypertension Father     Heart Disease Maternal Grandmother     Cancer Maternal Grandmother         brain and colon    Cancer Maternal Uncle         5 w/ brain [place] [employer]    MS Other         1st cousin    Bipolar Disorder Other      Current Outpatient Medications   Medication Sig Dispense Refill    amoxicillin-clavulanate (AUGMENTIN) 875-125 mg per tablet Take 1 Tab by mouth two (2) times a day. 20 Tab 0    topiramate (TOPAMAX) 25 mg tablet Take 4 Tabs by mouth nightly. Takes 4 tabs once a day 360 Tab 3    dexAMETHasone (Decadron) 4 mg tablet Take 4 mg by mouth two (2) times daily (with meals). 20 Tab 0    galcanezumab-gnlm (Emgality Syringe) 120 mg/mL syrg 120 mg by SubCUTAneous route every thirty (30) days. 1 Units 2    FENUGREEK SEED EXTRACT PO Take  by mouth.  KARINA EXTRACT PO Take  by mouth.  HOP-BLK COH-RHODIOLA-FLAX-PRIM PO Take  by mouth.  furosemide (Lasix) 40 mg tablet Si tab daily 30 Tab 6    albuterol (PROVENTIL HFA, VENTOLIN HFA, PROAIR HFA) 90 mcg/actuation inhaler INHALE 2 PUFFS BY MOUTH EVERY 4 HOURS AS NEEDED FOR WHEEZING 8.5 g 11    citalopram (CELEXA) 20 mg tablet Take 1 Tab by mouth daily. 30 Tab 3    fluticasone (FLONASE ALLERGY RELIEF) 50 mcg/actuation nasal spray 2 Sprays by Both Nostrils route daily as needed for Rhinitis.  potassium chloride SR (KLOR-CON 10) 10 mEq tablet Take 20 mEq by mouth daily. With Lasix.  cyanocobalamin (VITAMIN B12) 100 mcg tablet Take 100 mcg by mouth daily.  therapeutic multivitamin (THERA) tablet Take 1 Tab by mouth daily.  Ferrous Sulfate (SLOW FE) 47.5 mg iron TbER tablet Take 1 Tab by mouth nightly.  tiZANidine (ZANAFLEX) 4 mg tablet Take 1 Tab by mouth nightly.  (Patient not taking: Reported on 10/5/2020) 30 Tab 1     Allergies   Allergen Reactions    Latex Hives    Other Food Rash     All fruits except apple, oranges, and pineapple (recorded as Other Medication 2012)    Peanut Swelling    Shellfish Containing Products Anaphylaxis    Morphine Itching     She has had morphine but premedicates with benadryl    Nystatin Rash    Flagyl [Metronidazole] Contact Dermatitis    Flexeril [Cyclobenzaprine] Rash    Other Medication Rash     All fruits except apple, oranges, and pineapple    Phenergan [Promethazine] Other (comments)     Rapid hr    Robaxin [Methocarbamol] Rash       Objective:  Visit Vitals  /76   Pulse 86   Temp 97 °F (36.1 °C) (Oral)   Resp 16   Ht 5' 3.5\" (1.613 m)   Wt 272 lb (123.4 kg)   LMP 2019   SpO2 98%   BMI 47.43 kg/m²     Physical Exam:   General appearance - alert, well appearing, and in no distress  Mental status - alert, oriented to person, place, and time  EYE-BRIGIDA, EOMI, corneas normal, no foreign bodies  ENT-ENT exam normal, no neck nodes or sinus tenderness  Nose - normal and patent, no erythema, discharge or polyps  Mouth - mucous membranes moist, pharynx normal without lesions  Neck - supple, no significant adenopathy   Chest - clear to auscultation, no wheezes, rales or rhonchi, symmetric air entry   Heart - normal rate, regular rhythm, normal S1, S2, no murmurs, rubs, clicks or gallops   Abdomen - soft, nontender, nondistended, no masses or organomegaly  Lymph- no adenopathy palpable  Ext-peripheral pulses normal, no pedal edema, no clubbing or cyanosis  Skin-Warm and dry. no hyperpigmentation, vitiligo, or suspicious lesions  Neuro -alert, oriented, normal speech, no focal findings or movement disorder noted  Neck-normal C-spine, no tenderness, full ROM without pain  Feet-no nail deformities or callus formation with good pulses noted  Rt.shoulder-subdeltoid tenderness, positive impingement signs    Results for orders placed or performed during the hospital encounter of 07/03/20   SAMPLES BEING HELD   Result Value Ref Range    SAMPLES BEING HELD LAV,GREN     COMMENT        Add-on orders for these samples will be processed based on acceptable specimen integrity and analyte stability, which may vary by analyte. Assessment/Plan:  No diagnosis found. No orders of the defined types were placed in this encounter. lose weight, increase physical activity, follow low fat diet, follow low salt diet, continue present plan,Take 81mg aspirin daily  There are no Patient Instructions on file for this visit. I have reviewed with the patient details of the assessment and plan and all questions were answered. Relevent patient education was performed. The most recent lab findings were reviewed with the patient. An After Visit Summary was printed and given to the patient.

## 2020-11-11 NOTE — PATIENT INSTRUCTIONS
[employer] Activation Thank you for requesting access to edulio. Please follow the instructions below to securely access and download your online medical record. edulio allows you to send messages to your doctor, view your test results, renew your prescriptions, schedule appointments, and more. How Do I Sign Up? 1. In your internet browser, go to [email or web address] 
2. Click on the First Time User? Click Here link in the Sign In box. You will be redirect to the New Member Sign Up page. 3. Enter your edulio Access Code exactly as it appears below. You will not need to use this code after youve completed the sign-up process. If you do not sign up before the expiration date, you must request a new code. edulio Access Code: Activation code not generated Current edulio Status: Active (This is the date your edulio access code will ) 4. Enter the last four digits of your Social Security Number (xxxx) and Date of Birth (mm/dd/yyyy) as indicated and click Submit. You will be taken to the next sign-up page. 5. Create a edulio ID. This will be your edulio login ID and cannot be changed, so think of one that is secure and easy to remember. 6. Create a edulio password. You can change your password at any time. 7. Enter your Password Reset Question and Answer. This can be used at a later time if you forget your password. 8. Enter your e-mail address. You will receive e-mail notification when new information is available in [address] 9. Click Sign Up. You can now view and download portions of your medical record. 10. Click the Download Summary menu link to download a portable copy of your medical information. Additional Information If you have questions, please visit the Frequently Asked Questions section of the edulio website at [email or web address] Remember, edulio is NOT to be used for urgent needs. For medical emergencies, dial 911.

## 2020-11-11 NOTE — PROGRESS NOTES
1. Have you been to the ER, urgent care clinic since your last visit? Hospitalized since your last visit?no    2. Have you seen or consulted any other health care providers outside of the [address] [place] [address] since your last visit? Include any pap smears or colon screening. No    3 most recent PHQ Screens 10/14/2020   PHQ Not Done Patient Decline   Little interest or pleasure in doing things Not at all   Feeling down, depressed, irritable, or hopeless Not at all   Total Score PHQ 2 0   Trouble falling or staying asleep, or sleeping too much -   Feeling tired or having little energy -   Poor appetite, weight loss, or overeating -   Feeling bad about yourself - or that you are a failure or have let yourself or your family down -   Trouble concentrating on things such as school, work, reading, or watching TV -   Moving or speaking so slowly that other people could have noticed; or the opposite being so fidgety that others notice -   Thoughts of being better off dead, or hurting yourself in some way -   PHQ 9 Score -   How difficult have these problems made it for you to do your work, take care of your home and get along with others -     Chief Complaint   Patient presents with    Shoulder Pain     Per Dr. Madi Aguiar.,  verbal order given for needed amb poc labs.

## 2020-11-20 ENCOUNTER — NURSE TRIAGE (OUTPATIENT)
Dept: OTHER | Facility: CLINIC | Age: 50
End: 2020-11-20

## 2020-11-20 NOTE — TELEPHONE ENCOUNTER
Reason for Disposition   [1] COVID-19 infection suspected by caller or triager AND [2] mild symptoms (cough, fever, or others) AND [6] no complications or SOB    Answer Assessment - Initial Assessment Questions  1. COVID-19 DIAGNOSIS: \"Who made your Coronavirus (COVID-19) diagnosis? \" \"Was it confirmed by a positive lab test?\" If not diagnosed by a HCP, ask \"Are there lots of cases (community spread) where you live? \" (See public health department website, if unsure)      n/a  2. ONSET: \"When did the COVID-19 symptoms start?\"       11/20  3. WORST SYMPTOM: \"What is your worst symptom? \" (e.g., cough, fever, shortness of breath, muscle aches)      Cough - chest hurts when she coughs  4. COUGH: \"Do you have a cough? \" If so, ask: \"How bad is the cough? \"        See item 3  5. FEVER: \"Do you have a fever? \" If so, ask: \"What is your temperature, how was it measured, and when did it start? \"      No fever  6. RESPIRATORY STATUS: \"Describe your breathing? \" (e.g., shortness of breath, wheezing, unable to speak)       No breathing problems  7. BETTER-SAME-WORSE: Saleem Holliday you getting better, staying the same or getting worse compared to yesterday? \"  If getting worse, ask, \"In what way? \"      worse  8. HIGH RISK DISEASE: \"Do you have any chronic medical problems? \" (e.g., asthma, heart or lung disease, weak immune system, etc.)      Season asthma  9. PREGNANCY: \"Is there any chance you are pregnant? \" \"When was your last menstrual period? \"      No  10. OTHER SYMPTOMS: \"Do you have any other symptoms? \"  (e.g., chills, fatigue, headache, loss of smell or taste, muscle pain, sore throat)        Cough (chest related pain), sore throat, left ear pain    Protocols used: CORONAVIRUS (COVID-19) DIAGNOSED OR SUSPECTED-ADULT-    Patient reports cough (cough related chest pain), sore throat, and left ear pain. Patient denies fever or shortness of breath.   Recommended patient reach out to her provider but patient prefers to be evaluated and possibly tested at one of our Flu clinics. Provided flu clinic information. Provided home supportive care advice.

## 2020-12-08 ENCOUNTER — HOSPITAL ENCOUNTER (OUTPATIENT)
Dept: PHYSICAL THERAPY | Age: 50
Discharge: HOME | End: 2020-12-08
Payer: COMMERCIAL

## 2020-12-08 NOTE — PROGRESS NOTES
Physical Therapy at [facility],   a part of [address] [place] [address]  [address] [address], [address] [place] [address], [address] [place] [address]  Phone: [phone]  Fax: [phone]    Plan of Care/Statement of Necessity for Physical Therapy Services  -15    Patient name: Sudha Pack  : 1970  Provider#: [id number]  Referral source: Markos Urrutia MD      Medical/Treatment Diagnosis: Right shoulder pain [M25.511]     Prior Hospitalization: see medical history     Comorbidities: Obesity, DDD  Prior Level of Function: Independent  Medications: Verified on Patient Summary List    Start of Care: 2020      Onset Date: 2020      The Plan of Care and following information is based on the information from the initial evaluation. Assessment/ key information: Patient is a 45 y.o. female presenting with acute R shoulder pain s/p concussive head injury and possible fall onto R shoulder. She is extremely painful in all planes of [address] motion and is limited to approximately 30 degrees of elevation and 10 degrees of IR/ER. She has diminished  strength on the R compared to L. She is very tender to palpation over her R biceps tendon and is painful with resisted elbow flexion. She is also tender over her anterior and medial deltoid, LS insertion and UT. Patient experiences numbness in her 3-5th digit on her right hand. Numbness is relieved some with cervical distraction. 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 S/s are consistent with cervical radiculopathy and biceps tendon involvement. Patient would benefit from skilled physical therapy to address her above impairments.     Evaluation Complexity History LOW Complexity : Zero comorbidities / personal factors that will impact the outcome / POC; Examination LOW Complexity : 1-2 Standardized tests and measures addressing body structure, function, activity limitation and / or participation in recreation  ;Presentation LOW Complexity : Stable, uncomplicated    Overall Complexity Rating: LOW     Problem List: pain affecting function, decrease ROM, decrease strength, edema affecting function and decrease activity tolerance   Treatment Plan may include any combination of the following: Therapeutic exercise, Therapeutic activities, Neuromuscular re-education, Physical agent/modality, Manual therapy and Patient education  Patient / Family readiness to learn indicated by: Interest, trying to perform exercises  Persons(s) to be included in education: patient (P)  Barriers to Learning/Limitations: None  Patient Goal (s): Pain relief  Patient Self Reported Health Status: excellent  Rehabilitation Potential: excellent    Short Term Goals: To be accomplished in 4 weeks:   Patient will be able to raise her arm to shoulder level   Patient will be able to reach behind her back to put hand in her pocket   Patient will be independent with HEP  Long Term Goals: To be accomplished in 8 weeks:   Patient will be able to lift 5 lbs to approximately 120 degrees of elevation   Patient will be able to perform  without an increase in numbness in the R hand  Frequency / Duration: Patient to be seen 2 times per week for 8 weeks. Patient/ Caregiver education and instruction: activity modification and exercises    [x]  Plan of care has been reviewed with LEIDY Chatman, SPT 12/8/2020     ________________________________________________________________________    I certify that the above Therapy Services are being furnished while the patient is under my care. I agree with the treatment plan and certify that this therapy is necessary.     [de-identified] Signature:____________________  Date:____________Time: _________
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
## 2020-12-11 ENCOUNTER — HOSPITAL ENCOUNTER (OUTPATIENT)
Dept: PHYSICAL THERAPY | Age: 50
Discharge: HOME | End: 2020-12-11
Payer: COMMERCIAL

## 2020-12-11 PROCEDURE — 97110 THERAPEUTIC EXERCISES: CPT

## 2020-12-11 PROCEDURE — 97014 ELECTRIC STIMULATION THERAPY: CPT
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
## 2020-12-16 ENCOUNTER — APPOINTMENT (OUTPATIENT)
Dept: PHYSICAL THERAPY | Age: 50
End: 2020-12-16
Payer: COMMERCIAL

## 2021-01-07 NOTE — PROGRESS NOTES
Physical Therapy at [facility],   a part of [address] [place] [address]  [address] [address], [address], [address]  Phone: [phone]  Fax: [phone]    Discharge Summary  2-15    Patient name: Fabián Cerna                 : 1970                          Provider#: [id number]  Referral source: Meseret Aparicio MD                                                    Medical/Treatment Diagnosis: Right shoulder pain [M25.511]                                Prior Hospitalization: see medical history                                      Comorbidities: Obesity, DDD  Prior Level of Function: Independent  Medications: Verified on Patient Summary List     Start of Care: 2020                                                                              Onset Date: 2020  Visits from Start of Care: 2     Missed Visits:2  Reporting Period : 20  to 20    Progress towards goals / Updated goals: NOT MET  Short Term Goals: To be accomplished in 4 weeks:               Patient will be able to raise her arm to shoulder level               Patient will be able to reach behind her back to put hand in her pocket               Patient will be independent with HEP  Long Term Goals: To be accomplished in 8 weeks:               Patient will be able to lift 5 lbs to approximately 120 degrees of elevation               Patient will be able to perform  without an increase in numbness in the R hand      ASSESSMENT/SUMMARY OF CARE: Pt seen for 2 sessions and failed to return to PT. She will be discharged at this time.      RECOMMENDATIONS:  [x]Discontinue therapy: []Patient has reached or is progressing toward set goals      [x]Patient is non-compliant or has abdicated      []Due to lack of appreciable progress towards set goals    Carmen Solo, PT, DPT 2021

## 2021-02-02 ENCOUNTER — VIRTUAL VISIT (OUTPATIENT)
Dept: INTERNAL MEDICINE CLINIC | Age: 51
End: 2021-02-02
Payer: COMMERCIAL

## 2021-02-02 DIAGNOSIS — K52.9 GASTROENTERITIS: Primary | ICD-10-CM

## 2021-02-02 PROCEDURE — 99213 OFFICE O/P EST LOW 20 MIN: CPT | Performed by: INTERNAL MEDICINE

## 2021-02-02 NOTE — LETTER
NOTIFICATION RETURN TO WORK / SCHOOL 
 
2/2/2021 11:23 AM 
 
Ms. Johnson [phone] To Whom It May Concern: 
 
Leticia Barrientos is currently under the care of [address] [place] [address]. She will return to work/school on: 2/4/2021 If there are questions or concerns please have the patient contact our office. Sincerely, Loretta Torres MD

## 2021-02-02 NOTE — PROGRESS NOTES
1. Have you been to the ER, urgent care clinic since your last visit? Hospitalized since your last visit?no    2. Have you seen or consulted any other health care providers outside of the [address] [place] [address] since your last visit? Include any pap smears or colon screening.  No    Chief Complaint   Patient presents with    GI Problem     stated she was out of work yesterday for GI problems

## 2021-02-02 NOTE — PATIENT INSTRUCTIONS
[employer] Thank you for requesting access to [employer]. Please follow the instructions below to securely access and download your online medical record. [employer] allows you to send messages to your doctor, view your test results, renew your prescriptions, schedule appointments, and more. How Do I Sign Up? 1. In your internet browser, go to [email or web address] 
2. Click on the First Time User? Click Here link in the Sign In box. You will be redirect to the New Member Sign Up page. 3. Enter your [employer] Access Code exactly as it appears below. You will not need to use this code after youve completed the sign-up process. If you do not sign up before the expiration date, you must request a new code. [employer] Access Code: Activation code not generated Current [employer] Status: Active (This is the date your [employer] access code will ) 4. Enter the last four digits of your Social Security Number (xxxx) and Date of Birth (mm/dd/yyyy) as indicated and click Submit. You will be taken to the next sign-up page. 5. Create a [employer] ID. This will be your [employer] login ID and cannot be changed, so think of one that is secure and easy to remember. 6. Create a [employer] password. You can change your password at any time. 7. Enter your Password Reset Question and Answer. This can be used at a later time if you forget your password. 8. Enter your e-mail address. You will receive e-mail notification when new information is available in [address] 9. Click Sign Up. You can now view and download portions of your medical record. 10. Click the Download Summary menu link to download a portable copy of your medical information. Additional Information If you have questions, please visit the Frequently Asked Questions section of the [employer] website at [email or web address] Remember, [employer] is NOT to be used for urgent needs. For medical emergencies, dial 911.

## 2021-02-02 NOTE — PROGRESS NOTES
Terrell Bryson is a 45 y.o. female being evaluated by a Virtual Visit (video visit) encounter to address concerns as mentioned above. A caregiver was present when appropriate. Due to this being a TeleHealth encounter (During WENLB-59 public health emergency), evaluation of the following organ systems was limited: Vitals/Constitutional/EENT/Resp/CV/GI//MS/Neuro/Skin/Heme-Lymph-Imm. Pursuant to the emergency declaration under the [address] [place] [address] and the [employer] Act, this Virtual Visit was conducted with patient's (and/or legal guardian's) consent, to reduce the risk of exposure to COVID-19 and provide necessary medical care. Services were provided through a video synchronous discussion virtually to substitute for in-person encounter. --Karissa Orourke MD on 2/2/2021 at 11:21 AM    An electronic signature was used to authenticate this note. Terrell Bryson is a 45 y.o. female and presents with GI Problem (stated she was out of work yesterday for GI problems)  . Subjective:    Subjective:   Gastroenteritis review:  Terrell Bryson is a 45 y.o. female who presents for evaluation of diarrhea. Symptoms have been present for a fewdaysThe patient denies diarrhea a few  times per day. The patient's oral intake has been normal. The patient's urine output has been stable. Other contacts with similar symptoms include: other: no others. Patient employed recent travel history. The patient has not had recent ingestion of possible contaminated food, toxic plants, or inappropriate medications/poisons      Review of Systems  Constitutional: negative for fevers, chills, anorexia and weight loss  Eyes:   negative for visual disturbance and irritation  ENT:   negative for tinnitus,sore throat,nasal congestion,ear pains. hoarseness  Respiratory:  negative for cough, hemoptysis, dyspnea,wheezing  CV:   negative for chest pain, palpitations, lower extremity edema  GI:    diarrhea,  Endo:               negative for polyuria,polydipsia,polyphagia,heat intolerance  Genitourinary: negative for frequency, dysuria and hematuria  Integument:  negative for rash and pruritus  Hematologic:  negative for easy bruising and gum/nose bleeding  Musculoskel: negative for myalgias, arthralgias, back pain, muscle weakness, joint pain  Neurological:  negative for headaches, dizziness, vertigo, memory problems and gait   Behavl/Psych: negative for feelings of anxiety, depression, mood changes    Past Medical History:   Diagnosis Date    Arthritis     Asthma     Seasonal     Bipolar disorder ([facility] [address]     IBS (irritable bowel syndrome)     Migraine     Peptic ulcer     PTSD (post-traumatic stress disorder)     Snoring     Thyroiditis 10/7/2020    TIA (transient ischemic attack)     As of 19, pt states she never had one and that her migraine caused symptoms of TIA     Past Surgical History:   Procedure Laterality Date    HX CERVICAL FUSION      C4-C7    HX CHOLECYSTECTOMY      HX COLONOSCOPY      HX CYST REMOVAL      from under both arms    HX HEENT Left 2013    orbital    HX HERNIA REPAIR  2009    HX MENISCUS REPAIR Left     x2    HX ORTHOPAEDIC Left     ORIF left fibula    HX TUBAL LIGATION      HX TUBAL LIGATION  1994    HYSTEROSCOPY DIAGNOSTIC      x2     Social History     Socioeconomic History    Marital status: SINGLE     Spouse name: Not on file    Number of children: Not on file    Years of education: Not on file    Highest education level: Not on file   Tobacco Use    Smoking status: Current Some Day Smoker     Packs/day: 0.25     Last attempt to quit: 12/10/2019     Years since quittin.1    Smokeless tobacco: Never Used   Substance and Sexual Activity    Alcohol use: Yes     Comment: rare    Drug use: No    Sexual activity: Not Currently     Family History Problem Relation Age of Onset    Stroke Father     Hypertension Father     Heart Disease Maternal Grandmother     Cancer Maternal Grandmother         brain and colon    Cancer Maternal Uncle         5 w/ brain Luissolomon Hodge    MS Other         1st cousin    Bipolar Disorder Other      Current Outpatient Medications   Medication Sig Dispense Refill    topiramate (TOPAMAX) 25 mg tablet Take 4 Tabs by mouth nightly. Takes 4 tabs once a day 360 Tab 3    dexAMETHasone (Decadron) 4 mg tablet Take 4 mg by mouth two (2) times daily (with meals). 20 Tab 0    galcanezumab-gnlm (Emgality Syringe) 120 mg/mL syrg 120 mg by SubCUTAneous route every thirty (30) days. 1 Units 2    FENUGREEK SEED EXTRACT PO Take  by mouth.  KARINA EXTRACT PO Take  by mouth.  HOP-BLK COH-RHODIOLA-FLAX-PRIM PO Take  by mouth.  furosemide (Lasix) 40 mg tablet Si tab daily 30 Tab 6    albuterol (PROVENTIL HFA, VENTOLIN HFA, PROAIR HFA) 90 mcg/actuation inhaler INHALE 2 PUFFS BY MOUTH EVERY 4 HOURS AS NEEDED FOR WHEEZING 8.5 g 11    fluticasone (FLONASE ALLERGY RELIEF) 50 mcg/actuation nasal spray 2 Sprays by Both Nostrils route daily as needed for Rhinitis.  potassium chloride SR (KLOR-CON 10) 10 mEq tablet Take 20 mEq by mouth daily. With Lasix.  cyanocobalamin (VITAMIN B12) 100 mcg tablet Take 100 mcg by mouth daily.  therapeutic multivitamin (THERA) tablet Take 1 Tab by mouth daily.  Ferrous Sulfate (SLOW FE) 47.5 mg iron TbER tablet Take 1 Tab by mouth nightly.  tiZANidine (ZANAFLEX) 4 mg tablet Take 1 Tab by mouth nightly. (Patient not taking: Reported on 10/5/2020) 30 Tab 1    citalopram (CELEXA) 20 mg tablet Take 1 Tab by mouth daily.  (Patient not taking: Reported on 2021) 30 Tab 3     Allergies   Allergen Reactions    Latex Hives    Other Food Rash     All fruits except apple, oranges, and pineapple (recorded as Other Medication 2012)    Peanut Swelling    Shellfish Containing Products Anaphylaxis    Morphine Itching     She has had morphine but premedicates with benadryl    Nystatin Rash    Flagyl [Metronidazole] Contact Dermatitis    Flexeril [Cyclobenzaprine] Rash    Other Medication Rash     All fruits except apple, oranges, and pineapple    Phenergan [Promethazine] Other (comments)     Rapid hr    Robaxin [Methocarbamol] Rash       Objective:  Visit Vitals  LMP 12/04/2019     Physical Exam:   General appearance - alert, well appearing, and in no distress  Mental status - alert, oriented to person, place, and time  EYE-BRIGIDA, EOMI, corneas normal, no foreign bodies  ENT-ENT exam normal, no neck nodes or sinus tenderness  Nose - normal and patent, no erythema, discharge or polyps  Mouth - mucous membranes moist, pharynx normal without lesions  Skin-Warm and dry. no hyperpigmentation, vitiligo, or suspicious lesions  Neuro -alert, oriented, normal speech, no focal findings or movement disorder noted  Neck-normal C-spine, no tenderness, full ROM without pain        Results for orders placed or performed during the hospital encounter of 07/03/20   SAMPLES BEING HELD   Result Value Ref Range    SAMPLES BEING HELD LAV,GREN     COMMENT        Add-on orders for these samples will be processed based on acceptable specimen integrity and analyte stability, which may vary by analyte. Assessment/Plan:    ICD-10-CM ICD-9-CM    1. Gastroenteritis  K52.9 558.9      No orders of the defined types were placed in this encounter. lose weight, follow low fat diet, follow low salt diet, continue present plan,Take 81mg aspirin daily  Patient Instructions   [employer] Activation    Thank you for requesting access to [employer]. Please follow the instructions below to securely access and download your online medical record. [employer] allows you to send messages to your doctor, view your test results, renew your prescriptions, schedule appointments, and more. How Do I Sign Up? 1.  In your internet browser, go to [email or web address]  2. Click on the First Time User? Click Here link in the Sign In box. You will be redirect to the New Member Sign Up page. 3. Enter your [employer] Access Code exactly as it appears below. You will not need to use this code after youve completed the sign-up process. If you do not sign up before the expiration date, you must request a new code. MyChart Access Code: Activation code not generated  Current Market Track Status: Active (This is the date your MemberConnectiont access code will )    4. Enter the last four digits of your Social Security Number (xxxx) and Date of Birth (mm/dd/yyyy) as indicated and click Submit. You will be taken to the next sign-up page. 5. Create a MemberConnectiont ID. This will be your Market Track login ID and cannot be changed, so think of one that is secure and easy to remember. 6. Create a Market Track password. You can change your password at any time. 7. Enter your Password Reset Question and Answer. This can be used at a later time if you forget your password. 8. Enter your e-mail address. You will receive e-mail notification when new information is available in [address] 9. Click Sign Up. You can now view and download portions of your medical record. 10. Click the Download Summary menu link to download a portable copy of your medical information. Additional Information    If you have questions, please visit the Frequently Asked Questions section of the [employer] website at [email or web address] Remember, [employer] is NOT to be used for urgent needs. For medical emergencies, dial 911. Follow-up and Dispositions    · Return in about 3 months (around 2021), or if symptoms worsen or fail to improve. I have reviewed with the patient details of the assessment and plan and all questions were answered. Relevent patient education was performed. The most recent lab findings were reviewed with the patient.     An After Visit Summary was printed and given to the patient.

## 2021-02-21 NOTE — TELEPHONE ENCOUNTER
Left message to return call. Hemoglobin 9.2 on this adm, with hemoglobin in 9s-10s at end of previous recent admission. No active signs of bleeding, though INR 1.75 on adm.   -F/u iron studies    #Elevated INR  -INR 1.75 on admission. Will repeat additional value to confirm Hemoglobin 9.2 on this adm, with hemoglobin in 9s-10s at end of previous recent admission. No active signs of bleeding, though INR 1.75 on adm.   -F/u iron studies    #Elevated INR  -INR 1.75 on admission. Has been getting apixaban 2.5 mg BID for DVT ppx while at [facility] rehab, since 2/16 Hemoglobin 9.2 on this adm, with hemoglobin in 9s-10s at end of previous recent admission. No active signs of bleeding, though INR 1.75 on adm.   -F/u iron studies, retic count    #Elevated INR  -INR 1.75 on admission. Has been getting apixaban 2.5 mg BID for DVT ppx while at [facility] rehab, since 2/16. Will not continue eliquis as will start on lovenox while at [facility]

## 2021-03-25 ENCOUNTER — TELEPHONE (OUTPATIENT)
Dept: NEUROLOGY | Age: 51
End: 2021-03-25

## 2021-03-25 ENCOUNTER — HOSPITAL ENCOUNTER (EMERGENCY)
Age: 51
Discharge: HOME | End: 2021-03-26
Attending: EMERGENCY MEDICINE | Admitting: EMERGENCY MEDICINE
Payer: COMMERCIAL

## 2021-03-25 VITALS
WEIGHT: 287 LBS | TEMPERATURE: 98.4 F | SYSTOLIC BLOOD PRESSURE: 139 MMHG | BODY MASS INDEX: 49 KG/M2 | HEIGHT: 64 IN | DIASTOLIC BLOOD PRESSURE: 102 MMHG | HEART RATE: 89 BPM | RESPIRATION RATE: 16 BRPM | OXYGEN SATURATION: 99 %

## 2021-03-25 DIAGNOSIS — S43.401A SPRAIN OF RIGHT SHOULDER, UNSPECIFIED SHOULDER SPRAIN TYPE, INITIAL ENCOUNTER: Primary | ICD-10-CM

## 2021-03-25 PROCEDURE — 99283 EMERGENCY DEPT VISIT LOW MDM: CPT

## 2021-03-26 ENCOUNTER — APPOINTMENT (OUTPATIENT)
Dept: CT IMAGING | Age: 51
End: 2021-03-26
Attending: EMERGENCY MEDICINE
Payer: COMMERCIAL

## 2021-03-26 PROCEDURE — 73200 CT UPPER EXTREMITY W/O DYE: CPT

## 2021-03-26 PROCEDURE — 74011250637 HC RX REV CODE- 250/637: Performed by: EMERGENCY MEDICINE

## 2021-03-26 RX ORDER — DIAZEPAM 5 MG/1
5 TABLET ORAL
Status: COMPLETED | OUTPATIENT
Start: 2021-03-26 | End: 2021-03-26

## 2021-03-26 RX ORDER — DIAZEPAM 5 MG/1
5 TABLET ORAL AS NEEDED
Status: DISCONTINUED | OUTPATIENT
Start: 2021-03-26 | End: 2021-03-26 | Stop reason: HOSPADM

## 2021-03-26 RX ORDER — NAPROXEN 250 MG/1
500 TABLET ORAL
Status: DISCONTINUED | OUTPATIENT
Start: 2021-03-26 | End: 2021-03-26

## 2021-03-26 RX ORDER — LIDOCAINE 4 G/100G
1 PATCH TOPICAL EVERY 24 HOURS
Status: DISCONTINUED | OUTPATIENT
Start: 2021-03-26 | End: 2021-03-26

## 2021-03-26 RX ADMIN — DIAZEPAM 5 MG: 5 TABLET ORAL at 02:15

## 2021-03-26 NOTE — ED NOTES
[facility] [address] [facility] this writer that patient refused to return to room via [address]- Discharge instructions given to patient by Dr. Candace Sharma. Verbalized understanding of instructions. Patient discharged without difficulty. Patient discharged in stable condition ambulatory accompanied by family.
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
## 2021-03-26 NOTE — ED NOTES
23: 31Assumed care of pt. Plan of care discussed. Call bell in reach. Will continue to monitor. 00:30  Awaiting CT, Pt informed. 12:43 Bedside shift change report given to Esha ACKERMAN  (oncoming nurse) by Diamante Gunderson RN (offgoing nurse). Report included the following information SBAR, MAR and Recent Results. 12:52  Pt to CT via wheelchair.

## 2021-05-12 ENCOUNTER — VIRTUAL VISIT (OUTPATIENT)
Dept: INTERNAL MEDICINE CLINIC | Age: 51
End: 2021-05-12
Payer: COMMERCIAL

## 2021-05-12 DIAGNOSIS — E66.01 OBESITY, MORBID (HCC): ICD-10-CM

## 2021-05-12 DIAGNOSIS — M12.811 ROTATOR CUFF ARTHROPATHY OF RIGHT SHOULDER: Primary | ICD-10-CM

## 2021-05-12 PROCEDURE — 99213 OFFICE O/P EST LOW 20 MIN: CPT | Performed by: INTERNAL MEDICINE

## 2021-05-12 NOTE — PATIENT INSTRUCTIONS
[employer] Activation Thank you for requesting access to [facility]. Please follow the instructions below to securely access and download your online medical record. [facility] allows you to send messages to your doctor, view your test results, renew your prescriptions, schedule appointments, and more. How Do I Sign Up? 1. In your internet browser, go to [email or web address] 
2. Click on the First Time User? Click Here link in the Sign In box. You will be redirect to the New Member Sign Up page. 3. Enter your [facility] Access Code exactly as it appears below. You will not need to use this code after youve completed the sign-up process. If you do not sign up before the expiration date, you must request a new code. [facility] Access Code: Activation code not generated Current [facility] Status: Active (This is the date your [facility] access code will ) 4. Enter the last four digits of your Social Security Number (xxxx) and Date of Birth (mm/dd/yyyy) as indicated and click Submit. You will be taken to the next sign-up page. 5. Create a [facility] ID. This will be your [facility] login ID and cannot be changed, so think of one that is secure and easy to remember. 6. Create a [facility] password. You can change your password at any time. 7. Enter your Password Reset Question and Answer. This can be used at a later time if you forget your password. 8. Enter your e-mail address. You will receive e-mail notification when new information is available in [address] 9. Click Sign Up. You can now view and download portions of your medical record. 10. Click the Download Summary menu link to download a portable copy of your medical information. Additional Information If you have questions, please visit the Frequently Asked Questions section of the [employer] website at [email or web address] Remember, [employer] is NOT to be used for urgent needs. For medical emergencies, dial 911.

## 2021-05-12 NOTE — PROGRESS NOTES
Abigail Heath is a 45 y.o. female being evaluated by a Virtual Visit (video visit) encounter to address concerns as mentioned above. A caregiver was present when appropriate. Due to this being a TeleHealth encounter (During [facility] public health emergency), evaluation of the following organ systems was limited: Vitals/Constitutional/EENT/Resp/CV/GI//MS/Neuro/Skin/Heme-Lymph-Imm. Pursuant to the emergency declaration under the [address] [place] [address] and the [employer] Act, this Virtual Visit was conducted with patient's (and/or legal guardian's) consent, to reduce the risk of exposure to COVID-19 and provide necessary medical care. Services were provided through a video synchronous discussion virtually to substitute for in-person encounter. --Jana Martinez MD on 5/12/2021 at 11:21 AM    An electronic signature was used to authenticate this note. Abigail Heath is a 45 y.o. female and presents with Referral Request and Shoulder Pain  . Subjective:    Subjective:   Shoulder Pain Review:  Patient complains of right side shoulder pain. The symptoms began months ago Course of symptoms since onset has been gradually worsening. Pain is described as overall severity = moderate. Symptoms were incited by no known event. Patient states she is to need surgery but needs weight loss Therapy to date includes OTC analgesics: effective. Review of Systems  Constitutional: negative for fevers, chills, anorexia and weight loss  Eyes:   negative for visual disturbance and irritation  ENT:   negative for tinnitus,sore throat,nasal congestion,ear pains. hoarseness  Respiratory:  negative for cough, hemoptysis, dyspnea,wheezing  CV:   negative for chest pain, palpitations, lower extremity edema  GI:    diarrhea,  Endo:               negative for polyuria,polydipsia,polyphagia,heat intolerance  Genitourinary: negative for frequency, dysuria and hematuria  Integument:  negative for rash and pruritus  Hematologic:  negative for easy bruising and gum/nose bleeding  Musculoskel: negative for myalgias, arthralgias, back pain, muscle weakness, joint pain  Neurological:  negative for headaches, dizziness, vertigo, memory problems and gait   Behavl/Psych: negative for feelings of anxiety, depression, mood changes    Past Medical History:   Diagnosis Date    Arthritis     Asthma     Seasonal     Bipolar disorder ([facility] [address]     IBS (irritable bowel syndrome)     Migraine     Peptic ulcer     PTSD (post-traumatic stress disorder)     Snoring     Thyroiditis 10/7/2020    TIA (transient ischemic attack)     As of 19, pt states she never had one and that her migraine caused symptoms of TIA     Past Surgical History:   Procedure Laterality Date    HX CERVICAL FUSION      C4-C7    HX CHOLECYSTECTOMY      HX COLONOSCOPY      HX CYST REMOVAL      from under both arms    HX HEENT Left 2013    orbital    HX HERNIA REPAIR  2009    HX MENISCUS REPAIR Left     x2    HX ORTHOPAEDIC Left     ORIF left fibula    HX TUBAL LIGATION      HX TUBAL LIGATION      HYSTEROSCOPY DIAGNOSTIC      x2     Social History     Socioeconomic History    Marital status: SINGLE     Spouse name: Not on file    Number of children: Not on file    Years of education: Not on file    Highest education level: Not on file   Tobacco Use    Smoking status: Current Some Day Smoker     Packs/day: 0.25     Last attempt to quit: 12/10/2019     Years since quittin.4    Smokeless tobacco: Never Used   Substance and Sexual Activity    Alcohol use: Yes     Comment: rare    Drug use: No    Sexual activity: Not Currently     Family History   Problem Relation Age of Onset    Stroke Father     Hypertension Father     Heart Disease Maternal Grandmother     Cancer Maternal Grandmother         brain and colon    Cancer Maternal Uncle         5 w/ brain Marci Whitney MS Other         1st cousin    Bipolar Disorder Other      Current Outpatient Medications   Medication Sig Dispense Refill    topiramate (TOPAMAX) 25 mg tablet Take 4 Tabs by mouth nightly. Takes 4 tabs once a day 360 Tab 3    dexAMETHasone (Decadron) 4 mg tablet Take 4 mg by mouth two (2) times daily (with meals). 20 Tab 0    tiZANidine (ZANAFLEX) 4 mg tablet Take 1 Tab by mouth nightly. (Patient not taking: Reported on 10/5/2020) 30 Tab 1    galcanezumab-gnlm (Emgality Syringe) 120 mg/mL syrg 120 mg by SubCUTAneous route every thirty (30) days. 1 Units 2    FENUGREEK SEED EXTRACT PO Take  by mouth.  KARINA EXTRACT PO Take  by mouth.  HOP-BLK COH-RHODIOLA-FLAX-PRIM PO Take  by mouth.  furosemide (Lasix) 40 mg tablet Si tab daily 30 Tab 6    citalopram (CELEXA) 20 mg tablet Take 1 Tab by mouth daily. (Patient not taking: Reported on 2021) 30 Tab 3    fluticasone (FLONASE ALLERGY RELIEF) 50 mcg/actuation nasal spray 2 Sprays by Both Nostrils route daily as needed for Rhinitis.  potassium chloride SR (KLOR-CON 10) 10 mEq tablet Take 20 mEq by mouth daily. With Lasix.  cyanocobalamin (VITAMIN B12) 100 mcg tablet Take 100 mcg by mouth daily.  therapeutic multivitamin (THERA) tablet Take 1 Tab by mouth daily.  Ferrous Sulfate (SLOW FE) 47.5 mg iron TbER tablet Take 1 Tab by mouth nightly.        Allergies   Allergen Reactions    Latex Hives    Other Food Rash     All fruits except apple, oranges, and pineapple (recorded as Other Medication 2012)    Peanut Swelling    Shellfish Containing Products Anaphylaxis    Morphine Itching     She has had morphine but premedicates with benadryl    Nystatin Rash    Flagyl [Metronidazole] Contact Dermatitis    Flexeril [Cyclobenzaprine] Rash    Other Medication Rash     All fruits except apple, oranges, and pineapple    Phenergan [Promethazine] Other (comments)     Rapid hr  Robaxin [Methocarbamol] Rash       Objective:  Visit Vitals  LMP 12/04/2019     Physical Exam:   General appearance - alert, well appearing, and in no distress  Mental status - alert, oriented to person, place, and time  EYE-BRIGIDA, EOMI, corneas normal, no foreign bodies  ENT-ENT exam normal, no neck nodes or sinus tenderness  Nose - normal and patent, no erythema, discharge or polyps  Mouth - mucous membranes moist, pharynx normal without lesions  Skin-Warm and dry. no hyperpigmentation, vitiligo, or suspicious lesions  Neuro -alert, oriented, normal speech, no focal findings or movement disorder noted  Neck-normal C-spine, no tenderness, full ROM without pain  Rt.shoulder-reduced range of motion of rt, subdeltoid tenderness      Results for orders placed or performed during the hospital encounter of 07/03/20   SAMPLES BEING HELD   Result Value Ref Range    SAMPLES BEING HELD LAV,GREN     COMMENT        Add-on orders for these samples will be processed based on acceptable specimen integrity and analyte stability, which may vary by analyte. Assessment/Plan:    ICD-10-CM ICD-9-CM    1. Rotator cuff arthropathy of right shoulder  M12.811 716.81    2. Obesity, morbid ([facility] [address]  E66.01 278.01 REFERRAL TO NUTRITION     Orders Placed This Encounter    REFERRAL TO NUTRITION     Referral Priority:   Routine     Referral Type:   Consultation     Referral Reason:   Specialty Services Required     Referred to Provider:   VENKATA Argueta     Requested Specialty:   Nutrition     Number of Visits Requested:   1     lose weight, follow low fat diet, follow low salt diet, continue present plan,Take 81mg aspirin daily  Patient Instructions   Rutanet Activation    Thank you for requesting access to Rut[employer]et. Please follow the instructions below to securely access and download your online medical record.  Rutanet allows you to send messages to your doctor, view your test results, renew your prescriptions, schedule appointments, and more. How Do I Sign Up? 1. In your internet browser, go to [email or web address]  2. Click on the First Time User? Click Here link in the Sign In box. You will be redirect to the New Member Sign Up page. 3. Enter your Shogether Access Code exactly as it appears below. You will not need to use this code after youve completed the sign-up process. If you do not sign up before the expiration date, you must request a new code. MyChart Access Code: Activation code not generated  Current Shogether Status: Active (This is the date your [employer] access code will )    4. Enter the last four digits of your Social Security Number (xxxx) and Date of Birth (mm/dd/yyyy) as indicated and click Submit. You will be taken to the next sign-up page. 5. Create a [employer] ID. This will be your Shogether login ID and cannot be changed, so think of one that is secure and easy to remember. 6. Create a Shogether password. You can change your password at any time. 7. Enter your Password Reset Question and Answer. This can be used at a later time if you forget your password. 8. Enter your e-mail address. You will receive e-mail notification when new information is available in [address] 9. Click Sign Up. You can now view and download portions of your medical record. 10. Click the Download Summary menu link to download a portable copy of your medical information. Additional Information    If you have questions, please visit the Frequently Asked Questions section of the Shogether website at [email or web address] Remember, Shogether is NOT to be used for urgent needs. For medical emergencies, dial 911. Follow-up and Dispositions    · Return in about 4 weeks (around 2021), or if symptoms worsen or fail to improve. I have reviewed with the patient details of the assessment and plan and all questions were answered. Relevent patient education was performed. The most recent lab findings were reviewed with the patient. An After Visit Summary was printed and given to the patient.

## 2021-05-12 NOTE — PROGRESS NOTES
Chief Complaint   Patient presents with    Referral Request    Shoulder Pain       3 most recent PHQ Screens 5/12/2021   PHQ Not Done Patient Decline   Little interest or pleasure in doing things -   Feeling down, depressed, irritable, or hopeless -   Total Score PHQ 2 -   Trouble falling or staying asleep, or sleeping too much -   Feeling tired or having little energy -   Poor appetite, weight loss, or overeating -   Feeling bad about yourself - or that you are a failure or have let yourself or your family down -   Trouble concentrating on things such as school, work, reading, or watching TV -   Moving or speaking so slowly that other people could have noticed; or the opposite being so fidgety that others notice -   Thoughts of being better off dead, or hurting yourself in some way -   PHQ 9 Score -   How difficult have these problems made it for you to do your work, take care of your home and get along with others -     1. Have you been to the ER, urgent care clinic since your last visit? Hospitalized since your last visit? yes    2. Have you seen or consulted any other health care providers outside of the [address] [place] [address] since your last visit? Include any pap smears or colon screening.  no

## 2021-06-01 ENCOUNTER — TELEPHONE (OUTPATIENT)
Dept: CARDIAC REHAB | Age: 51
End: 2021-06-01

## 2021-06-01 NOTE — TELEPHONE ENCOUNTER
Called Earla Halsted to remind her of nutrition consult scheduled for Friday 6/4/21. Yvan Evans stated that she wanted to cancel the appointment. Reason given was that she did not make the appointment. Informed her that she was referred by Dr. Miriam Corrales and she stated \"I know, I talked about it with him. \" She restated she wished to cancel the appointment. She will call back if she changes her mind and wants to reschedule.

## 2021-06-03 NOTE — PERIOP NOTES
PATIENT CALLED AND MADE AWARE OF COVID-19 TESTING NEEDED TO BE DONE WITHIN 96 HOURS OF SURGERY. COVID-19 TESTING APPOINTMENT MADE FOR PATIENT. PATIENT INSTRUCTED ON SELF QUARANTINE BETWEEN TESTING AND ARRIVAL TIME DAY OF SURGERY.   -

## 2021-06-04 ENCOUNTER — TRANSCRIBE ORDER (OUTPATIENT)
Dept: REGISTRATION | Age: 51
End: 2021-06-04

## 2021-06-04 ENCOUNTER — HOSPITAL ENCOUNTER (OUTPATIENT)
Dept: PREADMISSION TESTING | Age: 51
Discharge: HOME | End: 2021-06-04
Payer: COMMERCIAL

## 2021-06-04 ENCOUNTER — APPOINTMENT (OUTPATIENT)
Dept: CARDIAC REHAB | Age: 51
End: 2021-06-04
Attending: INTERNAL MEDICINE

## 2021-06-04 DIAGNOSIS — Z01.812 PRE-PROCEDURE LAB EXAM: ICD-10-CM

## 2021-06-04 DIAGNOSIS — Z01.812 PRE-PROCEDURE LAB EXAM: Primary | ICD-10-CM

## 2021-06-04 PROCEDURE — U0003 INFECTIOUS AGENT DETECTION BY NUCLEIC ACID (DNA OR RNA); SEVERE ACUTE RESPIRATORY SYNDROME CORONAVIRUS 2 (SARS-COV-2) (CORONAVIRUS DISEASE [COVID-19]), AMPLIFIED PROBE TECHNIQUE, MAKING USE OF HIGH THROUGHPUT TECHNOLOGIES AS DESCRIBED BY CMS-2020-01-R: HCPCS

## 2021-06-05 LAB — SARS-COV-2, COV2NT: NOT DETECTED

## 2021-06-07 ENCOUNTER — ANESTHESIA EVENT (OUTPATIENT)
Dept: SURGERY | Age: 51
End: 2021-06-07
Payer: COMMERCIAL

## 2021-06-07 RX ORDER — CHOLECALCIFEROL (VITAMIN D3) 50 MCG
CAPSULE ORAL
COMMUNITY

## 2021-06-08 ENCOUNTER — HOSPITAL ENCOUNTER (OUTPATIENT)
Age: 51
Discharge: HOME | End: 2021-06-08
Attending: ORTHOPAEDIC SURGERY | Admitting: ORTHOPAEDIC SURGERY
Payer: COMMERCIAL

## 2021-06-08 ENCOUNTER — ANESTHESIA (OUTPATIENT)
Dept: SURGERY | Age: 51
End: 2021-06-08
Payer: COMMERCIAL

## 2021-06-08 VITALS
HEART RATE: 61 BPM | TEMPERATURE: 97.2 F | SYSTOLIC BLOOD PRESSURE: 129 MMHG | HEIGHT: 64 IN | OXYGEN SATURATION: 99 % | BODY MASS INDEX: 49.26 KG/M2 | RESPIRATION RATE: 10 BRPM | DIASTOLIC BLOOD PRESSURE: 73 MMHG

## 2021-06-08 PROBLEM — M75.101 ROTATOR CUFF TEAR, RIGHT: Status: ACTIVE | Noted: 2021-06-08

## 2021-06-08 PROCEDURE — 74011250636 HC RX REV CODE- 250/636: Performed by: NURSE ANESTHETIST, CERTIFIED REGISTERED

## 2021-06-08 PROCEDURE — 76060000036 HC ANESTHESIA 2.5 TO 3 HR: Performed by: ORTHOPAEDIC SURGERY

## 2021-06-08 PROCEDURE — 77030002916 HC SUT ETHLN J&J -A: Performed by: ORTHOPAEDIC SURGERY

## 2021-06-08 PROCEDURE — 2709999900 HC NON-CHARGEABLE SUPPLY: Performed by: ORTHOPAEDIC SURGERY

## 2021-06-08 PROCEDURE — 77030019908 HC STETH ESOPH SIMS -A: Performed by: ANESTHESIOLOGY

## 2021-06-08 PROCEDURE — 77030006884 HC BLD SHV INCIS S&N -B: Performed by: ORTHOPAEDIC SURGERY

## 2021-06-08 PROCEDURE — 77030018834: Performed by: ORTHOPAEDIC SURGERY

## 2021-06-08 PROCEDURE — 77030006988: Performed by: ORTHOPAEDIC SURGERY

## 2021-06-08 PROCEDURE — 77030016678 HC BUR SHV4 S&N -B: Performed by: ORTHOPAEDIC SURGERY

## 2021-06-08 PROCEDURE — 76210000017 HC OR PH I REC 1.5 TO 2 HR: Performed by: ORTHOPAEDIC SURGERY

## 2021-06-08 PROCEDURE — 77030040922 HC BLNKT HYPOTHRM STRY -A

## 2021-06-08 PROCEDURE — 74011250636 HC RX REV CODE- 250/636: Performed by: ANESTHESIOLOGY

## 2021-06-08 PROCEDURE — 74011000258 HC RX REV CODE- 258: Performed by: NURSE ANESTHETIST, CERTIFIED REGISTERED

## 2021-06-08 PROCEDURE — C1713 ANCHOR/SCREW BN/BN,TIS/BN: HCPCS | Performed by: ORTHOPAEDIC SURGERY

## 2021-06-08 PROCEDURE — 74011250636 HC RX REV CODE- 250/636: Performed by: ORTHOPAEDIC SURGERY

## 2021-06-08 PROCEDURE — 77030026438 HC STYL ET INTUB CARD -A: Performed by: ANESTHESIOLOGY

## 2021-06-08 PROCEDURE — 74011000250 HC RX REV CODE- 250: Performed by: NURSE ANESTHETIST, CERTIFIED REGISTERED

## 2021-06-08 PROCEDURE — 77030037717 HC BIT DRL SUT TAK KT -ARTH -D: Performed by: ORTHOPAEDIC SURGERY

## 2021-06-08 PROCEDURE — 77030003601 HC NDL NRV BLK BBMI -A

## 2021-06-08 PROCEDURE — L3650 SO 8 ABD RESTRAINT PRE OTS: HCPCS | Performed by: ORTHOPAEDIC SURGERY

## 2021-06-08 PROCEDURE — 76010000132 HC OR TIME 2.5 TO 3 HR: Performed by: ORTHOPAEDIC SURGERY

## 2021-06-08 PROCEDURE — 77030011390 HC GRSP SUT IDL J&J -C: Performed by: ORTHOPAEDIC SURGERY

## 2021-06-08 PROCEDURE — 77030008684 HC TU ET CUF COVD -B: Performed by: ANESTHESIOLOGY

## 2021-06-08 DEVICE — BC SWIVELOCK, 3.9X17.9MM
Type: IMPLANTABLE DEVICE | Site: SHOULDER | Status: FUNCTIONAL
Brand: ARTHREX®

## 2021-06-08 DEVICE — ANCHOR SUTURE SFT 2.6 MM TRPL LD FIBERWIRE CL FIBERTAK: Type: IMPLANTABLE DEVICE | Site: SHOULDER | Status: FUNCTIONAL

## 2021-06-08 RX ORDER — SODIUM CHLORIDE 0.9 % (FLUSH) 0.9 %
5-40 SYRINGE (ML) INJECTION EVERY 8 HOURS
Status: DISCONTINUED | OUTPATIENT
Start: 2021-06-08 | End: 2021-06-08 | Stop reason: HOSPADM

## 2021-06-08 RX ORDER — SODIUM CHLORIDE, SODIUM LACTATE, POTASSIUM CHLORIDE, CALCIUM CHLORIDE 600; 310; 30; 20 MG/100ML; MG/100ML; MG/100ML; MG/100ML
50 INJECTION, SOLUTION INTRAVENOUS CONTINUOUS
Status: DISCONTINUED | OUTPATIENT
Start: 2021-06-08 | End: 2021-06-08 | Stop reason: HOSPADM

## 2021-06-08 RX ORDER — DEXMEDETOMIDINE HYDROCHLORIDE 100 UG/ML
INJECTION, SOLUTION INTRAVENOUS AS NEEDED
Status: DISCONTINUED | OUTPATIENT
Start: 2021-06-08 | End: 2021-06-08 | Stop reason: HOSPADM

## 2021-06-08 RX ORDER — HYDROMORPHONE HYDROCHLORIDE 1 MG/ML
0.2 INJECTION, SOLUTION INTRAMUSCULAR; INTRAVENOUS; SUBCUTANEOUS
Status: DISCONTINUED | OUTPATIENT
Start: 2021-06-08 | End: 2021-06-09 | Stop reason: HOSPADM

## 2021-06-08 RX ORDER — SODIUM CHLORIDE 0.9 % (FLUSH) 0.9 %
5-40 SYRINGE (ML) INJECTION AS NEEDED
Status: DISCONTINUED | OUTPATIENT
Start: 2021-06-08 | End: 2021-06-09 | Stop reason: HOSPADM

## 2021-06-08 RX ORDER — GLYCOPYRROLATE 0.2 MG/ML
INJECTION INTRAMUSCULAR; INTRAVENOUS AS NEEDED
Status: DISCONTINUED | OUTPATIENT
Start: 2021-06-08 | End: 2021-06-08 | Stop reason: HOSPADM

## 2021-06-08 RX ORDER — PROPOFOL 10 MG/ML
INJECTION, EMULSION INTRAVENOUS AS NEEDED
Status: DISCONTINUED | OUTPATIENT
Start: 2021-06-08 | End: 2021-06-08 | Stop reason: HOSPADM

## 2021-06-08 RX ORDER — SODIUM CHLORIDE 0.9 % (FLUSH) 0.9 %
5-40 SYRINGE (ML) INJECTION EVERY 8 HOURS
Status: DISCONTINUED | OUTPATIENT
Start: 2021-06-08 | End: 2021-06-09 | Stop reason: HOSPADM

## 2021-06-08 RX ORDER — SODIUM CHLORIDE 0.9 % (FLUSH) 0.9 %
5-40 SYRINGE (ML) INJECTION AS NEEDED
Status: CANCELLED | OUTPATIENT
Start: 2021-06-08

## 2021-06-08 RX ORDER — LIDOCAINE HYDROCHLORIDE 20 MG/ML
INJECTION, SOLUTION EPIDURAL; INFILTRATION; INTRACAUDAL; PERINEURAL AS NEEDED
Status: DISCONTINUED | OUTPATIENT
Start: 2021-06-08 | End: 2021-06-08 | Stop reason: HOSPADM

## 2021-06-08 RX ORDER — SUCCINYLCHOLINE CHLORIDE 20 MG/ML
INJECTION INTRAMUSCULAR; INTRAVENOUS AS NEEDED
Status: DISCONTINUED | OUTPATIENT
Start: 2021-06-08 | End: 2021-06-08 | Stop reason: HOSPADM

## 2021-06-08 RX ORDER — KETOROLAC TROMETHAMINE 30 MG/ML
INJECTION, SOLUTION INTRAMUSCULAR; INTRAVENOUS AS NEEDED
Status: DISCONTINUED | OUTPATIENT
Start: 2021-06-08 | End: 2021-06-08 | Stop reason: HOSPADM

## 2021-06-08 RX ORDER — SODIUM CHLORIDE 0.9 % (FLUSH) 0.9 %
5-40 SYRINGE (ML) INJECTION EVERY 8 HOURS
Status: CANCELLED | OUTPATIENT
Start: 2021-06-08

## 2021-06-08 RX ORDER — ONDANSETRON 2 MG/ML
INJECTION INTRAMUSCULAR; INTRAVENOUS AS NEEDED
Status: DISCONTINUED | OUTPATIENT
Start: 2021-06-08 | End: 2021-06-08 | Stop reason: HOSPADM

## 2021-06-08 RX ORDER — SODIUM CHLORIDE 9 MG/ML
100 INJECTION, SOLUTION INTRAVENOUS CONTINUOUS
Status: DISCONTINUED | OUTPATIENT
Start: 2021-06-08 | End: 2021-06-09 | Stop reason: HOSPADM

## 2021-06-08 RX ORDER — ACETAMINOPHEN 325 MG/1
650 TABLET ORAL ONCE
Status: DISCONTINUED | OUTPATIENT
Start: 2021-06-08 | End: 2021-06-08 | Stop reason: HOSPADM

## 2021-06-08 RX ORDER — FENTANYL CITRATE 50 UG/ML
INJECTION, SOLUTION INTRAMUSCULAR; INTRAVENOUS AS NEEDED
Status: DISCONTINUED | OUTPATIENT
Start: 2021-06-08 | End: 2021-06-08 | Stop reason: HOSPADM

## 2021-06-08 RX ORDER — FENTANYL CITRATE 50 UG/ML
50 INJECTION, SOLUTION INTRAMUSCULAR; INTRAVENOUS AS NEEDED
Status: DISCONTINUED | OUTPATIENT
Start: 2021-06-08 | End: 2021-06-08 | Stop reason: HOSPADM

## 2021-06-08 RX ORDER — SODIUM CHLORIDE 0.9 % (FLUSH) 0.9 %
5-40 SYRINGE (ML) INJECTION AS NEEDED
Status: DISCONTINUED | OUTPATIENT
Start: 2021-06-08 | End: 2021-06-08 | Stop reason: HOSPADM

## 2021-06-08 RX ORDER — LIDOCAINE HYDROCHLORIDE 10 MG/ML
0.1 INJECTION, SOLUTION EPIDURAL; INFILTRATION; INTRACAUDAL; PERINEURAL AS NEEDED
Status: DISCONTINUED | OUTPATIENT
Start: 2021-06-08 | End: 2021-06-08 | Stop reason: HOSPADM

## 2021-06-08 RX ORDER — MIDAZOLAM HYDROCHLORIDE 1 MG/ML
INJECTION, SOLUTION INTRAMUSCULAR; INTRAVENOUS AS NEEDED
Status: DISCONTINUED | OUTPATIENT
Start: 2021-06-08 | End: 2021-06-08 | Stop reason: HOSPADM

## 2021-06-08 RX ORDER — MIDAZOLAM HYDROCHLORIDE 1 MG/ML
1 INJECTION, SOLUTION INTRAMUSCULAR; INTRAVENOUS AS NEEDED
Status: DISCONTINUED | OUTPATIENT
Start: 2021-06-08 | End: 2021-06-08 | Stop reason: HOSPADM

## 2021-06-08 RX ORDER — ROCURONIUM BROMIDE 10 MG/ML
INJECTION, SOLUTION INTRAVENOUS AS NEEDED
Status: DISCONTINUED | OUTPATIENT
Start: 2021-06-08 | End: 2021-06-08 | Stop reason: HOSPADM

## 2021-06-08 RX ORDER — DEXAMETHASONE SODIUM PHOSPHATE 4 MG/ML
INJECTION, SOLUTION INTRA-ARTICULAR; INTRALESIONAL; INTRAMUSCULAR; INTRAVENOUS; SOFT TISSUE AS NEEDED
Status: DISCONTINUED | OUTPATIENT
Start: 2021-06-08 | End: 2021-06-08 | Stop reason: HOSPADM

## 2021-06-08 RX ORDER — KETAMINE HYDROCHLORIDE 10 MG/ML
INJECTION, SOLUTION INTRAMUSCULAR; INTRAVENOUS AS NEEDED
Status: DISCONTINUED | OUTPATIENT
Start: 2021-06-08 | End: 2021-06-08 | Stop reason: HOSPADM

## 2021-06-08 RX ORDER — FENTANYL CITRATE 50 UG/ML
25 INJECTION, SOLUTION INTRAMUSCULAR; INTRAVENOUS
Status: DISCONTINUED | OUTPATIENT
Start: 2021-06-08 | End: 2021-06-09 | Stop reason: HOSPADM

## 2021-06-08 RX ORDER — NEOSTIGMINE METHYLSULFATE 1 MG/ML
INJECTION INTRAVENOUS AS NEEDED
Status: DISCONTINUED | OUTPATIENT
Start: 2021-06-08 | End: 2021-06-08 | Stop reason: HOSPADM

## 2021-06-08 RX ORDER — ONDANSETRON 2 MG/ML
4 INJECTION INTRAMUSCULAR; INTRAVENOUS AS NEEDED
Status: DISCONTINUED | OUTPATIENT
Start: 2021-06-08 | End: 2021-06-09 | Stop reason: HOSPADM

## 2021-06-08 RX ORDER — ROPIVACAINE HYDROCHLORIDE 5 MG/ML
INJECTION, SOLUTION EPIDURAL; INFILTRATION; PERINEURAL
Status: COMPLETED | OUTPATIENT
Start: 2021-06-08 | End: 2021-06-08

## 2021-06-08 RX ORDER — SODIUM CHLORIDE, SODIUM LACTATE, POTASSIUM CHLORIDE, CALCIUM CHLORIDE 600; 310; 30; 20 MG/100ML; MG/100ML; MG/100ML; MG/100ML
INJECTION, SOLUTION INTRAVENOUS
Status: DISCONTINUED | OUTPATIENT
Start: 2021-06-08 | End: 2021-06-08 | Stop reason: HOSPADM

## 2021-06-08 RX ADMIN — ROPIVACAINE HYDROCHLORIDE 30 ML: 5 INJECTION, SOLUTION EPIDURAL; INFILTRATION; PERINEURAL at 13:54

## 2021-06-08 RX ADMIN — ROCURONIUM BROMIDE 10 MG: 10 SOLUTION INTRAVENOUS at 15:46

## 2021-06-08 RX ADMIN — DEXMEDETOMIDINE HYDROCHLORIDE 10 MCG: 100 INJECTION, SOLUTION, CONCENTRATE INTRAVENOUS at 15:46

## 2021-06-08 RX ADMIN — MIDAZOLAM 2 MG: 1 INJECTION INTRAMUSCULAR; INTRAVENOUS at 15:36

## 2021-06-08 RX ADMIN — SUCCINYLCHOLINE CHLORIDE 200 MG: 20 INJECTION, SOLUTION INTRAMUSCULAR; INTRAVENOUS at 15:46

## 2021-06-08 RX ADMIN — NEOSTIGMINE METHYLSULFATE 3 MG: 1 INJECTION, SOLUTION INTRAVENOUS at 18:08

## 2021-06-08 RX ADMIN — SODIUM CHLORIDE, POTASSIUM CHLORIDE, SODIUM LACTATE AND CALCIUM CHLORIDE: 600; 310; 30; 20 INJECTION, SOLUTION INTRAVENOUS at 15:18

## 2021-06-08 RX ADMIN — FENTANYL CITRATE 100 MCG: 50 INJECTION, SOLUTION INTRAMUSCULAR; INTRAVENOUS at 15:48

## 2021-06-08 RX ADMIN — ROCURONIUM BROMIDE 40 MG: 10 SOLUTION INTRAVENOUS at 15:55

## 2021-06-08 RX ADMIN — KETOROLAC TROMETHAMINE 30 MG: 30 INJECTION, SOLUTION INTRAMUSCULAR; INTRAVENOUS at 18:18

## 2021-06-08 RX ADMIN — ONDANSETRON HYDROCHLORIDE 4 MG: 2 INJECTION, SOLUTION INTRAMUSCULAR; INTRAVENOUS at 15:48

## 2021-06-08 RX ADMIN — DEXAMETHASONE SODIUM PHOSPHATE 8 MG: 4 INJECTION, SOLUTION INTRAMUSCULAR; INTRAVENOUS at 15:46

## 2021-06-08 RX ADMIN — LIDOCAINE HYDROCHLORIDE 100 MG: 20 INJECTION, SOLUTION EPIDURAL; INFILTRATION; INTRACAUDAL; PERINEURAL at 15:46

## 2021-06-08 RX ADMIN — KETAMINE HYDROCHLORIDE 30 MG: 10 INJECTION, SOLUTION INTRAMUSCULAR; INTRAVENOUS at 15:46

## 2021-06-08 RX ADMIN — KETAMINE HYDROCHLORIDE 20 MG: 10 INJECTION, SOLUTION INTRAMUSCULAR; INTRAVENOUS at 16:16

## 2021-06-08 RX ADMIN — ONDANSETRON HYDROCHLORIDE 4 MG: 2 INJECTION, SOLUTION INTRAMUSCULAR; INTRAVENOUS at 18:08

## 2021-06-08 RX ADMIN — FENTANYL CITRATE 50 MCG: 50 INJECTION, SOLUTION INTRAMUSCULAR; INTRAVENOUS at 13:37

## 2021-06-08 RX ADMIN — CEFAZOLIN SODIUM 3 G: 10 INJECTION, POWDER, FOR SOLUTION INTRAVENOUS at 16:07

## 2021-06-08 RX ADMIN — MIDAZOLAM HYDROCHLORIDE 4 MG: 1 INJECTION, SOLUTION INTRAMUSCULAR; INTRAVENOUS at 13:37

## 2021-06-08 RX ADMIN — GLYCOPYRROLATE 0.4 MG: 0.2 INJECTION, SOLUTION INTRAMUSCULAR; INTRAVENOUS at 18:08

## 2021-06-08 RX ADMIN — DEXMEDETOMIDINE HYDROCHLORIDE 10 MCG: 100 INJECTION, SOLUTION, CONCENTRATE INTRAVENOUS at 17:34

## 2021-06-08 RX ADMIN — PROPOFOL 200 MG: 10 INJECTION, EMULSION INTRAVENOUS at 15:46

## 2021-06-08 NOTE — ANESTHESIA PREPROCEDURE EVALUATION
Relevant Problems   No relevant active problems       Anesthetic History   No history of anesthetic complications            Review of Systems / Medical History  Patient summary reviewed, nursing notes reviewed and pertinent labs reviewed    Pulmonary          Smoker  Asthma        Neuro/Psych       CVA  TIA, headaches and psychiatric history    Comments: Bipolar  Depression  migraines Cardiovascular  Within defined limits                Exercise tolerance: >4 METS     GI/Hepatic/Renal  Within defined limits         PUD     Endo/Other        Morbid obesity, arthritis and anemia     Other Findings   Comments: IBS  Right foot drop  Lumbar radiculopathy           Physical Exam    Airway  Mallampati: II  TM Distance: 4 - 6 cm  Neck ROM: normal range of motion   Mouth opening: Normal     Cardiovascular  Regular rate and rhythm,  S1 and S2 normal,  no murmur, click, rub, or gallop             Dental  No notable dental hx       Pulmonary  Breath sounds clear to auscultation               Abdominal  GI exam deferred       Other Findings            Anesthetic Plan    ASA: 2  Anesthesia type: general and regional - interscalene block    Monitoring Plan: BIS      Induction: Intravenous  Anesthetic plan and risks discussed with: Patient

## 2021-06-08 NOTE — DISCHARGE INSTRUCTIONS
Dr. Dwayne Celis Postoperative Instructions for Shoulder Surgery    Medications/Diet  1. Eat only light, non-greasy foods today  2. Take pain medicine with food  3. While taking pain medicines, you may not operate a vehicle, heavy machinery, or appliances  4. While taking pain medicines, you may not drink alcoholic beverages  5. While taking pain medicines, you may not make critical decisions or sign legal papers  6. If you have any reactions to your medicines, stop taking them and call my office immediately. 7. Please keep in mind that constipation is a very common side   effect of taking narcotic pain medication. We recommend that patients take precautions to prevent constipation:   Drink plenty of water (6-8 glasses of 8 oz. a day)   Avoid alcohol, caffeine, and dairy products   Eat plenty of fiber (fruits, vegetables and whole grains)   Take an over the counter stool softener (colace or dulcolax)   Patients that have had upper extremity surgery should take frequent walks    Activity/Exercise  1. Exercises are not necessary at this stage, and you will be given exercises at your first post operative visit  2. You are in a sling and should remain in this until your first postoperative visit  3. Please continue to move your wrist up and down and make a fist several times every hour to help reduce swelling and stiffness  4. Please keep ice applied to the shoulder for the first 72 hours or as long as pain or swelling persist. Do not apply ice directly to skin, or allow water to leak on your dressing    Dressings/Shower  1. Please keep dressing dry  2. If you have had arthroscopy, you may expect some drainage  3. Please reinforce your dressing with a dry sterile dressing  4. Please remove your dressing 2 days after surgery  5. You may shower after your dressing is removed    Emergency/Follow-Up  1.  Please notify my office at [phone] if you develop any fever (101° or above), unexpected warmth, redness or swelling in your shoulder. Please call if your fingers become cold, purple, numb, or there is excessive bleeding  2. Please call the office within 24 hours at [phone]  to schedule a follow up appointment next week  3. Please call the office before 3pm on Friday if you do not have enough pain medicine for the weekend. Narcotic pain medication cannot be called into your pharmacy and the prescription must be picked up at our office        ______________________________________________________________________    Anesthesia Discharge Instructions    After general anesthesia or intervenous sedation, for 24 hours or while taking prescription Narcotics:  · Limit your activities  · Do not drive or operate hazardous machinery  · If you have not urinated within 8 hours after discharge, please contact your surgeon on call. · Do not make important personal or business decisions  · Do not drink alcoholic beverages    Report the following to your surgeon:  · Excessive pain, swelling, redness or odor of or around the surgical area  · Temperature over 100.5 degrees  · Nausea and vomiting lasting longer than 4 hours or if unable to take medication  · Any signs of decreased circulation or nerve impairment to extremity:  Change in color, persistent numbness, tingling, coldness or increased pain.   · Any questions

## 2021-06-08 NOTE — ANESTHESIA PROCEDURE NOTES
Peripheral Block    Start time: 6/8/2021 1:52 PM  Performed by: Romeo Garcia MD  Authorized by: Romeo Garcia MD       Pre-procedure: Indications: at surgeon's request and post-op pain management    Preanesthetic Checklist: patient identified, risks and benefits discussed, site marked, timeout performed and patient being monitored    Timeout Time: 13:52 EDT          Block Type:   Block Type:   Interscalene  Laterality:  Right  Monitoring:  Standard ASA monitoring, continuous pulse ox, frequent vital sign checks, heart rate, responsive to questions and oxygen  Injection Technique:  Single shot  Procedures: ultrasound guided    Patient Position: supine  Prep: chlorhexidine    Location:  Supraclavicular  Needle Type:  Stimuplex  Needle Gauge:  21 G  Needle Localization:  Ultrasound guidance and anatomical landmarks  Medication Injected:  Ropivacaine (PF) (NAROPIN)(0.5%) 5 mg/mL injection, 30 mL  Med Admin Time: 6/8/2021 1:54 PM    Assessment:  Number of attempts:  1  Injection Assessment:  Incremental injection every 5 mL, local visualized surrounding nerve on ultrasound, negative aspiration for blood, no paresthesia, no intravascular symptoms and ultrasound image on chart  Patient tolerance:  Patient tolerated the procedure well with no immediate complications

## 2021-06-08 NOTE — PERIOP NOTES
Patient: Julio Morin MRN: [id number]  SSN: [id number]   YOB: 1970  Age: 45 y.o. Sex: female     Patient is status post Procedure(s):  RIGHT SHOULDER ARTHROSCOPY, ROTATOR CUFF REPAIR, ACROMIOPLASTY, DISTAL CLAVICAL EXCISION, EXTENSIVE DEBRIDEMENT (GEN/AX BLOCK). Surgeon(s) and Role:     * Antoine Lopez MD - Primary     * Virginia Ibsell MD - Fellow    Local/Dose/Irrigation:  No local given. Peripheral IV 06/08/21 Anterior; Left Forearm (Active)   Site Assessment Clean, dry, & intact 06/08/21 1317   Phlebitis Assessment 0 06/08/21 1317   Infiltration Assessment 0 06/08/21 1317   Dressing Status Clean, dry, & intact; New 06/08/21 1317   Hub Color/Line Status Blue; Infusing 06/08/21 1317                       Dressing/Packing:  Incision 06/08/21 Shoulder Right-Dressing/Treatment: Other (Comment) (adaptic, 4x4, ABD, tape, sling) (06/08/21 1807)

## 2021-06-09 NOTE — PERIOP NOTES
Pt. VSS, tolerating PO intake without complaint of nausea, denies pain. Pt AOx4, ambulating with standby assistance. Discharge instruction completed with patient and family (via cell with permission), both verbalize understanding and agree patient can safely comply with discharge plan, opportunity for questions provided, prescriptions confirmed with [address].

## 2021-06-09 NOTE — ANESTHESIA POSTPROCEDURE EVALUATION
Post-Anesthesia Evaluation and Assessment    Patient: Nani Rangel MRN: [id number]  SSN: [id number]    YOB: 1970  Age: 45 y.o. Sex: female      I have evaluated the patient and they are stable and ready for discharge from the PACU. Cardiovascular Function/Vital Signs  Visit Vitals  /73   Pulse (!) 48   Temp 36.2 °C (97.2 °F)   Resp 12   Ht 5' 4\" (1.626 m)   SpO2 96%   BMI 49.26 kg/m²       Patient is status post General anesthesia for Procedure(s):  RIGHT SHOULDER ARTHROSCOPY, ROTATOR CUFF REPAIR, ACROMIOPLASTY, DISTAL CLAVICAL EXCISION, EXTENSIVE DEBRIDEMENT (GEN/AX BLOCK). Nausea/Vomiting: None    Postoperative hydration reviewed and adequate. Pain:  Pain Scale 1: Numeric (0 - 10) (06/08/21 1915)  Pain Intensity 1: 0 (pt denies) (06/08/21 1915)   Managed    Neurological Status:   Neuro (WDL): Exceptions to WDL (06/08/21 1915)  Neuro  Neurologic State: Drowsy (06/08/21 1915)  Orientation Level: Oriented X4 (06/08/21 1915)  Cognition: Follows commands (06/08/21 1915)  Speech: Clear (06/08/21 1915)  LUE Motor Response: Purposeful (06/08/21 1915)  LLE Motor Response: Purposeful (06/08/21 1915)  RUE Motor Response: Purposeful (06/08/21 1915)  RLE Motor Response: Purposeful (06/08/21 1915)   At baseline    Mental Status, Level of Consciousness: Alert and  oriented to person, place, and time    Pulmonary Status:   O2 Device: None (Room air) (06/08/21 1915)   Adequate oxygenation and airway patent    Complications related to anesthesia: None    Post-anesthesia assessment completed. No concerns    Signed By: Javan العلي MD     June 8, 2021              Procedure(s):  RIGHT SHOULDER ARTHROSCOPY, ROTATOR CUFF REPAIR, ACROMIOPLASTY, DISTAL CLAVICAL EXCISION, EXTENSIVE DEBRIDEMENT (GEN/AX BLOCK).     general, regional    <BSHSIANPOST>    INITIAL Post-op Vital signs:   Vitals Value Taken Time   /73 06/08/21 1915   Temp 36.2 °C (97.2 °F) 06/08/21 1915   Pulse 61 06/08/21 1919 Resp 10 06/08/21 1919   SpO2 99 % 06/08/21 1919   Vitals shown include unvalidated device data.

## 2021-06-09 NOTE — OP NOTES
[address] [facility]  OPERATIVE REPORT    Name:  Pascual Glez  MR#:  [id number]  :  1970  ACCOUNT #:  [de-identified]  DATE OF SERVICE:  2021    PREOPERATIVE DIAGNOSES:  Right shoulder rotator cuff tear, impingement, acromioclavicular joint arthrosis. POSTOPERATIVE DIAGNOSES:  Right shoulder rotator cuff tear, impingement, acromioclavicular joint arthrosis. PROCEDURES PERFORMED:  Right shoulder arthroscopy with rotator cuff repair, acromioplasty, distal clavicle excision, extensive debridement. SURGEON:  Tone Brenner MD    ASSISTANT:  Tim Pedroza    ANESTHESIA:  General with a block. COMPLICATIONS:  None. SPECIMENS REMOVED:  None. ESTIMATED BLOOD LOSS:  Approximately 10 mL. DRAINS:  None. INDICATIONS FOR PROCEDURE:  The patient is a 76-year-old female with a long history of right shoulder pain. She tried multiple conservative measures without relief and presents today for the above procedure with MRI-documented pathology after extensive preoperative workup. PROCEDURE:  After being explained the risks and benefits, undergoing extensive preoperative workup, the patient underwent informed consent for the above procedure. She has been kept n.p.o. overnight, receiving preoperative antibiotics. The patient was taken from the holding area to the operating room. After general anesthesia and a block had been established, the patient was placed in the lateral decubitus position in a well-padded beanbag and axillary roll. Right upper extremity was examined under anesthesia, prepped and draped in standard surgical fashion, placed in gentle 10 pounds of suspension. Time-out was called. Correct patient and site were identified with the operating staff and anesthesia staff. We then proceeded to establish posterior portal and diagnostic arthroscopy of the glenohumeral joint was undertaken. There were no loose bodies within the glenohumeral joint.   There were no substantial chondral lesions. There was some fraying to the anterior labrum as well as the undersurface of the supraspinatus. An anterior portal was established. The labrum was debrided to a stable edge. The synovium within the rotator interval capsule were debrided. The undersurface of the rotator cuff was debrided in preparation for further inspection of the bursal surface. Biceps tendon was intact. Capsule was intact. We then proceeded to subacromial space. We established an anterior and lateral portal.  Thorough bursectomy was undertaken. There was an obvious high-grade partial surface bursal sided tear of the posterior aspect of the supraspinatus, which extended into the infraspinatus. This was debrided and it was crescent in nature. We placed 1 triple-loaded Arthrex all suture FiberTak anchor at the medial margin of the tear. Mattress stitches were passed and tied. Free ends were left long and incorporated into 3.9 BioComposite SwiveLock laterally to perform modified double row repair of the rotator cuff. We then turned our attention to acromion. We performed a standard acromioplasty removing approximately 7-mm of bone from the anterior acromion. We then turned our attention to distal clavicle excision. We removed approximately 10-mm of the bone from the distal clavicle. We irrigated copiously. We took final arthroscopic pictures. We closed the portals with nylon suture. Dry sterile dressing was applied. At the end of the case, counts were correct. The patient was placed in abduction pillow, sling and taken to recovery room in stable condition.         Augusto Freitas MD      [id number]  D:  06/08/2021 18:05  T:  06/09/2021 2:45  JOB #:  [id number]  CC:  Dia Fairbanks MD       [facility]

## 2021-06-14 ENCOUNTER — HOSPITAL ENCOUNTER (EMERGENCY)
Age: 51
Discharge: LEFT BEFORE DISCHARGE | End: 2021-06-14
Attending: EMERGENCY MEDICINE
Payer: COMMERCIAL

## 2021-06-14 VITALS
SYSTOLIC BLOOD PRESSURE: 124 MMHG | OXYGEN SATURATION: 99 % | HEIGHT: 64 IN | WEIGHT: 270 LBS | TEMPERATURE: 98 F | DIASTOLIC BLOOD PRESSURE: 85 MMHG | BODY MASS INDEX: 46.1 KG/M2 | RESPIRATION RATE: 15 BRPM | HEART RATE: 72 BPM

## 2021-06-14 PROCEDURE — 99281 EMR DPT VST MAYX REQ PHY/QHP: CPT

## 2021-06-14 NOTE — ED TRIAGE NOTES
TRIAGE NOTE:  Patient arrives from home with right shoulder pain post surgery on the June 8th. She has attempted to call the doctor but not able to get in touch with them. She reports swelling, increased pain and discoloration to site.

## 2021-06-14 NOTE — ED NOTES
Patient seen leaving the ER with belongings and spouse after speaking with doctor. Patient has been called many times for lab work, no response.

## 2021-06-14 NOTE — ED NOTES
RN went to call for pt in waiting room, pt not present, RN checked surrounding areas twice. RN to call again in 15 min.

## 2021-06-14 NOTE — ED PROVIDER NOTES
24-year-old female with history as below with recent shoulder arthroscopy on 6/8 for rotator cuff repair and acromioplasty, presents to the emergency department stating that over the last couple of days she has noticed increased pain around her incision sites in her right shoulder. She is also noted some mild redness directly around her incisions and expresses concern for possible developing infection. She describes a heaviness to her upper extremity. She states that she tried to get in touch with her orthopedist, Dr. Jose Alejandro Moya but was unable to get in touch with them. She states that she is currently taking Keflex since 6/9. She states that she has been taking her pain medications as prescribed. She denies any fever or any fall or new trauma to the area.   She presents to the ED wearing her shoulder sling given to her by her orthopedist.           Past Medical History:   Diagnosis Date    Arthritis     Asthma     Seasonal     Bipolar disorder ([facility] [address]     GERD (gastroesophageal reflux disease)     IBS (irritable bowel syndrome)     Ill-defined condition     MIGRAINES    Migraine     Peptic ulcer     PTSD (post-traumatic stress disorder)     Snoring     Thyroiditis 10/7/2020    TIA (transient ischemic attack)     As of 12/13/19, pt states she never had one and that her migraine caused symptoms of TIA       Past Surgical History:   Procedure Laterality Date    HX CERVICAL FUSION  2011    C4-C7    HX COLONOSCOPY  2010    HX CYST REMOVAL      from under both arms    HX HEENT Left 2013    orbital    HX HERNIA REPAIR Left 2009    INGUINAL    HX HYSTERECTOMY  2019    HX KNEE ARTHROSCOPY Left     HX LAP CHOLECYSTECTOMY      HX MENISCUS REPAIR Left     x2    HX ORTHOPAEDIC Left     ORIF left fibula    HX TUBAL LIGATION  1994    HYSTEROSCOPY DIAGNOSTIC      x2         Family History:   Problem Relation Age of Onset    Stroke Father     Hypertension Father     Diabetes Father     Diabetes Mother  Hypertension Mother     Heart Disease Maternal Grandmother     Cancer Maternal Grandmother         brain and colon    Cancer Maternal Uncle         5 w/ brain Rema Genera MS Other         1st cousin    Bipolar Disorder Other     Anesth Problems Neg Hx        Social History     Socioeconomic History    Marital status: SINGLE     Spouse name: Not on file    Number of children: Not on file    Years of education: Not on file    Highest education level: Not on file   Occupational History    Not on file   Tobacco Use    Smoking status: Former Smoker     Packs/day: 0.25     Years: 20.00     Pack years: 5.00    Smokeless tobacco: Never Used   Vaping Use    Vaping Use: Every day    Substances: CBD   Substance and Sexual Activity    Alcohol use: Yes     Comment: OCCASIONALLY    Drug use: No    Sexual activity: Not Currently   Other Topics Concern    Not on file   Social History Narrative    Not on file     Social Determinants of Health     Financial Resource Strain:     Difficulty of Paying Living Expenses:    Food Insecurity:     Worried About Running Out of Food in the Last Year:     Ran Out of Food in the Last Year:    Transportation Needs:     Lack of Transportation (Medical):  Lack of Transportation (Non-Medical):    Physical Activity:     Days of Exercise per Week:     Minutes of Exercise per Session:    Stress:     Feeling of Stress :    Social Connections:     Frequency of Communication with Friends and Family:     Frequency of Social Gatherings with Friends and Family:     Attends [employer] Services:     Active Member of Clubs or Organizations:     Attends Club or Organization Meetings:     Marital Status:    Intimate Partner Violence:     Fear of Current or Ex-Partner:     Emotionally Abused:     Physically Abused:     Sexually Abused:           ALLERGIES: Latex, Other food, Peanut, Shellfish containing products, Morphine, Nystatin, Flagyl [metronidazole], Flexeril [cyclobenzaprine], Other medication, Phenergan [promethazine], and Robaxin [methocarbamol]    Review of Systems   Constitutional: Negative for activity change, appetite change, chills and fever. HENT: Negative for congestion, rhinorrhea, sinus pressure, sneezing and sore throat. Eyes: Negative for photophobia and visual disturbance. Respiratory: Negative for cough and shortness of breath. Cardiovascular: Negative for chest pain. Gastrointestinal: Negative for abdominal pain, blood in stool, constipation, diarrhea, nausea and vomiting. Genitourinary: Negative for difficulty urinating, dysuria, flank pain, frequency, hematuria, menstrual problem, urgency, vaginal bleeding and vaginal discharge. Musculoskeletal: Positive for arthralgias (Right shoulder). Negative for back pain, myalgias and neck pain. Skin: Positive for wound. Negative for rash. Neurological: Negative for syncope, weakness, numbness and headaches. Psychiatric/Behavioral: Negative for self-injury and suicidal ideas. All other systems reviewed and are negative. Vitals:    06/14/21 1330 06/14/21 1333   BP:  124/85   Pulse: 72    Resp: 15    Temp: 98 °F (36.7 °C)    SpO2: 99%    Weight: 122.5 kg (270 lb)    Height: 5' 4\" (1.626 m)             Physical Exam  Vitals and nursing note reviewed. Constitutional:       General: She is not in acute distress. Appearance: Normal appearance. She is well-developed. She is obese. She is not diaphoretic. HENT:      Head: Normocephalic and atraumatic. Nose: Nose normal.   Eyes:      Extraocular Movements: Extraocular movements intact. Conjunctiva/sclera: Conjunctivae normal.      Pupils: Pupils are equal, round, and reactive to light. Cardiovascular:      Rate and Rhythm: Normal rate and regular rhythm. Heart sounds: Normal heart sounds. Pulmonary:      Effort: Pulmonary effort is normal.      Breath sounds: Normal breath sounds.    Abdominal:      General: There is no distension. Palpations: Abdomen is soft. Tenderness: There is no abdominal tenderness. Musculoskeletal:         General: Tenderness present. Arms:       Cervical back: Neck supple. Comments: Presents to the ED in right shoulder sling and dressing over surgical wounds. Patient yells out loudly complaining of pain with any touch of her upper extremity and when removing her dressing. Skin:     General: Skin is warm and dry. Neurological:      General: No focal deficit present. Mental Status: She is alert and oriented to person, place, and time. Cranial Nerves: No cranial nerve deficit. Sensory: No sensory deficit. Motor: No weakness. Coordination: Coordination normal.                  MDM   50-year-old female with recent orthopedic surgery presents with concern for possible infection postoperatively. States that she has not been able to reach her orthopedist's office. She is currently taking po antibiotics. She is afebrile with vital signs stable. She was offered xray evaluation and labs to evaluate for evidence of developing infection. She declines x-ray evaluation at this time but agrees to cooperate with blood work. She states that she has been rx'd pain medication and she is less worried about the pain, but more concerned that her shoulder feels heavy and might be infected. Labs were ordered to further evaluate. While awaiting these results I was notified by nursing staff that it appeared that the patient eloped from the ED before they could be drawn.      Procedures

## 2021-06-28 ENCOUNTER — HOSPITAL ENCOUNTER (EMERGENCY)
Age: 51
Discharge: HOME | End: 2021-06-28
Attending: EMERGENCY MEDICINE
Payer: COMMERCIAL

## 2021-06-28 ENCOUNTER — APPOINTMENT (OUTPATIENT)
Dept: GENERAL RADIOLOGY | Age: 51
End: 2021-06-28
Attending: EMERGENCY MEDICINE
Payer: COMMERCIAL

## 2021-06-28 VITALS
HEART RATE: 59 BPM | RESPIRATION RATE: 18 BRPM | DIASTOLIC BLOOD PRESSURE: 91 MMHG | SYSTOLIC BLOOD PRESSURE: 123 MMHG | TEMPERATURE: 98.4 F | OXYGEN SATURATION: 100 %

## 2021-06-28 DIAGNOSIS — G89.18 ACUTE POST-OPERATIVE PAIN: Primary | ICD-10-CM

## 2021-06-28 LAB
ALBUMIN SERPL-MCNC: 3.5 G/DL (ref 3.5–5)
ALBUMIN/GLOB SERPL: 0.7 {RATIO} (ref 1.1–2.2)
ALP SERPL-CCNC: 95 U/L (ref 45–117)
ALT SERPL-CCNC: 22 U/L (ref 12–78)
ANION GAP SERPL CALC-SCNC: 3 MMOL/L (ref 5–15)
AST SERPL-CCNC: 26 U/L (ref 15–37)
BASOPHILS # BLD: 0 K/UL (ref 0–0.1)
BASOPHILS NFR BLD: 1 % (ref 0–1)
BILIRUB SERPL-MCNC: 0.6 MG/DL (ref 0.2–1)
BUN SERPL-MCNC: 18 MG/DL (ref 6–20)
BUN/CREAT SERPL: 21 (ref 12–20)
CALCIUM SERPL-MCNC: 9.8 MG/DL (ref 8.5–10.1)
CHLORIDE SERPL-SCNC: 107 MMOL/L (ref 97–108)
CO2 SERPL-SCNC: 27 MMOL/L (ref 21–32)
COMMENT, HOLDF: NORMAL
CREAT SERPL-MCNC: 0.85 MG/DL (ref 0.55–1.02)
DIFFERENTIAL METHOD BLD: ABNORMAL
EOSINOPHIL # BLD: 0.1 K/UL (ref 0–0.4)
EOSINOPHIL NFR BLD: 3 % (ref 0–7)
ERYTHROCYTE [DISTWIDTH] IN BLOOD BY AUTOMATED COUNT: 13.6 % (ref 11.5–14.5)
GLOBULIN SER CALC-MCNC: 4.9 G/DL (ref 2–4)
GLUCOSE SERPL-MCNC: 86 MG/DL (ref 65–100)
HCT VFR BLD AUTO: 37.2 % (ref 35–47)
HGB BLD-MCNC: 12.2 G/DL (ref 11.5–16)
IMM GRANULOCYTES # BLD AUTO: 0 K/UL (ref 0–0.04)
IMM GRANULOCYTES NFR BLD AUTO: 1 % (ref 0–0.5)
LACTATE SERPL-SCNC: 0.6 MMOL/L (ref 0.4–2)
LYMPHOCYTES # BLD: 1.2 K/UL (ref 0.8–3.5)
LYMPHOCYTES NFR BLD: 33 % (ref 12–49)
MCH RBC QN AUTO: 31.9 PG (ref 26–34)
MCHC RBC AUTO-ENTMCNC: 32.8 G/DL (ref 30–36.5)
MCV RBC AUTO: 97.4 FL (ref 80–99)
MONOCYTES # BLD: 0.4 K/UL (ref 0–1)
MONOCYTES NFR BLD: 9 % (ref 5–13)
NEUTS SEG # BLD: 2 K/UL (ref 1.8–8)
NEUTS SEG NFR BLD: 53 % (ref 32–75)
NRBC # BLD: 0 K/UL (ref 0–0.01)
NRBC BLD-RTO: 0 PER 100 WBC
PLATELET # BLD AUTO: 278 K/UL (ref 150–400)
PMV BLD AUTO: 11.2 FL (ref 8.9–12.9)
POTASSIUM SERPL-SCNC: 4.5 MMOL/L (ref 3.5–5.1)
PROT SERPL-MCNC: 8.4 G/DL (ref 6.4–8.2)
RBC # BLD AUTO: 3.82 M/UL (ref 3.8–5.2)
SAMPLES BEING HELD,HOLD: NORMAL
SODIUM SERPL-SCNC: 137 MMOL/L (ref 136–145)
WBC # BLD AUTO: 3.8 K/UL (ref 3.6–11)

## 2021-06-28 PROCEDURE — 36415 COLL VENOUS BLD VENIPUNCTURE: CPT

## 2021-06-28 PROCEDURE — 85025 COMPLETE CBC W/AUTO DIFF WBC: CPT

## 2021-06-28 PROCEDURE — 80053 COMPREHEN METABOLIC PANEL: CPT

## 2021-06-28 PROCEDURE — 74011250637 HC RX REV CODE- 250/637: Performed by: EMERGENCY MEDICINE

## 2021-06-28 PROCEDURE — 73030 X-RAY EXAM OF SHOULDER: CPT

## 2021-06-28 PROCEDURE — 99284 EMERGENCY DEPT VISIT MOD MDM: CPT

## 2021-06-28 PROCEDURE — 83605 ASSAY OF LACTIC ACID: CPT

## 2021-06-28 PROCEDURE — 74011250636 HC RX REV CODE- 250/636: Performed by: EMERGENCY MEDICINE

## 2021-06-28 PROCEDURE — 96374 THER/PROPH/DIAG INJ IV PUSH: CPT

## 2021-06-28 RX ORDER — OXYCODONE AND ACETAMINOPHEN 5; 325 MG/1; MG/1
1 TABLET ORAL ONCE
Status: COMPLETED | OUTPATIENT
Start: 2021-06-28 | End: 2021-06-28

## 2021-06-28 RX ORDER — FAMOTIDINE 20 MG/1
20 TABLET, FILM COATED ORAL
Status: COMPLETED | OUTPATIENT
Start: 2021-06-28 | End: 2021-06-28

## 2021-06-28 RX ORDER — KETOROLAC TROMETHAMINE 30 MG/ML
30 INJECTION, SOLUTION INTRAMUSCULAR; INTRAVENOUS
Status: COMPLETED | OUTPATIENT
Start: 2021-06-28 | End: 2021-06-28

## 2021-06-28 RX ORDER — OXYCODONE HYDROCHLORIDE 5 MG/1
5 TABLET ORAL
Qty: 12 TABLET | Refills: 0 | Status: SHIPPED | OUTPATIENT
Start: 2021-06-28 | End: 2021-07-02

## 2021-06-28 RX ORDER — ACETAMINOPHEN 325 MG/1
650 TABLET ORAL ONCE
Status: COMPLETED | OUTPATIENT
Start: 2021-06-28 | End: 2021-06-28

## 2021-06-28 RX ORDER — DICLOFENAC SODIUM 10 MG/G
2 GEL TOPICAL 4 TIMES DAILY
Qty: 100 G | Refills: 0 | Status: SHIPPED | OUTPATIENT
Start: 2021-06-28 | End: 2021-07-11

## 2021-06-28 RX ADMIN — OXYCODONE HYDROCHLORIDE AND ACETAMINOPHEN 1 TABLET: 5; 325 TABLET ORAL at 13:41

## 2021-06-28 RX ADMIN — FAMOTIDINE 20 MG: 20 TABLET, FILM COATED ORAL at 15:13

## 2021-06-28 RX ADMIN — ACETAMINOPHEN 650 MG: 325 TABLET ORAL at 13:41

## 2021-06-28 RX ADMIN — KETOROLAC TROMETHAMINE 30 MG: 30 INJECTION, SOLUTION INTRAMUSCULAR; INTRAVENOUS at 15:13

## 2021-06-28 NOTE — ED PROVIDER NOTES
The history is provided by the patient. Shoulder Pain   The incident occurred 6 to 12 hours ago. The incident occurred at home. Injury mechanism: rotator cuff surgery 19 days ago with worsening pain and swelling after sleeping in her bed last night. The right shoulder is affected. The pain is severe. The pain has been constant since onset. The pain does not radiate. There is a history of shoulder injury (rotator cuff tear). She has no other injuries. There is a history of shoulder surgery (rotator cuff repair). She reports no foreign bodies present.         Past Medical History:   Diagnosis Date    Arthritis     Asthma     Seasonal     Bipolar disorder ([address]     GERD (gastroesophageal reflux disease)     IBS (irritable bowel syndrome)     Ill-defined condition     MIGRAINES    Migraine     Peptic ulcer     PTSD (post-traumatic stress disorder)     Snoring     Thyroiditis 10/7/2020    TIA (transient ischemic attack)     As of 12/13/19, pt states she never had one and that her migraine caused symptoms of TIA       Past Surgical History:   Procedure Laterality Date    HX CERVICAL FUSION  2011    C4-C7    HX COLONOSCOPY  2010    HX CYST REMOVAL      from under both arms    HX HEENT Left 2013    orbital    HX HERNIA REPAIR Left 2009    INGUINAL    HX HYSTERECTOMY  2019    HX KNEE ARTHROSCOPY Left     HX LAP CHOLECYSTECTOMY      HX MENISCUS REPAIR Left     x2    HX ORTHOPAEDIC Left     ORIF left fibula    HX ROTATOR CUFF REPAIR      HX TUBAL LIGATION  1994    HYSTEROSCOPY DIAGNOSTIC      x2         Family History:   Problem Relation Age of Onset   [facility] Stroke Father     Hypertension Father     Diabetes Father     Diabetes Mother     Hypertension Mother     Heart Disease Maternal Grandmother     Cancer Maternal Grandmother         brain and colon    Cancer Maternal Uncle         5 w/ brain [employer]    MS Other         1st cousin    Bipolar Disorder Other     Anesth Problems Neg Hx Social History     Socioeconomic History    Marital status: SINGLE     Spouse name: Not on file    Number of children: Not on file    Years of education: Not on file    Highest education level: Not on file   Occupational History    Not on file   Tobacco Use    Smoking status: Former Smoker     Packs/day: 0.25     Years: 20.00     Pack years: 5.00    Smokeless tobacco: Never Used   Vaping Use    Vaping Use: Every day    Substances: CBD   Substance and Sexual Activity    Alcohol use: Yes     Comment: OCCASIONALLY    Drug use: No    Sexual activity: Not Currently   Other Topics Concern    Not on file   Social History Narrative    Not on file     Social Determinants of Health     Financial Resource Strain:     Difficulty of Paying Living Expenses:    Food Insecurity:     Worried About Running Out of Food in the Last Year:     Ran Out of Food in the Last Year:    Transportation Needs:     Lack of Transportation (Medical):  Lack of Transportation (Non-Medical):    Physical Activity:     Days of Exercise per Week:     Minutes of Exercise per Session:    Stress:     Feeling of Stress :    Social Connections:     Frequency of Communication with Friends and Family:     Frequency of Social Gatherings with Friends and Family:     Attends Bah[employer] Services:     Active Member of Clubs or Organizations:     Attends Club or Organization Meetings:     Marital Status:    Intimate Partner Violence:     Fear of Current or Ex-Partner:     Emotionally Abused:     Physically Abused:     Sexually Abused: ALLERGIES: Latex, Other food, Peanut, Shellfish containing products, Morphine, Nystatin, Flagyl [metronidazole], Flexeril [cyclobenzaprine], Other medication, Phenergan [promethazine], and Robaxin [methocarbamol]    Review of Systems   Constitutional: Negative for chills and fever. Respiratory: Negative for shortness of breath. Cardiovascular: Negative for chest pain. Gastrointestinal: Negative for abdominal pain, constipation, diarrhea and vomiting. Musculoskeletal: Positive for arthralgias, joint swelling and myalgias. Neurological: Negative for dizziness and light-headedness. All other systems reviewed and are negative. Vitals:    06/28/21 1238   BP: 133/71   Pulse: 77   Resp: 16   Temp: 97.2 °F (36.2 °C)   SpO2: 98%            Physical Exam  Vitals and nursing note reviewed. Constitutional:       Appearance: She is well-developed. HENT:      Head: Normocephalic and atraumatic. Eyes:      General: No scleral icterus. Cardiovascular:      Rate and Rhythm: Normal rate. Pulmonary:      Effort: Pulmonary effort is normal.   Abdominal:      General: There is no distension. Musculoskeletal:      Right shoulder: Swelling and tenderness present. No deformity. Decreased range of motion. Decreased strength. Normal pulse. Right hand: Normal capillary refill. Normal pulse. Cervical back: Normal range of motion. Skin:     General: Skin is warm and dry. Findings: No erythema or rash. Neurological:      General: No focal deficit present. Mental Status: She is alert and oriented to person, place, and time. Psychiatric:         Mood and Affect: Mood normal.         Behavior: Behavior normal.          MDM  Number of Diagnoses or Management Options  Acute post-operative pain  Diagnosis management comments: Pt presents with increased post-operative extremity pain. No evidence of fracture, dislocation, or other significant musculoskeletal injury. Patient was discharged home with a plan for pain control as well as instructions on managing her symptoms and precautions for returning to the emergency department. No evidence of compartment syndrome on evaluation. Patient will be discharged home to follow-up with orthopedic surgery in 2 days. Patient expressed understanding and agreed with plan.            Procedures          LABORATORY TESTS:  Recent Results (from the past 12 hour(s))   CBC WITH AUTOMATED DIFF    Collection Time: 06/28/21  1:04 PM   Result Value Ref Range    WBC 3.8 3.6 - 11.0 K/uL    RBC 3.82 3.80 - 5.20 M/uL    HGB 12.2 11.5 - 16.0 g/dL    HCT 37.2 35.0 - 47.0 %    MCV 97.4 80.0 - 99.0 FL    MCH 31.9 26.0 - 34.0 PG    MCHC 32.8 30.0 - 36.5 g/dL    RDW 13.6 11.5 - 14.5 %    PLATELET 932 027 - 139 K/uL    MPV 11.2 8.9 - 12.9 FL    NRBC 0.0 0  WBC    ABSOLUTE NRBC 0.00 0.00 - 0.01 K/uL    NEUTROPHILS 53 32 - 75 %    LYMPHOCYTES 33 12 - 49 %    MONOCYTES 9 5 - 13 %    EOSINOPHILS 3 0 - 7 %    BASOPHILS 1 0 - 1 %    IMMATURE GRANULOCYTES 1 (H) 0.0 - 0.5 %    ABS. NEUTROPHILS 2.0 1.8 - 8.0 K/UL    ABS. LYMPHOCYTES 1.2 0.8 - 3.5 K/UL    ABS. MONOCYTES 0.4 0.0 - 1.0 K/UL    ABS. EOSINOPHILS 0.1 0.0 - 0.4 K/UL    ABS. BASOPHILS 0.0 0.0 - 0.1 K/UL    ABS. IMM. GRANS. 0.0 0.00 - 0.04 K/UL    DF AUTOMATED     METABOLIC PANEL, COMPREHENSIVE    Collection Time: 06/28/21  1:04 PM   Result Value Ref Range    Sodium 137 136 - 145 mmol/L    Potassium 4.5 3.5 - 5.1 mmol/L    Chloride 107 97 - 108 mmol/L    CO2 27 21 - 32 mmol/L    Anion gap 3 (L) 5 - 15 mmol/L    Glucose 86 65 - 100 mg/dL    BUN 18 6 - 20 MG/DL    Creatinine 0.85 0.55 - 1.02 MG/DL    BUN/Creatinine ratio 21 (H) 12 - 20      GFR est AA >60 >60 ml/min/1.73m2    GFR est non-AA >60 >60 ml/min/1.73m2    Calcium 9.8 8.5 - 10.1 MG/DL    Bilirubin, total 0.6 0.2 - 1.0 MG/DL    ALT (SGPT) 22 12 - 78 U/L    AST (SGOT) 26 15 - 37 U/L    Alk.  phosphatase 95 45 - 117 U/L    Protein, total 8.4 (H) 6.4 - 8.2 g/dL    Albumin 3.5 3.5 - 5.0 g/dL    Globulin 4.9 (H) 2.0 - 4.0 g/dL    A-G Ratio 0.7 (L) 1.1 - 2.2     LACTIC ACID    Collection Time: 06/28/21  1:04 PM   Result Value Ref Range    Lactic acid 0.6 0.4 - 2.0 MMOL/L   SAMPLES BEING HELD    Collection Time: 06/28/21  1:04 PM   Result Value Ref Range    SAMPLES BEING HELD 1red     COMMENT        Add-on orders for these samples will be processed based on acceptable specimen integrity and analyte stability, which may vary by analyte. IMAGING RESULTS:    MEDICATIONS GIVEN:  Medications   acetaminophen (TYLENOL) tablet 650 mg (650 mg Oral Given 6/28/21 1341)   oxyCODONE-acetaminophen (PERCOCET) 5-325 mg per tablet 1 Tablet (1 Tablet Oral Given 6/28/21 1341)   ketorolac (TORADOL) injection 30 mg (30 mg IntraVENous Given 6/28/21 1513)   famotidine (PEPCID) tablet 20 mg (20 mg Oral Given 6/28/21 1513)       IMPRESSION:  1. Acute post-operative pain        PLAN:  1. Diclofenac gel, Oxycodone  2.  Follow-up with Ortho      Return to ED if worse

## 2021-06-28 NOTE — LETTER
[address] 54  [address] [facility] [address] [id number]  [phone]    Work/School Note    Date: 6/28/2021    To Whom It May concern:    Joe Hernandez was seen and treated today in the emergency room by the following provider(s):  No providers found. Joe Hernandez may return to work on 6/30/2021.     Sincerely,          Miguelina Jack RN

## 2021-06-28 NOTE — ED TRIAGE NOTES
Pt had right rotator cuff repair 3 weeks ago, pt slept in her bed and then woke up an noticed  her right shoulder was hanging lower than her left, pt stated her sutures are infected, last Friday had sutures removed and had pus coming out, has been on antibiotics, denies fever, burning sensation to right shoulder , unable to extend her arm out

## 2021-07-10 ENCOUNTER — HOSPITAL ENCOUNTER (EMERGENCY)
Age: 51
Discharge: HOME | End: 2021-07-10
Attending: EMERGENCY MEDICINE
Payer: COMMERCIAL

## 2021-07-10 ENCOUNTER — APPOINTMENT (OUTPATIENT)
Dept: GENERAL RADIOLOGY | Age: 51
End: 2021-07-10
Attending: EMERGENCY MEDICINE
Payer: COMMERCIAL

## 2021-07-10 VITALS
HEART RATE: 91 BPM | RESPIRATION RATE: 18 BRPM | TEMPERATURE: 98.8 F | OXYGEN SATURATION: 100 % | DIASTOLIC BLOOD PRESSURE: 82 MMHG | WEIGHT: 285 LBS | SYSTOLIC BLOOD PRESSURE: 126 MMHG | BODY MASS INDEX: 48.65 KG/M2 | HEIGHT: 64 IN

## 2021-07-10 DIAGNOSIS — J20.9 ACUTE BRONCHITIS, UNSPECIFIED ORGANISM: Primary | ICD-10-CM

## 2021-07-10 LAB
ALBUMIN SERPL-MCNC: 3.4 G/DL (ref 3.5–5)
ALBUMIN/GLOB SERPL: 0.7 {RATIO} (ref 1.1–2.2)
ALP SERPL-CCNC: 103 U/L (ref 45–117)
ALT SERPL-CCNC: 44 U/L (ref 12–78)
ANION GAP SERPL CALC-SCNC: 5 MMOL/L (ref 5–15)
AST SERPL-CCNC: 22 U/L (ref 15–37)
BASOPHILS # BLD: 0 K/UL (ref 0–0.1)
BASOPHILS NFR BLD: 1 % (ref 0–1)
BILIRUB SERPL-MCNC: 0.3 MG/DL (ref 0.2–1)
BUN SERPL-MCNC: 19 MG/DL (ref 6–20)
BUN/CREAT SERPL: 19 (ref 12–20)
CALCIUM SERPL-MCNC: 8.8 MG/DL (ref 8.5–10.1)
CHLORIDE SERPL-SCNC: 106 MMOL/L (ref 97–108)
CK MB CFR SERPL CALC: NORMAL % (ref 0–2.5)
CK MB SERPL-MCNC: <1 NG/ML (ref 5–25)
CK SERPL-CCNC: 72 U/L (ref 26–192)
CO2 SERPL-SCNC: 28 MMOL/L (ref 21–32)
COVID-19 RAPID TEST, COVR: NOT DETECTED
CREAT SERPL-MCNC: 1 MG/DL (ref 0.55–1.02)
DIFFERENTIAL METHOD BLD: ABNORMAL
EOSINOPHIL # BLD: 0.1 K/UL (ref 0–0.4)
EOSINOPHIL NFR BLD: 2 % (ref 0–7)
ERYTHROCYTE [DISTWIDTH] IN BLOOD BY AUTOMATED COUNT: 13.3 % (ref 11.5–14.5)
ERYTHROCYTE [SEDIMENTATION RATE] IN BLOOD: 52 MM/HR (ref 0–30)
GLOBULIN SER CALC-MCNC: 5.1 G/DL (ref 2–4)
GLUCOSE SERPL-MCNC: 102 MG/DL (ref 65–100)
HCT VFR BLD AUTO: 39.9 % (ref 35–47)
HGB BLD-MCNC: 13.3 G/DL (ref 11.5–16)
IMM GRANULOCYTES # BLD AUTO: 0 K/UL (ref 0–0.04)
IMM GRANULOCYTES NFR BLD AUTO: 1 % (ref 0–0.5)
LYMPHOCYTES # BLD: 2.1 K/UL (ref 0.8–3.5)
LYMPHOCYTES NFR BLD: 38 % (ref 12–49)
MCH RBC QN AUTO: 32.2 PG (ref 26–34)
MCHC RBC AUTO-ENTMCNC: 33.3 G/DL (ref 30–36.5)
MCV RBC AUTO: 96.6 FL (ref 80–99)
MONOCYTES # BLD: 0.4 K/UL (ref 0–1)
MONOCYTES NFR BLD: 7 % (ref 5–13)
NEUTS SEG # BLD: 2.8 K/UL (ref 1.8–8)
NEUTS SEG NFR BLD: 51 % (ref 32–75)
NRBC # BLD: 0 K/UL (ref 0–0.01)
NRBC BLD-RTO: 0 PER 100 WBC
PLATELET # BLD AUTO: 247 K/UL (ref 150–400)
PMV BLD AUTO: 11.6 FL (ref 8.9–12.9)
POTASSIUM SERPL-SCNC: 3.9 MMOL/L (ref 3.5–5.1)
PROT SERPL-MCNC: 8.5 G/DL (ref 6.4–8.2)
RBC # BLD AUTO: 4.13 M/UL (ref 3.8–5.2)
SODIUM SERPL-SCNC: 139 MMOL/L (ref 136–145)
SOURCE, COVRS: NORMAL
TROPONIN I BLD-MCNC: <0.04 NG/ML (ref 0–0.08)
WBC # BLD AUTO: 5.5 K/UL (ref 3.6–11)

## 2021-07-10 PROCEDURE — 74011000250 HC RX REV CODE- 250: Performed by: EMERGENCY MEDICINE

## 2021-07-10 PROCEDURE — 94640 AIRWAY INHALATION TREATMENT: CPT

## 2021-07-10 PROCEDURE — 99284 EMERGENCY DEPT VISIT MOD MDM: CPT

## 2021-07-10 PROCEDURE — 71046 X-RAY EXAM CHEST 2 VIEWS: CPT

## 2021-07-10 PROCEDURE — 82550 ASSAY OF CK (CPK): CPT

## 2021-07-10 PROCEDURE — 84484 ASSAY OF TROPONIN QUANT: CPT

## 2021-07-10 PROCEDURE — 80053 COMPREHEN METABOLIC PANEL: CPT

## 2021-07-10 PROCEDURE — 87635 SARS-COV-2 COVID-19 AMP PRB: CPT

## 2021-07-10 PROCEDURE — 85652 RBC SED RATE AUTOMATED: CPT

## 2021-07-10 PROCEDURE — 36415 COLL VENOUS BLD VENIPUNCTURE: CPT

## 2021-07-10 PROCEDURE — 93005 ELECTROCARDIOGRAM TRACING: CPT

## 2021-07-10 PROCEDURE — 85025 COMPLETE CBC W/AUTO DIFF WBC: CPT

## 2021-07-10 RX ORDER — ALBUTEROL SULFATE 90 UG/1
2 AEROSOL, METERED RESPIRATORY (INHALATION)
Qty: 1 INHALER | Refills: 0 | Status: SHIPPED | OUTPATIENT
Start: 2021-07-10 | End: 2021-08-06 | Stop reason: SDUPTHER

## 2021-07-10 RX ORDER — IPRATROPIUM BROMIDE AND ALBUTEROL SULFATE 2.5; .5 MG/3ML; MG/3ML
3 SOLUTION RESPIRATORY (INHALATION)
Status: COMPLETED | OUTPATIENT
Start: 2021-07-10 | End: 2021-07-10

## 2021-07-10 RX ORDER — AMOXICILLIN AND CLAVULANATE POTASSIUM 875; 125 MG/1; MG/1
1 TABLET, FILM COATED ORAL 2 TIMES DAILY
Qty: 14 TABLET | Refills: 0 | Status: SHIPPED | OUTPATIENT
Start: 2021-07-10 | End: 2021-08-19

## 2021-07-10 RX ADMIN — IPRATROPIUM BROMIDE AND ALBUTEROL SULFATE 3 ML: .5; 3 SOLUTION RESPIRATORY (INHALATION) at 20:29

## 2021-07-10 NOTE — ED PROVIDER NOTES
EMERGENCY DEPARTMENT HISTORY AND PHYSICAL EXAM      Date: 7/10/2021  Patient Name: Maryann Vidales    Please note that this dictation was completed with [employer], the computer voice recognition software. Quite often unanticipated grammatical, syntax, homophones, and other interpretive errors are inadvertently transcribed by the computer software. Please disregard these errors. Please excuse any errors that have escaped final proofreading. History of Presenting Illness     Chief Complaint   Patient presents with    Shortness of Breath     Pt had shoulder surgery on  and has had multiple issues with infections since. Pt reports she has had a cold ever since. Pt reports severe SOB x 2 hours. Pt SPO2 remains 100% in triage. Pt is labored and techepnic.  Shoulder Pain       History Provided By: Patient     HPI: Maryann Vidales, 45 y.o. female, presenting the emergency department complaining of shortness of breath, cough, sinus congestion. Also complaining of right shoulder pain. She patient had a arthroscopic surgery on the right shoulder on . She reports continued severe pain since then. She was treated for an infection in the surgical wounds, that has been improving. No purulent drainage, no significant surrounding erythema. She is concerned that her sinus congestion and cough is related to infection in the shoulder. She was recently treated with Keflex for the skin infection, she reports it did not make her congestion in her sinuses better, did not help her cough. PCP: Wyatt Jaquez MD    No current facility-administered medications on file prior to encounter. Current Outpatient Medications on File Prior to Encounter   Medication Sig Dispense Refill    [] diclofenac (Voltaren) 1 % gel Apply 2 g to affected area four (4) times daily for 13 days.  100 g 0    OTHER,NON-FORMULARY, CBD oil,cream for pain      b complex-vitamin c-folic acid 0.8 mg (NEPHRO-DAVINA) 0.8 mg tab tablet Take 1 Tablet by mouth daily.  B.infantis-B.ani-B.long-B.bifi (Probiotic 4X) 10-15 mg TbEC Take  by mouth.  galcanezumab-gnlm (Emgality Syringe) 120 mg/mL syrg 120 mg by SubCUTAneous route every thirty (30) days. 1 Units 2    furosemide (Lasix) 40 mg tablet Si tab daily (Patient taking differently: 20 mg every other day. Si tab daily) 30 Tab 6    fluticasone (FLONASE ALLERGY RELIEF) 50 mcg/actuation nasal spray 2 Sprays by Both Nostrils route daily as needed for Rhinitis.  cyanocobalamin (VITAMIN B12) 100 mcg tablet Take 100 mcg by mouth daily.  therapeutic multivitamin (THERA) tablet Take 1 Tab by mouth daily.  Ferrous Sulfate (SLOW FE) 47.5 mg iron TbER tablet Take 1 Tab by mouth nightly.          Past History     Past Medical History:  Past Medical History:   Diagnosis Date    Arthritis     Asthma     Seasonal     Bipolar disorder ([facility] [address]     GERD (gastroesophageal reflux disease)     IBS (irritable bowel syndrome)     Ill-defined condition     MIGRAINES    Migraine     Peptic ulcer     PTSD (post-traumatic stress disorder)     Snoring     Thyroiditis 10/7/2020    TIA (transient ischemic attack)     As of 19, pt states she never had one and that her migraine caused symptoms of TIA       Past Surgical History:  Past Surgical History:   Procedure Laterality Date    HX CERVICAL FUSION  2011    C4-C7    HX COLONOSCOPY      HX CYST REMOVAL      from under both arms    HX HEENT Left 2013    orbital    HX HERNIA REPAIR Left 2009    INGUINAL    HX HYSTERECTOMY  2019    HX KNEE ARTHROSCOPY Left     HX LAP CHOLECYSTECTOMY      HX MENISCUS REPAIR Left     x2    HX ORTHOPAEDIC Left     ORIF left fibula    HX ROTATOR CUFF REPAIR      HX TUBAL LIGATION      HYSTEROSCOPY DIAGNOSTIC      x2       Family History:  Family History   Problem Relation Age of Onset    Stroke Father     Hypertension Father     Diabetes Father     Diabetes Mother    [facility] Hypertension Mother     Heart Disease Maternal Grandmother     Cancer Maternal Grandmother         brain and colon    Cancer Maternal Uncle         5 w/ brain Margarette Stokes MS Other         1st cousin    Bipolar Disorder Other     Anesth Problems Neg Hx        Social History:  Social History     Tobacco Use    Smoking status: Former Smoker     Packs/day: 0.25     Years: 20.00     Pack years: 5.00    Smokeless tobacco: Never Used   Vaping Use    Vaping Use: Every day    Substances: CBD   Substance Use Topics    Alcohol use: Yes     Comment: OCCASIONALLY    Drug use: No       Allergies: Allergies   Allergen Reactions    Latex Hives    Other Food Rash     All fruits except apple, oranges, and pineapple (recorded as Other Medication 2/8/2012)    Peanut Swelling    Shellfish Containing Products Anaphylaxis    Morphine Itching     She has had morphine but premedicates with benadryl    Nystatin Rash    Flagyl [Metronidazole] Contact Dermatitis    Flexeril [Cyclobenzaprine] Rash    Other Medication Rash     All fruits  Can eat apples,oranges, and pineapple    Phenergan [Promethazine] Other (comments)     Rapid hr    Robaxin [Methocarbamol] Rash         Review of Systems   Review of Systems   Constitutional: Negative for chills and fever. HENT: Positive for congestion. Negative for sore throat. Eyes: Negative for visual disturbance. Respiratory: Positive for cough and shortness of breath. Cardiovascular: Negative for chest pain and leg swelling. Gastrointestinal: Negative for abdominal pain, blood in stool, diarrhea, nausea and vomiting. Endocrine: Negative for polyuria. Genitourinary: Negative for dysuria, flank pain, vaginal bleeding and vaginal discharge. Musculoskeletal: Positive for arthralgias. Negative for myalgias. Skin: Negative for rash. Allergic/Immunologic: Negative for immunocompromised state. Neurological: Negative for weakness and headaches.    Psychiatric/Behavioral: Negative for confusion. Physical Exam   Physical Exam  Vitals and nursing note reviewed. Constitutional:       Appearance: She is well-developed. She is obese. HENT:      Head: Normocephalic and atraumatic. Mouth/Throat:      Comments: Voice is hoarse  Eyes:      General:         Right eye: No discharge. Left eye: No discharge. Conjunctiva/sclera: Conjunctivae normal.      Pupils: Pupils are equal, round, and reactive to light. Neck:      Trachea: No tracheal deviation. Cardiovascular:      Rate and Rhythm: Normal rate and regular rhythm. Heart sounds: Normal heart sounds. No murmur heard. Pulmonary:      Effort: Pulmonary effort is normal. No respiratory distress. Breath sounds: Wheezing present. No rales. Comments: Intermittent wheeze  Abdominal:      General: Bowel sounds are normal.      Palpations: Abdomen is soft. Tenderness: There is no abdominal tenderness. There is no guarding or rebound. Musculoskeletal:         General: No tenderness or deformity. Normal range of motion. Cervical back: Normal range of motion and neck supple. Comments: No effusion palpable. She has tenderness to palpation over the joint. Range of motion is limited secondary to pain. No axillary lymphadenopathy, no axillary mass or abscess palpable. Skin:     General: Skin is warm and dry. Findings: No erythema or rash. Comments: Well-healing surgical wounds to the right shoulder. No surrounding erythema   Neurological:      Mental Status: She is alert and oriented to person, place, and time. Psychiatric:         Behavior: Behavior normal.         Diagnostic Study Results     Labs -     No results found for this or any previous visit (from the past 12 hour(s)). Radiologic Studies -   XR CHEST PA LAT   Final Result   No acute findings.         CT Results  (Last 48 hours)    None        CXR Results  (Last 48 hours)               07/10/21 2016  XR CHEST PA LAT Final result    Impression:  No acute findings. Narrative:  EXAM: XR CHEST PA LAT       INDICATION: cough       COMPARISON: Chest x-ray 1/19/2013. FINDINGS: PA and lateral radiographs of the chest demonstrate clear lungs. The   cardiac and mediastinal contours and pulmonary vascularity are normal. The chest   wall structures and visualized upper abdomen show no acute findings with   incidental note of degenerative spine changes and lower cervical ACDF plate and   screws. Medical Decision Making   I am the first provider for this patient. I reviewed the vital signs, available nursing notes, past medical history, past surgical history, family history and social history. Vital Signs-Reviewed the patient's vital signs. No data found. Records Reviewed:   Nursing notes, Prior visits     Provider Notes (Medical Decision Making):   Consistent with likely sinusitis versus viral infection, will test for Covid. No evidence of infected joint on physical exam.  Repeat temperature in the room was 98 8. Will check labs including sed rate. At this time I do not feel a palpable effusion to tap of the shoulder and there is no overwhelming evidence of septic arthritis so at this time will hold on arthrocentesis. Will have patient follow-up closely with orthopedics. Regarding patient's shortness of breath, cough and congestion I do not think that Keflex is likely an appropriate antibiotic to treat this, would consider switching to Augmentin. We will give nebs. We will test for Covid as well. ED Course:   Initial assessment performed. The patients presenting problems have been discussed, and they are in agreement with the care plan formulated and outlined with them. I have encouraged them to ask questions as they arise throughout their visit. Critical Care Time:   none    Disposition:    DISCHARGE NOTE  Patients results have been reviewed with them.   Patient and/or family have verbally conveyed their understanding and agreement of the patient's signs, symptoms, diagnosis, treatment and prognosis and additionally agree to follow up as recommended or return to the Emergency Room should their condition change or have any new concerns prior to their follow-up appointment. Patient verbally agrees with the care-plan and verbally conveys that all of their questions have been answered. Discharge instructions have also been provided to the patient with some educational information regarding their diagnosis as well a list of reasons why they would want to return to the ER prior to their follow-up appointment should their condition change. PLAN:  1. Discharge Medication List as of 7/10/2021 10:15 PM      START taking these medications    Details   albuterol (PROVENTIL HFA, VENTOLIN HFA, PROAIR HFA) 90 mcg/actuation inhaler Take 2 Puffs by inhalation every four (4) hours as needed for Wheezing., Print, Disp-1 Inhaler, R-0      amoxicillin-clavulanate (Augmentin) 875-125 mg per tablet Take 1 Tablet by mouth two (2) times a day., Print, Disp-14 Tablet, R-0         CONTINUE these medications which have NOT CHANGED    Details   diclofenac (Voltaren) 1 % gel Apply 2 g to affected area four (4) times daily for 13 days. , Normal, Disp-100 g, R-0      OTHER,NON-FORMULARY, CBD oil,cream for pain, Historical Med      b complex-vitamin c-folic acid 0.8 mg (NEPHRO-DAVINA) 0.8 mg tab tablet Take 1 Tablet by mouth daily. , Historical Med      B.infantis-B.ani-B.long-B.bifi (Probiotic 4X) 10-15 mg TbEC Take  by mouth., Historical Med      galcanezumab-gnlm (Emgality Syringe) 120 mg/mL syrg 120 mg by SubCUTAneous route every thirty (30) days. , Normal, Disp-1 Units, R-2      furosemide (Lasix) 40 mg tablet Si tab daily, Normal, Disp-30 Tab, R-6      fluticasone (FLONASE ALLERGY RELIEF) 50 mcg/actuation nasal spray 2 Sprays by Both Nostrils route daily as needed for Rhinitis., Historical Med cyanocobalamin (VITAMIN B12) 100 mcg tablet Take 100 mcg by mouth daily. , Historical Med      therapeutic multivitamin (THERA) tablet Take 1 Tab by mouth daily. , Historical Med      Ferrous Sulfate (SLOW FE) 47.5 mg iron TbER tablet Take 1 Tab by mouth nightly., Historical Med           2. Follow-up Information     Follow up With Specialties Details Why Contact Info    Derick Carroll MD Internal Medicine Schedule an appointment as soon as possible for a visit   [address] [address]  [phone]      [facility] EMERGENCY DEPT Emergency Medicine  If symptoms worsen [address] [place] [address]  [address] [facility]  [phone]          Return to ED if worse     Diagnosis     Clinical Impression:   1. Acute bronchitis, unspecified organism        Attestations:   This note was completed by Sofia Britton DO

## 2021-07-11 LAB
ATRIAL RATE: 102 BPM
CALCULATED P AXIS, ECG09: 87 DEGREES
CALCULATED R AXIS, ECG10: 54 DEGREES
CALCULATED T AXIS, ECG11: 26 DEGREES
DIAGNOSIS, 93000: NORMAL
P-R INTERVAL, ECG05: 148 MS
Q-T INTERVAL, ECG07: 314 MS
QRS DURATION, ECG06: 60 MS
QTC CALCULATION (BEZET), ECG08: 409 MS
VENTRICULAR RATE, ECG03: 102 BPM

## 2021-07-11 NOTE — ED NOTES
Assumed care of pt from triage. Pt with report of shortness of breath, cough, and concern for surgical site infection in right shoulder reports moist area in right axilla.

## 2021-07-11 NOTE — ED NOTES
Pt given discharge instructions. Saline lock removed. Discharged ambulatory, gait steady. No acute distress at time of departure.

## 2021-08-06 RX ORDER — FLUTICASONE PROPIONATE 50 MCG
2 SPRAY, SUSPENSION (ML) NASAL
Qty: 1 BOTTLE | Refills: 0 | Status: SHIPPED | OUTPATIENT
Start: 2021-08-06

## 2021-08-06 RX ORDER — ALBUTEROL SULFATE 90 UG/1
2 AEROSOL, METERED RESPIRATORY (INHALATION)
Qty: 1 INHALER | Refills: 0 | Status: SHIPPED
Start: 2021-08-06 | End: 2021-08-20 | Stop reason: SDUPTHER

## 2021-08-06 NOTE — TELEPHONE ENCOUNTER
Historical medication: Flonase Nasal Spray     Last visit 2021 Virtual visit MD Matt Wright   Next appointment 2021 MD Matt Wright   Previous refill encounter(s)   07/10/2021 Albuterol HFA INH #1 was prescribed by DR. Tiffani Clayton and ED provider    Requested Prescriptions     Pending Prescriptions Disp Refills    albuterol (PROVENTIL HFA, VENTOLIN HFA, PROAIR HFA) 90 mcg/actuation inhaler 1 Inhaler 0     Sig: Take 2 Puffs by inhalation every four (4) hours as needed for Wheezing.  fluticasone propionate (Flonase Allergy Relief) 50 mcg/actuation nasal spray 1 Bottle 0     Si Sprays by Both Nostrils route daily as needed for Rhinitis.

## 2021-08-11 ENCOUNTER — VIRTUAL VISIT (OUTPATIENT)
Dept: INTERNAL MEDICINE CLINIC | Age: 51
End: 2021-08-11
Payer: COMMERCIAL

## 2021-08-11 DIAGNOSIS — U07.1 COVID-19: Primary | ICD-10-CM

## 2021-08-11 PROCEDURE — 99441 PR PHYS/QHP TELEPHONE EVALUATION 5-10 MIN: CPT | Performed by: INTERNAL MEDICINE

## 2021-08-11 RX ORDER — PREDNISONE 10 MG/1
TABLET ORAL
Qty: 21 TABLET | Refills: 0 | Status: SHIPPED
Start: 2021-08-11 | End: 2021-08-19

## 2021-08-11 NOTE — PROGRESS NOTES
1. Have you been to the ER, urgent care clinic since your last visit? Hospitalized since your last visit?no    2. Have you seen or consulted any other health care providers outside of the [address] [place] [address] since your last visit? Include any pap smears or colon screening.  no    Chief Complaint   Patient presents with    Fever    Headache    Positive For Covid-19     3 most recent PHQ Screens 5/12/2021   PHQ Not Done Patient Decline   Little interest or pleasure in doing things -   Feeling down, depressed, irritable, or hopeless -   Total Score PHQ 2 -   Trouble falling or staying asleep, or sleeping too much -   Feeling tired or having little energy -   Poor appetite, weight loss, or overeating -   Feeling bad about yourself - or that you are a failure or have let yourself or your family down -   Trouble concentrating on things such as school, work, reading, or watching TV -   Moving or speaking so slowly that other people could have noticed; or the opposite being so fidgety that others notice -   Thoughts of being better off dead, or hurting yourself in some way -   PHQ 9 Score -   How difficult have these problems made it for you to do your work, take care of your home and get along with others -

## 2021-08-11 NOTE — PROGRESS NOTES
Nehemias Owens is a 45 y.o. female evaluated via telephone on 8/11/2021. Consent:  She and/or health care decision maker is aware that she may receive a bill for this telephone service, depending on her insurance coverage, and has provided verbal consent to proceed: Yes      Documentation:  I communicated with the patient and/or health care decision maker about COVID. Details of this discussion including any medical advice provided: COVID      I affirm this is a Patient Initiated Episode with an Established Patient who has not had a related appointment within my department in the past 7 days or scheduled within the next 24 hours. Total Time: minutes: 5-10 minutes      Note: not billable if this call serves to triage the patient into an appointment for the relevant concern      Ho Villa MD      Upper respiratory infection Review:  Nehemias Owens is a 45 y.o. female who complains of nasal congestion,sore throat, productive cough, myalgias  for the past few days, gradually worsening since that time. SHE HAS COVID  She denies a history of shortness of breath. shehas some chest congestion  Evaluation to date: none. Treatment to date: OTC products. Relevant PMH: No pertinent additional PMH. Review of Systems  Constitutional: negative for fevers, chills, anorexia and weight loss  Eyes:   negative for visual disturbance and irritation  ENT:   negative for tinnitus,sore throat,nasal congestion,ear pains. hoarseness  Respiratory:   cough, hemoptysis, dyspnea,wheezing  CV:   negative for chest pain, palpitations, lower extremity edema  GI:   negative for nausea, vomiting, diarrhea, abdominal pain,melena  Endo:               negative for polyuria,polydipsia,polyphagia,heat intolerance  Genitourinary: negative for frequency, dysuria and hematuria  Integument:  negative for rash and pruritus  Hematologic:  negative for easy bruising and gum/nose bleeding  Musculoskel: negative for myalgias, arthralgias, back pain, muscle weakness, joint pain  Neurological:  negative for headaches, dizziness, vertigo, memory problems and gait   Behavl/Psych: negative for feelings of anxiety, depression, mood changes    Past Medical History:   Diagnosis Date    Arthritis     Asthma     Seasonal     Bipolar disorder ([facility] [address]     GERD (gastroesophageal reflux disease)     IBS (irritable bowel syndrome)     Ill-defined condition     MIGRAINES    Migraine     Peptic ulcer     PTSD (post-traumatic stress disorder)     Snoring     Thyroiditis 10/7/2020    TIA (transient ischemic attack)     As of 12/13/19, pt states she never had one and that her migraine caused symptoms of TIA     Past Surgical History:   Procedure Laterality Date    HX CERVICAL FUSION  2011    C4-C7    HX COLONOSCOPY  2010    HX CYST REMOVAL      from under both arms    HX HEENT Left 2013    orbital    HX HERNIA REPAIR Left 2009    INGUINAL    HX HYSTERECTOMY  2019    HX KNEE ARTHROSCOPY Left     HX LAP CHOLECYSTECTOMY      HX MENISCUS REPAIR Left     x2    HX ORTHOPAEDIC Left     ORIF left fibula    HX ROTATOR CUFF REPAIR Right 06/08/2021    HX TUBAL LIGATION  1994    HYSTEROSCOPY DIAGNOSTIC      x2     Social History     Socioeconomic History    Marital status: SINGLE     Spouse name: Not on file    Number of children: Not on file    Years of education: Not on file    Highest education level: Not on file   Tobacco Use    Smoking status: Former Smoker     Packs/day: 0.25     Years: 20.00     Pack years: 5.00    Smokeless tobacco: Never Used   Vaping Use    Vaping Use: Every day    Substances: CBD   Substance and Sexual Activity    Alcohol use: Yes     Comment: OCCASIONALLY    Drug use: No    Sexual activity: Not Currently     Social Determinants of Health     Financial Resource Strain:     Difficulty of Paying Living Expenses:    Food Insecurity:     Worried About Running Out of Food in the Last Year:     [employer] in the Last Year:    Transportation Needs:     Lack of Transportation (Medical):  Lack of Transportation (Non-Medical):    Physical Activity:     Days of Exercise per Week:     Minutes of Exercise per Session:    Stress:     Feeling of Stress :    Social Connections:     Frequency of Communication with Friends and Family:     Frequency of Social Gatherings with Friends and Family:     Attends Yazidism Services:     Active Member of Clubs or Organizations:     Attends Club or Organization Meetings:     Marital Status:      Family History   Problem Relation Age of Onset    Stroke Father     Hypertension Father     Diabetes Father     Diabetes Mother     Hypertension Mother     Heart Disease Maternal Grandmother     Cancer Maternal Grandmother         brain and colon    Cancer Maternal Uncle         5 w/ brain Nellene Sees    MS Other         1st cousin    Bipolar Disorder Other     Anesth Problems Neg Hx      Current Outpatient Medications   Medication Sig Dispense Refill    albuterol (PROVENTIL HFA, VENTOLIN HFA, PROAIR HFA) 90 mcg/actuation inhaler Take 2 Puffs by inhalation every four (4) hours as needed for Wheezing. 1 Inhaler 0    fluticasone propionate (Flonase Allergy Relief) 50 mcg/actuation nasal spray 2 Sprays by Both Nostrils route daily as needed for Rhinitis. 1 Bottle 0    OTHER,NON-FORMULARY, CBD oil,cream for pain      b complex-vitamin c-folic acid 0.8 mg (NEPHRO-DAVINA) 0.8 mg tab tablet Take 1 Tablet by mouth daily.  B.infantis-B.ani-B.long-B.bifi (Probiotic 4X) 10-15 mg TbEC Take  by mouth.  galcanezumab-gnlm (Emgality Syringe) 120 mg/mL syrg 120 mg by SubCUTAneous route every thirty (30) days. 1 Units 2    cyanocobalamin (VITAMIN B12) 100 mcg tablet Take 100 mcg by mouth daily.  therapeutic multivitamin (THERA) tablet Take 1 Tab by mouth daily.  Ferrous Sulfate (SLOW FE) 47.5 mg iron TbER tablet Take 1 Tab by mouth nightly.       amoxicillin-clavulanate (Augmentin) 875-125 mg per tablet Take 1 Tablet by mouth two (2) times a day. (Patient not taking: Reported on 2021) 14 Tablet 0    furosemide (Lasix) 40 mg tablet Si tab daily (Patient not taking: Reported on 2021) 30 Tab 6     Allergies   Allergen Reactions    Latex Hives    Other Food Rash     All fruits except apple, oranges, and pineapple (recorded as Other Medication 2012)    Peanut Swelling    Shellfish Containing Products Anaphylaxis    Morphine Itching     She has had morphine but premedicates with benadryl    Nystatin Rash    Flagyl [Metronidazole] Contact Dermatitis    Flexeril [Cyclobenzaprine] Rash    Other Medication Rash     All fruits  Can eat apples,oranges, and pineapple    Phenergan [Promethazine] Other (comments)     Rapid hr    Robaxin [Methocarbamol] Rash       Objective:  Visit Vitals  LMP 2019         Results for orders placed or performed during the hospital encounter of 07/10/21   COVID-19 RAPID TEST   Result Value Ref Range    Specimen source Nasopharyngeal      COVID-19 rapid test Not detected NOTD     CBC WITH AUTOMATED DIFF   Result Value Ref Range    WBC 5.5 3.6 - 11.0 K/uL    RBC 4.13 3.80 - 5.20 M/uL    HGB 13.3 11.5 - 16.0 g/dL    HCT 39.9 35.0 - 47.0 %    MCV 96.6 80.0 - 99.0 FL    MCH 32.2 26.0 - 34.0 PG    MCHC 33.3 30.0 - 36.5 g/dL    RDW 13.3 11.5 - 14.5 %    PLATELET 223 995 - 857 K/uL    MPV 11.6 8.9 - 12.9 FL    NRBC 0.0 0  WBC    ABSOLUTE NRBC 0.00 0.00 - 0.01 K/uL    NEUTROPHILS 51 32 - 75 %    LYMPHOCYTES 38 12 - 49 %    MONOCYTES 7 5 - 13 %    EOSINOPHILS 2 0 - 7 %    BASOPHILS 1 0 - 1 %    IMMATURE GRANULOCYTES 1 (H) 0.0 - 0.5 %    ABS. NEUTROPHILS 2.8 1.8 - 8.0 K/UL    ABS. LYMPHOCYTES 2.1 0.8 - 3.5 K/UL    ABS. MONOCYTES 0.4 0.0 - 1.0 K/UL    ABS. EOSINOPHILS 0.1 0.0 - 0.4 K/UL    ABS. BASOPHILS 0.0 0.0 - 0.1 K/UL    ABS. IMM.  GRANS. 0.0 0.00 - 0.04 K/UL    DF AUTOMATED     METABOLIC PANEL, COMPREHENSIVE   Result Value Ref Range    Sodium 139 136 - 145 mmol/L    Potassium 3.9 3.5 - 5.1 mmol/L    Chloride 106 97 - 108 mmol/L    CO2 28 21 - 32 mmol/L    Anion gap 5 5 - 15 mmol/L    Glucose 102 (H) 65 - 100 mg/dL    BUN 19 6 - 20 MG/DL    Creatinine 1.00 0.55 - 1.02 MG/DL    BUN/Creatinine ratio 19 12 - 20      GFR est AA >60 >60 ml/min/1.73m2    GFR est non-AA 58 (L) >60 ml/min/1.73m2    Calcium 8.8 8.5 - 10.1 MG/DL    Bilirubin, total 0.3 0.2 - 1.0 MG/DL    ALT (SGPT) 44 12 - 78 U/L    AST (SGOT) 22 15 - 37 U/L    Alk. phosphatase 103 45 - 117 U/L    Protein, total 8.5 (H) 6.4 - 8.2 g/dL    Albumin 3.4 (L) 3.5 - 5.0 g/dL    Globulin 5.1 (H) 2.0 - 4.0 g/dL    A-G Ratio 0.7 (L) 1.1 - 2.2     CK W/ CKMB & INDEX   Result Value Ref Range    CK - MB <1.0 <3.6 NG/ML    CK-MB Index Cannot be calculated 0.0 - 2.5      CK 72 26 - 192 U/L   SED RATE (ESR)   Result Value Ref Range    Sed rate, automated 52 (H) 0 - 30 mm/hr   POC TROPONIN-I   Result Value Ref Range    Troponin-I (POC) <0.04 0.00 - 0.08 ng/mL   EKG, 12 LEAD, INITIAL   Result Value Ref Range    Ventricular Rate 102 BPM    Atrial Rate 102 BPM    P-R Interval 148 ms    QRS Duration 60 ms    Q-T Interval 314 ms    QTC Calculation (Bezet) 409 ms    Calculated P Axis 87 degrees    Calculated R Axis 54 degrees    Calculated T Axis 26 degrees    Diagnosis       Sinus tachycardia  When compared with ECG of 07-MAR-2019 12:48,  T wave inversion now evident in Lateral leads  Confirmed by Tiarra Reza ([id number]) on 7/11/2021 11:08:49 AM         Assessment/Plan:  No diagnosis found. No orders of the defined types were placed in this encounter. call if any problems,  There are no Patient Instructions on file for this visit. I have reviewed with the patient details of the assessment and plan and all questions were answered. Relevent patient education was performed. The most recent lab findings were reviewed with the patient.     An After Visit Summary was printed and given to the patient.

## 2021-08-15 ENCOUNTER — HOSPITAL ENCOUNTER (INPATIENT)
Age: 51
LOS: 4 days | Discharge: HOME | DRG: 137 | End: 2021-08-19
Attending: EMERGENCY MEDICINE | Admitting: INTERNAL MEDICINE
Payer: COMMERCIAL

## 2021-08-15 ENCOUNTER — APPOINTMENT (OUTPATIENT)
Dept: GENERAL RADIOLOGY | Age: 51
DRG: 137 | End: 2021-08-15
Attending: EMERGENCY MEDICINE
Payer: COMMERCIAL

## 2021-08-15 DIAGNOSIS — J18.9 PNEUMONIA OF BOTH LUNGS DUE TO INFECTIOUS ORGANISM, UNSPECIFIED PART OF LUNG: ICD-10-CM

## 2021-08-15 DIAGNOSIS — U07.1 COVID-19: Primary | ICD-10-CM

## 2021-08-15 PROBLEM — J12.82 PNEUMONIA DUE TO COVID-19 VIRUS: Status: ACTIVE | Noted: 2021-08-15

## 2021-08-15 LAB
ALBUMIN SERPL-MCNC: 2.9 G/DL (ref 3.5–5)
ALBUMIN/GLOB SERPL: 0.6 {RATIO} (ref 1.1–2.2)
ALP SERPL-CCNC: 76 U/L (ref 45–117)
ALT SERPL-CCNC: 33 U/L (ref 12–78)
ANION GAP SERPL CALC-SCNC: 6 MMOL/L (ref 5–15)
AST SERPL-CCNC: 34 U/L (ref 15–37)
ATRIAL RATE: 79 BPM
BASOPHILS # BLD: 0 K/UL (ref 0–0.1)
BASOPHILS NFR BLD: 0 % (ref 0–1)
BILIRUB SERPL-MCNC: 0.7 MG/DL (ref 0.2–1)
BUN SERPL-MCNC: 16 MG/DL (ref 6–20)
BUN/CREAT SERPL: 18 (ref 12–20)
CALCIUM SERPL-MCNC: 8.5 MG/DL (ref 8.5–10.1)
CALCULATED P AXIS, ECG09: 35 DEGREES
CALCULATED R AXIS, ECG10: 22 DEGREES
CALCULATED T AXIS, ECG11: -30 DEGREES
CHLORIDE SERPL-SCNC: 108 MMOL/L (ref 97–108)
CO2 SERPL-SCNC: 27 MMOL/L (ref 21–32)
COMMENT, HOLDF: NORMAL
CREAT SERPL-MCNC: 0.89 MG/DL (ref 0.55–1.02)
DIAGNOSIS, 93000: NORMAL
DIFFERENTIAL METHOD BLD: ABNORMAL
EOSINOPHIL # BLD: 0 K/UL (ref 0–0.4)
EOSINOPHIL NFR BLD: 0 % (ref 0–7)
ERYTHROCYTE [DISTWIDTH] IN BLOOD BY AUTOMATED COUNT: 13.2 % (ref 11.5–14.5)
GLOBULIN SER CALC-MCNC: 5.2 G/DL (ref 2–4)
GLUCOSE SERPL-MCNC: 110 MG/DL (ref 65–100)
HCT VFR BLD AUTO: 35.6 % (ref 35–47)
HGB BLD-MCNC: 11.5 G/DL (ref 11.5–16)
IMM GRANULOCYTES # BLD AUTO: 0 K/UL (ref 0–0.04)
IMM GRANULOCYTES NFR BLD AUTO: 1 % (ref 0–0.5)
LYMPHOCYTES # BLD: 1.2 K/UL (ref 0.8–3.5)
LYMPHOCYTES NFR BLD: 22 % (ref 12–49)
MCH RBC QN AUTO: 31.5 PG (ref 26–34)
MCHC RBC AUTO-ENTMCNC: 32.3 G/DL (ref 30–36.5)
MCV RBC AUTO: 97.5 FL (ref 80–99)
MONOCYTES # BLD: 0.5 K/UL (ref 0–1)
MONOCYTES NFR BLD: 9 % (ref 5–13)
NEUTS SEG # BLD: 3.9 K/UL (ref 1.8–8)
NEUTS SEG NFR BLD: 68 % (ref 32–75)
NRBC # BLD: 0 K/UL (ref 0–0.01)
NRBC BLD-RTO: 0 PER 100 WBC
P-R INTERVAL, ECG05: 138 MS
PLATELET # BLD AUTO: 206 K/UL (ref 150–400)
PMV BLD AUTO: 12.3 FL (ref 8.9–12.9)
POTASSIUM SERPL-SCNC: 3.4 MMOL/L (ref 3.5–5.1)
PROT SERPL-MCNC: 8.1 G/DL (ref 6.4–8.2)
Q-T INTERVAL, ECG07: 370 MS
QRS DURATION, ECG06: 66 MS
QTC CALCULATION (BEZET), ECG08: 424 MS
RBC # BLD AUTO: 3.65 M/UL (ref 3.8–5.2)
SAMPLES BEING HELD,HOLD: NORMAL
SODIUM SERPL-SCNC: 141 MMOL/L (ref 136–145)
TROPONIN I SERPL-MCNC: <0.05 NG/ML
VENTRICULAR RATE, ECG03: 79 BPM
WBC # BLD AUTO: 5.7 K/UL (ref 3.6–11)

## 2021-08-15 PROCEDURE — XW033E5 INTRODUCTION OF REMDESIVIR ANTI-INFECTIVE INTO PERIPHERAL VEIN, PERCUTANEOUS APPROACH, NEW TECHNOLOGY GROUP 5: ICD-10-PCS | Performed by: INTERNAL MEDICINE

## 2021-08-15 PROCEDURE — 77010033678 HC OXYGEN DAILY

## 2021-08-15 PROCEDURE — 93005 ELECTROCARDIOGRAM TRACING: CPT

## 2021-08-15 PROCEDURE — 74011250636 HC RX REV CODE- 250/636: Performed by: EMERGENCY MEDICINE

## 2021-08-15 PROCEDURE — 96375 TX/PRO/DX INJ NEW DRUG ADDON: CPT

## 2021-08-15 PROCEDURE — 94761 N-INVAS EAR/PLS OXIMETRY MLT: CPT

## 2021-08-15 PROCEDURE — 94640 AIRWAY INHALATION TREATMENT: CPT

## 2021-08-15 PROCEDURE — 84484 ASSAY OF TROPONIN QUANT: CPT

## 2021-08-15 PROCEDURE — 65660000000 HC RM CCU STEPDOWN

## 2021-08-15 PROCEDURE — 80053 COMPREHEN METABOLIC PANEL: CPT

## 2021-08-15 PROCEDURE — 74011000258 HC RX REV CODE- 258: Performed by: EMERGENCY MEDICINE

## 2021-08-15 PROCEDURE — 77030012341 HC CHMB SPCR OPTC MDI VYRM -A

## 2021-08-15 PROCEDURE — 74011250636 HC RX REV CODE- 250/636: Performed by: INTERNAL MEDICINE

## 2021-08-15 PROCEDURE — 99285 EMERGENCY DEPT VISIT HI MDM: CPT

## 2021-08-15 PROCEDURE — 36415 COLL VENOUS BLD VENIPUNCTURE: CPT

## 2021-08-15 PROCEDURE — 74011250637 HC RX REV CODE- 250/637: Performed by: INTERNAL MEDICINE

## 2021-08-15 PROCEDURE — 85025 COMPLETE CBC W/AUTO DIFF WBC: CPT

## 2021-08-15 PROCEDURE — 74011250637 HC RX REV CODE- 250/637: Performed by: EMERGENCY MEDICINE

## 2021-08-15 PROCEDURE — 96365 THER/PROPH/DIAG IV INF INIT: CPT

## 2021-08-15 PROCEDURE — 74011000250 HC RX REV CODE- 250: Performed by: INTERNAL MEDICINE

## 2021-08-15 PROCEDURE — 71045 X-RAY EXAM CHEST 1 VIEW: CPT

## 2021-08-15 PROCEDURE — 96367 TX/PROPH/DG ADDL SEQ IV INF: CPT

## 2021-08-15 PROCEDURE — 74011000258 HC RX REV CODE- 258: Performed by: INTERNAL MEDICINE

## 2021-08-15 RX ORDER — ACETAMINOPHEN 500 MG
1000 TABLET ORAL ONCE
Status: COMPLETED | OUTPATIENT
Start: 2021-08-15 | End: 2021-08-15

## 2021-08-15 RX ORDER — GUAIFENESIN 600 MG/1
600 TABLET, EXTENDED RELEASE ORAL EVERY 12 HOURS
Status: DISCONTINUED | OUTPATIENT
Start: 2021-08-16 | End: 2021-08-19 | Stop reason: HOSPADM

## 2021-08-15 RX ORDER — ALBUTEROL SULFATE 90 UG/1
2 AEROSOL, METERED RESPIRATORY (INHALATION)
Status: DISCONTINUED | OUTPATIENT
Start: 2021-08-15 | End: 2021-08-19 | Stop reason: HOSPADM

## 2021-08-15 RX ORDER — ONDANSETRON 2 MG/ML
4 INJECTION INTRAMUSCULAR; INTRAVENOUS
Status: COMPLETED | OUTPATIENT
Start: 2021-08-15 | End: 2021-08-15

## 2021-08-15 RX ORDER — ONDANSETRON 2 MG/ML
4 INJECTION INTRAMUSCULAR; INTRAVENOUS
Status: DISCONTINUED | OUTPATIENT
Start: 2021-08-15 | End: 2021-08-19 | Stop reason: HOSPADM

## 2021-08-15 RX ORDER — POTASSIUM CHLORIDE 750 MG/1
40 TABLET, FILM COATED, EXTENDED RELEASE ORAL
Status: COMPLETED | OUTPATIENT
Start: 2021-08-15 | End: 2021-08-15

## 2021-08-15 RX ORDER — GUAIFENESIN/DEXTROMETHORPHAN 100-10MG/5
5 SYRUP ORAL
Status: DISCONTINUED | OUTPATIENT
Start: 2021-08-15 | End: 2021-08-19 | Stop reason: HOSPADM

## 2021-08-15 RX ORDER — DEXAMETHASONE SODIUM PHOSPHATE 100 MG/10ML
4 INJECTION INTRAMUSCULAR; INTRAVENOUS
Status: DISCONTINUED | OUTPATIENT
Start: 2021-08-15 | End: 2021-08-15 | Stop reason: SDUPTHER

## 2021-08-15 RX ORDER — DEXAMETHASONE SODIUM PHOSPHATE 4 MG/ML
4 INJECTION, SOLUTION INTRA-ARTICULAR; INTRALESIONAL; INTRAMUSCULAR; INTRAVENOUS; SOFT TISSUE
Status: COMPLETED | OUTPATIENT
Start: 2021-08-15 | End: 2021-08-15

## 2021-08-15 RX ORDER — ENOXAPARIN SODIUM 100 MG/ML
40 INJECTION SUBCUTANEOUS EVERY 12 HOURS
Status: DISCONTINUED | OUTPATIENT
Start: 2021-08-15 | End: 2021-08-19 | Stop reason: HOSPADM

## 2021-08-15 RX ORDER — FUROSEMIDE 40 MG/1
40 TABLET ORAL DAILY
Status: DISCONTINUED | OUTPATIENT
Start: 2021-08-15 | End: 2021-08-19 | Stop reason: HOSPADM

## 2021-08-15 RX ORDER — ACETAMINOPHEN 325 MG/1
650 TABLET ORAL
Status: DISCONTINUED | OUTPATIENT
Start: 2021-08-15 | End: 2021-08-19 | Stop reason: HOSPADM

## 2021-08-15 RX ORDER — ALBUTEROL SULFATE 90 UG/1
4 AEROSOL, METERED RESPIRATORY (INHALATION)
Status: COMPLETED | OUTPATIENT
Start: 2021-08-15 | End: 2021-08-15

## 2021-08-15 RX ORDER — DEXAMETHASONE SODIUM PHOSPHATE 4 MG/ML
6 INJECTION, SOLUTION INTRA-ARTICULAR; INTRALESIONAL; INTRAMUSCULAR; INTRAVENOUS; SOFT TISSUE EVERY 24 HOURS
Status: DISCONTINUED | OUTPATIENT
Start: 2021-08-16 | End: 2021-08-19 | Stop reason: HOSPADM

## 2021-08-15 RX ORDER — DEXAMETHASONE SODIUM PHOSPHATE 100 MG/10ML
6 INJECTION INTRAMUSCULAR; INTRAVENOUS
Status: DISCONTINUED | OUTPATIENT
Start: 2021-08-15 | End: 2021-08-15

## 2021-08-15 RX ADMIN — GUAIFENESIN AND DEXTROMETHORPHAN 5 ML: 100; 10 SYRUP ORAL at 19:10

## 2021-08-15 RX ADMIN — ONDANSETRON 4 MG: 2 INJECTION INTRAMUSCULAR; INTRAVENOUS at 08:57

## 2021-08-15 RX ADMIN — REMDESIVIR 200 MG: 100 INJECTION, POWDER, LYOPHILIZED, FOR SOLUTION INTRAVENOUS at 13:47

## 2021-08-15 RX ADMIN — AZITHROMYCIN MONOHYDRATE 500 MG: 500 INJECTION, POWDER, LYOPHILIZED, FOR SOLUTION INTRAVENOUS at 08:29

## 2021-08-15 RX ADMIN — POTASSIUM CHLORIDE 40 MEQ: 750 TABLET, FILM COATED, EXTENDED RELEASE ORAL at 11:00

## 2021-08-15 RX ADMIN — ACETAMINOPHEN 650 MG: 325 TABLET ORAL at 19:10

## 2021-08-15 RX ADMIN — ALBUTEROL SULFATE 4 PUFF: 90 AEROSOL, METERED RESPIRATORY (INHALATION) at 07:39

## 2021-08-15 RX ADMIN — ACETAMINOPHEN 1000 MG: 500 TABLET ORAL at 09:09

## 2021-08-15 RX ADMIN — CEFTRIAXONE SODIUM 2 G: 2 INJECTION, POWDER, FOR SOLUTION INTRAMUSCULAR; INTRAVENOUS at 07:49

## 2021-08-15 RX ADMIN — ENOXAPARIN SODIUM 40 MG: 40 INJECTION, SOLUTION INTRAVENOUS; SUBCUTANEOUS at 21:50

## 2021-08-15 RX ADMIN — ENOXAPARIN SODIUM 40 MG: 40 INJECTION, SOLUTION INTRAVENOUS; SUBCUTANEOUS at 11:00

## 2021-08-15 RX ADMIN — DEXAMETHASONE SODIUM PHOSPHATE 4 MG: 4 INJECTION, SOLUTION INTRAMUSCULAR; INTRAVENOUS at 08:21

## 2021-08-15 RX ADMIN — GUAIFENESIN AND DEXTROMETHORPHAN 5 ML: 100; 10 SYRUP ORAL at 13:34

## 2021-08-15 RX ADMIN — ALBUTEROL SULFATE 2 PUFF: 90 AEROSOL, METERED RESPIRATORY (INHALATION) at 15:05

## 2021-08-15 NOTE — ED NOTES
Patient is being transferred to [address], Room # 0421. Report given to TYRON Palencia on Brendan Never for routine progression of care. Report consisted of the following information SBAR, ED Summary, Intake/Output, MAR and Recent Results. Patient transferred to receiving unit by: transport (RN or tech name). Outstanding consults needed: No     Next labs due: No     The following personal items will be sent with the patient during transfer to the floor: All valuables:    Cardiac monitoring ordered: No     The following CURRENT information was reported to the receiving RN:    Code status: Full Code at time of transfer    Last set of vital signs:  Vital Signs  Level of Consciousness: Alert (0) (08/15/21 0625)  Temp: 98.3 °F (36.8 °C) (08/15/21 0625)  Temp Source: Oral (08/15/21 0625)  Pulse (Heart Rate): 74 (08/15/21 1500)  Resp Rate: 22 (08/15/21 1500)  BP: 139/79 (08/15/21 1500)  MAP (Monitor): 98 (08/15/21 1500)  MAP (Calculated): 99 (08/15/21 1500)  BP 1 Method: Automatic (08/15/21 0625)  BP Patient Position: At rest (08/15/21 0625)  MEWS Score: 3 (08/15/21 0625)         Oxygen Therapy  O2 Sat (%): 97 % (08/15/21 1505)  Pulse via Oximetry: 79 beats per minute (08/15/21 1505)  O2 Device: Nasal cannula (08/15/21 1505)  O2 Flow Rate (L/min): 2 l/min (08/15/21 1505)      Last pain assessment:         Wounds: No     Urinary catheter: voiding  Is there a bonilla order: No     LDAs:       Peripheral IV 08/15/21 Right Antecubital (Active)   Site Assessment Clean, dry, & intact 08/15/21 0655   Phlebitis Assessment 0 08/15/21 0655   Infiltration Assessment 0 08/15/21 0655   Dressing Status Clean, dry, & intact 08/15/21 0655   Dressing Type Tape;Transparent 08/15/21 0655   Hub Color/Line Status Patent; Flushed;Pink 08/15/21 0655   Action Taken Blood drawn 08/15/21 0655   Alcohol Cap Used No 08/15/21 0655         Opportunity for questions and clarification was provided.     Marisabel Adams RN

## 2021-08-15 NOTE — PROGRESS NOTES
End of Shift Note    Bedside shift change report given to [address] [place] [address] (oncoming nurse) by Abi Neal (offgoing nurse).   Report included the following information SBAR, Kardex, ED Summary, Procedure Summary, Intake/Output and MAR    Shift worked: 7-7pm     Shift summary and any significant changes:     Currently on 2 L O2   Increase work of breathing  with exertion      Concerns for physician to address:       Zone phone for oncoming shift:            Abi Neal

## 2021-08-15 NOTE — H&P
Hospitalist Admission Note    NAME: Zulema Julian   :  1970   MRN:  [id number]     Date/Time:  8/15/2021 10:37 AM    Patient PCP: Rebecca Carrizales MD  ________________________________________________________________________    My assessment of this patient's clinical condition and my plan of care is as follows. Assessment / Plan:  Pneumonia due to COVID-19  Acute hypoxic respiratory failure due to above  -SARS-CoV-2 was positive but 2 days ago  -She became hypoxic and was saturating at about 88% on ambulation and is using accessory muscles  -Start Decadron. Consult pharmacy for remdesivir. Check CRP and D-dimer. If CRP is more than 7.5, consider Tocilizumab if hypoxia is worsening  -Check procalcitonin. She already received Zithromax. If procalcitonin is elevated, consider starting on antibiotics    Asthma  -Continue steroids as above. Start albuterol puffs. Hypokalemia  -replaced and recheck in am     Bipolar disorder  GERD  Peptic ulcer disease  PTSD  History of thyroiditis  -Consult pharmacy for medication reconciliation        Code Status: Full code  Surrogate Decision Maker: Patient's mother Rivera Grullon    DVT Prophylaxis: Lovenox      Baseline: From home, independent of ADLs        Subjective:   CHIEF COMPLAINT: sob    HISTORY OF PRESENT ILLNESS:     Regina Mckenzie is a 45 y.o.   female who presents with past medical history of asthma, bipolar disorder, gastroesophageal reflux disease is coming to the hospital chief complaint of shortness of breath. Patient reports that she was due to get her first short of Covid vaccine this Monday. She started experiencing shortness of breath along with cough about 4 days ago for which she saw her PCP and was prescribed steroids which she has been taking with only minimal relief. Shortness of breath was gradual in onset, with no associated orthopnea or PND. She reports cough with small amount of whitish phlegm.   She does not report any chest pain. She also reports some wheezing. She has been using her albuterol inhaler with only minimal relief. Does not report any abdominal pain, nausea or vomiting. On arrival to ED, she became hypoxic with ambulation. On labs CBC was normal.   BMP showed a potassium of 3.4. Creatinine normal.  Troponin is normal.  LFTs are normal.  Chest x-ray shows bibasilar airspace disease. We were asked to admit for work up and evaluation of the above problems.      Past Medical History:   Diagnosis Date    Arthritis     Asthma     Seasonal     Bipolar disorder ([address]     GERD (gastroesophageal reflux disease)     IBS (irritable bowel syndrome)     Ill-defined condition     MIGRAINES    Migraine     Peptic ulcer     PTSD (post-traumatic stress disorder)     Snoring     Thyroiditis 10/7/2020    TIA (transient ischemic attack)     As of 12/13/19, pt states she never had one and that her migraine caused symptoms of TIA        Past Surgical History:   Procedure Laterality Date    HX CERVICAL FUSION  2011    C4-C7    HX COLONOSCOPY  2010    HX CYST REMOVAL      from under both arms    HX HEENT Left 2013    orbital    HX HERNIA REPAIR Left 2009    INGUINAL    HX HYSTERECTOMY  2019    HX KNEE ARTHROSCOPY Left     HX LAP CHOLECYSTECTOMY      HX MENISCUS REPAIR Left     x2    HX ORTHOPAEDIC Left     ORIF left fibula    HX ROTATOR CUFF REPAIR Right 06/08/2021    HX TUBAL LIGATION  1994    HYSTEROSCOPY DIAGNOSTIC      x2       Social History     Tobacco Use    Smoking status: Former Smoker     Packs/day: 0.25     Years: 20.00     Pack years: 5.00    Smokeless tobacco: Never Used   Substance Use Topics    Alcohol use: Yes     Comment: OCCASIONALLY        Family History   Problem Relation Age of Onset    Stroke Father     Hypertension Father     Diabetes Father     Diabetes Mother     Hypertension Mother     Heart Disease Maternal Grandmother     Cancer Maternal Grandmother brain and colon    Cancer Maternal Uncle         5 w/ brain Mirza Setter MS Other         1st cousin    Bipolar Disorder Other     Anesth Problems Neg Hx      Allergies   Allergen Reactions    Latex Hives    Other Food Rash     All fruits except apple, oranges, and pineapple (recorded as Other Medication 2/8/2012)    Peanut Swelling    Shellfish Containing Products Anaphylaxis    Morphine Itching     She has had morphine but premedicates with benadryl    Nystatin Rash    Flagyl [Metronidazole] Contact Dermatitis    Flexeril [Cyclobenzaprine] Rash    Other Medication Rash     All fruits  Can eat apples,oranges, and pineapple    Phenergan [Promethazine] Other (comments)     Rapid hr    Robaxin [Methocarbamol] Rash        Prior to Admission medications    Medication Sig Start Date End Date Taking? Authorizing Provider   predniSONE (DELTASONE) 10 mg tablet 6 tabs today and reduce by 1 tab daily 8/11/21   Melissa Sharpe MD   albuterol (PROVENTIL HFA, VENTOLIN HFA, PROAIR HFA) 90 mcg/actuation inhaler Take 2 Puffs by inhalation every four (4) hours as needed for Wheezing. 8/6/21   Melissa Sharpe MD   fluticasone propionate (Flonase Allergy Relief) 50 mcg/actuation nasal spray 2 Sprays by Both Nostrils route daily as needed for Rhinitis. 8/6/21   Melissa Sharpe MD   amoxicillin-clavulanate (Augmentin) 875-125 mg per tablet Take 1 Tablet by mouth two (2) times a day. Patient not taking: Reported on 8/11/2021 7/10/21   Fidelsteve Caballero, DO   OTHER,NON-FORMULARY, CBD oil,cream for pain    Provider, Historical   b complex-vitamin c-folic acid 0.8 mg (NEPHRO-DAVINA) 0.8 mg tab tablet Take 1 Tablet by mouth daily. Provider, Historical   B.infantis-B.ani-B.long-B.bifi (Probiotic 4X) 10-15 mg TbEC Take  by mouth. Provider, Historical   galcanezumab-gnlm (Emgality Syringe) 120 mg/mL syrg 120 mg by SubCUTAneous route every thirty (30) days.  7/2/20   Kushal Gauthier MD   furosemide (Lasix) 40 mg tablet Si tab daily  Patient not taking: Reported on 2021   Corona Zarate MD   cyanocobalamin (VITAMIN B12) 100 mcg tablet Take 100 mcg by mouth daily. Provider, Historical   therapeutic multivitamin (THERA) tablet Take 1 Tab by mouth daily. Provider, Historical   Ferrous Sulfate (SLOW FE) 47.5 mg iron TbER tablet Take 1 Tab by mouth nightly. Provider, Historical       REVIEW OF SYSTEMS:     I am not able to complete the review of systems because:    The patient is intubated and sedated    The patient has altered mental status due to his acute medical problems    The patient has baseline aphasia from prior stroke(s)    The patient has baseline dementia and is not reliable historian    The patient is in acute medical distress and unable to provide information           Total of 12 systems reviewed as follows:       POSITIVE= underlined text  Negative = text not underlined  General:  fever, chills, sweats, generalized weakness, weight loss/gain,      loss of appetite   Eyes:    blurred vision, eye pain, loss of vision, double vision  ENT:    rhinorrhea, pharyngitis   Respiratory:   cough, sputum production, SOB, DUNCAN, wheezing, pleuritic pain   Cardiology:   chest pain, palpitations, orthopnea, PND, edema, syncope   Gastrointestinal:  abdominal pain , N/V, diarrhea, dysphagia, constipation, bleeding   Genitourinary:  frequency, urgency, dysuria, hematuria, incontinence   Muskuloskeletal :  arthralgia, myalgia, back pain  Hematology:  easy bruising, nose or gum bleeding, lymphadenopathy   Dermatological: rash, ulceration, pruritis, color change / jaundice  Endocrine:   hot flashes or polydipsia   Neurological:  headache, dizziness, confusion, focal weakness, paresthesia,     Speech difficulties, memory loss, gait difficulty  Psychological: Feelings of anxiety, depression, agitation    Objective:   VITALS:    Visit Vitals  BP (!) 152/84   Pulse 82   Temp 98.3 °F (36.8 °C)   Resp (!) 31   Ht 5' 3\" (1.6 m)   Wt 127.1 kg (280 lb 3.3 oz)   SpO2 97%   BMI 49.64 kg/m²       PHYSICAL EXAM:    General:    no distress, appears stated age. HEENT: Using accessory muscles  Neck:  No thyroid enlargement  Lungs:   Using accessory muscles  Heart:   Regular  rhythm  Abdomen:   Nondistended  Skin:     No rash  Psych:  Not anxious or agitated. Neurologic: Alert, awake, oriented x3, can move all 4 extremities against gravity    Exam limited to conserve PPE and also decrease the risk of spread of COVID-19    _______________________________________________________________________  Care Plan discussed with:    Comments   Patient y    Family      RN y    Care Manager                    Consultant:      _______________________________________________________________________  Expected  Disposition:   Home with Family y   HH/PT/OT/RN    SNF/LTC    RUBEN    ________________________________________________________________________  TOTAL TIME:  61  Minutes    Critical Care Provided     Minutes non procedure based      Comments    y Reviewed previous records   >50% of visit spent in counseling and coordination of care y Discussion with patient and/or family and questions answered       ________________________________________________________________________  Signed: Lilliam King MD    Procedures: see electronic medical records for all procedures/Xrays and details which were not copied into this note but were reviewed prior to creation of Plan.     LAB DATA REVIEWED:    Recent Results (from the past 24 hour(s))   CBC WITH AUTOMATED DIFF    Collection Time: 08/15/21  6:52 AM   Result Value Ref Range    WBC 5.7 3.6 - 11.0 K/uL    RBC 3.65 (L) 3.80 - 5.20 M/uL    HGB 11.5 11.5 - 16.0 g/dL    HCT 35.6 35.0 - 47.0 %    MCV 97.5 80.0 - 99.0 FL    MCH 31.5 26.0 - 34.0 PG    MCHC 32.3 30.0 - 36.5 g/dL    RDW 13.2 11.5 - 14.5 %    PLATELET 476 619 - 588 K/uL    MPV 12.3 8.9 - 12.9 FL    NRBC 0.0 0  WBC    ABSOLUTE NRBC 0.00 0.00 - 0.01 K/uL    NEUTROPHILS 68 32 - 75 %    LYMPHOCYTES 22 12 - 49 %    MONOCYTES 9 5 - 13 %    EOSINOPHILS 0 0 - 7 %    BASOPHILS 0 0 - 1 %    IMMATURE GRANULOCYTES 1 (H) 0.0 - 0.5 %    ABS. NEUTROPHILS 3.9 1.8 - 8.0 K/UL    ABS. LYMPHOCYTES 1.2 0.8 - 3.5 K/UL    ABS. MONOCYTES 0.5 0.0 - 1.0 K/UL    ABS. EOSINOPHILS 0.0 0.0 - 0.4 K/UL    ABS. BASOPHILS 0.0 0.0 - 0.1 K/UL    ABS. IMM. GRANS. 0.0 0.00 - 0.04 K/UL    DF AUTOMATED     METABOLIC PANEL, COMPREHENSIVE    Collection Time: 08/15/21  6:52 AM   Result Value Ref Range    Sodium 141 136 - 145 mmol/L    Potassium 3.4 (L) 3.5 - 5.1 mmol/L    Chloride 108 97 - 108 mmol/L    CO2 27 21 - 32 mmol/L    Anion gap 6 5 - 15 mmol/L    Glucose 110 (H) 65 - 100 mg/dL    BUN 16 6 - 20 MG/DL    Creatinine 0.89 0.55 - 1.02 MG/DL    BUN/Creatinine ratio 18 12 - 20      GFR est AA >60 >60 ml/min/1.73m2    GFR est non-AA >60 >60 ml/min/1.73m2    Calcium 8.5 8.5 - 10.1 MG/DL    Bilirubin, total 0.7 0.2 - 1.0 MG/DL    ALT (SGPT) 33 12 - 78 U/L    AST (SGOT) 34 15 - 37 U/L    Alk. phosphatase 76 45 - 117 U/L    Protein, total 8.1 6.4 - 8.2 g/dL    Albumin 2.9 (L) 3.5 - 5.0 g/dL    Globulin 5.2 (H) 2.0 - 4.0 g/dL    A-G Ratio 0.6 (L) 1.1 - 2.2     TROPONIN I    Collection Time: 08/15/21  6:52 AM   Result Value Ref Range    Troponin-I, Qt. <0.05 <0.05 ng/mL   SAMPLES BEING HELD    Collection Time: 08/15/21  6:52 AM   Result Value Ref Range    SAMPLES BEING HELD  BLUE, RED     COMMENT        Add-on orders for these samples will be processed based on acceptable specimen integrity and analyte stability, which may vary by analyte.

## 2021-08-15 NOTE — ED PROVIDER NOTES
EMERGENCY DEPARTMENT HISTORY AND PHYSICAL EXAM      Date: 8/15/2021  Patient Name: Taisha Schafer    History of Presenting Illness     Chief Complaint   Patient presents with    Shortness of Breath       History Provided By: Patient    HPI: Taisha Schafer, 45 y.o. female  presents to the ED with cc of shortness of breath and cough. Patient states for about 2-3 days she has had cough and shortness of breath. Positive Covid test as an outpatient 2 days ago. History of asthma. Primary care doctor placed her on steroids but she does not feel relief. She is short of breath with exertion. She has had a productive cough. States hard to breathe when she is lying flat. She has been using an albuterol inhaler at home with no relief. She has had some headaches. Generalized malaise and fatigue.     Past History     Past Medical History:  Past Medical History:   Diagnosis Date    Arthritis     Asthma     Seasonal     Bipolar disorder ([address]     GERD (gastroesophageal reflux disease)     IBS (irritable bowel syndrome)     Ill-defined condition     MIGRAINES    Migraine     Peptic ulcer     PTSD (post-traumatic stress disorder)     Snoring     Thyroiditis 10/7/2020    TIA (transient ischemic attack)     As of 12/13/19, pt states she never had one and that her migraine caused symptoms of TIA       Past Surgical History:  Past Surgical History:   Procedure Laterality Date    HX CERVICAL FUSION  2011    C4-C7    HX COLONOSCOPY  2010    HX CYST REMOVAL      from under both arms    HX HEENT Left 2013    orbital    HX HERNIA REPAIR Left 2009    INGUINAL    HX HYSTERECTOMY  2019    HX KNEE ARTHROSCOPY Left     HX LAP CHOLECYSTECTOMY      HX MENISCUS REPAIR Left     x2    HX ORTHOPAEDIC Left     ORIF left fibula    HX ROTATOR CUFF REPAIR Right 06/08/2021    HX TUBAL LIGATION  1994    HYSTEROSCOPY DIAGNOSTIC      x2       Medications:  No current facility-administered medications on file prior to encounter. Current Outpatient Medications on File Prior to Encounter   Medication Sig Dispense Refill    predniSONE (DELTASONE) 10 mg tablet 6 tabs today and reduce by 1 tab daily 21 Tablet 0    albuterol (PROVENTIL HFA, VENTOLIN HFA, PROAIR HFA) 90 mcg/actuation inhaler Take 2 Puffs by inhalation every four (4) hours as needed for Wheezing. 1 Inhaler 0    fluticasone propionate (Flonase Allergy Relief) 50 mcg/actuation nasal spray 2 Sprays by Both Nostrils route daily as needed for Rhinitis. 1 Bottle 0    amoxicillin-clavulanate (Augmentin) 875-125 mg per tablet Take 1 Tablet by mouth two (2) times a day. (Patient not taking: Reported on 2021) 14 Tablet 0    OTHER,NON-FORMULARY, CBD oil,cream for pain      b complex-vitamin c-folic acid 0.8 mg (NEPHRO-DAVINA) 0.8 mg tab tablet Take 1 Tablet by mouth daily.  B.infantis-B.ani-B.long-B.bifi (Probiotic 4X) 10-15 mg TbEC Take  by mouth.  galcanezumab-gnlm (Emgality Syringe) 120 mg/mL syrg 120 mg by SubCUTAneous route every thirty (30) days. 1 Units 2    furosemide (Lasix) 40 mg tablet Si tab daily (Patient not taking: Reported on 2021) 30 Tab 6    cyanocobalamin (VITAMIN B12) 100 mcg tablet Take 100 mcg by mouth daily.  therapeutic multivitamin (THERA) tablet Take 1 Tab by mouth daily.  Ferrous Sulfate (SLOW FE) 47.5 mg iron TbER tablet Take 1 Tab by mouth nightly.          Family History:  Family History   Problem Relation Age of Onset   Giancarlo Potter Stroke Father     Hypertension Father     Diabetes Father     Diabetes Mother     Hypertension Mother     Heart Disease Maternal Grandmother     Cancer Maternal Grandmother         brain and colon    Cancer Maternal Uncle         5 w/ brain Doc Luz Maria    MS Other         1st cousin    Bipolar Disorder Other     Anesth Problems Neg Hx        Social History:  Social History     Tobacco Use    Smoking status: Former Smoker     Packs/day: 0.25     Years: 20.00     Pack years: 5.00    Smokeless tobacco: Never Used   Vaping Use    Vaping Use: Every day    Substances: CBD   Substance Use Topics    Alcohol use: Yes     Comment: OCCASIONALLY    Drug use: No       Allergies: Allergies   Allergen Reactions    Latex Hives    Other Food Rash     All fruits except apple, oranges, and pineapple (recorded as Other Medication 2/8/2012)    Peanut Swelling    Shellfish Containing Products Anaphylaxis    Morphine Itching     She has had morphine but premedicates with benadryl    Nystatin Rash    Flagyl [Metronidazole] Contact Dermatitis    Flexeril [Cyclobenzaprine] Rash    Other Medication Rash     All fruits  Can eat apples,oranges, and pineapple    Phenergan [Promethazine] Other (comments)     Rapid hr    Robaxin [Methocarbamol] Rash       All the above components of the past  history are auto-populated from the electronic record. They have been reviewed and the patient has been interviewed for any pertinent past history that pertains to the patient's chief complaint and reason for visit. Not all pre-populated components may be accurate at the time this note was generated. Review of Systems   Review of Systems   Constitutional: Positive for chills and fatigue. Negative for fever. HENT: Negative for congestion, ear pain, rhinorrhea, sore throat and trouble swallowing. Eyes: Negative for visual disturbance. Respiratory: Positive for cough and shortness of breath. Negative for chest tightness. Cardiovascular: Negative for chest pain and palpitations. Gastrointestinal: Positive for diarrhea, nausea and vomiting. Negative for abdominal pain, blood in stool and constipation. Genitourinary: Negative for decreased urine volume, difficulty urinating, dysuria and frequency. Musculoskeletal: Negative for back pain and neck pain. Skin: Negative for color change and rash. Neurological: Positive for headaches. Negative for dizziness, weakness and light-headedness.        Physical Exam   Physical Exam  Vitals and nursing note reviewed. Constitutional:       General: She is not in acute distress. Appearance: She is well-developed. She is obese. She is not ill-appearing. HENT:      Head: Normocephalic. Eyes:      Conjunctiva/sclera: Conjunctivae normal.   Cardiovascular:      Rate and Rhythm: Normal rate and regular rhythm. Pulmonary:      Effort: Pulmonary effort is normal. Tachypnea present. No accessory muscle usage or respiratory distress. Abdominal:      General: There is no distension. Musculoskeletal:      Cervical back: Normal range of motion. Skin:     General: Skin is warm and dry. Neurological:      Mental Status: She is alert and oriented to person, place, and time. Due to the COVID-19 pandemic, in order to reduce the spread and transmission of the virus, some basic elements of the physical exam have been deferred to reduce direct or close contact with the patient unless they are deemed to be absolutely necessary, regardless of whether the virus is highly suspected or not. Diagnostic Study Results     Labs -     Recent Results (from the past 24 hour(s))   CBC WITH AUTOMATED DIFF    Collection Time: 08/15/21  6:52 AM   Result Value Ref Range    WBC 5.7 3.6 - 11.0 K/uL    RBC 3.65 (L) 3.80 - 5.20 M/uL    HGB 11.5 11.5 - 16.0 g/dL    HCT 35.6 35.0 - 47.0 %    MCV 97.5 80.0 - 99.0 FL    MCH 31.5 26.0 - 34.0 PG    MCHC 32.3 30.0 - 36.5 g/dL    RDW 13.2 11.5 - 14.5 %    PLATELET 812 034 - 668 K/uL    MPV 12.3 8.9 - 12.9 FL    NRBC 0.0 0  WBC    ABSOLUTE NRBC 0.00 0.00 - 0.01 K/uL    NEUTROPHILS 68 32 - 75 %    LYMPHOCYTES 22 12 - 49 %    MONOCYTES 9 5 - 13 %    EOSINOPHILS 0 0 - 7 %    BASOPHILS 0 0 - 1 %    IMMATURE GRANULOCYTES 1 (H) 0.0 - 0.5 %    ABS. NEUTROPHILS 3.9 1.8 - 8.0 K/UL    ABS. LYMPHOCYTES 1.2 0.8 - 3.5 K/UL    ABS. MONOCYTES 0.5 0.0 - 1.0 K/UL    ABS. EOSINOPHILS 0.0 0.0 - 0.4 K/UL    ABS. BASOPHILS 0.0 0.0 - 0.1 K/UL    ABS. IMM. GRANS. 0.0 0.00 - 0.04 K/UL    DF AUTOMATED     METABOLIC PANEL, COMPREHENSIVE    Collection Time: 08/15/21  6:52 AM   Result Value Ref Range    Sodium 141 136 - 145 mmol/L    Potassium 3.4 (L) 3.5 - 5.1 mmol/L    Chloride 108 97 - 108 mmol/L    CO2 27 21 - 32 mmol/L    Anion gap 6 5 - 15 mmol/L    Glucose 110 (H) 65 - 100 mg/dL    BUN 16 6 - 20 MG/DL    Creatinine 0.89 0.55 - 1.02 MG/DL    BUN/Creatinine ratio 18 12 - 20      GFR est AA >60 >60 ml/min/1.73m2    GFR est non-AA >60 >60 ml/min/1.73m2    Calcium 8.5 8.5 - 10.1 MG/DL    Bilirubin, total 0.7 0.2 - 1.0 MG/DL    ALT (SGPT) 33 12 - 78 U/L    AST (SGOT) 34 15 - 37 U/L    Alk. phosphatase 76 45 - 117 U/L    Protein, total 8.1 6.4 - 8.2 g/dL    Albumin 2.9 (L) 3.5 - 5.0 g/dL    Globulin 5.2 (H) 2.0 - 4.0 g/dL    A-G Ratio 0.6 (L) 1.1 - 2.2     TROPONIN I    Collection Time: 08/15/21  6:52 AM   Result Value Ref Range    Troponin-I, Qt. <0.05 <0.05 ng/mL   SAMPLES BEING HELD    Collection Time: 08/15/21  6:52 AM   Result Value Ref Range    SAMPLES BEING HELD  BLUE, RED     COMMENT        Add-on orders for these samples will be processed based on acceptable specimen integrity and analyte stability, which may vary by analyte. Radiologic Studies -   XR CHEST PORT   Final Result   Bilateral airspace disease, new. CT Results  (Last 48 hours)    None        CXR Results  (Last 48 hours)               08/15/21 0646  XR CHEST PORT Final result    Impression:  Bilateral airspace disease, new. Narrative:  EXAM: Portable CXR. 0630 hours. COMPARISON: 7/10/2021. INDICATION: cough/short of breath/covid +       FINDINGS:   The lungs show new patchy bilateral airspace disease. Heart is normal in size. There is no pulmonary edema. There is no evident pneumothorax or pleural   effusion.                    Medical Decision Making     I reviewed the vital signs, available nursing notes, past medical history, past surgical history, family history and social history. Vital Signs-I have reviewed the vital signs that have been made available during the patient's emergency department visit. The vital signs auto-populated below are obtained mostly by electronic means through monitoring devices that have been downloaded into the patient's chart by the nursing staff. Some vital signs are not downloaded into the chart until after the patient has been discharged and this note has been completed, therefore some vital signs may not be available to the physician for review prior to patient's discharge or admission. The physician has reviewed the patient's triage vital signs, monitored the electronic monitoring devices remotely for any significant vital sign abnormalities, and have reviewed vital signs prior to discharge. Some vital signs reviewed at bedside or remotely utilizing electronic monitoring devices may be different than the vital signs downloaded into the electronic medical record. Some vital signs may be erroneous and inaccurate since they are obtained by electronic monitoring devices, and not all vital signs are verified for accuracy by nursing staff prior to downloading into the patient's chart. Patient Vitals for the past 24 hrs:   Temp Pulse Resp BP SpO2   08/15/21 0741 -- -- -- -- 97 %   08/15/21 0645 -- 82 (!) 31 (!) 152/84 100 %   08/15/21 0625 98.3 °F (36.8 °C) 86 30 135/81 100 %         Records Reviewed: Nursing notes for today's visit have been reviewed. I have also reviewed most recent medical records pertinent to today's complaints, if available in our medical record system. I have also reviewed all labs and imaging results from previous results in comparison to results obtained today. If an EKG was obtained today, it has been compared to previous EKGs, if available. If arriving via EMS, the EMS report has been reviewed if made available to us within the patient's time in the emergency department.     Provider Notes (Medical Decision Making):   Patient with recently diagnosed COVID-19 infection presents with shortness of breath. Initially hypoxic according to EMS. She arrives on oxygen. Chest x-ray shows bilateral patchy pneumonia. She is not septic. At rest on 2 L nasal cannula she is not hypoxic if she does well. Any exertion causes her to become extremely symptomatic and on room air lowest oxygen saturation was 88%. Patient administered antibiotics for community-acquired pneumonia however likely all viral.  Labs look unremarkable. Administered steroids and bronchodilators via MDI. ED Course:   Initial assessment performed. The patients presenting problems have been discussed, and they are in agreement with the care plan formulated and outlined with them. I have encouraged them to ask questions as they arise throughout their visit. Orders Placed This Encounter    XR CHEST PORT    CBC WITH AUTOMATED DIFF    METABOLIC PANEL, COMPREHENSIVE    TROPONIN I    SAMPLES BEING HELD    CBC WITH AUTOMATED DIFF    METABOLIC PANEL, COMPREHENSIVE    C REACTIVE PROTEIN, QT    D DIMER    ADULT DIET Regular    CARDIAC MONITORING    NOTIFY PROVIDER: SPECIFY Notify provider on pt's arrival to floor ONE TIME STAT    OUT OF BED WITH ASSISTANCE    VITAL SIGNS PER UNIT ROUTINE    NURSING-MISCELLANEOUS: PPE required for any aerosolizing procedure (ie, intubation, bronchoscopy). Surgical mask on patient for any transport outside room. Patient should be single room, closed door with notification of droplet plus isolation. CON. ..    FULL CODE    Contact Isolation    Droplet Isolation    DROPLET PLUS    EKG, 12 LEAD, INITIAL    albuterol (PROVENTIL HFA, VENTOLIN HFA, PROAIR HFA) inhaler 4 Puff    DISCONTD: dexamethasone (DECADRON) 10 mg/mL injection 6 mg    cefTRIAXone (ROCEPHIN) 2 g in 0.9% sodium chloride (MBP/ADV) 50 mL MBP    azithromycin (ZITHROMAX) 500 mg in 0.9% sodium chloride 250 mL (VIAL-MATE)    DISCONTD: dexamethasone (DECADRON) 10 mg/mL injection 4 mg    dexamethasone (DECADRON) 4 mg/mL injection 4 mg    ondansetron (ZOFRAN) injection 4 mg    acetaminophen (TYLENOL) tablet 1,000 mg    acetaminophen (TYLENOL) tablet 650 mg    ondansetron (ZOFRAN) injection 4 mg    albuterol (PROVENTIL HFA, VENTOLIN HFA, PROAIR HFA) inhaler 2 Puff    furosemide (LASIX) tablet 40 mg    enoxaparin (LOVENOX) injection 30 mg    guaiFENesin-dextromethorphan (ROBITUSSIN DM) 100-10 mg/5 mL syrup 5 mL    dexamethasone (DECADRON) 10 mg/mL injection 6 mg    IP CONSULT TO HOSPITALIST    INITIAL PHYSICIAN ORDER: INPATIENT Telemetry; Yes; 3. Patient receiving treatment that can only be provided in an inpatient setting (further clarification in H&P documentation)    Pharmacy to Dose Consult       Procedures      Critical Care Time:   0    Disposition:  Admit      Diagnosis     Clinical Impression:   1. COVID-19    2.  Pneumonia of both lungs due to infectious organism, unspecified part of lung

## 2021-08-15 NOTE — ED NOTES
Bedside and Verbal shift change report given to [facility] (oncoming nurse) by Fracisco Collado (offgoing nurse). Report included the following information SBAR, ED Summary, Intake/Output, MAR and Recent Results.

## 2021-08-15 NOTE — ED NOTES
Pt presents to ed due to shortness of breath that has been progressing. Pt reports she was dx covid positive a few days ago. Pt states that she cannot catch her breath. Pt stating that she wants to keep NRB on at this time as she feels too short of breath to take it off.       6:42 AM: Pt difficult stick, delay in obtaining blood work. 6:59 AM: Attempted to call report at this time. RN reports to call back when they have finished their report.

## 2021-08-15 NOTE — ED NOTES
Attempted road test with patient; pt began coughing prior to standing up along with hyperventilating; Pt unable to walk farther than a few steps without feeling weak; pt oxygen dipped to lower 90s and upper 80s, lowest sat at 88%

## 2021-08-16 LAB
ALBUMIN SERPL-MCNC: 2.7 G/DL (ref 3.5–5)
ALBUMIN/GLOB SERPL: 0.5 {RATIO} (ref 1.1–2.2)
ALP SERPL-CCNC: 80 U/L (ref 45–117)
ALT SERPL-CCNC: 28 U/L (ref 12–78)
ANION GAP SERPL CALC-SCNC: 3 MMOL/L (ref 5–15)
AST SERPL-CCNC: 27 U/L (ref 15–37)
BASOPHILS # BLD: 0 K/UL (ref 0–0.1)
BASOPHILS NFR BLD: 0 % (ref 0–1)
BILIRUB SERPL-MCNC: 0.4 MG/DL (ref 0.2–1)
BUN SERPL-MCNC: 15 MG/DL (ref 6–20)
BUN/CREAT SERPL: 16 (ref 12–20)
CALCIUM SERPL-MCNC: 8.3 MG/DL (ref 8.5–10.1)
CHLORIDE SERPL-SCNC: 109 MMOL/L (ref 97–108)
CO2 SERPL-SCNC: 28 MMOL/L (ref 21–32)
CREAT SERPL-MCNC: 0.94 MG/DL (ref 0.55–1.02)
CRP SERPL-MCNC: 7.8 MG/DL (ref 0–0.6)
D DIMER PPP FEU-MCNC: 0.46 MG/L FEU (ref 0–0.65)
DIFFERENTIAL METHOD BLD: ABNORMAL
EOSINOPHIL # BLD: 0 K/UL (ref 0–0.4)
EOSINOPHIL NFR BLD: 0 % (ref 0–7)
ERYTHROCYTE [DISTWIDTH] IN BLOOD BY AUTOMATED COUNT: 13.3 % (ref 11.5–14.5)
GLOBULIN SER CALC-MCNC: 5 G/DL (ref 2–4)
GLUCOSE SERPL-MCNC: 135 MG/DL (ref 65–100)
HCT VFR BLD AUTO: 34.2 % (ref 35–47)
HGB BLD-MCNC: 11 G/DL (ref 11.5–16)
IMM GRANULOCYTES # BLD AUTO: 0 K/UL (ref 0–0.04)
IMM GRANULOCYTES NFR BLD AUTO: 0 % (ref 0–0.5)
LYMPHOCYTES # BLD: 0.8 K/UL (ref 0.8–3.5)
LYMPHOCYTES NFR BLD: 16 % (ref 12–49)
MCH RBC QN AUTO: 31.8 PG (ref 26–34)
MCHC RBC AUTO-ENTMCNC: 32.2 G/DL (ref 30–36.5)
MCV RBC AUTO: 98.8 FL (ref 80–99)
METAMYELOCYTES NFR BLD MANUAL: 1 %
MONOCYTES # BLD: 0.4 K/UL (ref 0–1)
MONOCYTES NFR BLD: 7 % (ref 5–13)
NEUTS BAND NFR BLD MANUAL: 1 %
NEUTS SEG # BLD: 3.8 K/UL (ref 1.8–8)
NEUTS SEG NFR BLD: 75 % (ref 32–75)
NRBC # BLD: 0 K/UL (ref 0–0.01)
NRBC BLD-RTO: 0 PER 100 WBC
PLATELET # BLD AUTO: 239 K/UL (ref 150–400)
PMV BLD AUTO: 11.7 FL (ref 8.9–12.9)
POTASSIUM SERPL-SCNC: 3.7 MMOL/L (ref 3.5–5.1)
PROCALCITONIN SERPL-MCNC: <0.05 NG/ML
PROT SERPL-MCNC: 7.7 G/DL (ref 6.4–8.2)
RBC # BLD AUTO: 3.46 M/UL (ref 3.8–5.2)
RBC MORPH BLD: ABNORMAL
SODIUM SERPL-SCNC: 140 MMOL/L (ref 136–145)
WBC # BLD AUTO: 5 K/UL (ref 3.6–11)

## 2021-08-16 PROCEDURE — 85025 COMPLETE CBC W/AUTO DIFF WBC: CPT

## 2021-08-16 PROCEDURE — 36415 COLL VENOUS BLD VENIPUNCTURE: CPT

## 2021-08-16 PROCEDURE — 74011250637 HC RX REV CODE- 250/637: Performed by: EMERGENCY MEDICINE

## 2021-08-16 PROCEDURE — 65660000000 HC RM CCU STEPDOWN

## 2021-08-16 PROCEDURE — 74011250637 HC RX REV CODE- 250/637: Performed by: INTERNAL MEDICINE

## 2021-08-16 PROCEDURE — 80053 COMPREHEN METABOLIC PANEL: CPT

## 2021-08-16 PROCEDURE — 94761 N-INVAS EAR/PLS OXIMETRY MLT: CPT

## 2021-08-16 PROCEDURE — 74011250637 HC RX REV CODE- 250/637: Performed by: NURSE PRACTITIONER

## 2021-08-16 PROCEDURE — 86140 C-REACTIVE PROTEIN: CPT

## 2021-08-16 PROCEDURE — 74011000250 HC RX REV CODE- 250: Performed by: INTERNAL MEDICINE

## 2021-08-16 PROCEDURE — 85379 FIBRIN DEGRADATION QUANT: CPT

## 2021-08-16 PROCEDURE — 74011250636 HC RX REV CODE- 250/636: Performed by: INTERNAL MEDICINE

## 2021-08-16 PROCEDURE — 94762 N-INVAS EAR/PLS OXIMTRY CONT: CPT

## 2021-08-16 PROCEDURE — 74011000258 HC RX REV CODE- 258: Performed by: INTERNAL MEDICINE

## 2021-08-16 PROCEDURE — 84145 PROCALCITONIN (PCT): CPT

## 2021-08-16 PROCEDURE — 94640 AIRWAY INHALATION TREATMENT: CPT

## 2021-08-16 PROCEDURE — 77010033678 HC OXYGEN DAILY

## 2021-08-16 RX ORDER — BENZONATATE 100 MG/1
100 CAPSULE ORAL
Status: DISCONTINUED | OUTPATIENT
Start: 2021-08-16 | End: 2021-08-19 | Stop reason: HOSPADM

## 2021-08-16 RX ADMIN — BENZONATATE 100 MG: 100 CAPSULE ORAL at 20:14

## 2021-08-16 RX ADMIN — BENZONATATE 100 MG: 100 CAPSULE ORAL at 12:05

## 2021-08-16 RX ADMIN — DEXAMETHASONE SODIUM PHOSPHATE 6 MG: 4 INJECTION, SOLUTION INTRAMUSCULAR; INTRAVENOUS at 08:57

## 2021-08-16 RX ADMIN — ALBUTEROL SULFATE 2 PUFF: 90 AEROSOL, METERED RESPIRATORY (INHALATION) at 08:44

## 2021-08-16 RX ADMIN — ALBUTEROL SULFATE 2 PUFF: 90 AEROSOL, METERED RESPIRATORY (INHALATION) at 20:09

## 2021-08-16 RX ADMIN — ENOXAPARIN SODIUM 40 MG: 40 INJECTION, SOLUTION INTRAVENOUS; SUBCUTANEOUS at 23:23

## 2021-08-16 RX ADMIN — GUAIFENESIN 600 MG: 600 TABLET, EXTENDED RELEASE ORAL at 08:56

## 2021-08-16 RX ADMIN — ACETAMINOPHEN 650 MG: 325 TABLET ORAL at 12:05

## 2021-08-16 RX ADMIN — GUAIFENESIN 600 MG: 600 TABLET, EXTENDED RELEASE ORAL at 20:14

## 2021-08-16 RX ADMIN — REMDESIVIR 100 MG: 100 INJECTION, POWDER, LYOPHILIZED, FOR SOLUTION INTRAVENOUS at 09:06

## 2021-08-16 RX ADMIN — GUAIFENESIN AND DEXTROMETHORPHAN 5 ML: 100; 10 SYRUP ORAL at 04:48

## 2021-08-16 RX ADMIN — GUAIFENESIN 600 MG: 600 TABLET, EXTENDED RELEASE ORAL at 00:07

## 2021-08-16 RX ADMIN — GUAIFENESIN AND DEXTROMETHORPHAN 5 ML: 100; 10 SYRUP ORAL at 00:07

## 2021-08-16 RX ADMIN — ENOXAPARIN SODIUM 40 MG: 40 INJECTION, SOLUTION INTRAVENOUS; SUBCUTANEOUS at 12:04

## 2021-08-16 NOTE — PROGRESS NOTES
Patient refusing to take Albuterol inhaler. Patient stated that it makes her cough more and feel worst. Patient takes Albuterol inhaler at home as needed.  I informed the RN

## 2021-08-16 NOTE — PROGRESS NOTES
Received message from patient's nurse stating:    Pt is requesting for a different cough medicine . States that \"the Robitussin I am getting is not working \"         Discussion / orders:    Mucinex 600 mg by mouth every 12 hours           Please note that this note was dictated using Dragon computer voice recognition software. Quite often unanticipated grammatical, syntax, homophones, and other interpretive errors are inadvertently transcribed by the computer software. Please disregard these errors. Please excuse any errors that have escaped final proofreading.

## 2021-08-16 NOTE — PROGRESS NOTES
End of Shift Note    Bedside shift change report given to [address] [place] [address] (oncoming nurse) by Sundar Steve RN (offgoing nurse). Report included the following information SBAR, Kardex, Intake/Output, MAR, Accordion, Recent Results and Med Rec Status    Shift worked:  7PM-7AM     Shift summary and any significant changes:     Orders received for cough medicine.  On  3 L NC     Concerns for physician to address:     Zone phone for oncoming shift:            Sundar Steve, RN

## 2021-08-16 NOTE — PROGRESS NOTES
Hospitalist Progress Note    NAME: Lashon García   :  1970   MRN:  [id number]     Admit date: 8/15/2021    Today's date: 21    PCP: Melissa Sharpe MD      Anticipated discharge date:     Barriers:      Assessment / Plan:    Pneumonia due to COVID-19 POA  Acute hypoxic respiratory failure POA  Asthma POA  -SARS-CoV-2 was positive but 2 days ago  -Hypoxic and was saturating at about 88% on ambulation, using accessory muscles  -Start Decadron and remdesivir.    - Currently on 3 liters, sats 95 to 100%, will wean O2  - Trend inflammatory markers   CRP 7.80   D-dimer 0.46.    - If CRP is more than 7.5, consider Tocilizumab if hypoxia is worsening  -Procalcitonin < 0.05.      Asthma  -Continue steroids as above. Start albuterol puffs.     Hypokalemia  -replaced and resolved     Bipolar disorder  GERD  Peptic ulcer disease  PTSD  History of thyroiditis  -Consult pharmacy for medication reconciliation    Obesity POA Body mass index is 49.64 kg/m². Code Status: Full code  Surrogate Decision Maker: Patient's mother Lorrie     DVT Prophylaxis: Lovenox     Baseline: From home, independent of ADLs    Subjective:     Chief Complaint / Reason for Physician Visit f/u COVID-19 pneumonia  \"I keep coughing\". Discussed with RN events overnight. Intractable coughing, sats 100% on 3 liters, weaned to 1 liter  No CP or N/V or diarrhea    Review of Systems:  Symptom Y/N Comments  Symptom Y/N Comments   Fever/Chills n   Chest Pain n    Poor Appetite    Edema     Cough y   Abdominal Pain n    Sputum    Joint Pain     SOB/DUNCAN y   Headache     Nausea/vomit n   Tolerating PT/OT     Diarrhea n   Tolerating Diet y    Constipation    Other       Could NOT obtain due to:      Objective:     VITALS:   Last 24hrs VS reviewed since prior progress note.  Most recent are:  Patient Vitals for the past 24 hrs:   Temp Pulse Resp BP SpO2   21 0844 -- -- -- -- 100 %   21 0749 98 °F (36.7 °C) 71 20 (!) 155/83 98 %   08/15/21 2300 98.3 °F (36.8 °C) 62 20 139/64 99 %   08/15/21 2012 -- -- -- -- 99 %   08/15/21 1910 98.3 °F (36.8 °C) 61 18 135/76 99 %   08/15/21 1546 98.8 °F (37.1 °C) 83 -- 132/74 98 %   08/15/21 1505 -- -- -- -- 97 %   08/15/21 1500 -- 74 22 139/79 99 %   08/15/21 1445 -- 83 22 (!) 143/79 98 %   08/15/21 1430 -- 75 18 126/74 100 %   08/15/21 1415 -- 82 18 124/82 98 %   08/15/21 1400 -- 82 17 133/74 98 %   08/15/21 1345 -- 87 20 (!) 135/101 97 %   08/15/21 1330 -- 100 28 (!) 166/101 92 %   08/15/21 1315 -- 84 20 (!) 143/84 98 %   08/15/21 1300 -- 81 19 (!) 141/81 97 %   08/15/21 1245 -- 84 14 124/82 96 %   08/15/21 1230 -- 82 21 (!) 134/95 97 %   08/15/21 1215 -- 77 20 138/76 97 %       Intake/Output Summary (Last 24 hours) at 8/16/2021 1033  Last data filed at 8/15/2021 1910  Gross per 24 hour   Intake 600 ml   Output --   Net 600 ml        Wt Readings from Last 12 Encounters:   08/15/21 127.1 kg (280 lb 3.3 oz)   07/10/21 129.3 kg (285 lb)   03/25/21 130.2 kg (287 lb)   11/11/20 123.4 kg (272 lb)   10/14/20 122.5 kg (270 lb)   10/07/20 121.1 kg (267 lb)   07/03/20 120.2 kg (265 lb)   04/01/20 122.8 kg (270 lb 12.8 oz)   03/03/20 123.4 kg (272 lb)   01/08/20 122.8 kg (270 lb 11.6 oz)   12/23/19 121.1 kg (267 lb)   12/13/19 123.4 kg (272 lb)       PHYSICAL EXAM:    I had a face to face encounter and independently examined this patient on 08/16/21 as outlined below:    General: WD, WN. Alert, cooperative, no acute distress    EENT:  PERRL. Anicteric sclerae. MMM  Resp:  Crackles and scattered wheezes bilaterally, no  rales. No accessory muscle use  CV:  Regular  rhythm,  No edema  GI:  Soft, Non distended, Non tender. +Bowel sounds, no rebound  Neurologic:  Alert and oriented X 3, normal speech, non focal motor exam  Psych:   Good insight. Not anxious nor agitated  Skin:  No rashes.   No jaundice    Reviewed most current lab test results and cultures  YES  Reviewed most current radiology test results   YES  Review and summation of old records today    NO  Reviewed patient's current orders and MAR    YES  PMH/SH reviewed - no change compared to H&P  ________________________________________________________________________  Care Plan discussed with:    Comments   Patient x    Family      RN x    Care Manager     Consultant                        Multidiciplinary team rounds were held today with , nursing, pharmacist and clinical coordinator. Patient's plan of care was discussed; medications were reviewed and discharge planning was addressed. ________________________________________________________________________      Comments   >50% of visit spent in counseling and coordination of care     ________________________________________________________________________  Nori Ogden MD     Procedures: see electronic medical records for all procedures/Xrays and details which were not copied into this note but were reviewed prior to creation of Plan. LABS:  I reviewed today's most current labs and imaging studies.   Pertinent labs include:  Recent Labs     08/16/21  0344 08/15/21  0652   WBC 5.0 5.7   HGB 11.0* 11.5   HCT 34.2* 35.6    206     Recent Labs     08/16/21  0344 08/15/21  0652    141   K 3.7 3.4*   * 108   CO2 28 27   * 110*   BUN 15 16   CREA 0.94 0.89   CA 8.3* 8.5   ALB 2.7* 2.9*   TBILI 0.4 0.7   ALT 28 33

## 2021-08-16 NOTE — PROGRESS NOTES
Verbal shift change report given to Chante Momin (oncoming nurse) by Cj Walls (offgoing nurse). Report included the following information SBAR, Kardex, Intake/Output, MAR and Recent Results.

## 2021-08-17 PROCEDURE — 65660000000 HC RM CCU STEPDOWN

## 2021-08-17 PROCEDURE — 74011250637 HC RX REV CODE- 250/637: Performed by: INTERNAL MEDICINE

## 2021-08-17 PROCEDURE — 74011000258 HC RX REV CODE- 258: Performed by: INTERNAL MEDICINE

## 2021-08-17 PROCEDURE — 77010033678 HC OXYGEN DAILY

## 2021-08-17 PROCEDURE — 94640 AIRWAY INHALATION TREATMENT: CPT

## 2021-08-17 PROCEDURE — 94761 N-INVAS EAR/PLS OXIMETRY MLT: CPT

## 2021-08-17 PROCEDURE — 74011000250 HC RX REV CODE- 250: Performed by: INTERNAL MEDICINE

## 2021-08-17 PROCEDURE — 74011250636 HC RX REV CODE- 250/636: Performed by: INTERNAL MEDICINE

## 2021-08-17 PROCEDURE — 74011250637 HC RX REV CODE- 250/637: Performed by: NURSE PRACTITIONER

## 2021-08-17 RX ADMIN — ENOXAPARIN SODIUM 40 MG: 40 INJECTION, SOLUTION INTRAVENOUS; SUBCUTANEOUS at 21:41

## 2021-08-17 RX ADMIN — REMDESIVIR 100 MG: 100 INJECTION, POWDER, LYOPHILIZED, FOR SOLUTION INTRAVENOUS at 09:15

## 2021-08-17 RX ADMIN — ALBUTEROL SULFATE 2 PUFF: 90 AEROSOL, METERED RESPIRATORY (INHALATION) at 09:00

## 2021-08-17 RX ADMIN — ENOXAPARIN SODIUM 40 MG: 40 INJECTION, SOLUTION INTRAVENOUS; SUBCUTANEOUS at 10:34

## 2021-08-17 RX ADMIN — DEXAMETHASONE SODIUM PHOSPHATE 6 MG: 4 INJECTION, SOLUTION INTRAMUSCULAR; INTRAVENOUS at 09:01

## 2021-08-17 RX ADMIN — BENZONATATE 100 MG: 100 CAPSULE ORAL at 09:01

## 2021-08-17 RX ADMIN — GUAIFENESIN AND DEXTROMETHORPHAN 5 ML: 100; 10 SYRUP ORAL at 21:41

## 2021-08-17 RX ADMIN — GUAIFENESIN 600 MG: 600 TABLET, EXTENDED RELEASE ORAL at 21:03

## 2021-08-17 RX ADMIN — ALBUTEROL SULFATE 2 PUFF: 90 AEROSOL, METERED RESPIRATORY (INHALATION) at 14:11

## 2021-08-17 RX ADMIN — GUAIFENESIN 600 MG: 600 TABLET, EXTENDED RELEASE ORAL at 09:01

## 2021-08-17 RX ADMIN — ALBUTEROL SULFATE 2 PUFF: 90 AEROSOL, METERED RESPIRATORY (INHALATION) at 17:00

## 2021-08-17 NOTE — PROGRESS NOTES
Transition of Care Plan:    RUR: 15% low risk  Disposition: Home with family assistance  Follow up appointments: PCP  DME needed: No needs identified. Pt will need 02 challenge 24 hours prior to d/c  Transportation at Discharge: Pt is unsure at this time. Pt may need ride asssitance  [place] or means to access home: Mariah has keys  IM Medicare Letter: N/A  Is patient a BCPI-A Bundle: N/A          If yes, was Bundle Letter given?:     Caregiver Contact: MotherClotilde ([phone])  Discharge Caregiver contacted prior to discharge? Pt will contact cg prior to d/c    Reason for Admission:  PNA due to COVID 19 virus                     RUR Score:          15%           Plan for utilizing home health:      No needs identified    PCP: First and Last name:  Francois Sandra MD     Name of [facility]: [facility]   Are you a current patient: Yes/No: Yes   Approximate date of last visit: 8/11/21   Can you participate in a virtual visit with your PCP: Yes                    Current Advanced Directive/Advance Care Plan: Full Code      Healthcare Decision Maker: MotherClotilde ([phone])                  Transition of Care Plan:             Home with family assistance    CM contacted pt via bedside phone to complete initial assessment due to contact isolation precautions. CM introduced role, verified demographics, and discussed discharge planning. Pt is a 46 yo female with an admitting diagnosis of PNA due to COVID 19 virus. Pt lives alone in a 1 story apartment with several NIKCY. Pt is independent at baseline and transports herself to appts. Pt has no home DME. Pt has a hx of SNF at [facility] and hx of [facility] with [address]. Pt does not know who will transport her home at d/c. CM will continue to follow for discharge planning while patient is admitted on current unit. Please contact this CM with questions or issues related to discharge.      Care Management Interventions  PCP Verified by CM: Yes (Dr. Sriram Kapadia)  Last Visit to PCP: 08/11/21  Mode of Transport at Discharge: Other (see comment) (Pt is unsure. Pt may need ride assistance)  Transition of Care Consult (CM Consult): Discharge Planning  Discharge Durable Medical Equipment: No  Physical Therapy Consult: No  Occupational Therapy Consult: No  Speech Therapy Consult: No  Current Support Network: Lives Alone (Pt lives alone in a 1 story apartment, on the second floor, with servera NICKY)  Confirm Follow Up Transport: Self  Discharge Location  Discharge Placement: Home with family assistance  ----------------------------------------------  Juliano Morse (ACP) Conversation      Date of Conversation: 8/17/2021  Conducted with: Patient with Decision Making Capacity    Healthcare Decision Maker:     Primary Decision Maker: Celia Joiner - Mother - [phone]  Today we documented Decision Maker(s) consistent with Legal Next of Kin hierarchy.     Content/Action Overview:   DECLINED ACP conversation - will revisit periodically   Reviewed DNR/DNI and patient elects Full Code (Attempt Resuscitation)    Length of Voluntary ACP Conversation in minutes:  <16 minutes (Non-Billable)    ASHLEY Paiz  Care Manager [facility]  [phone]

## 2021-08-17 NOTE — PROGRESS NOTES
Hospitalist Progress Note    NAME: Rayna Choe   :  1970   MRN:  [id number]     Admit date: 8/15/2021    Today's date: 21    PCP: Jeffrey Reeves MD      Anticipated discharge date:     Barriers:      Assessment / Plan:    Pneumonia due to COVID-19 POA  Acute hypoxic respiratory failure POA  Asthma POA  -SARS-CoV-2 was positive but 2 days ago  -Hypoxic and was saturating at about 88% on ambulation, using accessory muscle  - CXR with new patchy bilateral airspace disease.   -Decadron and remdesivir day 3   - Currently on 2 liters, sats 95 to 100%, I weaned to 1 liter       Wean further and do O2 challenge  - Trend inflammatory markers   CRP 7.80   D-dimer 0.46.    - If CRP is more than 7.5, consider Tocilizumab if hypoxia is worsening   Currently O2 is stable  -Procalcitonin < 0.05, hold antibiotics  - Labs not drawn, will reorder in AM, need to follow LFTs and inflammatory markers  - Add tussionex     Asthma  -Continue steroids as above. Start albuterol MDI, continue steroids, increase to BID     Hypokalemia  -replaced and resolved     Bipolar disorder  GERD  Peptic ulcer disease  PTSD  History of thyroiditis    Obesity POA Body mass index is 49.64 kg/m². Code Status: Full code  Surrogate Decision Maker: Patient's mother Lorrie     DVT Prophylaxis: Lovenox     Baseline: From home, independent of ADLs    Subjective:     Chief Complaint / Reason for Physician Visit f/u COVID-19 pneumonia  \"I get winded when I get up and walk a few feet\". Discussed with RN events overnight.    Still with significant coughing spells, tessalon helped a bit  Still SOB ailin with exertion, currently on 2 liters, sats 100%  No CP or N/V or diarrhea    Review of Systems:  Symptom Y/N Comments  Symptom Y/N Comments   Fever/Chills n   Chest Pain n    Poor Appetite    Edema     Cough y   Abdominal Pain n    Sputum    Joint Pain     SOB/DUNCAN y   Headache     Nausea/vomit n   Tolerating PT/OT     Diarrhea n   Tolerating Diet y    Constipation    Other       Could NOT obtain due to:      Objective:     VITALS:   Last 24hrs VS reviewed since prior progress note. Most recent are:  Patient Vitals for the past 24 hrs:   Temp Pulse Resp BP SpO2   08/17/21 1147 98.1 °F (36.7 °C) (!) 54 20 138/76 100 %   08/17/21 0814 98.6 °F (37 °C) (!) 56 20 (!) 143/74 99 %   08/17/21 0254 98.5 °F (36.9 °C) 63 20 129/73 96 %   08/16/21 2235 98.4 °F (36.9 °C) (!) 55 21 124/64 95 %   08/16/21 2009 -- -- -- -- 97 %   08/16/21 1846 98.6 °F (37 °C) 62 20 128/73 98 %   08/16/21 1541 97.8 °F (36.6 °C) 71 20 118/64 100 %     No intake or output data in the 24 hours ending 08/17/21 1303     Wt Readings from Last 12 Encounters:   08/15/21 127.1 kg (280 lb 3.3 oz)   07/10/21 129.3 kg (285 lb)   03/25/21 130.2 kg (287 lb)   11/11/20 123.4 kg (272 lb)   10/14/20 122.5 kg (270 lb)   10/07/20 121.1 kg (267 lb)   07/03/20 120.2 kg (265 lb)   04/01/20 122.8 kg (270 lb 12.8 oz)   03/03/20 123.4 kg (272 lb)   01/08/20 122.8 kg (270 lb 11.6 oz)   12/23/19 121.1 kg (267 lb)   12/13/19 123.4 kg (272 lb)       PHYSICAL EXAM:    I had a face to face encounter and independently examined this patient on 08/17/21 as outlined below:    General: WD, WN. Alert, cooperative, no acute distress    EENT:  PERRL. Anicteric sclerae. MMM  Resp:  Crackles and scattered wheezes bilaterally, no  rales. No accessory muscle use  CV:  Regular  rhythm,  No edema  GI:  Soft, Non distended, Non tender. +Bowel sounds, no rebound  Neurologic:  Alert and oriented X 3, normal speech, non focal motor exam  Psych:   Good insight. Not anxious nor agitated  Skin:  No rashes.   No jaundice    Reviewed most current lab test results and cultures  YES  Reviewed most current radiology test results   YES  Review and summation of old records today    NO  Reviewed patient's current orders and MAR    YES  PMH/SH reviewed - no change compared to H&P  ________________________________________________________________________  Care Plan discussed with:    Comments   Patient x    Family      RN x    Care Manager     Consultant                        Multidiciplinary team rounds were held today with , nursing, pharmacist and clinical coordinator. Patient's plan of care was discussed; medications were reviewed and discharge planning was addressed. ________________________________________________________________________      Comments   >50% of visit spent in counseling and coordination of care     ________________________________________________________________________  Kitty Xiao MD     Procedures: see electronic medical records for all procedures/Xrays and details which were not copied into this note but were reviewed prior to creation of Plan. LABS:  I reviewed today's most current labs and imaging studies.   Pertinent labs include:  Recent Labs     08/16/21  0344 08/15/21  0652   WBC 5.0 5.7   HGB 11.0* 11.5   HCT 34.2* 35.6    206     Recent Labs     08/16/21  0344 08/15/21  0652    141   K 3.7 3.4*   * 108   CO2 28 27   * 110*   BUN 15 16   CREA 0.94 0.89   CA 8.3* 8.5   ALB 2.7* 2.9*   TBILI 0.4 0.7   ALT 28 33

## 2021-08-17 NOTE — PROGRESS NOTES
End of Shift Note    Bedside shift change report given to Ignacio (oncoming nurse) by Evy Bright (offgoing nurse). Report included the following information SBAR, Kardex, Intake/Output, MAR and Recent Results    Shift worked:  [id number]     Shift summary and any significant changes:     No significant changes. Concerns for physician to address:  none     Zone phone for oncoming shift:          Activity:  Activity Level: Up with Assistance  Number times ambulated in hallways past shift: 0  Number of times OOB to chair past shift: 0    Cardiac:   Cardiac Monitoring: Yes      Cardiac Rhythm: Sinus Rhythm    Access:   Current line(s): PIV     Genitourinary:   Urinary status: voiding    Respiratory:   O2 Device: Nasal cannula  Chronic home O2 use?:NO  Incentive spirometer at bedside: YES     GI:  Last Bowel Movement Date: 08/16/21  Current diet:  ADULT DIET Regular  Passing flatus: YES  Tolerating current diet: YES       Pain Management:   Patient states pain is manageable on current regimen: YES    Skin:  Phani Score: 20  Interventions: increase time out of bed    Patient Safety:  Fall Score:  Total Score: 1  Interventions: gripper socks and pt to call before getting OOB       Length of Stay:  Expected LOS: 5d 9h  Actual LOS: 2      Evy Bright

## 2021-08-17 NOTE — PROGRESS NOTES
Verbal shift change report given to Sharmaine (oncoming nurse) by Mary Kate Maloney (offgoing nurse). Report included the following information SBAR, Kardex, Intake/Output, MAR and Recent Results.

## 2021-08-17 NOTE — PROGRESS NOTES
Patient requesting AM labs be drawn off peripheral IV - this RN educated pt that per hospital policy, we cannot pull labs off of an IV. Patient refusing to be stuck for labs. Primary RN made aware.

## 2021-08-18 LAB
ALBUMIN SERPL-MCNC: 2.5 G/DL (ref 3.5–5)
ALBUMIN/GLOB SERPL: 0.5 {RATIO} (ref 1.1–2.2)
ALP SERPL-CCNC: 70 U/L (ref 45–117)
ALT SERPL-CCNC: 45 U/L (ref 12–78)
ANION GAP SERPL CALC-SCNC: 6 MMOL/L (ref 5–15)
AST SERPL-CCNC: 47 U/L (ref 15–37)
BASOPHILS # BLD: 0 K/UL (ref 0–0.1)
BASOPHILS NFR BLD: 0 % (ref 0–1)
BILIRUB SERPL-MCNC: 0.4 MG/DL (ref 0.2–1)
BUN SERPL-MCNC: 19 MG/DL (ref 6–20)
BUN/CREAT SERPL: 27 (ref 12–20)
CALCIUM SERPL-MCNC: 8.2 MG/DL (ref 8.5–10.1)
CHLORIDE SERPL-SCNC: 108 MMOL/L (ref 97–108)
CO2 SERPL-SCNC: 26 MMOL/L (ref 21–32)
CREAT SERPL-MCNC: 0.71 MG/DL (ref 0.55–1.02)
CRP SERPL-MCNC: 2.04 MG/DL (ref 0–0.6)
D DIMER PPP FEU-MCNC: 0.36 MG/L FEU (ref 0–0.65)
DIFFERENTIAL METHOD BLD: ABNORMAL
EOSINOPHIL # BLD: 0 K/UL (ref 0–0.4)
EOSINOPHIL NFR BLD: 0 % (ref 0–7)
ERYTHROCYTE [DISTWIDTH] IN BLOOD BY AUTOMATED COUNT: 13 % (ref 11.5–14.5)
FERRITIN SERPL-MCNC: 485 NG/ML (ref 8–252)
GLOBULIN SER CALC-MCNC: 4.7 G/DL (ref 2–4)
GLUCOSE SERPL-MCNC: 121 MG/DL (ref 65–100)
HCT VFR BLD AUTO: 32.6 % (ref 35–47)
HGB BLD-MCNC: 10.6 G/DL (ref 11.5–16)
IMM GRANULOCYTES # BLD AUTO: 0 K/UL (ref 0–0.04)
IMM GRANULOCYTES NFR BLD AUTO: 0 % (ref 0–0.5)
LYMPHOCYTES # BLD: 1.4 K/UL (ref 0.8–3.5)
LYMPHOCYTES NFR BLD: 33 % (ref 12–49)
MCH RBC QN AUTO: 31.1 PG (ref 26–34)
MCHC RBC AUTO-ENTMCNC: 32.5 G/DL (ref 30–36.5)
MCV RBC AUTO: 95.6 FL (ref 80–99)
MONOCYTES # BLD: 0.5 K/UL (ref 0–1)
MONOCYTES NFR BLD: 11 % (ref 5–13)
MYELOCYTES NFR BLD MANUAL: 1 %
NEUTS SEG # BLD: 2.4 K/UL (ref 1.8–8)
NEUTS SEG NFR BLD: 55 % (ref 32–75)
NRBC # BLD: 0 K/UL (ref 0–0.01)
NRBC BLD-RTO: 0 PER 100 WBC
PLATELET # BLD AUTO: 308 K/UL (ref 150–400)
PLATELET COMMENTS,PCOM: ABNORMAL
PMV BLD AUTO: 11.8 FL (ref 8.9–12.9)
POTASSIUM SERPL-SCNC: 3.8 MMOL/L (ref 3.5–5.1)
PROT SERPL-MCNC: 7.2 G/DL (ref 6.4–8.2)
RBC # BLD AUTO: 3.41 M/UL (ref 3.8–5.2)
RBC MORPH BLD: ABNORMAL
SODIUM SERPL-SCNC: 140 MMOL/L (ref 136–145)
WBC # BLD AUTO: 4.3 K/UL (ref 3.6–11)

## 2021-08-18 PROCEDURE — 74011250637 HC RX REV CODE- 250/637: Performed by: NURSE PRACTITIONER

## 2021-08-18 PROCEDURE — 85025 COMPLETE CBC W/AUTO DIFF WBC: CPT

## 2021-08-18 PROCEDURE — 74011000258 HC RX REV CODE- 258: Performed by: INTERNAL MEDICINE

## 2021-08-18 PROCEDURE — 82728 ASSAY OF FERRITIN: CPT

## 2021-08-18 PROCEDURE — 36415 COLL VENOUS BLD VENIPUNCTURE: CPT

## 2021-08-18 PROCEDURE — 85379 FIBRIN DEGRADATION QUANT: CPT

## 2021-08-18 PROCEDURE — 74011000250 HC RX REV CODE- 250: Performed by: INTERNAL MEDICINE

## 2021-08-18 PROCEDURE — 74011250636 HC RX REV CODE- 250/636: Performed by: EMERGENCY MEDICINE

## 2021-08-18 PROCEDURE — 74011250637 HC RX REV CODE- 250/637: Performed by: INTERNAL MEDICINE

## 2021-08-18 PROCEDURE — 94761 N-INVAS EAR/PLS OXIMETRY MLT: CPT

## 2021-08-18 PROCEDURE — 94640 AIRWAY INHALATION TREATMENT: CPT

## 2021-08-18 PROCEDURE — 80053 COMPREHEN METABOLIC PANEL: CPT

## 2021-08-18 PROCEDURE — 77010033678 HC OXYGEN DAILY

## 2021-08-18 PROCEDURE — 65660000000 HC RM CCU STEPDOWN

## 2021-08-18 PROCEDURE — 74011250636 HC RX REV CODE- 250/636: Performed by: INTERNAL MEDICINE

## 2021-08-18 PROCEDURE — 86140 C-REACTIVE PROTEIN: CPT

## 2021-08-18 RX ORDER — ALBUTEROL SULFATE 90 UG/1
2 AEROSOL, METERED RESPIRATORY (INHALATION)
Qty: 1 INHALER | Refills: 0 | Status: SHIPPED | OUTPATIENT
Start: 2021-08-18 | End: 2021-09-17

## 2021-08-18 RX ORDER — GUAIFENESIN 600 MG/1
600 TABLET, EXTENDED RELEASE ORAL EVERY 12 HOURS
Qty: 14 TABLET | Refills: 0 | Status: SHIPPED | OUTPATIENT
Start: 2021-08-18 | End: 2021-08-19 | Stop reason: SDUPTHER

## 2021-08-18 RX ORDER — DEXAMETHASONE SODIUM PHOSPHATE 4 MG/ML
6 INJECTION, SOLUTION INTRA-ARTICULAR; INTRALESIONAL; INTRAMUSCULAR; INTRAVENOUS; SOFT TISSUE EVERY 24 HOURS
Qty: 10.5 ML | Refills: 0 | Status: SHIPPED
Start: 2021-08-19 | End: 2021-08-18

## 2021-08-18 RX ORDER — ACETAMINOPHEN 325 MG/1
650 TABLET ORAL
Qty: 28 TABLET | Refills: 0 | Status: SHIPPED | OUTPATIENT
Start: 2021-08-18 | End: 2021-08-25

## 2021-08-18 RX ORDER — DEXAMETHASONE 6 MG/1
6 TABLET ORAL
Qty: 7 TABLET | Refills: 0 | Status: SHIPPED | OUTPATIENT
Start: 2021-08-18 | End: 2021-08-25

## 2021-08-18 RX ORDER — BENZONATATE 100 MG/1
100 CAPSULE ORAL
Qty: 21 CAPSULE | Refills: 0 | Status: SHIPPED
Start: 2021-08-18 | End: 2021-08-19

## 2021-08-18 RX ADMIN — GUAIFENESIN 600 MG: 600 TABLET, EXTENDED RELEASE ORAL at 21:52

## 2021-08-18 RX ADMIN — GUAIFENESIN 600 MG: 600 TABLET, EXTENDED RELEASE ORAL at 08:46

## 2021-08-18 RX ADMIN — REMDESIVIR 100 MG: 100 INJECTION, POWDER, LYOPHILIZED, FOR SOLUTION INTRAVENOUS at 09:13

## 2021-08-18 RX ADMIN — ENOXAPARIN SODIUM 40 MG: 40 INJECTION, SOLUTION INTRAVENOUS; SUBCUTANEOUS at 10:34

## 2021-08-18 RX ADMIN — ENOXAPARIN SODIUM 40 MG: 40 INJECTION, SOLUTION INTRAVENOUS; SUBCUTANEOUS at 22:34

## 2021-08-18 RX ADMIN — DEXAMETHASONE SODIUM PHOSPHATE 6 MG: 4 INJECTION, SOLUTION INTRAMUSCULAR; INTRAVENOUS at 08:46

## 2021-08-18 RX ADMIN — GUAIFENESIN AND DEXTROMETHORPHAN 5 ML: 100; 10 SYRUP ORAL at 15:21

## 2021-08-18 RX ADMIN — GUAIFENESIN AND DEXTROMETHORPHAN 5 ML: 100; 10 SYRUP ORAL at 04:00

## 2021-08-18 RX ADMIN — GUAIFENESIN AND DEXTROMETHORPHAN 5 ML: 100; 10 SYRUP ORAL at 22:34

## 2021-08-18 RX ADMIN — ALBUTEROL SULFATE 2 PUFF: 90 AEROSOL, METERED RESPIRATORY (INHALATION) at 07:56

## 2021-08-18 RX ADMIN — GUAIFENESIN AND DEXTROMETHORPHAN 5 ML: 100; 10 SYRUP ORAL at 08:44

## 2021-08-18 RX ADMIN — ALBUTEROL SULFATE 2 PUFF: 90 AEROSOL, METERED RESPIRATORY (INHALATION) at 21:52

## 2021-08-18 RX ADMIN — ONDANSETRON 4 MG: 2 INJECTION INTRAMUSCULAR; INTRAVENOUS at 08:46

## 2021-08-18 RX ADMIN — ALBUTEROL SULFATE 2 PUFF: 90 AEROSOL, METERED RESPIRATORY (INHALATION) at 13:46

## 2021-08-18 NOTE — DISCHARGE SUMMARY
Hospitalist Discharge Summary     Patient ID:  Emilie Copeland  [id number]  17 y.o.  1970  8/15/2021    PCP on record: Nolan Pendleton MD    Admit date: 8/15/2021  Discharge date and time: 8/18/2021    DISCHARGE DIAGNOSIS:  Pneumonia due to COVID-19 POA  Acute hypoxic respiratory failure POA  Asthma POA  Hypokalemia  Bipolar disorder  GERD  Peptic ulcer disease  PTSD  History of thyroiditis    CONSULTATIONS:  IP CONSULT TO HOSPITALIST    Excerpted HPI from H&P of Lucy Woods MD:  Subjective:   CHIEF COMPLAINT: sob     HISTORY OF PRESENT ILLNESS:     Manju Zhao is a 45 y.o.   female who presents with past medical history of asthma, bipolar disorder, gastroesophageal reflux disease is coming to the hospital chief complaint of shortness of breath. Patient reports that she was due to get her first short of Covid vaccine this Monday. She started experiencing shortness of breath along with cough about 4 days ago for which she saw her PCP and was prescribed steroids which she has been taking with only minimal relief. Shortness of breath was gradual in onset, with no associated orthopnea or PND. She reports cough with small amount of whitish phlegm. She does not report any chest pain. She also reports some wheezing. She has been using her albuterol inhaler with only minimal relief. Does not report any abdominal pain, nausea or vomiting.     On arrival to ED, she became hypoxic with ambulation. On labs CBC was normal.   BMP showed a potassium of 3.4. Creatinine normal.  Troponin is normal.  LFTs are normal.  Chest x-ray shows bibasilar airspace disease. We were asked to admit for work up and evaluation of the above problems. ______________________________________________________________________  DISCHARGE SUMMARY/HOSPITAL COURSE:  for full details see H&P, daily progress notes, labs, consult notes.    Pneumonia due to COVID-19 POA  Acute hypoxic respiratory failure POA  Asthma POA  This is a 41-year-old female who was admitted for acute respiratory failure with hypoxia secondary to COVID-19 pneumonia. Patient was treated with Decadron and remdesivir. Procalcitonin was low, therefore  no need for antibiotic treatment  Ambulatory pulse ox showed that she need home oxygen 2 L which has been arranged by the   Continue with cough syrup. Discharged on p.o. Decadron to complete 10 days in total  Asthma  Continue inhalers     Hypokalemia  -replaced and resolved     Bipolar disorder  GERD  Peptic ulcer disease  PTSD  History of thyroiditis  _______________________________________________________________________  Patient seen and examined by me on discharge day. Pertinent Findings:  Gen:    Not in distress  Chest: Clear lungs  CVS:   Regular rhythm. No edema  Abd:  Soft, not distended, not tender  Neuro:  Alert, oriented x4  _______________________________________________________________________  DISCHARGE MEDICATIONS:   Current Discharge Medication List      START taking these medications    Details   benzonatate (TESSALON) 100 mg capsule Take 1 Capsule by mouth three (3) times daily as needed for Cough for up to 7 days. Qty: 21 Capsule, Refills: 0  Start date: 8/18/2021, End date: 8/25/2021      guaiFENesin ER (MUCINEX) 600 mg ER tablet Take 1 Tablet by mouth every twelve (12) hours for 7 days. Qty: 14 Tablet, Refills: 0  Start date: 8/18/2021, End date: 8/25/2021      acetaminophen (TYLENOL) 325 mg tablet Take 2 Tablets by mouth every six (6) hours as needed for Fever for up to 7 days. Qty: 28 Tablet, Refills: 0  Start date: 8/18/2021, End date: 8/25/2021      dexAMETHasone (Decadron) 6 mg tablet Take 1 Tablet by mouth Daily (before breakfast) for 7 days.   Qty: 7 Tablet, Refills: 0  Start date: 8/18/2021, End date: 8/25/2021         CONTINUE these medications which have CHANGED    Details   !! albuterol (PROVENTIL HFA, VENTOLIN HFA, PROAIR HFA) 90 mcg/actuation inhaler Take 2 Puffs by inhalation every six (6) hours as needed for Wheezing for up to 30 days. Qty: 1 Inhaler, Refills: 0  Start date: 2021, End date: 2021       !! - Potential duplicate medications found. Please discuss with provider. CONTINUE these medications which have NOT CHANGED    Details   !! albuterol (PROVENTIL HFA, VENTOLIN HFA, PROAIR HFA) 90 mcg/actuation inhaler Take 2 Puffs by inhalation every four (4) hours as needed for Wheezing. Qty: 1 Inhaler, Refills: 0      fluticasone propionate (Flonase Allergy Relief) 50 mcg/actuation nasal spray 2 Sprays by Both Nostrils route daily as needed for Rhinitis. Qty: 1 Bottle, Refills: 0      OTHER,NON-FORMULARY, CBD oil,cream for pain      b complex-vitamin c-folic acid 0.8 mg (NEPHRO-DAVINA) 0.8 mg tab tablet Take 1 Tablet by mouth daily. B.infantis-B.ani-B.long-B.bifi (Probiotic 4X) 10-15 mg TbEC Take  by mouth.      galcanezumab-gnlm (Emgality Syringe) 120 mg/mL syrg 120 mg by SubCUTAneous route every thirty (30) days. Qty: 1 Units, Refills: 2      furosemide (Lasix) 40 mg tablet Si tab daily  Qty: 30 Tab, Refills: 6      cyanocobalamin (VITAMIN B12) 100 mcg tablet Take 100 mcg by mouth daily. therapeutic multivitamin (THERA) tablet Take 1 Tab by mouth daily. Ferrous Sulfate (SLOW FE) 47.5 mg iron TbER tablet Take 1 Tab by mouth nightly. !! - Potential duplicate medications found. Please discuss with provider. STOP taking these medications       predniSONE (DELTASONE) 10 mg tablet Comments:   Reason for Stopping:         amoxicillin-clavulanate (Augmentin) 875-125 mg per tablet Comments:   Reason for Stopping:                 Patient Follow Up Instructions:    Activity: Activity as tolerated  Diet: Cardiac Diet  Wound Care: None needed        Follow-up Information     Follow up With Specialties Details Why Katy Campbell MD Internal Medicine   [facility] [address]  [phone]          ________________________________________________________________    Risk of deterioration: Low    Condition at Discharge:  Stable  __________________________________________________________________    Disposition  Home with family, no needs    ____________________________________________________________________    Code Status: Full Code  ___________________________________________________________________      Total time in minutes spent coordinating this discharge (includes going over instructions, follow-up, prescriptions, and preparing report for sign off to her PCP) :  >30 minutes    Signed:  Blayne Hui MD

## 2021-08-18 NOTE — PROGRESS NOTES
Transition of Care Plan:     RUR: 15% low risk  Disposition: Home with family assistance  Follow up appointments: PCP  DME needed: No needs identified. Pt will need 02 challenge 24 hours prior to d/c  Transportation at Discharge: Pt is unsure at this time. Pt may need ride asssitance  [place] or means to access home: Mariah has keys  IM Medicare Letter: N/A  Is patient a BCPI-A Bundle: N/A                     If yes, was Bundle Letter given?:     Caregiver Contact: Mother, Juana Rivas ([phone])  Discharge Caregiver contacted prior to discharge? Pt will contact cg prior to d/c    1:54 PM  [place] able to provide home 02. CM attempted to provide pt with suggestions and assistance with d/c from hospital. Pt refused CM help.     12:32 PM  PRIMITIVO sent referral to [address] for pt's home 02 needs. 11:59 AM  PRIMITIVO was updated by MD that pt reports she can not d/c home due to not having access to her keys. She reports her mariah has her key, is out of town in [place], and does not return until Friday. She reports that she can not go to any other family member's home due to them having COVID as well. PRIMITIVO contacted pt's to discuss d/c planning. She confirmed all of the information CM got home MD. PRIMITIVO attempted to explain to pt that her insurance may not cover her stay due to her being medically stable at this time. Pt stated \"I am going to call patient advocate\" and then hung up on CM. PRIMITIVO updated MD and CM Lead.     Christina Bowden, MSW  Care Manager [id number]  [phone]

## 2021-08-18 NOTE — DISCHARGE INSTRUCTIONS
DISCHARGE DIAGNOSIS:  Pneumonia due to COVID-19 POA  Acute hypoxic respiratory failure POA  Asthma POA  Hypokalemia  Bipolar disorder  GERD  Peptic ulcer disease  PTSD  History of thyroiditis    MEDICATIONS:  · It is important that you take the medication exactly as they are prescribed. · Keep your medication in the bottles provided by the pharmacist and keep a list of the medication names, dosages, and times to be taken in your wallet. · Do not take other medications without consulting your doctor. Pain Management: per above medications    What to do at Home    Recommended diet:  Regular Diet    Recommended activity: Activity as tolerated    If you have questions regarding the hospital related prescriptions or hospital related issues please call [address] [place] [address] at . You can always direct your questions to your primary care doctor if you are unable to reach your hospital physician; your PCP works as an extension of your hospital doctor just like your hospital doctor is an extension of your PCP for your time at the hospital [facility], [facility]). If you experience any of the following symptoms then please call your primary care physician or return to the emergency room if you cannot get hold of your doctor:  Fever, chills, nausea, vomiting, diarrhea, change in mentation, falling, bleeding, shortness of breath. Advance Care Planning  People with COVID-19 may have no symptoms, mild symptoms, such as fever, cough, and shortness of breath or they may have more severe illness, developing severe and fatal pneumonia. As a result, Advance Care Planning with attention to naming a health care decision maker (someone you trust to make healthcare decisions for you if you could not speak for yourself) and sharing other health care preferences is important BEFORE a possible health crisis. Please contact your Primary Care Provider to discuss Advance Care Planning.      Preventing the Spread of Coronavirus Disease 2019 in Homes and Residential Communities  For the most recent information go to [email or web address]    Prevention steps for People with confirmed or suspected COVID-19 (including persons under investigation) who do not need to be hospitalized  and   People with confirmed COVID-19 who were hospitalized and determined to be medically stable to go home    Your healthcare provider and public health staff will evaluate whether you can be cared for at home. If it is determined that you do not need to be hospitalized and can be isolated at home, you will be monitored by staff from your local or state health department. You should follow the prevention steps below until a healthcare provider or local or state health department says you can return to your normal activities. Stay home except to get medical care  People who are mildly ill with COVID-19 are able to isolate at home during their illness. You should restrict activities outside your home, except for getting medical care. Do not go to work, school, or public areas. Avoid using public transportation, ride-sharing, or taxis. Separate yourself from other people and animals in your home  People: As much as possible, you should stay in a specific room and away from other people in your home. Also, you should use a separate bathroom, if available. Animals: You should restrict contact with pets and other animals while you are sick with COVID-19, just like you would around other people. Although there have not been reports of pets or other animals becoming sick with COVID-19, it is still recommended that people sick with COVID-19 limit contact with animals until more information is known about the virus. When possible, have another member of your household care for your animals while you are sick.  If you are sick with COVID-19, avoid contact with your pet, including petting, snuggling, being kissed or licked, and sharing food. If you must care for your pet or be around animals while you are sick, wash your hands before and after you interact with pets and wear a facemask. Call ahead before visiting your doctor  If you have a medical appointment, call the healthcare provider and tell them that you have or may have COVID-19. This will help the healthcare providers office take steps to keep other people from getting infected or exposed. Wear a facemask  You should wear a facemask when you are around other people (e.g., sharing a room or vehicle) or pets and before you enter a healthcare providers office. If you are not able to wear a facemask (for example, because it causes trouble breathing), then people who live with you should not stay in the same room with you, or they should wear a facemask if they enter your room. Cover your coughs and sneezes  Cover your mouth and nose with a tissue when you cough or sneeze. Throw used tissues in a lined trash can. Immediately wash your hands with soap and water for at least 20 seconds or, if soap and water are not available, clean your hands with an alcohol-based hand  that contains at least 60% alcohol. Clean your hands often  Wash your hands often with soap and water for at least 20 seconds, especially after blowing your nose, coughing, or sneezing; going to the bathroom; and before eating or preparing food. If soap and water are not readily available, use an alcohol-based hand  with at least 60% alcohol, covering all surfaces of your hands and rubbing them together until they feel dry. Soap and water are the best option if hands are visibly dirty. Avoid touching your eyes, nose, and mouth with unwashed hands. Avoid sharing personal household items  You should not share dishes, drinking glasses, cups, eating utensils, towels, or bedding with other people or pets in your home.  After using these items, they should be washed thoroughly with soap and water. Clean all high-touch surfaces everyday  High touch surfaces include counters, tabletops, doorknobs, bathroom fixtures, toilets, phones, keyboards, tablets, and bedside tables. Also, clean any surfaces that may have blood, stool, or body fluids on them. Use a household cleaning spray or wipe, according to the label instructions. Labels contain instructions for safe and effective use of the cleaning product including precautions you should take when applying the product, such as wearing gloves and making sure you have good ventilation during use of the product. Monitor your symptoms  Seek prompt medical attention if your illness is worsening (e.g., difficulty breathing). Before seeking care, call your healthcare provider and tell them that you have, or are being evaluated for, COVID-19. Put on a facemask before you enter the facility. These steps will help the healthcare providers office to keep other people in the office or waiting room from getting infected or exposed. Ask your healthcare provider to call the local or state health department. Persons who are placed under active monitoring or facilitated self-monitoring should follow instructions provided by their local health department or occupational health professionals, as appropriate. When working with your local health department check their available hours. If you have a medical emergency and need to call 911, notify the dispatch personnel that you have, or are being evaluated for COVID-19. If possible, put on a facemask before emergency medical services arrive. Discontinuing home isolation  Patients with confirmed COVID-19 should remain under home isolation precautions until the risk of secondary transmission to others is thought to be low. The decision to discontinue home isolation precautions should be made on a case-by-case basis, in consultation with healthcare providers and state and local health departments.

## 2021-08-18 NOTE — PROGRESS NOTES
Verbal shift change report given to [facility] (oncoming nurse) by Mik Menendez (offgoing nurse). Report included the following information SBAR, Kardex, Intake/Output, MAR and Recent Results.

## 2021-08-18 NOTE — PROGRESS NOTES
Hospitalist Progress Note    NAME: Brit Joseph   :  1970   MRN:  [id number]       Assessment / Plan:  Pneumonia due to COVID-19 POA  Acute hypoxic respiratory failure POA  Asthma POA  -SARS-CoV-2 was positive but 2 days ago  -Hypoxic and was saturating at about 88% on ambulation, using accessory muscle  - CXR with new patchy bilateral airspace disease.   -Decadron and remdesivir day 3   - Currently on 1 liters, sats 95 %, but patient complains of shortness of breath, lightheadedness upon minimal movement. Wean further and do O2 challenge  - Trend inflammatory markers              CRP 7.80              D-dimer 0.46.    - If CRP is more than 7.5, consider Tocilizumab if hypoxia is worsening              Currently O2 is stable  -Procalcitonin < 0.05, hold antibiotics  -Slight increase in the AST-will monitor , c/w  tussionex     Asthma  -Continue steroids as above.  c/w albuterol MDI, continue steroids, increase to BID     Hypokalemia  -replaced and resolved     Bipolar disorder  GERD  Peptic ulcer disease  PTSD  History of thyroiditis     Obesity POA Body mass index is 49.64 kg/m².     Code Status: Full code  Surrogate Decision Maker: Patient's mother Constantino Brownlee     DVT Prophylaxis: Lovenox     Baseline: From home, independent of ADLs     40 or above Morbid obesity / Body mass index is 49.64 kg/m². Estimated discharge date:   Barriers: Clinical improvement     Subjective:     Chief Complaint / Reason for Physician Visit  Follow-up COVID-19 infection. Discussed with RN events overnight.    Patient reported persistent cough, shortness of breath upon minimal exertion, dizziness   Review of Systems:  Symptom Y/N Comments  Symptom Y/N Comments   Fever/Chills    Chest Pain n    Poor Appetite n   Edema     Cough y   Abdominal Pain n    Sputum    Joint Pain     SOB/DUNCAN y   Pruritis/Rash     Nausea/vomit n   Tolerating PT/OT     Diarrhea n   Tolerating Diet y    Constipation    Other Could NOT obtain due to:      Objective:     VITALS:   Last 24hrs VS reviewed since prior progress note. Most recent are:  Patient Vitals for the past 24 hrs:   Temp Pulse Resp BP SpO2   08/18/21 0756 -- -- -- -- 98 %   08/18/21 0332 -- 83 -- -- 95 %   08/18/21 0310 98 °F (36.7 °C) 61 18 (!) 141/75 100 %   08/17/21 2332 98.6 °F (37 °C) (!) 48 19 (!) 123/55 98 %   08/17/21 1959 98.4 °F (36.9 °C) (!) 52 19 (!) 122/59 97 %   08/17/21 1440 98.1 °F (36.7 °C) (!) 58 20 121/70 98 %   08/17/21 1411 -- -- -- -- 98 %   08/17/21 1147 98.1 °F (36.7 °C) (!) 54 20 138/76 100 %     No intake or output data in the 24 hours ending 08/18/21 0826     I had a face to face encounter and independently examined this patient on 8/18/2021, as outlined below:  PHYSICAL EXAM:  General: WD, WN. Alert, cooperative, no acute distress    EENT:  EOMI. Anicteric sclerae. MMM  Resp:  CTA bilaterally, no wheezing or rales. No accessory muscle use  CV:  Regular  rhythm,  No edema  GI:  Soft, Non distended, Non tender. +Bowel sounds  Neurologic:  Alert and oriented X 3, normal speech,   Psych:   Good insight. Not anxious nor agitated  Skin:  No rashes. No jaundice    Reviewed most current lab test results and cultures  YES  Reviewed most current radiology test results   YES  Review and summation of old records today    NO  Reviewed patient's current orders and MAR    YES  PMH/SH reviewed - no change compared to H&P  ________________________________________________________________________  Care Plan discussed with:    Comments   Patient x    Family      RN x    Care Manager x    Consultant                       x Multidiciplinary team rounds were held today with , nursing, pharmacist and clinical coordinator. Patient's plan of care was discussed; medications were reviewed and discharge planning was addressed.      ________________________________________________________________________  Total NON critical care TIME: 30 Minutes    Total CRITICAL CARE TIME Spent:   Minutes non procedure based      Comments   >50% of visit spent in counseling and coordination of care     ________________________________________________________________________  Blayne Hui MD     Procedures: see electronic medical records for all procedures/Xrays and details which were not copied into this note but were reviewed prior to creation of Plan. LABS:  I reviewed today's most current labs and imaging studies.   Pertinent labs include:  Recent Labs     08/18/21  0412 08/16/21  0344   WBC 4.3 5.0   HGB 10.6* 11.0*   HCT 32.6* 34.2*    239     Recent Labs     08/18/21  0412 08/16/21  0344    140   K 3.8 3.7    109*   CO2 26 28   * 135*   BUN 19 15   CREA 0.71 0.94   CA 8.2* 8.3*   ALB 2.5* 2.7*   TBILI 0.4 0.4   ALT 45 28       Signed: Blayne Hui MD

## 2021-08-18 NOTE — PROGRESS NOTES
Home Oxygen Test  Date of test: 08/18  Time of test: 0900    Sa02 92 % on room air AT REST. Sa02 82 % on room air DURING AMBULATION. Sa02 95 % on 1 Liters DURING AMBULATION. Sa02 97 % on 1 Liters AT REST/AFTER AMBULATION.

## 2021-08-19 VITALS
SYSTOLIC BLOOD PRESSURE: 130 MMHG | WEIGHT: 280.2 LBS | RESPIRATION RATE: 18 BRPM | DIASTOLIC BLOOD PRESSURE: 60 MMHG | HEART RATE: 50 BPM | HEIGHT: 63 IN | BODY MASS INDEX: 49.65 KG/M2 | OXYGEN SATURATION: 100 % | TEMPERATURE: 97.9 F

## 2021-08-19 PROCEDURE — 74011000258 HC RX REV CODE- 258: Performed by: INTERNAL MEDICINE

## 2021-08-19 PROCEDURE — 77010033678 HC OXYGEN DAILY

## 2021-08-19 PROCEDURE — 74011250637 HC RX REV CODE- 250/637: Performed by: NURSE PRACTITIONER

## 2021-08-19 PROCEDURE — 94760 N-INVAS EAR/PLS OXIMETRY 1: CPT

## 2021-08-19 PROCEDURE — 74011250636 HC RX REV CODE- 250/636: Performed by: EMERGENCY MEDICINE

## 2021-08-19 PROCEDURE — 74011000250 HC RX REV CODE- 250: Performed by: INTERNAL MEDICINE

## 2021-08-19 PROCEDURE — 74011250636 HC RX REV CODE- 250/636: Performed by: INTERNAL MEDICINE

## 2021-08-19 PROCEDURE — 74011250637 HC RX REV CODE- 250/637: Performed by: INTERNAL MEDICINE

## 2021-08-19 RX ORDER — GUAIFENESIN/DEXTROMETHORPHAN 100-10MG/5
5 SYRUP ORAL
Qty: 1 BOTTLE | Refills: 0 | Status: SHIPPED | OUTPATIENT
Start: 2021-08-19 | End: 2021-08-29

## 2021-08-19 RX ORDER — GUAIFENESIN 600 MG/1
600 TABLET, EXTENDED RELEASE ORAL EVERY 12 HOURS
Qty: 14 TABLET | Refills: 0 | Status: SHIPPED | OUTPATIENT
Start: 2021-08-19 | End: 2021-08-26

## 2021-08-19 RX ADMIN — DEXAMETHASONE SODIUM PHOSPHATE 6 MG: 4 INJECTION, SOLUTION INTRAMUSCULAR; INTRAVENOUS at 09:14

## 2021-08-19 RX ADMIN — ONDANSETRON 4 MG: 2 INJECTION INTRAMUSCULAR; INTRAVENOUS at 10:32

## 2021-08-19 RX ADMIN — REMDESIVIR 100 MG: 100 INJECTION, POWDER, LYOPHILIZED, FOR SOLUTION INTRAVENOUS at 10:32

## 2021-08-19 RX ADMIN — GUAIFENESIN 600 MG: 600 TABLET, EXTENDED RELEASE ORAL at 09:14

## 2021-08-19 RX ADMIN — ALBUTEROL SULFATE 2 PUFF: 90 AEROSOL, METERED RESPIRATORY (INHALATION) at 09:14

## 2021-08-19 RX ADMIN — ENOXAPARIN SODIUM 40 MG: 40 INJECTION, SOLUTION INTRAVENOUS; SUBCUTANEOUS at 10:32

## 2021-08-19 RX ADMIN — GUAIFENESIN AND DEXTROMETHORPHAN 5 ML: 100; 10 SYRUP ORAL at 06:21

## 2021-08-19 NOTE — PROGRESS NOTES
Spiritual Care Assessment/Progress Note  [facility]      NAME: Murray Persaud      MRN: [id number]  AGE: 45 y.o.  SEX: female  Religion Affiliation: Nadira   Language: English     8/19/2021     Total Time (in minutes): 10     Spiritual Assessment begun in [facility] 3 MED TELE through conversation with:         [x]Patient        [] Family    [] Friend(s)        Reason for Consult: Initial/Spiritual assessment, patient floor     Spiritual beliefs: (Please include comment if needed)     [x] Identifies with a suresh tradition:         [x] Supported by a suresh community:            [] Claims no spiritual orientation:           [] Seeking spiritual identity:                [] Adheres to an individual form of spirituality:           [] Not able to assess:                           Identified resources for coping:      [x] Prayer                               [] Music                  [] Guided Imagery     [x] Family/friends                 [] Pet visits     [] Devotional reading                         [] Unknown     [] Other:                                             Interventions offered during this visit: (See comments for more details)    Patient Interventions: Affirmation of emotions/emotional suffering, Affirmation of suresh, Catharsis/review of pertinent events in supportive environment, Iconic (affirming the presence of God/Higher Power), Initial/Spiritual assessment, patient floor, Prayer (assurance of), Religion beliefs/image of God discussed           Plan of Care:     [] Support spiritual and/or cultural needs    [] Support AMD and/or advance care planning process      [] Support grieving process   [] Coordinate Rites and/or Rituals    [] Coordination with community clergy   [x] No spiritual needs identified at this time   [] Detailed Plan of Care below (See Comments)  [] Make referral to Music Therapy  [] Make referral to Pet Therapy     [] Make referral to Addiction services  [] Make referral to [facility]  [] Make referral to Spiritual Care Partner  [] No future visits requested        [x] Follow up upon further referrals     Comments:   Reviewed chart prior to visit on Med Tele for spiritual assessment. Patient on COVID+ isolation;  unable to enter room. Telephoned into room and spoke with patient. Provided listening presence as she shared she is feeling pretty good this morning. She has just been speaking with her  and indicates having good spiritual support. She also has a supportive family. She expressed no spiritual concerns at this time; she was receptive to assurance of prayer. Advised of ongoing availability of pastoral support.      CARLOS Gilmore, [address] [facility], Staff [address] [place] [address] Avenue    [address] Paging Service  [phone])

## 2021-08-19 NOTE — PROGRESS NOTES
Problem: Risk for Spread of Infection  Goal: Prevent transmission of infectious organism to others  Description: Prevent the transmission of infectious organisms to other patients, staff members, and visitors. Outcome: Progressing Towards Goal     Problem: Patient Education:  Go to Education Activity  Goal: Patient/Family Education  Outcome: Progressing Towards Goal     Problem: Falls - Risk of  Goal: *Absence of Falls  Description: Document Vernia Colder Fall Risk and appropriate interventions in the flowsheet. Outcome: Progressing Towards Goal  Note: Fall Risk Interventions:            Medication Interventions: Assess postural VS orthostatic hypotension                   Problem: Patient Education: Go to Patient Education Activity  Goal: Patient/Family Education  Outcome: Progressing Towards Goal     Problem: Airway Clearance - Ineffective  Goal: Achieve or maintain patent airway  Outcome: Progressing Towards Goal     Problem: Gas Exchange - Impaired  Goal: Absence of hypoxia  Outcome: Progressing Towards Goal  Goal: Promote optimal lung function  Outcome: Progressing Towards Goal     Problem: Breathing Pattern - Ineffective  Goal: Ability to achieve and maintain a regular respiratory rate  Outcome: Progressing Towards Goal     Problem:  Body Temperature -  Risk of, Imbalanced  Goal: Ability to maintain a body temperature within defined limits  Outcome: Progressing Towards Goal  Goal: Will regain or maintain usual level of consciousness  Outcome: Progressing Towards Goal  Goal: Complications related to the disease process, condition or treatment will be avoided or minimized  Outcome: Progressing Towards Goal     Problem: Isolation Precautions - Risk of Spread of Infection  Goal: Prevent transmission of infectious organism to others  Outcome: Progressing Towards Goal     Problem: Nutrition Deficits  Goal: Optimize nutrtional status  Outcome: Progressing Towards Goal     Problem: Risk for Fluid Volume Deficit  Goal: Maintain normal heart rhythm  Outcome: Progressing Towards Goal  Goal: Maintain absence of muscle cramping  Outcome: Progressing Towards Goal  Goal: Maintain normal serum potassium, sodium, calcium, phosphorus, and pH  Outcome: Progressing Towards Goal     Problem: Loneliness or Risk for Loneliness  Goal: Demonstrate positive use of time alone when socialization is not possible  Outcome: Progressing Towards Goal     Problem: Fatigue  Goal: Verbalize increase energy and improved vitality  Outcome: Progressing Towards Goal     Problem: Patient Education: Go to Patient Education Activity  Goal: Patient/Family Education  Outcome: Progressing Towards Goal     Problem: Patient Education: Go to Patient Education Activity  Goal: Patient/Family Education  Outcome: Progressing Towards Goal     Problem: Pneumonia: Day 1  Goal: Off Pathway (Use only if patient is Off Pathway)  Outcome: Progressing Towards Goal  Goal: Activity/Safety  Outcome: Progressing Towards Goal  Goal: Consults, if ordered  Outcome: Progressing Towards Goal  Goal: Diagnostic Test/Procedures  Outcome: Progressing Towards Goal  Goal: Nutrition/Diet  Outcome: Progressing Towards Goal  Goal: Medications  Outcome: Progressing Towards Goal  Goal: Respiratory  Outcome: Progressing Towards Goal  Goal: Treatments/Interventions/Procedures  Outcome: Progressing Towards Goal  Goal: Psychosocial  Outcome: Progressing Towards Goal  Goal: *Oxygen saturation within defined limits  Outcome: Progressing Towards Goal  Goal: *Influenza vaccine administered (October-March)  Outcome: Progressing Towards Goal  Goal: *Pneumoccocal vaccine administered  Outcome: Progressing Towards Goal  Goal: *Hemodynamically stable  Outcome: Progressing Towards Goal  Goal: *Demonstrates progressive activity  Outcome: Progressing Towards Goal  Goal: *Tolerating diet  Outcome: Progressing Towards Goal     Problem: Pneumonia: Day 2  Goal: Off Pathway (Use only if patient is Off Pathway)  Outcome: Progressing Towards Goal  Goal: Activity/Safety  Outcome: Progressing Towards Goal  Goal: Consults, if ordered  Outcome: Progressing Towards Goal  Goal: Diagnostic Test/Procedures  Outcome: Progressing Towards Goal  Goal: Nutrition/Diet  Outcome: Progressing Towards Goal  Goal: Discharge Planning  Outcome: Progressing Towards Goal  Goal: Medications  Outcome: Progressing Towards Goal  Goal: Respiratory  Outcome: Progressing Towards Goal  Goal: Treatments/Interventions/Procedures  Outcome: Progressing Towards Goal  Goal: Psychosocial  Outcome: Progressing Towards Goal  Goal: *Oxygen saturation within defined limits  Outcome: Progressing Towards Goal  Goal: *Hemodynamically stable  Outcome: Progressing Towards Goal  Goal: *Demonstrates progressive activity  Outcome: Progressing Towards Goal  Goal: *Tolerating diet  Outcome: Progressing Towards Goal  Goal: *Optimal pain control at patient's stated goal  Outcome: Progressing Towards Goal     Problem: Pneumonia: Day 3  Goal: Off Pathway (Use only if patient is Off Pathway)  Outcome: Progressing Towards Goal  Goal: Activity/Safety  Outcome: Progressing Towards Goal  Goal: Consults, if ordered  Outcome: Progressing Towards Goal  Goal: Diagnostic Test/Procedures  Outcome: Progressing Towards Goal  Goal: Nutrition/Diet  Outcome: Progressing Towards Goal  Goal: Discharge Planning  Outcome: Progressing Towards Goal  Goal: Medications  Outcome: Progressing Towards Goal  Goal: Respiratory  Outcome: Progressing Towards Goal  Goal: Treatments/Interventions/Procedures  Outcome: Progressing Towards Goal  Goal: Psychosocial  Outcome: Progressing Towards Goal  Goal: *Oxygen saturation within defined limits  Outcome: Progressing Towards Goal  Goal: *Hemodynamically stable  Outcome: Progressing Towards Goal  Goal: *Demonstrates progressive activity  Outcome: Progressing Towards Goal  Goal: *Tolerating diet  Outcome: Progressing Towards Goal  Goal: *Describes available resources and support systems  Outcome: Progressing Towards Goal  Goal: *Optimal pain control at patient's stated goal  Outcome: Progressing Towards Goal     Problem: Pneumonia: Day 4  Goal: Off Pathway (Use only if patient is Off Pathway)  Outcome: Progressing Towards Goal  Goal: Activity/Safety  Outcome: Progressing Towards Goal  Goal: Nutrition/Diet  Outcome: Progressing Towards Goal  Goal: Discharge Planning  Outcome: Progressing Towards Goal  Goal: Medications  Outcome: Progressing Towards Goal  Goal: Respiratory  Outcome: Progressing Towards Goal  Goal: Treatments/Interventions/Procedures  Outcome: Progressing Towards Goal  Goal: Psychosocial  Outcome: Progressing Towards Goal     Problem: Pneumonia: Discharge Outcomes  Goal: *Demonstrates progressive activity  Outcome: Progressing Towards Goal  Goal: *Describes follow-up/return visits to physicians  Outcome: Progressing Towards Goal  Goal: *Tolerating diet  Outcome: Progressing Towards Goal  Goal: *Verbalizes name, dosage, time, side effects, and number of days to continue medications  Outcome: Progressing Towards Goal  Goal: *Influenza immunization  Outcome: Progressing Towards Goal  Goal: *Pneumococcal immunization  Outcome: Progressing Towards Goal  Goal: *Respiratory status at baseline  Outcome: Progressing Towards Goal  Goal: *Vital signs within defined limits  Outcome: Progressing Towards Goal  Goal: *Describes available resources and support systems  Outcome: Progressing Towards Goal  Goal: *Optimal pain control at patient's stated goal  Outcome: Progressing Towards Goal

## 2021-08-19 NOTE — PROGRESS NOTES
End of Shift Note    Bedside shift change report given to [employer] (oncoming nurse) by Cony Meier (offgoing nurse). Report included the following information SBAR    Shift worked:  night     Shift summary and any significant changes:     Pt refusing bloodwork this AM     Concerns for physician to address:        Zone phone for oncoming shift:            Activity:  Activity Level: Up ad masha  Number times ambulated in hallways past shift: 0  Number of times OOB to chair past shift: 0    Cardiac:   Cardiac Monitoring: Yes      Cardiac Rhythm: Sinus Mario    Access:   Current line(s): PIV     Genitourinary:   Urinary status: voiding    Respiratory:   O2 Device: Nasal cannula  Chronic home O2 use?: NO  Incentive spirometer at bedside: YES     GI:  Last Bowel Movement Date: 08/18/21  Current diet:  ADULT DIET Regular  Passing flatus: YES  Tolerating current diet: YES       Pain Management:   Patient states pain is manageable on current regimen: YES    Skin:  Phani Score: 22  Interventions: turn team    Patient Safety:  Fall Score:  Total Score: 1  Interventions: bed/chair alarm       Length of Stay:  Expected LOS: 5d 9h  Actual LOS: [facility]

## 2021-08-19 NOTE — PROGRESS NOTES
Pt being discharged home via family transportation. IV and telemetry discontinued. Discharge instructions and follow up appts given and explained to pt. Pt leaving this portable home O2 on 1L.

## 2021-08-20 ENCOUNTER — PATIENT OUTREACH (OUTPATIENT)
Dept: CASE MANAGEMENT | Age: 51
End: 2021-08-20

## 2021-08-20 NOTE — PROGRESS NOTES
Patient contacted regarding COVID-19 diagnosis. Discussed COVID-19 related testing which was COVID test not done at hospital; reported done on 21 at this time. Test results were 21-test reportedly positive. Patient informed of results, if available? Patient stated on arrival to ED. Care Transition Nurse contacted the patient by telephone to perform post discharge assessment. Call within 2 business days of discharge: Yes Verified name and  with patient as identifiers. Provided introduction to self, and explanation of the CTN/ACM role, and reason for call due to risk factors for infection and/or exposure to COVID-19. Symptoms reviewed with patient who verbalized the following symptoms: pain or aching joints, cough, shortness of breath, no new symptoms and no worsening symptoms      Due to no new or worsening symptoms encounter was not routed to provider for escalation. Discussed follow-up appointments. If no appointment was previously scheduled, appointment scheduling offered:  She states she will call. CTN reached out to PCP as well to determine follow up needed. [address] follow up appointment(s):   Future Appointments   Date Time Provider Tima Mccormick   2021  3:15 PM Lupe Chakraborty MD [facility]     Non-[facility] follow up appointment(s): NA    Interventions to address risk factors: Education of patient/family/caregiver/guardian to support self-management-recovery with COVID, PNA, Assessment and support for treatment adherence and medication management-complete prescribed courses of dexamethasone, mucinex. use PRN to treat symptoms and communication with PCP. Advance Care Planning:   Does patient have an Advance Directive: not on file. Educated patient about risk for severe COVID-19 due to risk factors according to CDC guidelines. CTN reviewed discharge instructions, medical action plan and red flag symptoms with the patient who verbalized understanding.  Discussed COVID vaccination status: no. Education provided on COVID-19 vaccination as appropriate. Discussed exposure protocols and quarantine with CDC Guidelines. Patient was given an opportunity to verbalize any questions and concerns and agrees to contact CTN or health care provider for questions related to their healthcare. Reviewed and educated patient on any new and changed medications related to discharge diagnosis     Was patient discharged with a pulse oximeter? no   CTN provided contact information. Plan for follow-up call in 3-5 days based on severity of symptoms and risk factors. Goals        Post Hospitalization     Understands red flags post discharge. 8/20/21- spoke with Ms. Pamela Kerr. She feels poorly- coughing and ribs are sore. She was not able to pick RX at pharmacy last night- daughter is picking up today- pharmacy was trying to locate meds at other locations. She states she waited for more than 3 hours for them to fill-but had to leave- O2 tank was running low. She will let CTN know if they are not ready, etc.   She is taking OTC cough and cold med for now. Try warmer-room temperature liquids. Drink fluids to stay hydrated. Eat foods to help with recovery- protein and avoid concentrated sugar, foods to boost immune- Vit C, almonds, blueberries. She likes to eat fruit. She is able to monitor temperature- no fever. Encouraged her to take acetaminophen on schedule- 1000 mg Q8H or 1300 mg Q12H. For comfort-pain. She is using 1.5 L of O2- nasal passages are dry- staff at hospital had advised her to use vaseline- CTN encouraged her to use often. Discussion about activity- she reports DUNCAN- asked her to stand move around slowly- pump ankles, etc - Q1h while awake. She will call Dr. Jenn Mistry office to schedule VV for early next week. CTN will reach out to PCP as well.       Received call from Ms. Mercedez Lenz- she relays that the pharmacy said they do not have any of her RX sent from hospital. CTN contacted 1 [address] Raemon,  he confirmed that 3 RX were picked up last evening and one was ready to  today. While CTN was talking with pharmacist- Ms. Anamaria Castro called and said her cousin- Guanako Barrera ( [phone])  called her and said she looked in her bag and her medications were given to her by mistake- she was at [address] [place] [address] last night as well. She lives and now is back in [facility]. CTN contacted pharmacist to see what are options:  he cannot run through insurance- would not be able to get an override- CTN provided verbal RX- and he will fill using [facility] card- only true RX would be the Dexamethasone tablets. The acetaminophen and generic mucinex can be purchased OTC. The Robitussin DM was not filled until today and was through insurance. CTN called but had to leave VM-  relayed previous.    Lynette aSlmeron

## 2021-09-01 ENCOUNTER — VIRTUAL VISIT (OUTPATIENT)
Dept: INTERNAL MEDICINE CLINIC | Age: 51
End: 2021-09-01
Payer: COMMERCIAL

## 2021-09-01 DIAGNOSIS — U07.1 COVID-19: Primary | ICD-10-CM

## 2021-09-01 DIAGNOSIS — J18.9 PNEUMONIA OF BOTH LUNGS DUE TO INFECTIOUS ORGANISM, UNSPECIFIED PART OF LUNG: ICD-10-CM

## 2021-09-01 PROCEDURE — 99441 PR PHYS/QHP TELEPHONE EVALUATION 5-10 MIN: CPT | Performed by: INTERNAL MEDICINE

## 2021-09-01 NOTE — PROGRESS NOTES
Rob Laird is a 45 y.o. female evaluated via telephone on 9/1/2021. Consent:  She and/or health care decision maker is aware that she may receive a bill for this telephone service, depending on her insurance coverage, and has provided verbal consent to proceed: Yes      Documentation:  I communicated with the patient and/or health care decision maker about covid. Details of this discussion including any medical advice provided: covid      I affirm this is a Patient Initiated Episode with an Established Patient who has not had a related appointment within my department in the past 7 days or scheduled within the next 24 hours. Total Time: minutes: 5-10 minutes      Note: not billable if this call serves to triage the patient into an appointment for the relevant concern      Maribel Barajas MD    She is s/p Covid  She offers no new complaints at this time except time  She states is off oxygen at this time      Review of Systems  Constitutional: negative for fevers, chills, anorexia and weight loss  Eyes:   negative for visual disturbance and irritation  ENT:   negative for tinnitus,sore throat,nasal congestion,ear pains. hoarseness  Respiratory:  cough,dyspnea,wheezing  CV:   negative for chest pain, palpitations, lower extremity edema  GI:   negative for nausea, vomiting, diarrhea, abdominal pain,melena  Endo:               negative for polyuria,polydipsia,polyphagia,heat intolerance  Genitourinary: negative for frequency, dysuria and hematuria  Integument:  negative for rash and pruritus  Hematologic:  negative for easy bruising and gum/nose bleeding  Musculoskel: negative for myalgias, arthralgias, back pain, muscle weakness, joint pain  Neurological:  negative for headaches, dizziness, vertigo, memory problems and gait   Behavl/Psych: negative for feelings of anxiety, depression, mood changes    Past Medical History:   Diagnosis Date    Arthritis     Asthma     Seasonal     Bipolar disorder ([facility] [address]     GERD (gastroesophageal reflux disease)     IBS (irritable bowel syndrome)     Ill-defined condition     MIGRAINES    Migraine     Peptic ulcer     PTSD (post-traumatic stress disorder)     Snoring     Thyroiditis 10/7/2020    TIA (transient ischemic attack)     As of 12/13/19, pt states she never had one and that her migraine caused symptoms of TIA     Past Surgical History:   Procedure Laterality Date    HX CERVICAL FUSION  2011    C4-C7    HX COLONOSCOPY  2010    HX CYST REMOVAL      from under both arms    HX HEENT Left 2013    orbital    HX HERNIA REPAIR Left 2009    INGUINAL    HX HYSTERECTOMY  2019    HX KNEE ARTHROSCOPY Left     HX LAP CHOLECYSTECTOMY      HX MENISCUS REPAIR Left     x2    HX ORTHOPAEDIC Left     ORIF left fibula    HX ROTATOR CUFF REPAIR Right 06/08/2021    HX TUBAL LIGATION  1994    HYSTEROSCOPY DIAGNOSTIC      x2     Social History     Socioeconomic History    Marital status: SINGLE     Spouse name: Not on file    Number of children: Not on file    Years of education: Not on file    Highest education level: Not on file   Tobacco Use    Smoking status: Former Smoker     Packs/day: 0.25     Years: 20.00     Pack years: 5.00    Smokeless tobacco: Never Used   Vaping Use    Vaping Use: Every day    Substances: CBD   Substance and Sexual Activity    Alcohol use: Yes     Comment: OCCASIONALLY    Drug use: No    Sexual activity: Not Currently     Social Determinants of Health     Financial Resource Strain:     Difficulty of Paying Living Expenses:    Food Insecurity:     Worried About Running Out of Food in the Last Year:     Ran Out of Food in the Last Year:    Transportation Needs:     Lack of Transportation (Medical):      Lack of Transportation (Non-Medical):    Physical Activity:     Days of Exercise per Week:     Minutes of Exercise per Session:    Stress:     Feeling of Stress :    Social Connections:     Frequency of Communication with Friends and Family:     Frequency of Social Gatherings with Friends and Family:     Attends Yarsanism Services:     Active Member of Clubs or Organizations:     Attends Club or Organization Meetings:     Marital Status:      Family History   Problem Relation Age of Onset    Stroke Father     Hypertension Father     Diabetes Father     Diabetes Mother     Hypertension Mother     Heart Disease Maternal Grandmother     Cancer Maternal Grandmother         brain and colon    Cancer Maternal Uncle         5 w/ brain Reji Doherty    MS Other         1st cousin    Bipolar Disorder Other     Anesth Problems Neg Hx      Current Outpatient Medications   Medication Sig Dispense Refill    albuterol (PROVENTIL HFA, VENTOLIN HFA, PROAIR HFA) 90 mcg/actuation inhaler Take 2 Puffs by inhalation every six (6) hours as needed for Wheezing for up to 30 days. 1 Inhaler 0    OTHER,NON-FORMULARY, CBD oil,cream for pain      cyanocobalamin (VITAMIN B12) 100 mcg tablet Take 100 mcg by mouth daily.  therapeutic multivitamin (THERA) tablet Take 1 Tab by mouth daily.  Ferrous Sulfate (SLOW FE) 47.5 mg iron TbER tablet Take 1 Tab by mouth nightly.  fluticasone propionate (Flonase Allergy Relief) 50 mcg/actuation nasal spray 2 Sprays by Both Nostrils route daily as needed for Rhinitis. (Patient not taking: Reported on 9/1/2021) 1 Bottle 0    B.infantis-B.ani-B.long-B.bifi (Probiotic 4X) 10-15 mg TbEC Take  by mouth. (Patient not taking: Reported on 9/1/2021)      galcanezumab-[facility] Syringe) 120 mg/mL syrg 120 mg by SubCUTAneous route every thirty (30) days.  (Patient not taking: Reported on 9/1/2021) 1 Units 2     Allergies   Allergen Reactions    Latex Hives    Other Food Rash     All fruits except apple, oranges, and pineapple (recorded as Other Medication 2/8/2012)    Peanut Swelling    Shellfish Containing Products Anaphylaxis    Morphine Itching     She has had morphine but premedicates with benadryl    Nystatin Rash    Flagyl [Metronidazole] Contact Dermatitis    Flexeril [Cyclobenzaprine] Rash    Other Medication Rash     All fruits  Can eat apples,oranges, and pineapple    Phenergan [Promethazine] Other (comments)     Rapid hr    Robaxin [Methocarbamol] Rash       Objective:  Visit Vitals  LMP 12/04/2019         Results for orders placed or performed during the hospital encounter of 08/15/21   CBC WITH AUTOMATED DIFF   Result Value Ref Range    WBC 5.7 3.6 - 11.0 K/uL    RBC 3.65 (L) 3.80 - 5.20 M/uL    HGB 11.5 11.5 - 16.0 g/dL    HCT 35.6 35.0 - 47.0 %    MCV 97.5 80.0 - 99.0 FL    MCH 31.5 26.0 - 34.0 PG    MCHC 32.3 30.0 - 36.5 g/dL    RDW 13.2 11.5 - 14.5 %    PLATELET 721 211 - 035 K/uL    MPV 12.3 8.9 - 12.9 FL    NRBC 0.0 0  WBC    ABSOLUTE NRBC 0.00 0.00 - 0.01 K/uL    NEUTROPHILS 68 32 - 75 %    LYMPHOCYTES 22 12 - 49 %    MONOCYTES 9 5 - 13 %    EOSINOPHILS 0 0 - 7 %    BASOPHILS 0 0 - 1 %    IMMATURE GRANULOCYTES 1 (H) 0.0 - 0.5 %    ABS. NEUTROPHILS 3.9 1.8 - 8.0 K/UL    ABS. LYMPHOCYTES 1.2 0.8 - 3.5 K/UL    ABS. MONOCYTES 0.5 0.0 - 1.0 K/UL    ABS. EOSINOPHILS 0.0 0.0 - 0.4 K/UL    ABS. BASOPHILS 0.0 0.0 - 0.1 K/UL    ABS. IMM. GRANS. 0.0 0.00 - 0.04 K/UL    DF AUTOMATED     METABOLIC PANEL, COMPREHENSIVE   Result Value Ref Range    Sodium 141 136 - 145 mmol/L    Potassium 3.4 (L) 3.5 - 5.1 mmol/L    Chloride 108 97 - 108 mmol/L    CO2 27 21 - 32 mmol/L    Anion gap 6 5 - 15 mmol/L    Glucose 110 (H) 65 - 100 mg/dL    BUN 16 6 - 20 MG/DL    Creatinine 0.89 0.55 - 1.02 MG/DL    BUN/Creatinine ratio 18 12 - 20      GFR est AA >60 >60 ml/min/1.73m2    GFR est non-AA >60 >60 ml/min/1.73m2    Calcium 8.5 8.5 - 10.1 MG/DL    Bilirubin, total 0.7 0.2 - 1.0 MG/DL    ALT (SGPT) 33 12 - 78 U/L    AST (SGOT) 34 15 - 37 U/L    Alk.  phosphatase 76 45 - 117 U/L    Protein, total 8.1 6.4 - 8.2 g/dL    Albumin 2.9 (L) 3.5 - 5.0 g/dL    Globulin 5.2 (H) 2.0 - 4.0 g/dL    A-G Ratio 0.6 (L) 1.1 - 2.2     TROPONIN I   Result Value Ref Range    Troponin-I, Qt. <0.05 <0.05 ng/mL   SAMPLES BEING HELD   Result Value Ref Range    SAMPLES BEING HELD  BLUE, RED     COMMENT        Add-on orders for these samples will be processed based on acceptable specimen integrity and analyte stability, which may vary by analyte. CBC WITH AUTOMATED DIFF   Result Value Ref Range    WBC 5.0 3.6 - 11.0 K/uL    RBC 3.46 (L) 3.80 - 5.20 M/uL    HGB 11.0 (L) 11.5 - 16.0 g/dL    HCT 34.2 (L) 35.0 - 47.0 %    MCV 98.8 80.0 - 99.0 FL    MCH 31.8 26.0 - 34.0 PG    MCHC 32.2 30.0 - 36.5 g/dL    RDW 13.3 11.5 - 14.5 %    PLATELET 232 249 - 133 K/uL    MPV 11.7 8.9 - 12.9 FL    NRBC 0.0 0  WBC    ABSOLUTE NRBC 0.00 0.00 - 0.01 K/uL    NEUTROPHILS 75 32 - 75 %    BAND NEUTROPHILS 1 %    LYMPHOCYTES 16 12 - 49 %    MONOCYTES 7 5 - 13 %    EOSINOPHILS 0 0 - 7 %    BASOPHILS 0 0 - 1 %    METAMYELOCYTES 1 %    IMMATURE GRANULOCYTES 0 0.0 - 0.5 %    ABS. NEUTROPHILS 3.8 1.8 - 8.0 K/UL    ABS. LYMPHOCYTES 0.8 0.8 - 3.5 K/UL    ABS. MONOCYTES 0.4 0.0 - 1.0 K/UL    ABS. EOSINOPHILS 0.0 0.0 - 0.4 K/UL    ABS. BASOPHILS 0.0 0.0 - 0.1 K/UL    ABS. IMM. GRANS. 0.0 0.00 - 0.04 K/UL    DF MANUAL      RBC COMMENTS MACROCYTOSIS  1+       METABOLIC PANEL, COMPREHENSIVE   Result Value Ref Range    Sodium 140 136 - 145 mmol/L    Potassium 3.7 3.5 - 5.1 mmol/L    Chloride 109 (H) 97 - 108 mmol/L    CO2 28 21 - 32 mmol/L    Anion gap 3 (L) 5 - 15 mmol/L    Glucose 135 (H) 65 - 100 mg/dL    BUN 15 6 - 20 MG/DL    Creatinine 0.94 0.55 - 1.02 MG/DL    BUN/Creatinine ratio 16 12 - 20      GFR est AA >60 >60 ml/min/1.73m2    GFR est non-AA >60 >60 ml/min/1.73m2    Calcium 8.3 (L) 8.5 - 10.1 MG/DL    Bilirubin, total 0.4 0.2 - 1.0 MG/DL    ALT (SGPT) 28 12 - 78 U/L    AST (SGOT) 27 15 - 37 U/L    Alk.  phosphatase 80 45 - 117 U/L    Protein, total 7.7 6.4 - 8.2 g/dL    Albumin 2.7 (L) 3.5 - 5.0 g/dL    Globulin 5.0 (H) 2.0 - 4.0 g/dL    A-G Ratio 0.5 (L) 1.1 - 2.2     C REACTIVE PROTEIN, QT   Result Value Ref Range    C-Reactive protein 7.80 (H) 0.00 - 0.60 mg/dL   D DIMER   Result Value Ref Range    D-dimer 0.46 0.00 - 0.65 mg/L FEU   PROCALCITONIN   Result Value Ref Range    Procalcitonin <0.05 ng/mL   D DIMER   Result Value Ref Range    D-dimer 0.36 0.00 - 0.65 mg/L FEU   METABOLIC PANEL, COMPREHENSIVE   Result Value Ref Range    Sodium 140 136 - 145 mmol/L    Potassium 3.8 3.5 - 5.1 mmol/L    Chloride 108 97 - 108 mmol/L    CO2 26 21 - 32 mmol/L    Anion gap 6 5 - 15 mmol/L    Glucose 121 (H) 65 - 100 mg/dL    BUN 19 6 - 20 MG/DL    Creatinine 0.71 0.55 - 1.02 MG/DL    BUN/Creatinine ratio 27 (H) 12 - 20      GFR est AA >60 >60 ml/min/1.73m2    GFR est non-AA >60 >60 ml/min/1.73m2    Calcium 8.2 (L) 8.5 - 10.1 MG/DL    Bilirubin, total 0.4 0.2 - 1.0 MG/DL    ALT (SGPT) 45 12 - 78 U/L    AST (SGOT) 47 (H) 15 - 37 U/L    Alk. phosphatase 70 45 - 117 U/L    Protein, total 7.2 6.4 - 8.2 g/dL    Albumin 2.5 (L) 3.5 - 5.0 g/dL    Globulin 4.7 (H) 2.0 - 4.0 g/dL    A-G Ratio 0.5 (L) 1.1 - 2.2     FERRITIN   Result Value Ref Range    Ferritin 485 (H) 8 - 252 NG/ML   CBC WITH AUTOMATED DIFF   Result Value Ref Range    WBC 4.3 3.6 - 11.0 K/uL    RBC 3.41 (L) 3.80 - 5.20 M/uL    HGB 10.6 (L) 11.5 - 16.0 g/dL    HCT 32.6 (L) 35.0 - 47.0 %    MCV 95.6 80.0 - 99.0 FL    MCH 31.1 26.0 - 34.0 PG    MCHC 32.5 30.0 - 36.5 g/dL    RDW 13.0 11.5 - 14.5 %    PLATELET 743 902 - 710 K/uL    MPV 11.8 8.9 - 12.9 FL    NRBC 0.0 0  WBC    ABSOLUTE NRBC 0.00 0.00 - 0.01 K/uL    NEUTROPHILS 55 32 - 75 %    LYMPHOCYTES 33 12 - 49 %    MONOCYTES 11 5 - 13 %    EOSINOPHILS 0 0 - 7 %    BASOPHILS 0 0 - 1 %    MYELOCYTES 1 %    IMMATURE GRANULOCYTES 0 0.0 - 0.5 %    ABS. NEUTROPHILS 2.4 1.8 - 8.0 K/UL    ABS. LYMPHOCYTES 1.4 0.8 - 3.5 K/UL    ABS. MONOCYTES 0.5 0.0 - 1.0 K/UL    ABS. EOSINOPHILS 0.0 0.0 - 0.4 K/UL    ABS. BASOPHILS 0.0 0.0 - 0.1 K/UL    ABS. IMM.  GRANS. 0.0 0.00 - 0.04 K/UL    DF MANUAL      PLATELET COMMENTS Large Platelets      RBC COMMENTS MACROCYTOSIS  1+       C REACTIVE PROTEIN, QT   Result Value Ref Range    C-Reactive protein 2.04 (H) 0.00 - 0.60 mg/dL   EKG, 12 LEAD, INITIAL   Result Value Ref Range    Ventricular Rate 79 BPM    Atrial Rate 79 BPM    P-R Interval 138 ms    QRS Duration 66 ms    Q-T Interval 370 ms    QTC Calculation (Bezet) 424 ms    Calculated P Axis 35 degrees    Calculated R Axis 22 degrees    Calculated T Axis -30 degrees    Diagnosis       Normal sinus rhythm  Nonspecific T wave abnormality  When compared with ECG of 10-JUL-2021 18:55,  Inverted T waves have replaced nonspecific T wave abnormality in Inferior   leads  Confirmed by [email or web address] Query ([id number]) on 8/15/2021 1:17:50 PM         Assessment/Plan:    ICD-10-CM ICD-9-CM    1. COVID-19  U07.1 079.89    2. Pneumonia of both lungs due to infectious organism, unspecified part of lung  J18.9 483.8      No orders of the defined types were placed in this encounter. call if any problems,  There are no Patient Instructions on file for this visit. Follow-up and Dispositions    · Return in about 2 weeks (around 9/15/2021), or if symptoms worsen or fail to improve. I have reviewed with the patient details of the assessment and plan and all questions were answered. Relevent patient education was performed. The most recent lab findings were reviewed with the patient. An After Visit Summary was printed and given to the patient.

## 2021-09-01 NOTE — PROGRESS NOTES
Chief Complaint   Patient presents with    Post-COVID Symptoms    Transitions Of Care       3 most recent PHQ Screens 9/1/2021   PHQ Not Done -   Little interest or pleasure in doing things Not at all   Feeling down, depressed, irritable, or hopeless Not at all   Total Score PHQ 2 0   Trouble falling or staying asleep, or sleeping too much -   Feeling tired or having little energy -   Poor appetite, weight loss, or overeating -   Feeling bad about yourself - or that you are a failure or have let yourself or your family down -   Trouble concentrating on things such as school, work, reading, or watching TV -   Moving or speaking so slowly that other people could have noticed; or the opposite being so fidgety that others notice -   Thoughts of being better off dead, or hurting yourself in some way -   PHQ 9 Score -   How difficult have these problems made it for you to do your work, take care of your home and get along with others -     1. Have you been to the ER, urgent care clinic since your last visit? Hospitalized since your last visit? yes    2. Have you seen or consulted any other health care providers outside of the [address] [place] [address] since your last visit? Include any pap smears or colon screening.  no

## 2021-11-23 ENCOUNTER — NURSE TRIAGE (OUTPATIENT)
Dept: OTHER | Facility: CLINIC | Age: 51
End: 2021-11-23

## 2021-11-23 NOTE — TELEPHONE ENCOUNTER
Darin , VI     Reason for Disposition   SEVERE back pain (e.g., excruciating, unable to do any normal activities) and not improved after pain medicine and CARE ADVICE    Answer Assessment - Initial Assessment Questions  1. ONSET: \"When did the pain begin? \"       November 7; injury at work and have been treating her. Now requesting to be seen at her PCP. She was at work in [employer]; she worked up something heavy and felt a pop in her back. 2. LOCATION: \"Where does it hurt? \" (upper, mid or lower back)      Lower back    3. SEVERITY: \"How bad is the pain? \"  (e.g., Scale 1-10; mild, moderate, or severe)    - MILD (1-3): doesn't interfere with normal activities     - MODERATE (4-7): interferes with normal activities or awakens from sleep     - SEVERE (8-10): excruciating pain, unable to do any normal activities      10/10- takes advil     4. PATTERN: \"Is the pain constant? \" (e.g., yes, no; constant, intermittent)       Constant     5. RADIATION: \"Does the pain shoot into your legs or elsewhere? \"    In right hip, two left toes went numb. 6. CAUSE:  \"What do you think is causing the back pain? \"       Injury from November. 7. BACK OVERUSE:  Keya Alejandro recent lifting of heavy objects, strenuous work or exercise? \"      Sits/drives a bus for a living. 8. MEDICATIONS: \"What have you taken so far for the pain? \" (e.g., nothing, acetaminophen, NSAIDS)      Advil    9. NEUROLOGIC SYMPTOMS: \"Do you have any weakness, numbness, or problems with bowel/bladder control? \"      Blood in stool one week ago;     10. OTHER SYMPTOMS: \"Do you have any other symptoms? \" (e.g., fever, abdominal pain, burning with urination, blood in urine)        Blood in stool 1 week ago. Constipation at times. 11. PREGNANCY: \"Is there any chance you are pregnant? \" (e.g., yes, no; LMP)        N/a    Protocols used: BACK PAIN-ADULT-[place]    Received call from [facility] with Red Flag Complaint.     Brief description of triage: Seen 11/8 for back pain at work; needing to be seen by PCP. Pain still severe. Triage indicates for patient to have virtual appointment today. She lives 2 hours away. Transfer to office if unable to get in, patient requesting PCP visit since [facility]. Need to re-establish with PCP in [place] due to recent move. Care advice provided, patient verbalizes understanding; denies any other questions or concerns; instructed to call back for any new or worsening symptoms. Writer provided warm transfer to Chaya Kerr at [facility] for appointment scheduling. Attention Provider: Thank you for allowing me to participate in the care of your patient. The patient was connected to triage in response to information provided to the ECC. Please do not respond through this encounter as the response is not directed to a shared pool.

## 2021-11-24 ENCOUNTER — VIRTUAL VISIT (OUTPATIENT)
Dept: INTERNAL MEDICINE CLINIC | Age: 51
End: 2021-11-24
Payer: COMMERCIAL

## 2021-11-24 DIAGNOSIS — M54.16 LUMBAR RADICULOPATHY: Primary | ICD-10-CM

## 2021-11-24 PROCEDURE — 99212 OFFICE O/P EST SF 10 MIN: CPT | Performed by: INTERNAL MEDICINE

## 2021-11-24 RX ORDER — LIDOCAINE 50 MG/G
1 PATCH TOPICAL EVERY 24 HOURS
Qty: 30 EACH | Refills: 3 | Status: SHIPPED | OUTPATIENT
Start: 2021-11-24 | End: 2022-03-30

## 2021-11-24 RX ORDER — PREDNISONE 10 MG/1
TABLET ORAL
Qty: 21 TABLET | Refills: 0 | Status: SHIPPED | OUTPATIENT
Start: 2021-11-24 | End: 2021-12-02 | Stop reason: SDUPTHER

## 2021-11-24 NOTE — PROGRESS NOTES
David Casiano is a 45 y.o. female  HIPAA verified by two patient identifiers. Health Maintenance Due   Topic    Hepatitis C Screening     COVID-19 Vaccine (1)    DTaP/Tdap/Td series (1 - Tdap)    Shingrix Vaccine Age 49> (1 of 2)    Breast Cancer Screen Mammogram     Colorectal Cancer Screening Combo     Flu Vaccine (1)     Chief Complaint   Patient presents with    Back Pain     Patient-Reported Vitals 11/24/2021   Patient-Reported Temperature 270lb       Pain Scale: 10  /10  Pain Location:             1. Have you been to the ER, urgent care clinic since your last visit? Hospitalized since your last visit? No    2. Have you seen or consulted any other health care providers outside of the [address] [place] [address] since your last visit? Include any pap smears or colon screening.  No

## 2021-11-24 NOTE — PROGRESS NOTES
Holly Gerard is a 45 y.o. female evaluated via telephone on 11/24/2021. Consent:  She and/or health care decision maker is aware that she may receive a bill for this telephone service, depending on her insurance coverage, and has provided verbal consent to proceed: Yes      Documentation:  I communicated with the patient and/or health care decision maker about back pain. Details of this discussion including any medical advice provided: back pain      I affirm this is a Patient Initiated Episode with an Established Patient who has not had a related appointment within my department in the past 7 days or scheduled within the next 24 hours. Total Time: minutes: 5-10 minutes      Note: not billable if this call serves to triage the patient into an appointment for the relevant concern      Alexander Cole MD      Back Pain Review:  Patient presents for evaluation of low back problems. Symptoms have been present for  weeks and include pain in lower back (dull, moderate in character; 10/10 in severity). Initial inciting event: unknown. Symptoms are worst: at times. Alleviating factors identifiable by patient are lying flat, medication . Exacerbating factors identifiable by patient are bending forwards, bending backwards. Treatments so far initiated by patient: medication Previous lower back problems: reported. Previous workup: none. she has numbness on the rt.toes      Review of Systems  Constitutional: negative for fevers, chills, anorexia and weight loss  Eyes:   negative for visual disturbance and irritation  ENT:   negative for tinnitus,sore throat,nasal congestion,ear pains. hoarseness  Respiratory:  negative for cough, hemoptysis, dyspnea,wheezing  CV:   negative for chest pain, palpitations, lower extremity edema  GI:   negative for nausea, vomiting, diarrhea, abdominal pain,melena  Endo:               negative for polyuria,polydipsia,polyphagia,heat intolerance  Genitourinary: negative for frequency, dysuria and hematuria  Integument:  negative for rash and pruritus  Hematologic:  negative for easy bruising and gum/nose bleeding  Musculoskel: myalgias, arthralgias, back pain,  joint pain  Neurological:  negative for headaches, dizziness, vertigo, memory problems and gait   Behavl/Psych: negative for feelings of anxiety, depression, mood changes    Past Medical History:   Diagnosis Date    Arthritis     Asthma     Seasonal     Bipolar disorder ([address]     GERD (gastroesophageal reflux disease)     IBS (irritable bowel syndrome)     Ill-defined condition     MIGRAINES    Migraine     Peptic ulcer     PTSD (post-traumatic stress disorder)     Snoring     Thyroiditis 10/7/2020    TIA (transient ischemic attack)     As of 12/13/19, pt states she never had one and that her migraine caused symptoms of TIA     Past Surgical History:   Procedure Laterality Date    HX CERVICAL FUSION  2011    C4-C7    HX COLONOSCOPY  2010    HX CYST REMOVAL      from under both arms    HX HEENT Left 2013    orbital    HX HERNIA REPAIR Left 2009    INGUINAL    HX HYSTERECTOMY  2019    HX KNEE ARTHROSCOPY Left     HX LAP CHOLECYSTECTOMY      HX MENISCUS REPAIR Left     x2    HX ORTHOPAEDIC Left     ORIF left fibula    HX ROTATOR CUFF REPAIR Right 06/08/2021    HX TUBAL LIGATION  1994    HYSTEROSCOPY DIAGNOSTIC      x2     Social History     Socioeconomic History    Marital status: SINGLE   Tobacco Use    Smoking status: Former Smoker     Packs/day: 0.25     Years: 20.00     Pack years: 5.00    Smokeless tobacco: Never Used   Vaping Use    Vaping Use: Every day    Substances: CBD   Substance and Sexual Activity    Alcohol use: Yes     Comment: OCCASIONALLY    Drug use: No    Sexual activity: Not Currently     Family History   Problem Relation Age of Onset    Stroke Father     Hypertension Father     Diabetes Father     Diabetes Mother     Hypertension Mother     Heart Disease Maternal Grandmother     Cancer Maternal Grandmother         brain and colon    Cancer Maternal Uncle         5 w/ brain /colon    Mult Sclerosis Other         1st cousin    Bipolar Disorder Other     Anesth Problems Neg Hx      Current Outpatient Medications   Medication Sig Dispense Refill    fluticasone propionate (Flonase Allergy Relief) 50 mcg/actuation nasal spray 2 Sprays by Both Nostrils route daily as needed for Rhinitis. 1 Bottle 0    OTHER,NON-FORMULARY, CBD oil,cream for pain      B.infantis-B.ani-B.long-B.bifi (Probiotic 4X) 10-15 mg TbEC Take  by mouth.  galcanezumab-gnlm (Emgality Syringe) 120 mg/mL syrg 120 mg by SubCUTAneous route every thirty (30) days. 1 Units 2    cyanocobalamin (VITAMIN B12) 100 mcg tablet Take 100 mcg by mouth daily.  therapeutic multivitamin (THERA) tablet Take 1 Tab by mouth daily.  Ferrous Sulfate (SLOW FE) 47.5 mg iron TbER tablet Take 1 Tab by mouth nightly.        Allergies   Allergen Reactions    Latex Hives    Other Food Rash     All fruits except apple, oranges, and pineapple (recorded as Other Medication 2/8/2012)    Peanut Swelling    Shellfish Containing Products Anaphylaxis    Morphine Itching     She has had morphine but premedicates with benadryl    Nystatin Rash    Flagyl [Metronidazole] Contact Dermatitis    Flexeril [Cyclobenzaprine] Rash    Other Medication Rash     All fruits  Can eat apples,oranges, and pineapple    Phenergan [Promethazine] Other (comments)     Rapid hr    Robaxin [Methocarbamol] Rash       Objective:  Visit Vitals  [facility] 12/04/2019         Results for orders placed or performed during the hospital encounter of 08/15/21   CBC WITH AUTOMATED DIFF   Result Value Ref Range    WBC 5.7 3.6 - 11.0 K/uL    RBC 3.65 (L) 3.80 - 5.20 M/uL    HGB 11.5 11.5 - 16.0 g/dL    HCT 35.6 35.0 - 47.0 %    MCV 97.5 80.0 - 99.0 FL    MCH 31.5 26.0 - 34.0 PG    MCHC 32.3 30.0 - 36.5 g/dL    RDW 13.2 11.5 - 14.5 %    PLATELET 409 213 - 670 K/uL    MPV 12.3 8.9 - 12.9 FL    NRBC 0.0 0  WBC    ABSOLUTE NRBC 0.00 0.00 - 0.01 K/uL    NEUTROPHILS 68 32 - 75 %    LYMPHOCYTES 22 12 - 49 %    MONOCYTES 9 5 - 13 %    EOSINOPHILS 0 0 - 7 %    BASOPHILS 0 0 - 1 %    IMMATURE GRANULOCYTES 1 (H) 0.0 - 0.5 %    ABS. NEUTROPHILS 3.9 1.8 - 8.0 K/UL    ABS. LYMPHOCYTES 1.2 0.8 - 3.5 K/UL    ABS. MONOCYTES 0.5 0.0 - 1.0 K/UL    ABS. EOSINOPHILS 0.0 0.0 - 0.4 K/UL    ABS. BASOPHILS 0.0 0.0 - 0.1 K/UL    ABS. IMM. GRANS. 0.0 0.00 - 0.04 K/UL    DF AUTOMATED     METABOLIC PANEL, COMPREHENSIVE   Result Value Ref Range    Sodium 141 136 - 145 mmol/L    Potassium 3.4 (L) 3.5 - 5.1 mmol/L    Chloride 108 97 - 108 mmol/L    CO2 27 21 - 32 mmol/L    Anion gap 6 5 - 15 mmol/L    Glucose 110 (H) 65 - 100 mg/dL    BUN 16 6 - 20 MG/DL    Creatinine 0.89 0.55 - 1.02 MG/DL    BUN/Creatinine ratio 18 12 - 20      GFR est AA >60 >60 ml/min/1.73m2    GFR est non-AA >60 >60 ml/min/1.73m2    Calcium 8.5 8.5 - 10.1 MG/DL    Bilirubin, total 0.7 0.2 - 1.0 MG/DL    ALT (SGPT) 33 12 - 78 U/L    AST (SGOT) 34 15 - 37 U/L    Alk. phosphatase 76 45 - 117 U/L    Protein, total 8.1 6.4 - 8.2 g/dL    Albumin 2.9 (L) 3.5 - 5.0 g/dL    Globulin 5.2 (H) 2.0 - 4.0 g/dL    A-G Ratio 0.6 (L) 1.1 - 2.2     TROPONIN I   Result Value Ref Range    Troponin-I, Qt. <0.05 <0.05 ng/mL   SAMPLES BEING HELD   Result Value Ref Range    SAMPLES BEING HELD  BLUE, RED     COMMENT        Add-on orders for these samples will be processed based on acceptable specimen integrity and analyte stability, which may vary by analyte.    CBC WITH AUTOMATED DIFF   Result Value Ref Range    WBC 5.0 3.6 - 11.0 K/uL    RBC 3.46 (L) 3.80 - 5.20 M/uL    HGB 11.0 (L) 11.5 - 16.0 g/dL    HCT 34.2 (L) 35.0 - 47.0 %    MCV 98.8 80.0 - 99.0 FL    MCH 31.8 26.0 - 34.0 PG    MCHC 32.2 30.0 - 36.5 g/dL    RDW 13.3 11.5 - 14.5 %    PLATELET 868 613 - 409 K/uL    MPV 11.7 8.9 - 12.9 FL    NRBC 0.0 0  WBC    ABSOLUTE NRBC 0.00 0.00 - 0.01 K/uL    NEUTROPHILS 75 32 - 75 %    BAND NEUTROPHILS 1 %    LYMPHOCYTES 16 12 - 49 %    MONOCYTES 7 5 - 13 %    EOSINOPHILS 0 0 - 7 %    BASOPHILS 0 0 - 1 %    METAMYELOCYTES 1 %    IMMATURE GRANULOCYTES 0 0.0 - 0.5 %    ABS. NEUTROPHILS 3.8 1.8 - 8.0 K/UL    ABS. LYMPHOCYTES 0.8 0.8 - 3.5 K/UL    ABS. MONOCYTES 0.4 0.0 - 1.0 K/UL    ABS. EOSINOPHILS 0.0 0.0 - 0.4 K/UL    ABS. BASOPHILS 0.0 0.0 - 0.1 K/UL    ABS. IMM. GRANS. 0.0 0.00 - 0.04 K/UL    DF MANUAL      RBC COMMENTS MACROCYTOSIS  1+       METABOLIC PANEL, COMPREHENSIVE   Result Value Ref Range    Sodium 140 136 - 145 mmol/L    Potassium 3.7 3.5 - 5.1 mmol/L    Chloride 109 (H) 97 - 108 mmol/L    CO2 28 21 - 32 mmol/L    Anion gap 3 (L) 5 - 15 mmol/L    Glucose 135 (H) 65 - 100 mg/dL    BUN 15 6 - 20 MG/DL    Creatinine 0.94 0.55 - 1.02 MG/DL    BUN/Creatinine ratio 16 12 - 20      GFR est AA >60 >60 ml/min/1.73m2    GFR est non-AA >60 >60 ml/min/1.73m2    Calcium 8.3 (L) 8.5 - 10.1 MG/DL    Bilirubin, total 0.4 0.2 - 1.0 MG/DL    ALT (SGPT) 28 12 - 78 U/L    AST (SGOT) 27 15 - 37 U/L    Alk.  phosphatase 80 45 - 117 U/L    Protein, total 7.7 6.4 - 8.2 g/dL    Albumin 2.7 (L) 3.5 - 5.0 g/dL    Globulin 5.0 (H) 2.0 - 4.0 g/dL    A-G Ratio 0.5 (L) 1.1 - 2.2     C REACTIVE PROTEIN, QT   Result Value Ref Range    C-Reactive protein 7.80 (H) 0.00 - 0.60 mg/dL   D DIMER   Result Value Ref Range    D-dimer 0.46 0.00 - 0.65 mg/L FEU   PROCALCITONIN   Result Value Ref Range    Procalcitonin <0.05 ng/mL   D DIMER   Result Value Ref Range    D-dimer 0.36 0.00 - 0.65 mg/L FEU   METABOLIC PANEL, COMPREHENSIVE   Result Value Ref Range    Sodium 140 136 - 145 mmol/L    Potassium 3.8 3.5 - 5.1 mmol/L    Chloride 108 97 - 108 mmol/L    CO2 26 21 - 32 mmol/L    Anion gap 6 5 - 15 mmol/L    Glucose 121 (H) 65 - 100 mg/dL    BUN 19 6 - 20 MG/DL    Creatinine 0.71 0.55 - 1.02 MG/DL    BUN/Creatinine ratio 27 (H) 12 - 20      GFR est AA >60 >60 ml/min/1.73m2    GFR est non-AA >60 >60 ml/min/1.73m2    Calcium 8.2 (L) 8.5 - 10.1 MG/DL    Bilirubin, total 0.4 0.2 - 1.0 MG/DL    ALT (SGPT) 45 12 - 78 U/L    AST (SGOT) 47 (H) 15 - 37 U/L    Alk. phosphatase 70 45 - 117 U/L    Protein, total 7.2 6.4 - 8.2 g/dL    Albumin 2.5 (L) 3.5 - 5.0 g/dL    Globulin 4.7 (H) 2.0 - 4.0 g/dL    A-G Ratio 0.5 (L) 1.1 - 2.2     FERRITIN   Result Value Ref Range    Ferritin 485 (H) 8 - 252 NG/ML   CBC WITH AUTOMATED DIFF   Result Value Ref Range    WBC 4.3 3.6 - 11.0 K/uL    RBC 3.41 (L) 3.80 - 5.20 M/uL    HGB 10.6 (L) 11.5 - 16.0 g/dL    HCT 32.6 (L) 35.0 - 47.0 %    MCV 95.6 80.0 - 99.0 FL    MCH 31.1 26.0 - 34.0 PG    MCHC 32.5 30.0 - 36.5 g/dL    RDW 13.0 11.5 - 14.5 %    PLATELET 182 931 - 279 K/uL    MPV 11.8 8.9 - 12.9 FL    NRBC 0.0 0  WBC    ABSOLUTE NRBC 0.00 0.00 - 0.01 K/uL    NEUTROPHILS 55 32 - 75 %    LYMPHOCYTES 33 12 - 49 %    MONOCYTES 11 5 - 13 %    EOSINOPHILS 0 0 - 7 %    BASOPHILS 0 0 - 1 %    MYELOCYTES 1 %    IMMATURE GRANULOCYTES 0 0.0 - 0.5 %    ABS. NEUTROPHILS 2.4 1.8 - 8.0 K/UL    ABS. LYMPHOCYTES 1.4 0.8 - 3.5 K/UL    ABS. MONOCYTES 0.5 0.0 - 1.0 K/UL    ABS. EOSINOPHILS 0.0 0.0 - 0.4 K/UL    ABS. BASOPHILS 0.0 0.0 - 0.1 K/UL    ABS. IMM.  GRANS. 0.0 0.00 - 0.04 K/UL    DF MANUAL      PLATELET COMMENTS Large Platelets      RBC COMMENTS MACROCYTOSIS  1+       C REACTIVE PROTEIN, QT   Result Value Ref Range    C-Reactive protein 2.04 (H) 0.00 - 0.60 mg/dL   EKG, 12 LEAD, INITIAL   Result Value Ref Range    Ventricular Rate 79 BPM    Atrial Rate 79 BPM    P-R Interval 138 ms    QRS Duration 66 ms    Q-T Interval 370 ms    QTC Calculation (Bezet) 424 ms    Calculated P Axis 35 degrees    Calculated R Axis 22 degrees    Calculated T Axis -30 degrees    Diagnosis       Normal sinus rhythm  Nonspecific T wave abnormality  When compared with ECG of 10-JUL-2021 18:55,  Inverted T waves have replaced nonspecific T wave abnormality in Inferior   leads  Confirmed by Kaylin Nascimento, Niels Garcia ([id number]) on 8/15/2021 1:17:50 PM         Assessment/Plan:  No diagnosis found. No orders of the defined types were placed in this encounter. call if any problems,  There are no Patient Instructions on file for this visit. I have reviewed with the patient details of the assessment and plan and all questions were answered. Relevent patient education was performed. The most recent lab findings were reviewed with the patient. An After Visit Summary was printed and given to the patient.

## 2021-11-27 ENCOUNTER — HOSPITAL ENCOUNTER (OUTPATIENT)
Dept: GENERAL RADIOLOGY | Age: 51
Discharge: HOME | End: 2021-11-27
Payer: COMMERCIAL

## 2021-11-27 DIAGNOSIS — M54.16 LUMBAR RADICULOPATHY: ICD-10-CM

## 2021-11-27 PROCEDURE — 72110 X-RAY EXAM L-2 SPINE 4/>VWS: CPT

## 2021-12-02 ENCOUNTER — TELEPHONE (OUTPATIENT)
Dept: INTERNAL MEDICINE CLINIC | Age: 51
End: 2021-12-02

## 2021-12-03 RX ORDER — PREDNISONE 10 MG/1
TABLET ORAL
Qty: 21 TABLET | Refills: 0 | Status: SHIPPED | OUTPATIENT
Start: 2021-12-03 | End: 2021-12-09 | Stop reason: SDUPTHER

## 2021-12-09 RX ORDER — PREDNISONE 10 MG/1
TABLET ORAL
Qty: 21 TABLET | Refills: 0 | Status: SHIPPED | OUTPATIENT
Start: 2021-12-09 | End: 2022-03-30

## 2021-12-20 RX ORDER — TOPIRAMATE 100 MG/1
100 TABLET, FILM COATED ORAL 2 TIMES DAILY
Qty: 30 TABLET | Refills: 2 | Status: CANCELLED | OUTPATIENT
Start: 2021-12-20

## 2021-12-20 NOTE — TELEPHONE ENCOUNTER
Pt called for refill of:    Requested Prescriptions     Pending Prescriptions Disp Refills    topiramate (TOPAMAX) 100 mg tablet 30 Tablet 2     Sig: Take 1 Tablet by mouth two (2) times a day. Please send to [facility] on [address]    Pt said if you have any questions and don't get an answer on the phone, please leave a detailed message and she will return the call.

## 2021-12-21 NOTE — TELEPHONE ENCOUNTER
I looked in to the chart and the patient was last seen 9/25/2020 and per her last office with Dr. Leoncio Go was 11/24/2021 and the Topamax medication was not in her medication list.   She will be notified to make an appointment and have the PCP fill in the meantime if warranted.

## 2021-12-21 NOTE — TELEPHONE ENCOUNTER
Pt called to check on status of refill request. States her migraines are getting bad again, winter is the worst for her, and she can no longer control them with OTC products.

## 2021-12-22 ENCOUNTER — TELEPHONE (OUTPATIENT)
Dept: NEUROLOGY | Age: 51
End: 2021-12-22

## 2021-12-22 ENCOUNTER — VIRTUAL VISIT (OUTPATIENT)
Dept: NEUROLOGY | Age: 51
End: 2021-12-22
Payer: COMMERCIAL

## 2021-12-22 DIAGNOSIS — G44.009 MIGRAINE-CLUSTER HEADACHE SYNDROME: ICD-10-CM

## 2021-12-22 DIAGNOSIS — G43.719 INTRACTABLE CHRONIC MIGRAINE WITHOUT AURA AND WITHOUT STATUS MIGRAINOSUS: ICD-10-CM

## 2021-12-22 DIAGNOSIS — G43.119 INTRACTABLE MIGRAINE WITH AURA WITHOUT STATUS MIGRAINOSUS: Primary | ICD-10-CM

## 2021-12-22 DIAGNOSIS — G43.109 COMPLICATED MIGRAINE: ICD-10-CM

## 2021-12-22 PROCEDURE — 99214 OFFICE O/P EST MOD 30 MIN: CPT | Performed by: PSYCHIATRY & NEUROLOGY

## 2021-12-22 RX ORDER — DICLOFENAC SODIUM 100 MG/1
100 TABLET, FILM COATED, EXTENDED RELEASE ORAL DAILY
Qty: 30 TABLET | Refills: 1 | Status: SHIPPED | OUTPATIENT
Start: 2021-12-22 | End: 2022-03-30

## 2021-12-22 RX ORDER — TOPIRAMATE 100 MG/1
100 TABLET, FILM COATED ORAL
Qty: 90 TABLET | Refills: 3 | Status: SHIPPED | OUTPATIENT
Start: 2021-12-22 | End: 2022-03-30

## 2021-12-22 NOTE — TELEPHONE ENCOUNTER
Pt called upset that it is now one hour past her appt time and she still has not received the link for her VV w/ Dr. Amadeo Ha. Please call [phone]

## 2021-12-30 NOTE — PROGRESS NOTES
Neurology Progress Note  Paulo Garcia was seen by synchronous (real-time) audio-video technology on 21     Consent:  She and/or her healthcare decision maker is aware that this patient-initiated Telehealth encounter is a billable service, with coverage as determined by her insurance carrier. She is aware that she may receive a bill and has provided verbal consent to proceed: Yes    I was in the office while conducting this encounter. Pursuant to the emergency declaration under the [address] [place] [address] waiver authority and the [employer] Act, this Virtual  Visit was conducted, with patient's consent, to reduce the patient's risk of exposure to COVID-19 and provide continuity of care for an established patient. Services were provided through a video synchronous discussion virtually to substitute for in-person clinic visit. NAME:  Paulo Garcia   :   1970   MRN:   [id number]     Date/Time:  2021  Subjective:   Paulo Garcia is a 45 y.o. female here today for follow-up for headaches, numbness and tingling sensation, head pain and blurry vision. Patient says she was hospitalized in August 15, 2021 for Covid, discharged on 2021, since then, patient says her headache has increased in frequency and intensity. Headache  is throbbing in nature, generalized, associated with dizziness, blurry vision, occasional double vision, nausea, photophobia, phonophobia. She says medication has not helped as headache is nearly daily  She says she has been using Tylenol and other analgesic to help for the headache without any improvement, he noted that because she has used too much of the medications she is having GI discomfort. .  She has been using her Topamax for prevention but headache did not reduce the intensity.   She says she also having the neck pain with tingling sensation going down to the right, she feels as if her arm is weak and numb. Pain from the neck is sharp, persistent. Movement of the neck makes the pain worse. I will continue patient on the Topamax 100 mg p.o. twice daily  Diclofenac  mg p.o. daily  Nurtec ODT 75 mg p.o as needed for severe headache, I will also use this as a prophylaxis as this patient has failed different prophylaxis like Depakote, amitriptyline, verapamil, currently on Topamax. She is allergic to triptans. .  Review of Systems - General ROS: positive for  - fatigue and sleep disturbance  Psychological ROS: positive for - anxiety, concentration difficulties, depression, mood swings and sleep disturbances  Ophthalmic ROS: positive for - blurry vision, decreased vision, double vision and photophobia  ENT ROS: positive for - headaches, tinnitus, vertigo and visual changes  Allergy and Immunology ROS: negative  Hematological and Lymphatic ROS: negative  Endocrine ROS: negative  Respiratory ROS: no cough, shortness of breath, or wheezing  Cardiovascular ROS: no chest pain or dyspnea on exertion  Gastrointestinal ROS: no abdominal pain, change in bowel habits, or black or bloody stools  Genito-Urinary ROS: no dysuria, trouble voiding, or hematuria  Musculoskeletal ROS: positive for - joint pain, muscle pain and muscular weakness  Neurological ROS: positive for - dizziness, headaches, numbness/tingling and visual changes  Dermatological ROS: negative      Medications reviewed:  Current Outpatient Medications   Medication Sig Dispense Refill    topiramate (Topamax) 100 mg tablet Take 1 Tablet by mouth nightly. 90 Tablet 3    diclofenac (VOLTAREN XR) 100 mg ER tablet Take 1 Tablet by mouth daily. 30 Tablet 1    predniSONE (DELTASONE) 10 mg tablet 6 tabs today and reduce by 1 tab daily 21 Tablet 0    lidocaine (LIDODERM) 5 % 1 Patch by TransDERmal route every twenty-four (24) hours.  Apply to affected area every 24 hours 30 Each 3    fluticasone propionate (Flonase Allergy Relief) 50 mcg/actuation nasal spray 2 Sprays by Both Nostrils route daily as needed for Rhinitis. 1 Bottle 0    OTHER,NON-FORMULARY, CBD oil,cream for pain      B.infantis-B.ani-B.long-B.bifi (Probiotic 4X) 10-15 mg TbEC Take  by mouth.  galcanezumab-gnlm (Emgality Syringe) 120 mg/mL syrg 120 mg by SubCUTAneous route every thirty (30) days. 1 Units 2    cyanocobalamin (VITAMIN B12) 100 mcg tablet Take 100 mcg by mouth daily.  therapeutic multivitamin (THERA) tablet Take 1 Tab by mouth daily.  Ferrous Sulfate (SLOW FE) 47.5 mg iron TbER tablet Take 1 Tab by mouth nightly. Objective:   Vitals: There were no vitals filed for this visit. Lab Data Reviewed:  Lab Results   Component Value Date/Time    WBC 4.3 08/18/2021 04:12 AM    HCT 32.6 (L) 08/18/2021 04:12 AM    HGB 10.6 (L) 08/18/2021 04:12 AM    PLATELET 300 11/63/9880 04:12 AM       Lab Results   Component Value Date/Time    Sodium 140 08/18/2021 04:12 AM    Potassium 3.8 08/18/2021 04:12 AM    Chloride 108 08/18/2021 04:12 AM    CO2 26 08/18/2021 04:12 AM    Glucose 121 (H) 08/18/2021 04:12 AM    BUN 19 08/18/2021 04:12 AM    Creatinine 0.71 08/18/2021 04:12 AM    Calcium 8.2 (L) 08/18/2021 04:12 AM       No components found for: TROPQUANT    No results found for: KARAN      Lab Results   Component Value Date/Time    Hemoglobin A1c (POC) 5.7 10/11/2018 10:25 AM        No results found for: B12LT, FOL, RBCF    No results found for: AKRAN, Bevely Formica, XBANA    Lab Results   Component Value Date/Time    Cholesterol (POC) 159 10/11/2018 10:24 AM    HDL Cholesterol (POC) 59 10/11/2018 10:24 AM    LDL Cholesterol (POC) 78 10/11/2018 10:24 AM    Triglycerides (POC) 111 10/11/2018 10:24 AM         CT Results (recent): MRI Results (recent):  Results from [facility] encounter on 09/28/20    MRI BRAIN W WO CONT    Narrative  EXAM:  MRI BRAIN W WO CONT    INDICATION:    Migraine, dizziness.     COMPARISON:  CT head 5/8/2018. CONTRAST: 20 ml Dotarem. TECHNIQUE:  Multiplanar multisequence acquisition without and with contrast of the brain. FINDINGS:  The ventricles are normal in size and position. The brain parenchyma has normal  signal characteristics. There is no acute infarct, hemorrhage, extra-axial fluid  collection, or mass effect. There is no cerebellar tonsillar herniation. Expected arterial flow-voids are present. No evidence of abnormal enhancement. The paranasal sinuses, mastoid air cells, and middle ears are clear. The orbital  contents are within normal limits. No significant osseous or scalp lesions are  identified. Partially visualized cervical spine ACDF. Impression  IMPRESSION:    1. Normal brain MRI. IR Results (recent):  No results found for this or any previous visit. VAS/US Results (recent): HYSICAL EXAM:  General:    Alert, cooperative, no distress, appears stated age. Head:   Normocephalic, without obvious abnormality, atraumatic. Eyes:   Conjunctivae/corneas clear. Nose:  Nares normal. .  Throat:    Lips and tongue normal.  No Thrush  Neck:  Symmetrical,  no adenopathy, thyroid     no JVD. Back:    Symmetric,. Lungs:   Deferred  Chest wall:  No Accessory muscle use. Heart:   Deferred  Abdomen:    Not distended. Extremities: Extremities normal, atraumatic, No cyanosis. No edema. No clubbing  Skin:      No rashes or lesions. Not Jaundiced  Lymph nodes: Cervical, supraclavicular normal.  Psych:  Good insight. Not depressed. Anxious. Juany Fernando NEUROLOGICAL EXAM:  Appearance: The patient is well developed, well nourished, provides a coherent history and is in no acute distress. Mental Status: Oriented to time, place and person. Mood and affect appropriate. Cranial Nerves:   Intact visual fields. EOM's full, no nystagmus, no ptosis. . Facial movement is symmetric. . Tongue is midline. Motor:   Moves all extremities. No fasciculations. Reflexes:   Deferred. Sensory:   Deferred. Gait:  Normal gait. Tremor:   No tremor noted. Cerebellar:  No cerebellar signs present. Assesment  1. Intractable migraine with aura without status migrainosus    - rimegepant (NURTEC) 75 mg disintegrating tablet; Take 1 Tablet by mouth once as needed for Migraine for up to 1 dose. Dispense: 8 Tablet; Refill: 3    2. Migraine-cluster headache syndrome    - rimegepant (NURTEC) 75 mg disintegrating tablet; Take 1 Tablet by mouth once as needed for Migraine for up to 1 dose. Dispense: 8 Tablet; Refill: 3    3. Complicated migraine  Continue management    4. Intractable chronic migraine without aura and without status migrainosus    - rimegepant (NURTEC) 75 mg disintegrating tablet; Take 1 Tablet by mouth once as needed for Migraine for up to 1 dose. Dispense: 8 Tablet; Refill: 3    ___________________________________________________  PLAN: Medication and plan discussed with patient      ICD-10-CM ICD-9-CM    1. Intractable migraine with aura without status migrainosus  G43.119 346.01 rimegepant (NURTEC) 75 mg disintegrating tablet   2. Migraine-cluster headache syndrome  G44.009 339.00 rimegepant (NURTEC) 75 mg disintegrating tablet   3. Complicated migraine  Q39.695 346.00    4.  Intractable chronic migraine without aura and without status migrainosus  G43.719 346.71 rimegepant (NURTEC) 75 mg disintegrating tablet               ___________________________________________________    Attending Physician: Keyla Willis MD

## 2022-03-02 NOTE — PERIOP NOTES
Physical Therapy  Visit Type: initial evaluation  Precautions:  Medical precautions:  fall risk; standard precautions.  The patient is a 62 year old female admitted on 2/24/2022.  Pt admitted with diagnosis of Acidosis (E87.2)  Hyperkalemia (E87.5)  Acute kidney injury (CMS/HCC) (N17.9)  Dyspnea, unspecified type (R06.00)  Chest pain, unspecified type (R07.9)     Prior to admission pt lives alone in a town house with 2 JENNIFER and 13 stairs to her bedroom/bathroom with bilateral railings.  At baseline pt uses cane for ambulation. She is planning to move to [place] to be near her son in the near future.         Lines:     Basic: capped IV and telemetry      Lines in chart and on patient reviewed, precautions maintained throughout session.    SUBJECTIVE  Patient agreed to participate in therapy this date.  Pt states she has been feeling weaker lately. She is afraid if she goes to rehab she will fall into a depression.   Patient / Family Goal: maximize function and return home     OBJECTIVE   Pulse Ox: 97  Blood Pressure: 104/72Heart Rate: 98    Oriented to person, place, time and situation     Arousal alertness: appropriate responses to stimuli    Affect/Behavior: alert, calm and cooperative  Functional Communication/Cognition    Overall status:  Within functional limits    Form of communication:  Verbal   Attention span:  Appears intact    Commands: follows all commands and directions consistently.    Transition between tasks: transitions tasks without difficulty.  Range of Motion (measured in degrees unless otherwise noted, active unless indicated)  WFL: LLE, RLE  Strength (out of 5 unless otherwise indicated)   Hip:  Hip Flexion: Left: 4+ Right: 4+  Knee:   - Flexion:      • Left: 4      • Right: 4   - Extension:      • Left: 4+      • Right: 4+  Ankle:    - Dorsiflexion:      • Left: 5      • Right: 5  Balance (trials measured in sec unless otherwise indicted)    Sitting: Static: independent back unsupported and double  [facility]  Ambulatory Surgery Unit  Pre-operative Instructions    Surgery/Procedure Date  12/23/19            Tentative Arrival Time 0815      1. On the day of your surgery/procedure, please report to the Ambulatory Surgery Unit Registration Desk and sign in at your designated time. The Ambulatory Surgery Unit is located in [facility] on the [facility] side of the [facility] across from the [address] [place] [address]. Please have all of your health insurance cards and a photo ID. 2. You must have someone with you to drive you home, as you should not drive a car for 24 hours following anesthesia. Please make arrangements for a responsible adult friend or family member to stay with you for at least the first 24 hours after your surgery. 3. Do not have anything to eat or drink (including water, gum, mints, coffee, juice) after 11:59 PM  12/22/19, Sunday. This may not apply to medications prescribed by your physician. (Please note below the special instructions with medications to take the morning of surgery, if applicable.)    4. We recommend you do not drink any alcoholic beverages for 24 hours before and after your surgery. 5. Contact your surgeons office for instructions on the following medications: non-steroidal anti-inflammatory drugs (i.e. Advil, Aleve), vitamins, and supplements. (Some surgeons will want you to stop these medications prior to surgery and others may allow you to take them)   **If you are currently taking Plavix, Coumadin, Aspirin and/or other blood-thinning agents, contact your surgeon for instructions. ** Your surgeon will partner with the physician prescribing these medications to determine if it is safe to stop or if you need to continue taking. Please do not stop taking these medications without instructions from your surgeon. 6. See Hibiclens instructions    7. Wear comfortable clothes. Wear glasses instead of contacts.  Do not bring any jewelry or money (other than copays or fees as instructed). Do not wear make-up, particularly mascara, the morning of your surgery. Do not wear nail polish, particularly if you are having foot /hand surgery. Wear your hair loose or down, no ponytails, buns, devi pins or clips. All body piercings must be removed. 8. You should understand that if you do not follow these instructions your surgery may be cancelled. If your physical condition changes (i.e. fever, cold or flu) please contact your surgeon as soon as possible. 9. It is important that you be on time. If a situation occurs where you may be late, or if you have any questions or problems, please call [phone].    10. Your surgery time may be subject to change. You will receive a phone call the day prior to surgery to confirm your arrival time. 11. Pediatric patients: please bring a change of clothes, diapers, bottle/sippy cup, pacifier, etc.      Special Instructions: Take all medications and inhalers, as prescribed, on the morning of surgery with a sip of water EXCEPT: None      Insulin Dependent Diabetic patients: Take your diabetic medications as prescribed the day before surgery. Hold all diabetic medications the day of surgery. If you are scheduled to arrive for surgery after 8:00 AM, and your AM blood sugar is >200, please call Ambulatory Surgery. I understand a pre-operative phone call will be made to verify my surgery time. In the event that I am not available, I give permission for a message to be left on my answering service and/or with another person?       yes         ___________________      ___________________      ________________  (Signature of Patient)          (Witness)                   (Date and Time) lower extremity support    Standing - Firm Surface - Eyes Open: Static: contact guard/touching/steadying assist double upper extremity support  Sensation - Lower Extremity  L1 (back, over trochanter and groin):     Light Touch: Left: intact Right: intact  L2 (back, front of thigh to knee):     Light Touch: Left: intact Right: intact  L3 (back, upper buttock, anterior thigh, knee, medial lower leg):     Light Touch: Left: intact Right: intact  L4 (medial buttock, lateral thigh, medial leg, dorsum of foot, great toe):     Light Touch: Left: intact Right: intact  L5 (buttock, posterior and lateral thigh, lateral aspect of leg, dorsum of foot, medial half of sole, 1-3rd toes):     Light Touch: Left: intact Right: intact   S1 (buttock, thigh, posterior leg):     Light Touch: Left: intact Right: intact    Bed Mobility:        Supine to sit: independent    Sit to supine: independent  Training completed:    Tasks: supine to sit and sit to supine    Education details: body mechanics and patient safety  Transfers:    Assistive devices: gait belt and 2-wheeled walker    Sit to stand: contact guard/touching/steadying assist    Stand to sit: contact guard/touching/steadying assist  Training completed:    Tasks: sit to stand and stand to sit    Education details: body mechanics and patient safety  Gait/Ambulation:     Assistance: contact guard/touching/steadying assist   Assistive device: gait belt and 2-wheeled walker    Distance (ft): 60; 60    Type: decreased mason  Training Completed:    Tasks: gait training on level surfaces    Education details: body mechanics and patient safety  Stair Mobility:      Assistance: not attempted due to safety concerns       Interventions     Training provided: activity tolerance, functional ambulation, transfer training, balance retraining, bed mobility training and safety training    Skilled input: Verbal instruction/cues and tactile instruction/cues  Verbal Consent: Writer verbally  educated and received verbal consent for hand placement, positioning of patient, and techniques to be performed today from patient for clothing adjustments for techniques, hand placement and palpation for techniques and therapist position for techniques as described above and how they are pertinent to the patient's plan of care.       ASSESSMENT    Impairments: range of motion, strength, balance deficits, pain, endurance and activity tolerance  Functional Limitations: all functional mobility  Patient seen today for physical therapy evaluation. Pt supine  in bed at start of session, cleared by RN. Pt educated on the role of physical therapy in hospital setting, pt verbalized understanding and agreeable to therapy session.  Pt reports that prior to admission she was independent with self-care tasks and mobility, no assistive device needed. She states she has been feeling weaker lately. When discussing discharge plan and potential need for a short rehab stay pt states that she feels like she would spiral into a depression and that she has been feeling very depressed today. Pt states she is taking her medication for depression and has not discussed this with her medical team. RN made aware of conversation with patient.       The patient now presents with impairments in activity tolerance, balance, ROM, safety awareness and strength which impact safe and effective performance in functional mobility.  Patient currently able to perform bed mobility with independently, sit-stand transfer with CGA, ambulation with CGA using RW and WC follow. After ambulating 60ft pt felt as if her legs were going to give out and required immediate assistance to sit down. After a seated rest break she was able to ambulate to her room with frequent standing rest breaks. Pt was unsafe for stair assessment due to fatigue. Pt will benefit from continued therapy to improve activity tolerance and maximize safety and independence. Pt assisted back  to bed at end of session with bed alarm on and all needs within reach, handoff with RN.          Discharge Recommendations  Recommendation for Discharge: PT [place]: Other (comment) (TBD pending stairs)     Skilled therapy is required to address these limitations in attempt to maximize the patient's independence.  Progress: progressing toward goals  Predicted patient presentation: Low (stable) - Patient comorbidities and complexities, as defined above, will have little effect on progress for prescribed plan of care.    End of Session:   Location: in bed  Safety measures: call light within reach, equipment intact, lines intact and bed rails x2  Handoff to: nurse  Education Provided:   Learning assessment:  - Primary learner: patient  - Preferred learning style: verbal  Education provided during session:  - Receiving education: patient  - Pt educated on potential need for rehab for safe discharge.   - Results of above outlined education: Needs reinforcement    PLAN    Suggestions for next session as indicated:    PT Frequency: 5 days/week  Frequency Comments: 1/5 last 3/2 (TBD) KF      Interventions: balance, bed mobility, body mechanics, continued evaluation, endurance training, functional transfer training, gait training, HEP train/position, ROM, safety education, stairs retraining and strengthening  Agreement to plan and goals: patient agrees with goals and treatment plan        GOALS  Long Term Goals: (to be met by time of discharge from hospital)  Sit to supine: Patient will complete sit to supine independent.  Supine to sit: Patient will complete supine to sit independent.  Sit to stand: Patient will complete sit to stand transfer with 2-wheeled walker, modified independent.   Stand to sit: Patient will complete stand to sit transfer with 2-wheeled walker, modified independent.   Ambulation (even): Patient will ambulate on even surface for 150 feet with 2-wheeled walker, modified independent.   Flight of stairs:  Patient will ambulate a flight of stairs with using 1 rail, independent.     Documented in the chart in the following areas: Prior Level of Function. Assessment.      Therapy procedure time and total treatment time can be found documented on the Time Entry flowsheet

## 2022-03-15 PROBLEM — M75.121 NONTRAUMATIC COMPLETE TEAR OF RIGHT ROTATOR CUFF: Status: ACTIVE | Noted: 2022-03-15

## 2022-03-18 ENCOUNTER — VIRTUAL VISIT (OUTPATIENT)
Dept: INTERNAL MEDICINE CLINIC | Age: 52
End: 2022-03-18
Payer: MEDICAID

## 2022-03-18 DIAGNOSIS — I25.84 CORONARY ARTERY CALCIFICATION: Primary | ICD-10-CM

## 2022-03-18 DIAGNOSIS — R79.89 ABNORMAL LFTS: ICD-10-CM

## 2022-03-18 DIAGNOSIS — I25.10 CORONARY ARTERY CALCIFICATION: Primary | ICD-10-CM

## 2022-03-18 PROBLEM — M54.50 LOWER BACK PAIN: Status: ACTIVE | Noted: 2018-12-20

## 2022-03-18 PROCEDURE — 99213 OFFICE O/P EST LOW 20 MIN: CPT | Performed by: INTERNAL MEDICINE

## 2022-03-18 NOTE — PROGRESS NOTES
Telephone Visit Note:  I performed this visit using telephone. The patient gave informed verbal consent to use telemedicine. The patient understands the limitations of not been physically examined and that we may not be able to address all issues. All questions about telemedicine were answered. Assessment/Plan:  1. Coronary artery calcification    2. Abnormal LFTs      She is concerned that she may have coronary artery disease because her CT scan done at [facility] showed coronary calcifications. On further questioning she states that she does get chest pain on and off. This could be stable angina, will refer to cardiology for possible stress test.  I have advised her to take an aspirin 81 mg daily. We will get a lipid panel to calculate her 10-year cardiac risk. Her last lipid panel was in 2018 which looked good    She was also concerned that her LFTs are abnormal.  I reviewed the LFTs which she had done yesterday which showed an AST of 41 which is only slightly above normal.  Her ALT alk phos, total protein, albumin were all normal.  I told her that this is ok, but have told her to decrease the amount of Tylenol she takes. I have also advised her to decrease the amount of alcohol she takes    Advised her to follow-up with her PCP Dr. Durell Shone in 1 to 2 weeks    12 minutes spent talking with the patient on the phone.     Orders Placed This Encounter    LIPID PANEL     Standing Status:   Future     Standing Expiration Date:   3/18/2023    TSH 3RD GENERATION     Standing Status:   Future     Standing Expiration Date:   3/18/2023    REFERRAL TO CARDIOLOGY     Referral Priority:   Routine     Referral Type:   Consultation     Referral Reason:   Specialty Services Required     Referred to Provider:   Darylene Gills, MD     Requested Specialty:   Cardiology     Number of Visits Requested:   1     Social Determinants of Health     Tobacco Use: Medium Risk    Smoking Tobacco Use: Former Smoker    Smokeless Tobacco Use: Never Used   Alcohol Use:     Frequency of Alcohol Consumption: Not on file    Average Number of Drinks: Not on file    Frequency of Binge Drinking: Not on file   Financial Resource Strain:     Difficulty of Paying Living Expenses: Not on file   Food Insecurity:     Worried About Running Out of Food in the Last Year: Not on file    Dina of Food in the Last Year: Not on file   Transportation Needs:     Lack of Transportation (Medical): Not on file    Lack of Transportation (Non-Medical): Not on file   Physical Activity:     Days of Exercise per Week: Not on file    Minutes of Exercise per Session: Not on file   Stress:     Feeling of Stress : Not on file   Social Connections:     Frequency of Communication with Friends and Family: Not on file    Frequency of Social Gatherings with Friends and Family: Not on file    Attends Rastafarian Services: Not on file    Active Member of [address] [place] [address] or Organizations: Not on file    Attends Club or Organization Meetings: Not on file    Marital Status: Not on file   Intimate Partner Violence:     Fear of Current or Ex-Partner: Not on file    Emotionally Abused: Not on file    Physically Abused: Not on file    Sexually Abused: Not on file   Depression: Not at risk    PHQ-2 Score: 0   Housing Stability:     Unable to Pay for Housing in the Last Year: Not on file    Number of [place] in the Last Year: Not on file    Unstable Housing in the Last Year: Not on file       Follow-up and Dispositions    · Return in about 2 weeks (around 4/1/2022) for with Dr. Gisella Zabala. I have reviewed with the patient details of the assessment and plan and all questions were answered. Relevant patient education was performed. The most recent lab findings were reviewed with the patient. An After Visit Summary was printed and given to the patient.     Reason for Visit: Hospital Follow Up      Subjective:  47 y.o. female with h/o migraines, a patent of  Donny who is seen as a virtual visit for f/u of back pain. She was seen in the ER at [facility] yesterday on 3/17/2022 with complaints of lower back pain, right hip pain right rib pain which all started after a fall on February 14, 2022. She had an x-ray of her shoulder, CT of the pelvis, CT abdomen which were all unremarkable and did not show any acute findings. Her CT of the chest did show partial calcification of the coronaries and she was concerned about it. She was also told that she had elevated liver enzymes. Review of Systems  A complete 11 system ROS was preformed (constitutional, eyes, ENT, cardiovascular, respiratory, gastrointestinal, genitourinary, musculoskeletal, skin, neurological, psychiatric) and was negative aside from the pertinent positives and negatives noted in the HPI.     Past Medical History:   Diagnosis Date    Arthritis     Asthma     Seasonal     Bipolar disorder ([address]     GERD (gastroesophageal reflux disease)     IBS (irritable bowel syndrome)     Ill-defined condition     MIGRAINES    Migraine     Peptic ulcer     PTSD (post-traumatic stress disorder)     Snoring     Thyroiditis 10/7/2020    TIA (transient ischemic attack)     As of 12/13/19, pt states she never had one and that her migraine caused symptoms of TIA     Past Surgical History:   Procedure Laterality Date    HX CERVICAL FUSION  2011    C4-C7    HX COLONOSCOPY  2010    HX CYST REMOVAL      from under both arms    HX HEENT Left 2013    orbital    HX HERNIA REPAIR Left 2009    INGUINAL    HX HYSTERECTOMY  2019    HX KNEE ARTHROSCOPY Left     HX LAP CHOLECYSTECTOMY      HX MENISCUS REPAIR Left     x2    HX ORTHOPAEDIC Left     ORIF left fibula    HX ROTATOR CUFF REPAIR Right 06/08/2021    HX TUBAL LIGATION  1994    HYSTEROSCOPY DIAGNOSTIC      x2     Social History     Socioeconomic History    Marital status: SINGLE   Tobacco Use    Smoking status: Former Smoker     Packs/day: 0.25     Years: 20.00     Pack years: 5.00    Smokeless tobacco: Never Used   Vaping Use    Vaping Use: Every day    Substances: CBD   Substance and Sexual Activity    Alcohol use: Yes     Comment: OCCASIONALLY    Drug use: No    Sexual activity: Not Currently     Family History   Problem Relation Age of Onset    Stroke Father     Hypertension Father     Diabetes Father     Diabetes Mother     Hypertension Mother     Heart Disease Maternal Grandmother     Cancer Maternal Grandmother         brain and colon    Cancer Maternal Uncle         5 w/ brain /colon    Mult Sclerosis Other         1st cousin    Bipolar Disorder Other     Anesth Problems Neg Hx      Current Outpatient Medications   Medication Sig Dispense Refill    rimegepant (NURTEC) 75 mg disintegrating tablet 1 tablet p.o. every other day, 1 tablet p.o. for severe headache not to exceed 1 tablet in 24 hours. 16 Tablet 3    topiramate (Topamax) 100 mg tablet Take 1 Tablet by mouth nightly. (Patient not taking: Reported on 3/18/2022) 90 Tablet 3    diclofenac (VOLTAREN XR) 100 mg ER tablet Take 1 Tablet by mouth daily. (Patient not taking: Reported on 3/18/2022) 30 Tablet 1    predniSONE (DELTASONE) 10 mg tablet 6 tabs today and reduce by 1 tab daily (Patient not taking: Reported on 3/18/2022) 21 Tablet 0    lidocaine (LIDODERM) 5 % 1 Patch by TransDERmal route every twenty-four (24) hours. Apply to affected area every 24 hours (Patient not taking: Reported on 3/18/2022) 30 Each 3    fluticasone propionate (Flonase Allergy Relief) 50 mcg/actuation nasal spray 2 Sprays by Both Nostrils route daily as needed for Rhinitis. 1 Bottle 0    OTHER,NON-FORMULARY, CBD oil,cream for pain      B.infantis-B.ani-B.long-B.bifi (Probiotic 4X) 10-15 mg TbEC Take  by mouth.  galcanezumab-gnlm (Emgality Syringe) 120 mg/mL syrg 120 mg by SubCUTAneous route every thirty (30) days.  (Patient not taking: Reported on 3/18/2022) 1 Units 2    cyanocobalamin (VITAMIN B12) 100 mcg tablet Take 100 mcg by mouth daily.  therapeutic multivitamin (THERA) tablet Take 1 Tab by mouth daily. (Patient not taking: Reported on 3/18/2022)      Ferrous Sulfate (SLOW FE) 47.5 mg iron TbER tablet Take 1 Tab by mouth nightly. Allergies   Allergen Reactions    Latex Hives    Other Food Rash     All fruits except apple, oranges, and pineapple (recorded as Other Medication 2/8/2012)    Peanut Swelling    Shellfish Containing Products Anaphylaxis    Morphine Itching     She has had morphine but premedicates with benadryl    Nystatin Rash    Flagyl [Metronidazole] Contact Dermatitis    Flexeril [Cyclobenzaprine] Rash    Other Medication Rash     All fruits  Can eat apples,oranges, and pineapple    Phenergan [Promethazine] Other (comments)     Rapid hr    Robaxin [Methocarbamol] Rash       Objective:  Visit Vitals  LMP 12/04/2019     Physical Exam:   AA&O x3  Psych: Normal affect and mood. Results for orders placed or performed during the hospital encounter of 08/15/21   CBC WITH AUTOMATED DIFF   Result Value Ref Range    WBC 5.7 3.6 - 11.0 K/uL    RBC 3.65 (L) 3.80 - 5.20 M/uL    HGB 11.5 11.5 - 16.0 g/dL    HCT 35.6 35.0 - 47.0 %    MCV 97.5 80.0 - 99.0 FL    MCH 31.5 26.0 - 34.0 PG    MCHC 32.3 30.0 - 36.5 g/dL    RDW 13.2 11.5 - 14.5 %    PLATELET 826 361 - 819 K/uL    MPV 12.3 8.9 - 12.9 FL    NRBC 0.0 0  WBC    ABSOLUTE NRBC 0.00 0.00 - 0.01 K/uL    NEUTROPHILS 68 32 - 75 %    LYMPHOCYTES 22 12 - 49 %    MONOCYTES 9 5 - 13 %    EOSINOPHILS 0 0 - 7 %    BASOPHILS 0 0 - 1 %    IMMATURE GRANULOCYTES 1 (H) 0.0 - 0.5 %    ABS. NEUTROPHILS 3.9 1.8 - 8.0 K/UL    ABS. LYMPHOCYTES 1.2 0.8 - 3.5 K/UL    ABS. MONOCYTES 0.5 0.0 - 1.0 K/UL    ABS. EOSINOPHILS 0.0 0.0 - 0.4 K/UL    ABS. BASOPHILS 0.0 0.0 - 0.1 K/UL    ABS. IMM.  GRANS. 0.0 0.00 - 0.04 K/UL    DF AUTOMATED     METABOLIC PANEL, COMPREHENSIVE   Result Value Ref Range    Sodium 141 136 - 145 mmol/L    Potassium 3.4 (L) 3.5 - 5.1 mmol/L    Chloride 108 97 - 108 mmol/L    CO2 27 21 - 32 mmol/L    Anion gap 6 5 - 15 mmol/L    Glucose 110 (H) 65 - 100 mg/dL    BUN 16 6 - 20 MG/DL    Creatinine 0.89 0.55 - 1.02 MG/DL    BUN/Creatinine ratio 18 12 - 20      GFR est AA >60 >60 ml/min/1.73m2    GFR est non-AA >60 >60 ml/min/1.73m2    Calcium 8.5 8.5 - 10.1 MG/DL    Bilirubin, total 0.7 0.2 - 1.0 MG/DL    ALT (SGPT) 33 12 - 78 U/L    AST (SGOT) 34 15 - 37 U/L    Alk. phosphatase 76 45 - 117 U/L    Protein, total 8.1 6.4 - 8.2 g/dL    Albumin 2.9 (L) 3.5 - 5.0 g/dL    Globulin 5.2 (H) 2.0 - 4.0 g/dL    A-G Ratio 0.6 (L) 1.1 - 2.2     TROPONIN I   Result Value Ref Range    Troponin-I, Qt. <0.05 <0.05 ng/mL   SAMPLES BEING HELD   Result Value Ref Range    SAMPLES BEING HELD  BLUE, RED     COMMENT        Add-on orders for these samples will be processed based on acceptable specimen integrity and analyte stability, which may vary by analyte. CBC WITH AUTOMATED DIFF   Result Value Ref Range    WBC 5.0 3.6 - 11.0 K/uL    RBC 3.46 (L) 3.80 - 5.20 M/uL    HGB 11.0 (L) 11.5 - 16.0 g/dL    HCT 34.2 (L) 35.0 - 47.0 %    MCV 98.8 80.0 - 99.0 FL    MCH 31.8 26.0 - 34.0 PG    MCHC 32.2 30.0 - 36.5 g/dL    RDW 13.3 11.5 - 14.5 %    PLATELET 313 054 - 868 K/uL    MPV 11.7 8.9 - 12.9 FL    NRBC 0.0 0  WBC    ABSOLUTE NRBC 0.00 0.00 - 0.01 K/uL    NEUTROPHILS 75 32 - 75 %    BAND NEUTROPHILS 1 %    LYMPHOCYTES 16 12 - 49 %    MONOCYTES 7 5 - 13 %    EOSINOPHILS 0 0 - 7 %    BASOPHILS 0 0 - 1 %    METAMYELOCYTES 1 %    IMMATURE GRANULOCYTES 0 0.0 - 0.5 %    ABS. NEUTROPHILS 3.8 1.8 - 8.0 K/UL    ABS. LYMPHOCYTES 0.8 0.8 - 3.5 K/UL    ABS. MONOCYTES 0.4 0.0 - 1.0 K/UL    ABS. EOSINOPHILS 0.0 0.0 - 0.4 K/UL    ABS. BASOPHILS 0.0 0.0 - 0.1 K/UL    ABS. IMM.  GRANS. 0.0 0.00 - 0.04 K/UL    DF MANUAL      RBC COMMENTS MACROCYTOSIS  1+       METABOLIC PANEL, COMPREHENSIVE   Result Value Ref Range    Sodium 140 136 - 145 mmol/L Potassium 3.7 3.5 - 5.1 mmol/L    Chloride 109 (H) 97 - 108 mmol/L    CO2 28 21 - 32 mmol/L    Anion gap 3 (L) 5 - 15 mmol/L    Glucose 135 (H) 65 - 100 mg/dL    BUN 15 6 - 20 MG/DL    Creatinine 0.94 0.55 - 1.02 MG/DL    BUN/Creatinine ratio 16 12 - 20      GFR est AA >60 >60 ml/min/1.73m2    GFR est non-AA >60 >60 ml/min/1.73m2    Calcium 8.3 (L) 8.5 - 10.1 MG/DL    Bilirubin, total 0.4 0.2 - 1.0 MG/DL    ALT (SGPT) 28 12 - 78 U/L    AST (SGOT) 27 15 - 37 U/L    Alk. phosphatase 80 45 - 117 U/L    Protein, total 7.7 6.4 - 8.2 g/dL    Albumin 2.7 (L) 3.5 - 5.0 g/dL    Globulin 5.0 (H) 2.0 - 4.0 g/dL    A-G Ratio 0.5 (L) 1.1 - 2.2     C REACTIVE PROTEIN, QT   Result Value Ref Range    C-Reactive protein 7.80 (H) 0.00 - 0.60 mg/dL   D DIMER   Result Value Ref Range    D-dimer 0.46 0.00 - 0.65 mg/L FEU   PROCALCITONIN   Result Value Ref Range    Procalcitonin <0.05 ng/mL   D DIMER   Result Value Ref Range    D-dimer 0.36 0.00 - 0.65 mg/L FEU   METABOLIC PANEL, COMPREHENSIVE   Result Value Ref Range    Sodium 140 136 - 145 mmol/L    Potassium 3.8 3.5 - 5.1 mmol/L    Chloride 108 97 - 108 mmol/L    CO2 26 21 - 32 mmol/L    Anion gap 6 5 - 15 mmol/L    Glucose 121 (H) 65 - 100 mg/dL    BUN 19 6 - 20 MG/DL    Creatinine 0.71 0.55 - 1.02 MG/DL    BUN/Creatinine ratio 27 (H) 12 - 20      GFR est AA >60 >60 ml/min/1.73m2    GFR est non-AA >60 >60 ml/min/1.73m2    Calcium 8.2 (L) 8.5 - 10.1 MG/DL    Bilirubin, total 0.4 0.2 - 1.0 MG/DL    ALT (SGPT) 45 12 - 78 U/L    AST (SGOT) 47 (H) 15 - 37 U/L    Alk.  phosphatase 70 45 - 117 U/L    Protein, total 7.2 6.4 - 8.2 g/dL    Albumin 2.5 (L) 3.5 - 5.0 g/dL    Globulin 4.7 (H) 2.0 - 4.0 g/dL    A-G Ratio 0.5 (L) 1.1 - 2.2     FERRITIN   Result Value Ref Range    Ferritin 485 (H) 8 - 252 NG/ML   CBC WITH AUTOMATED DIFF   Result Value Ref Range    WBC 4.3 3.6 - 11.0 K/uL    RBC 3.41 (L) 3.80 - 5.20 M/uL    HGB 10.6 (L) 11.5 - 16.0 g/dL    HCT 32.6 (L) 35.0 - 47.0 %    MCV 95.6 80.0 - 99.0 FL    MCH 31.1 26.0 - 34.0 PG    MCHC 32.5 30.0 - 36.5 g/dL    RDW 13.0 11.5 - 14.5 %    PLATELET 267 373 - 297 K/uL    MPV 11.8 8.9 - 12.9 FL    NRBC 0.0 0  WBC    ABSOLUTE NRBC 0.00 0.00 - 0.01 K/uL    NEUTROPHILS 55 32 - 75 %    LYMPHOCYTES 33 12 - 49 %    MONOCYTES 11 5 - 13 %    EOSINOPHILS 0 0 - 7 %    BASOPHILS 0 0 - 1 %    MYELOCYTES 1 %    IMMATURE GRANULOCYTES 0 0.0 - 0.5 %    ABS. NEUTROPHILS 2.4 1.8 - 8.0 K/UL    ABS. LYMPHOCYTES 1.4 0.8 - 3.5 K/UL    ABS. MONOCYTES 0.5 0.0 - 1.0 K/UL    ABS. EOSINOPHILS 0.0 0.0 - 0.4 K/UL    ABS. BASOPHILS 0.0 0.0 - 0.1 K/UL    ABS. IMM. GRANS. 0.0 0.00 - 0.04 K/UL    DF MANUAL      PLATELET COMMENTS Large Platelets      RBC COMMENTS MACROCYTOSIS  1+       C REACTIVE PROTEIN, QT   Result Value Ref Range    C-Reactive protein 2.04 (H) 0.00 - 0.60 mg/dL   EKG, 12 LEAD, INITIAL   Result Value Ref Range    Ventricular Rate 79 BPM    Atrial Rate 79 BPM    P-R Interval 138 ms    QRS Duration 66 ms    Q-T Interval 370 ms    QTC Calculation (Bezet) 424 ms    Calculated P Axis 35 degrees    Calculated R Axis 22 degrees    Calculated T Axis -30 degrees    Diagnosis       Normal sinus rhythm  Nonspecific T wave abnormality  When compared with ECG of 10-JUL-2021 18:55,  Inverted T waves have replaced nonspecific T wave abnormality in Inferior   leads  Confirmed by Álvaro Post ([id number]) on 8/15/2021 1:17:50 PM           Cecilio Woods MD, FACP, [facility].   [address] [address], [place], [place].

## 2022-03-18 NOTE — PROGRESS NOTES
Chief Complaint   Patient presents with   [facility] Follow Up     1. Have you been to the ER, urgent care clinic since your last visit? Hospitalized since your last visit? Yes Reason for visit: fall in tub    2. Have you seen or consulted any other health care providers outside of the [address] [place] [address] since your last visit? Include any pap smears or colon screening.  No [facility]

## 2022-03-19 PROBLEM — G43.909 MIGRAINE: Status: ACTIVE | Noted: 2018-12-20

## 2022-03-19 PROBLEM — J12.82 PNEUMONIA DUE TO COVID-19 VIRUS: Status: ACTIVE | Noted: 2021-08-15

## 2022-03-19 PROBLEM — U07.1 PNEUMONIA DUE TO COVID-19 VIRUS: Status: ACTIVE | Noted: 2021-08-15

## 2022-03-19 PROBLEM — M53.9 MULTILEVEL DEGENERATIVE DISC DISEASE: Status: ACTIVE | Noted: 2018-12-20

## 2022-03-19 PROBLEM — M54.16 LUMBAR RADICULOPATHY: Status: ACTIVE | Noted: 2018-12-20

## 2022-03-19 PROBLEM — N92.0 MENORRHAGIA: Status: ACTIVE | Noted: 2019-12-23

## 2022-03-20 PROBLEM — E06.9 THYROIDITIS: Status: ACTIVE | Noted: 2020-10-07

## 2022-03-20 PROBLEM — E66.01 OBESITY, MORBID (HCC): Status: ACTIVE | Noted: 2018-10-11

## 2022-03-20 PROBLEM — M75.101 ROTATOR CUFF TEAR, RIGHT: Status: ACTIVE | Noted: 2021-06-08

## 2022-03-20 PROBLEM — M21.371 RIGHT FOOT DROP: Status: ACTIVE | Noted: 2018-12-20

## 2022-03-24 PROBLEM — M75.121 NONTRAUMATIC COMPLETE TEAR OF RIGHT ROTATOR CUFF: Status: ACTIVE | Noted: 2022-03-15

## 2022-03-30 ENCOUNTER — OFFICE VISIT (OUTPATIENT)
Dept: ORTHOPEDIC SURGERY | Age: 52
End: 2022-03-30
Payer: COMMERCIAL

## 2022-03-30 VITALS — BODY MASS INDEX: 49.61 KG/M2 | HEIGHT: 63 IN | WEIGHT: 280 LBS

## 2022-03-30 DIAGNOSIS — M25.511 ACUTE PAIN OF RIGHT SHOULDER: ICD-10-CM

## 2022-03-30 DIAGNOSIS — M54.2 NECK PAIN: ICD-10-CM

## 2022-03-30 DIAGNOSIS — M75.121 NONTRAUMATIC COMPLETE TEAR OF RIGHT ROTATOR CUFF: Primary | ICD-10-CM

## 2022-03-30 DIAGNOSIS — S46.011A TRAUMATIC TEAR OF RIGHT ROTATOR CUFF, UNSPECIFIED TEAR EXTENT, INITIAL ENCOUNTER: ICD-10-CM

## 2022-03-30 PROCEDURE — 99214 OFFICE O/P EST MOD 30 MIN: CPT | Performed by: ORTHOPAEDIC SURGERY

## 2022-03-30 RX ORDER — ACETAMINOPHEN AND CODEINE PHOSPHATE 300; 30 MG/1; MG/1
TABLET ORAL
COMMUNITY
Start: 2022-02-15 | End: 2022-05-16

## 2022-03-30 RX ORDER — ALBUTEROL SULFATE 90 UG/1
AEROSOL, METERED RESPIRATORY (INHALATION)
COMMUNITY
End: 2022-09-01

## 2022-03-30 RX ORDER — IBUPROFEN 600 MG/1
TABLET ORAL
COMMUNITY
Start: 2022-03-25

## 2022-03-30 NOTE — PROGRESS NOTES
ASSESSMENT/PLAN:  Below is the assessment and plan developed based on review of pertinent history, physical exam, labs, studies, and medications. 1. Nontraumatic complete tear of right rotator cuff      No follow-ups on file. In discussion with the patient, we considered the numerus possible diagnoses that could be contributing to their present symptoms. We also deliberated on the extensive management options that must be considered to treat their current condition. We reviewed their accessible prior medical records, diagnostic tests, and current health and employment information. We considered how these symptoms were affecting the patient´s activities of daily living as well as employment and fitness activities. The patient had various questions regarding the possible risks, benefits, complications, morbidity and mortality regarding their diagnosis and treatment options. The patients´ comorbidities were considered, and I advocated that they consider maximizing lifestyle modification through nutrition and exercise to aid in addressing their symptoms. Shared decision making yielded an understanding to move forward with conservation treatment preferences. The patient expressed understanding that if conservative management fails to alleviate the present symptoms they will return to office for re-evaluation and consideration of additional diagnostic tests and potential surgical options. Given that the patient's symptoms are increasing in frequency and duration, we have decided to evaluate the etiology of the pain and loss of function with an MRI. We discussed the risks of an MRI which include, but are not limited to the enclosed space, noisy environment, magnetic effect on implanted metal. We also talked about the fact that MRI is also contraindicated in the presence of internal metallic objects such as bullets or shrapnel, as well as surgical clips, pins, plates, screws, metal sutures, or wire mesh.  We talked about the fact that MRI does not use radiation, but it may be contraindicated if the patient has implanted pacemakers, intracranial aneurysm clips, cochlear implants, certain prosthetic devices, implanted drug infusion pumps, neurostimulators, bone-growth stimulators, certain intrauterine contraceptive devices; or any other type of iron-based metal implants. We discussed the fact that if you are pregnant or suspect that you may be pregnant, you should notify your physician and consult with your primary care or obstetrician before having an MRI. Although rare, we talked about the fact that if contrast dye is used, there is a risk for allergic reaction to the dye. Patients who are allergic to or sensitive to medications, contrast dye, iodine, or shellfish should notify the radiologist or technologist prior to the administration of dye. MRI contrast may also influence other conditions such as allergies, asthma, anemia, hypotension (low blood pressure), and sickle cell disease. The patient has expressed understanding of these risks and I will see the patient back after the MRI to discuss the findings as well as the treatment options. In the interim, we have recommended ice, elevation, and anti-inflammatory medications along with a physician directed home exercise program. We discussed the risks and common side effects of anti-inflammatory medications and instructed the patient to discontinue the medication and contact us if they experienced any side effects. The patient was encouraged to discuss the possible side effects with their family physician or pharmacist prior to initiating any new medications. She will follow up with us once the images are obtained. SUBJECTIVE/OBJECTIVE:  Rayna Choe (: 1970) is a 47 y.o. female, patient,here for evaluation of the No chief complaint on file. .   The patient returns today for follow-up of her right shoulder.   She underwent right shoulder rotator cuff repair on 6/8/2021. Unfortunately, she had a family tragedy during her postoperative course and she was unable to attend physical therapy. At her last visit she still had considerable stiffness and was attending physical therapy. Unfortunately, she was recently in the shower and slipped. Landed on her right shoulder. She reports she has had pain. She also reports she has had some numbness and tingling in her right upper extremity. Physical Exam    Upon examination of the right shoulder, the patient sits with the scapula protracted and depressed. They are tender to palpation over the trapezius and rhomboids as well as the anterior aspect of the supraspinatus and biceps. They are non-tender to palpation over the neck, clavicle, scapula, and elbow. There is no soft tissue swelling and eccymosis. The patient has limited range of motion of the shoulder in all planes secondary to discomfort. The patient is unable to tolerate stability testing. They have 5/5 strength at their side, and are neurovascularly intact distally. There is no erythema, warmth or skin lesions present. Imaging:    Views of the right shoulder show evidence of previous acromioplasty. I do not appreciate a fracture or dislocation. 2 views of the cervical spine show prior fusion of the cervical spine with significant degenerative changes above and below her fusion.     Allergies   Allergen Reactions    Latex Hives    Other Food Rash     All fruits except apple, oranges, and pineapple (recorded as Other Medication 2/8/2012)    Peanut Swelling    Shellfish Containing Products Anaphylaxis    Morphine Itching     She has had morphine but premedicates with benadryl    Nystatin Rash    Flagyl [Metronidazole] Contact Dermatitis    Flexeril [Cyclobenzaprine] Rash    Other Medication Rash     All fruits  Can eat apples,oranges, and pineapple    Phenergan [Promethazine] Other (comments)     Rapid hr    Robaxin [Methocarbamol] Rash Current Outpatient Medications   Medication Sig    rimegepant (NURTEC) 75 mg disintegrating tablet 1 tablet p.o. every other day, 1 tablet p.o. for severe headache not to exceed 1 tablet in 24 hours.  topiramate (Topamax) 100 mg tablet Take 1 Tablet by mouth nightly.  diclofenac (VOLTAREN XR) 100 mg ER tablet Take 1 Tablet by mouth daily.  predniSONE (DELTASONE) 10 mg tablet 6 tabs today and reduce by 1 tab daily    lidocaine (LIDODERM) 5 % 1 Patch by TransDERmal route every twenty-four (24) hours. Apply to affected area every 24 hours    fluticasone propionate (Flonase Allergy Relief) 50 mcg/actuation nasal spray 2 Sprays by Both Nostrils route daily as needed for Rhinitis.  OTHER,NON-FORMULARY, CBD oil,cream for pain    B.infantis-B.ani-B.long-B.bifi (Probiotic 4X) 10-15 mg TbEC Take  by mouth.  galcanezumab-gnlm (Emgality Syringe) 120 mg/mL syrg 120 mg by SubCUTAneous route every thirty (30) days.  cyanocobalamin (VITAMIN B12) 100 mcg tablet Take 100 mcg by mouth daily.  therapeutic multivitamin (THERA) tablet Take 1 Tab by mouth daily.  Ferrous Sulfate (SLOW FE) 47.5 mg iron TbER tablet Take 1 Tab by mouth nightly. No current facility-administered medications for this visit.        Past Medical History:   Diagnosis Date    Arthritis     Asthma     Seasonal     Bipolar disorder ([facility] [address]     GERD (gastroesophageal reflux disease)     IBS (irritable bowel syndrome)     Ill-defined condition     MIGRAINES    Migraine     Peptic ulcer     PTSD (post-traumatic stress disorder)     Snoring     Thyroiditis 10/7/2020    TIA (transient ischemic attack)     As of 12/13/19, pt states she never had one and that her migraine caused symptoms of TIA       Past Surgical History:   Procedure Laterality Date    HX CERVICAL FUSION  2011    C4-C7    HX COLONOSCOPY  2010    HX CYST REMOVAL      from under both arms    HX HEENT Left 2013    orbital    HX HERNIA REPAIR Left 2009 INGUINAL    HX HYSTERECTOMY  2019    HX KNEE ARTHROSCOPY Left     HX LAP CHOLECYSTECTOMY      HX MENISCUS REPAIR Left     x2    HX ORTHOPAEDIC Left     ORIF left fibula    HX ROTATOR CUFF REPAIR Right 06/08/2021    HX TUBAL LIGATION  1994    HYSTEROSCOPY DIAGNOSTIC      x2       Family History   Problem Relation Age of Onset   [facility] Stroke Father     Hypertension Father     Diabetes Father     Diabetes Mother     Hypertension Mother     Heart Disease Maternal Grandmother     Cancer Maternal Grandmother         brain and colon    Cancer Maternal Uncle         5 w/ brain /colon    Mult Sclerosis Other         1st cousin    Bipolar Disorder Other     Anesth Problems Neg Hx        Social History     Socioeconomic History    Marital status: SINGLE     Spouse name: Not on file    Number of children: Not on file    Years of education: Not on file    Highest education level: Not on file   Occupational History    Not on file   Tobacco Use    Smoking status: Former Smoker     Packs/day: 0.25     Years: 20.00     Pack years: 5.00    Smokeless tobacco: Never Used   Vaping Use    Vaping Use: Every day    Substances: CBD   Substance and Sexual Activity    Alcohol use: Yes     Comment: OCCASIONALLY    Drug use: No    Sexual activity: Not Currently   Other Topics Concern    Not on file   Social History Narrative    Not on file     Social Determinants of Health     Financial Resource Strain:     Difficulty of Paying Living Expenses: Not on file   Food Insecurity:     Worried About Running Out of Food in the Last Year: Not on file    Dina of Food in the Last Year: Not on file   Transportation Needs:     Lack of Transportation (Medical): Not on file    Lack of Transportation (Non-Medical):  Not on file   Physical Activity:     Days of Exercise per Week: Not on file    Minutes of Exercise per Session: Not on file   Stress:     Feeling of Stress : Not on file   Social Connections:     Frequency of Communication with Friends and Family: Not on file    Frequency of Social Gatherings with Friends and Family: Not on file    Attends Islam Services: Not on file    Active Member of Clubs or Organizations: Not on file    Attends Club or Organization Meetings: Not on file    Marital Status: Not on file   Intimate Partner Violence:     Fear of Current or Ex-Partner: Not on file    Emotionally Abused: Not on file    Physically Abused: Not on file    Sexually Abused: Not on file   Housing Stability:     Unable to Pay for Housing in the Last Year: Not on file    Number of [place] in the Last Year: Not on file    Unstable Housing in the Last Year: Not on file       Review of Systems    No flowsheet data found. Vitals:  LMP 12/04/2019    There is no height or weight on file to calculate BMI. An electronic signature was used to authenticate this note.   -- Jaky Crump MD

## 2022-03-31 ENCOUNTER — OFFICE VISIT (OUTPATIENT)
Dept: INTERNAL MEDICINE CLINIC | Age: 52
End: 2022-03-31
Payer: COMMERCIAL

## 2022-03-31 VITALS
DIASTOLIC BLOOD PRESSURE: 70 MMHG | HEIGHT: 63 IN | RESPIRATION RATE: 20 BRPM | OXYGEN SATURATION: 97 % | WEIGHT: 293 LBS | BODY MASS INDEX: 51.91 KG/M2 | HEART RATE: 75 BPM | SYSTOLIC BLOOD PRESSURE: 118 MMHG | TEMPERATURE: 98 F

## 2022-03-31 DIAGNOSIS — E66.01 OBESITY, MORBID (HCC): ICD-10-CM

## 2022-03-31 DIAGNOSIS — M43.06 LUMBAR SPONDYLOLYSIS: ICD-10-CM

## 2022-03-31 DIAGNOSIS — M54.16 LUMBAR RADICULOPATHY: ICD-10-CM

## 2022-03-31 DIAGNOSIS — E04.1 THYROID NODULE: Primary | ICD-10-CM

## 2022-03-31 PROCEDURE — 99214 OFFICE O/P EST MOD 30 MIN: CPT | Performed by: INTERNAL MEDICINE

## 2022-03-31 NOTE — PROGRESS NOTES
Chief Complaint   Patient presents with   Major Hospital Follow Up    Depression     3 most recent PHQ Screens 3/31/2022   PHQ Not Done -   Little interest or pleasure in doing things Several days   Feeling down, depressed, irritable, or hopeless Several days   Total Score PHQ 2 2   Trouble falling or staying asleep, or sleeping too much Nearly every day   Feeling tired or having little energy Several days   Poor appetite, weight loss, or overeating Nearly every day   Feeling bad about yourself - or that you are a failure or have let yourself or your family down Not at all   Trouble concentrating on things such as school, work, reading, or watching TV Not at all   Moving or speaking so slowly that other people could have noticed; or the opposite being so fidgety that others notice Not at all   Thoughts of being better off dead, or hurting yourself in some way Not at all   PHQ 9 Score 9   How difficult have these problems made it for you to do your work, take care of your home and get along with others Not difficult at all     1. Have you been to the ER, urgent care clinic since your last visit? Hospitalized since your last visit yes    2. Have you seen or consulted any other health care providers outside of the [address] [place] [address] since your last visit? Include any pap smears or colon screening.  yes

## 2022-03-31 NOTE — PROGRESS NOTES
Kat So is a 47 y.o. female and presents with Hospital Follow Up and Depression  . Subjective:  She was seen in the hospital and told she had a possible thyroid problem  Ct revealed: The thyroid gland is mildly enlarged and nodular, not well visualized. Her thyroid functions proved negative    Depression Review:  Patient is seen forpossible depression. Ongoing depressed mood and difficulty concentrating intermittently Treatment includes no medication and    She is followed for lumbar radiculopathy    Review of Systems  Constitutional: negative for fevers, chills, anorexia and weight loss  Eyes:   negative for visual disturbance and irritation  ENT:   negative for tinnitus,sore throat,nasal congestion,ear pains. hoarseness  Respiratory:  negative for cough, hemoptysis, dyspnea,wheezing  CV:   negative for chest pain, palpitations, lower extremity edema  GI:   negative for nausea, vomiting, diarrhea, abdominal pain,melena  Endo:               negative for polyuria,polydipsia,polyphagia,heat intolerance  Genitourinary: negative for frequency, dysuria and hematuria  Integument:  negative for rash and pruritus  Hematologic:  negative for easy bruising and gum/nose bleeding  Musculoskel: myalgias, arthralgias, back pain,  Neurological:  negative for headaches, dizziness, vertigo, memory problems and gait   Behavl/Psych: negative for feelings of anxiety, depression, mood changes    Past Medical History:   Diagnosis Date    Arthritis     Asthma     Seasonal     Bipolar disorder (HCC)     GERD (gastroesophageal reflux disease)     IBS (irritable bowel syndrome)     Ill-defined condition     MIGRAINES    Migraine     Peptic ulcer     PTSD (post-traumatic stress disorder)     Snoring     Thyroiditis 10/7/2020    TIA (transient ischemic attack)     As of 12/13/19, pt states she never had one and that her migraine caused symptoms of TIA     Past Surgical History:   Procedure Laterality Date    HX CERVICAL FUSION  2011    C4-C7    HX COLONOSCOPY  2010    HX CYST REMOVAL      from under both arms    HX HEENT Left 2013    orbital    HX HERNIA REPAIR Left 2009    INGUINAL    HX HYSTERECTOMY  2019    HX KNEE ARTHROSCOPY Left     HX LAP CHOLECYSTECTOMY      HX MENISCUS REPAIR Left     x2    HX ORTHOPAEDIC Left     ORIF left fibula    HX ROTATOR CUFF REPAIR Right 06/08/2021    HX TUBAL LIGATION  1994    HYSTEROSCOPY DIAGNOSTIC      x2     Social History     Socioeconomic History    Marital status: SINGLE   Tobacco Use    Smoking status: Former Smoker     Packs/day: 0.25     Years: 20.00     Pack years: 5.00    Smokeless tobacco: Never Used   Vaping Use    Vaping Use: Every day    Substances: CBD   Substance and Sexual Activity    Alcohol use: Yes     Comment: OCCASIONALLY    Drug use: No    Sexual activity: Not Currently     Family History   Problem Relation Age of Onset   Judit Romo Stroke Father     Hypertension Father     Diabetes Father     Diabetes Mother     Hypertension Mother     Heart Disease Maternal Grandmother     Cancer Maternal Grandmother         brain and colon    Cancer Maternal Uncle         5 w/ brain /colon    Mult Sclerosis Other         1st cousin    Bipolar Disorder Other     Anesth Problems Neg Hx      Current Outpatient Medications   Medication Sig Dispense Refill    OTHER,NON-FORMULARY, CBD oil,cream for pain      B.infantis-B.ani-B.long-B.bifi (Probiotic 4X) 10-15 mg TbEC Take  by mouth.  cyanocobalamin (VITAMIN B12) 100 mcg tablet Take 100 mcg by mouth daily.  therapeutic multivitamin (THERA) tablet Take 1 Tablet by mouth daily.  Ferrous Sulfate (SLOW FE) 47.5 mg iron TbER tablet Take 1 Tab by mouth nightly.       acetaminophen-codeine (TYLENOL #3) 300-30 mg per tablet TAKE 1 TO 2 TABLETS BY MOUTH EVERY 6 HOURS AS NEEDED FOR PAIN (Patient not taking: Reported on 3/31/2022)      albuterol (ProAir HFA) 90 mcg/actuation inhaler ProAir HFA 90 mcg/actuation aerosol inhaler   INHALE 2 PUFFS PO Q 4 H PRF WHEEZING (Patient not taking: Reported on 3/31/2022)      ibuprofen (MOTRIN) 600 mg tablet  (Patient not taking: Reported on 3/31/2022)      fluticasone propionate (Flonase Allergy Relief) 50 mcg/actuation nasal spray 2 Sprays by Both Nostrils route daily as needed for Rhinitis.  (Patient not taking: Reported on 3/31/2022) 1 Bottle 0     Allergies   Allergen Reactions    Latex Hives    Other Food Rash     All fruits except apple, oranges, and pineapple (recorded as Other Medication 2/8/2012)    Peanut Swelling    Shellfish Containing Products Anaphylaxis    Morphine Itching     She has had morphine but premedicates with benadryl    Nystatin Rash    Flagyl [Metronidazole] Contact Dermatitis    Flexeril [Cyclobenzaprine] Rash    Other Medication Rash     All fruits  Can eat apples,oranges, and pineapple    Phenergan [Promethazine] Other (comments)     Rapid hr    Robaxin [Methocarbamol] Rash       Objective:  Visit Vitals  /70 (BP 1 Location: Right arm, BP Patient Position: Sitting, BP Cuff Size: Adult)   Pulse 75   Temp 98 °F (36.7 °C) (Oral)   Resp 20   Ht 5' 3\" (1.6 m)   Wt 294 lb (133.4 kg)   LMP 12/04/2019   SpO2 97%   BMI 52.08 kg/m²     Physical Exam:   General appearance - alert, well appearing, and in no distress  Mental status - alert, oriented to person, place, and time  EYE-BRIGIDA, EOMI, corneas normal, no foreign bodies  ENT-ENT exam normal, no neck nodes or sinus tenderness  Nose - normal and patent, no erythema, discharge or polyps  Mouth - mucous membranes moist, pharynx normal without lesions  Neck - supple, no significant adenopathy   Chest - clear to auscultation, no wheezes, rales or rhonchi, symmetric air entry   Heart - normal rate, regular rhythm, normal S1, S2, no murmurs, rubs, clicks or gallops   Abdomen - soft, nontender, nondistended, no masses or organomegaly  Lymph- no adenopathy palpable  Ext-peripheral pulses normal, no pedal edema, no clubbing or cyanosis  Skin-Warm and dry. no hyperpigmentation, vitiligo, or suspicious lesions  Neuro -alert, oriented, normal speech, no focal findings or movement disorder noted  Neck-normal C-spine, no tenderness, full ROM without pain  Feet-no nail deformities or callus formation with good pulses noted      Results for orders placed or performed during the hospital encounter of 08/15/21   CBC WITH AUTOMATED DIFF   Result Value Ref Range    WBC 5.7 3.6 - 11.0 K/uL    RBC 3.65 (L) 3.80 - 5.20 M/uL    HGB 11.5 11.5 - 16.0 g/dL    HCT 35.6 35.0 - 47.0 %    MCV 97.5 80.0 - 99.0 FL    MCH 31.5 26.0 - 34.0 PG    MCHC 32.3 30.0 - 36.5 g/dL    RDW 13.2 11.5 - 14.5 %    PLATELET 527 216 - 230 K/uL    MPV 12.3 8.9 - 12.9 FL    NRBC 0.0 0  WBC    ABSOLUTE NRBC 0.00 0.00 - 0.01 K/uL    NEUTROPHILS 68 32 - 75 %    LYMPHOCYTES 22 12 - 49 %    MONOCYTES 9 5 - 13 %    EOSINOPHILS 0 0 - 7 %    BASOPHILS 0 0 - 1 %    IMMATURE GRANULOCYTES 1 (H) 0.0 - 0.5 %    ABS. NEUTROPHILS 3.9 1.8 - 8.0 K/UL    ABS. LYMPHOCYTES 1.2 0.8 - 3.5 K/UL    ABS. MONOCYTES 0.5 0.0 - 1.0 K/UL    ABS. EOSINOPHILS 0.0 0.0 - 0.4 K/UL    ABS. BASOPHILS 0.0 0.0 - 0.1 K/UL    ABS. IMM. GRANS. 0.0 0.00 - 0.04 K/UL    DF AUTOMATED     METABOLIC PANEL, COMPREHENSIVE   Result Value Ref Range    Sodium 141 136 - 145 mmol/L    Potassium 3.4 (L) 3.5 - 5.1 mmol/L    Chloride 108 97 - 108 mmol/L    CO2 27 21 - 32 mmol/L    Anion gap 6 5 - 15 mmol/L    Glucose 110 (H) 65 - 100 mg/dL    BUN 16 6 - 20 MG/DL    Creatinine 0.89 0.55 - 1.02 MG/DL    BUN/Creatinine ratio 18 12 - 20      GFR est AA >60 >60 ml/min/1.73m2    GFR est non-AA >60 >60 ml/min/1.73m2    Calcium 8.5 8.5 - 10.1 MG/DL    Bilirubin, total 0.7 0.2 - 1.0 MG/DL    ALT (SGPT) 33 12 - 78 U/L    AST (SGOT) 34 15 - 37 U/L    Alk.  phosphatase 76 45 - 117 U/L    Protein, total 8.1 6.4 - 8.2 g/dL    Albumin 2.9 (L) 3.5 - 5.0 g/dL    Globulin 5.2 (H) 2.0 - 4.0 g/dL    A-G Ratio 0.6 (L) 1.1 - 2.2     TROPONIN I   Result Value Ref Range    Troponin-I, Qt. <0.05 <0.05 ng/mL   SAMPLES BEING HELD   Result Value Ref Range    SAMPLES BEING HELD  BLUE, RED     COMMENT        Add-on orders for these samples will be processed based on acceptable specimen integrity and analyte stability, which may vary by analyte. CBC WITH AUTOMATED DIFF   Result Value Ref Range    WBC 5.0 3.6 - 11.0 K/uL    RBC 3.46 (L) 3.80 - 5.20 M/uL    HGB 11.0 (L) 11.5 - 16.0 g/dL    HCT 34.2 (L) 35.0 - 47.0 %    MCV 98.8 80.0 - 99.0 FL    MCH 31.8 26.0 - 34.0 PG    MCHC 32.2 30.0 - 36.5 g/dL    RDW 13.3 11.5 - 14.5 %    PLATELET 568 788 - 836 K/uL    MPV 11.7 8.9 - 12.9 FL    NRBC 0.0 0  WBC    ABSOLUTE NRBC 0.00 0.00 - 0.01 K/uL    NEUTROPHILS 75 32 - 75 %    BAND NEUTROPHILS 1 %    LYMPHOCYTES 16 12 - 49 %    MONOCYTES 7 5 - 13 %    EOSINOPHILS 0 0 - 7 %    BASOPHILS 0 0 - 1 %    METAMYELOCYTES 1 %    IMMATURE GRANULOCYTES 0 0.0 - 0.5 %    ABS. NEUTROPHILS 3.8 1.8 - 8.0 K/UL    ABS. LYMPHOCYTES 0.8 0.8 - 3.5 K/UL    ABS. MONOCYTES 0.4 0.0 - 1.0 K/UL    ABS. EOSINOPHILS 0.0 0.0 - 0.4 K/UL    ABS. BASOPHILS 0.0 0.0 - 0.1 K/UL    ABS. IMM. GRANS. 0.0 0.00 - 0.04 K/UL    DF MANUAL      RBC COMMENTS MACROCYTOSIS  1+       METABOLIC PANEL, COMPREHENSIVE   Result Value Ref Range    Sodium 140 136 - 145 mmol/L    Potassium 3.7 3.5 - 5.1 mmol/L    Chloride 109 (H) 97 - 108 mmol/L    CO2 28 21 - 32 mmol/L    Anion gap 3 (L) 5 - 15 mmol/L    Glucose 135 (H) 65 - 100 mg/dL    BUN 15 6 - 20 MG/DL    Creatinine 0.94 0.55 - 1.02 MG/DL    BUN/Creatinine ratio 16 12 - 20      GFR est AA >60 >60 ml/min/1.73m2    GFR est non-AA >60 >60 ml/min/1.73m2    Calcium 8.3 (L) 8.5 - 10.1 MG/DL    Bilirubin, total 0.4 0.2 - 1.0 MG/DL    ALT (SGPT) 28 12 - 78 U/L    AST (SGOT) 27 15 - 37 U/L    Alk.  phosphatase 80 45 - 117 U/L    Protein, total 7.7 6.4 - 8.2 g/dL    Albumin 2.7 (L) 3.5 - 5.0 g/dL    Globulin 5.0 (H) 2.0 - 4.0 g/dL    A-G Ratio 0.5 (L) 1.1 - 2.2     C REACTIVE PROTEIN, QT   Result Value Ref Range    C-Reactive protein 7.80 (H) 0.00 - 0.60 mg/dL   D DIMER   Result Value Ref Range    D-dimer 0.46 0.00 - 0.65 mg/L FEU   PROCALCITONIN   Result Value Ref Range    Procalcitonin <0.05 ng/mL   D DIMER   Result Value Ref Range    D-dimer 0.36 0.00 - 0.65 mg/L FEU   METABOLIC PANEL, COMPREHENSIVE   Result Value Ref Range    Sodium 140 136 - 145 mmol/L    Potassium 3.8 3.5 - 5.1 mmol/L    Chloride 108 97 - 108 mmol/L    CO2 26 21 - 32 mmol/L    Anion gap 6 5 - 15 mmol/L    Glucose 121 (H) 65 - 100 mg/dL    BUN 19 6 - 20 MG/DL    Creatinine 0.71 0.55 - 1.02 MG/DL    BUN/Creatinine ratio 27 (H) 12 - 20      GFR est AA >60 >60 ml/min/1.73m2    GFR est non-AA >60 >60 ml/min/1.73m2    Calcium 8.2 (L) 8.5 - 10.1 MG/DL    Bilirubin, total 0.4 0.2 - 1.0 MG/DL    ALT (SGPT) 45 12 - 78 U/L    AST (SGOT) 47 (H) 15 - 37 U/L    Alk. phosphatase 70 45 - 117 U/L    Protein, total 7.2 6.4 - 8.2 g/dL    Albumin 2.5 (L) 3.5 - 5.0 g/dL    Globulin 4.7 (H) 2.0 - 4.0 g/dL    A-G Ratio 0.5 (L) 1.1 - 2.2     FERRITIN   Result Value Ref Range    Ferritin 485 (H) 8 - 252 NG/ML   CBC WITH AUTOMATED DIFF   Result Value Ref Range    WBC 4.3 3.6 - 11.0 K/uL    RBC 3.41 (L) 3.80 - 5.20 M/uL    HGB 10.6 (L) 11.5 - 16.0 g/dL    HCT 32.6 (L) 35.0 - 47.0 %    MCV 95.6 80.0 - 99.0 FL    MCH 31.1 26.0 - 34.0 PG    MCHC 32.5 30.0 - 36.5 g/dL    RDW 13.0 11.5 - 14.5 %    PLATELET 178 165 - 023 K/uL    MPV 11.8 8.9 - 12.9 FL    NRBC 0.0 0  WBC    ABSOLUTE NRBC 0.00 0.00 - 0.01 K/uL    NEUTROPHILS 55 32 - 75 %    LYMPHOCYTES 33 12 - 49 %    MONOCYTES 11 5 - 13 %    EOSINOPHILS 0 0 - 7 %    BASOPHILS 0 0 - 1 %    MYELOCYTES 1 %    IMMATURE GRANULOCYTES 0 0.0 - 0.5 %    ABS. NEUTROPHILS 2.4 1.8 - 8.0 K/UL    ABS. LYMPHOCYTES 1.4 0.8 - 3.5 K/UL    ABS. MONOCYTES 0.5 0.0 - 1.0 K/UL    ABS. EOSINOPHILS 0.0 0.0 - 0.4 K/UL    ABS. BASOPHILS 0.0 0.0 - 0.1 K/UL    ABS. IMM.  GRANS. 0.0 0.00 - 0.04 K/UL    DF MANUAL      PLATELET COMMENTS Large Platelets      RBC COMMENTS MACROCYTOSIS  1+       C REACTIVE PROTEIN, QT   Result Value Ref Range    C-Reactive protein 2.04 (H) 0.00 - 0.60 mg/dL   EKG, 12 LEAD, INITIAL   Result Value Ref Range    Ventricular Rate 79 BPM    Atrial Rate 79 BPM    P-R Interval 138 ms    QRS Duration 66 ms    Q-T Interval 370 ms    QTC Calculation (Bezet) 424 ms    Calculated P Axis 35 degrees    Calculated R Axis 22 degrees    Calculated T Axis -30 degrees    Diagnosis       Normal sinus rhythm  Nonspecific T wave abnormality  When compared with ECG of 10-JUL-2021 18:55,  Inverted T waves have replaced nonspecific T wave abnormality in Inferior   leads  Confirmed by Sung Gaspar ([id number]) on 8/15/2021 1:17:50 PM         Assessment/Plan:    ICD-10-CM ICD-9-CM    1. Thyroid nodule  E04.1 241.0 US THYROID/PARATHYROID/SOFT TISS   2. Lumbar radiculopathy  M54.16 724.4    3. Lumbar spondylolysis  M43.06 738.4    4. Obesity, morbid ([facility] [address]  E66.01 278.01      Orders Placed This Encounter    US THYROID/PARATHYROID/SOFT TISS     Standing Status:   Future     Standing Expiration Date:   4/30/2022     lose weight, increase physical activity, follow low fat diet, follow low salt diet, call if any problems,Take 81mg aspirin daily  Patient Instructions   Wheely Activation    Thank you for requesting access to Wheely. Please follow the instructions below to securely access and download your online medical record. Wheely allows you to send messages to your doctor, view your test results, renew your prescriptions, schedule appointments, and more. How Do I Sign Up? 1. In your internet browser, go to [email or web address]  2. Click on the First Time User? Click Here link in the Sign In box. You will be redirect to the New Member Sign Up page. 3. Enter your Wheely Access Code exactly as it appears below. You will not need to use this code after youve completed the sign-up process.  If you do not sign up before the expiration date, you must request a new code. leaselock Access Code: Activation code not generated  Current leaselock Status: Active (This is the date your leaselock access code will )    4. Enter the last four digits of your Social Security Number (xxxx) and Date of Birth (mm/dd/yyyy) as indicated and click Submit. You will be taken to the next sign-up page. 5. Create a [employer] ID. This will be your leaselock login ID and cannot be changed, so think of one that is secure and easy to remember. 6. Create a [employer] password. You can change your password at any time. 7. Enter your Password Reset Question and Answer. This can be used at a later time if you forget your password. 8. Enter your e-mail address. You will receive e-mail notification when new information is available in [address] 9. Click Sign Up. You can now view and download portions of your medical record. 10. Click the Download Summary menu link to download a portable copy of your medical information. Additional Information    If you have questions, please visit the Frequently Asked Questions section of the leaselock website at [email or web address] Remember, leaselock is NOT to be used for urgent needs. For medical emergencies, dial 911. Follow-up and Dispositions    · Return in about 4 weeks (around 2022), or if symptoms worsen or fail to improve. I have reviewed with the patient details of the assessment and plan and all questions were answered. Relevent patient education was performed. The most recent lab findings were reviewed with the patient. An After Visit Summary was printed and given to the patient.

## 2022-03-31 NOTE — PATIENT INSTRUCTIONS
[facility]    Thank you for requesting access to Idea Shower. Please follow the instructions below to securely access and download your online medical record. Idea Shower allows you to send messages to your doctor, view your test results, renew your prescriptions, schedule appointments, and more. How Do I Sign Up? 1. In your internet browser, go to [email or web address]  2. Click on the First Time User? Click Here link in the Sign In box. You will be redirect to the New Member Sign Up page. 3. Enter your Idea Shower Access Code exactly as it appears below. You will not need to use this code after youve completed the sign-up process. If you do not sign up before the expiration date, you must request a new code. Idea Shower Access Code: Activation code not generated  Current Idea Shower Status: Active (This is the date your Idea Shower access code will )    4. Enter the last four digits of your Social Security Number (xxxx) and Date of Birth (mm/dd/yyyy) as indicated and click Submit. You will be taken to the next sign-up page. 5. Create a Idea Shower ID. This will be your Idea Shower login ID and cannot be changed, so think of one that is secure and easy to remember. 6. Create a Idea Shower password. You can change your password at any time. 7. Enter your Password Reset Question and Answer. This can be used at a later time if you forget your password. 8. Enter your e-mail address. You will receive e-mail notification when new information is available in [address] 9. Click Sign Up. You can now view and download portions of your medical record. 10. Click the Download Summary menu link to download a portable copy of your medical information. Additional Information    If you have questions, please visit the Frequently Asked Questions section of the Idea Shower website at [email or web address] Remember, Idea Shower is NOT to be used for urgent needs. For medical emergencies, dial 911.

## 2022-04-25 ENCOUNTER — HOSPITAL ENCOUNTER (OUTPATIENT)
Dept: ULTRASOUND IMAGING | Age: 52
Discharge: HOME | End: 2022-04-25
Attending: INTERNAL MEDICINE
Payer: MEDICAID

## 2022-04-25 DIAGNOSIS — E04.1 THYROID NODULE: ICD-10-CM

## 2022-04-25 PROCEDURE — 76536 US EXAM OF HEAD AND NECK: CPT

## 2022-04-27 ENCOUNTER — OFFICE VISIT (OUTPATIENT)
Dept: INTERNAL MEDICINE CLINIC | Age: 52
End: 2022-04-27
Payer: COMMERCIAL

## 2022-04-27 VITALS
HEART RATE: 90 BPM | TEMPERATURE: 97.4 F | SYSTOLIC BLOOD PRESSURE: 120 MMHG | HEIGHT: 63 IN | BODY MASS INDEX: 52.08 KG/M2 | RESPIRATION RATE: 20 BRPM | OXYGEN SATURATION: 99 % | DIASTOLIC BLOOD PRESSURE: 70 MMHG

## 2022-04-27 DIAGNOSIS — M54.16 LUMBAR RADICULOPATHY: ICD-10-CM

## 2022-04-27 DIAGNOSIS — M43.06 LUMBAR SPONDYLOLYSIS: ICD-10-CM

## 2022-04-27 DIAGNOSIS — E04.1 THYROID NODULE: Primary | ICD-10-CM

## 2022-04-27 PROCEDURE — 99214 OFFICE O/P EST MOD 30 MIN: CPT | Performed by: INTERNAL MEDICINE

## 2022-04-27 NOTE — PATIENT INSTRUCTIONS
[employer] Activation    Thank you for requesting access to DaWanda. Please follow the instructions below to securely access and download your online medical record. DaWanda allows you to send messages to your doctor, view your test results, renew your prescriptions, schedule appointments, and more. How Do I Sign Up? 1. In your internet browser, go to [email or web address]  2. Click on the First Time User? Click Here link in the Sign In box. You will be redirect to the New Member Sign Up page. 3. Enter your DaWanda Access Code exactly as it appears below. You will not need to use this code after youve completed the sign-up process. If you do not sign up before the expiration date, you must request a new code. DaWanda Access Code: Activation code not generated  Current DaWanda Status: Active (This is the date your DaWanda access code will )    4. Enter the last four digits of your Social Security Number (xxxx) and Date of Birth (mm/dd/yyyy) as indicated and click Submit. You will be taken to the next sign-up page. 5. Create a DaWanda ID. This will be your DaWanda login ID and cannot be changed, so think of one that is secure and easy to remember. 6. Create a DaWanda password. You can change your password at any time. 7. Enter your Password Reset Question and Answer. This can be used at a later time if you forget your password. 8. Enter your e-mail address. You will receive e-mail notification when new information is available in [address] 9. Click Sign Up. You can now view and download portions of your medical record. 10. Click the Download Summary menu link to download a portable copy of your medical information. Additional Information    If you have questions, please visit the Frequently Asked Questions section of the DaWanda website at [email or web address] Remember, DaWanda is NOT to be used for urgent needs. For medical emergencies, dial 911.

## 2022-04-27 NOTE — PROGRESS NOTES
Darby Chamorro is a 47 y.o. female and presents with Post-COVID Symptoms  . Subjective:   Back Pain Review:  Patient presents for evaluation of low back problems. Symptoms have been present for  months and include pain in lower back (dull, moderate in character;10 /10 in severity). Initial inciting event: unknown. Symptoms are worst: at times. Alleviating factors identifiable by patient are lying flat, medication . Exacerbating factors identifiable by patient are bending forwards, bending backwards. Treatments so far initiated by patient: medication Previous lower back problems: reported. Previous workup: none. She states she has a rt.foot drop,she states she is to have surgery soon due to disc impingement    She has bilateral thyroid nodules reported on ultrasound. Both t4 and tsh were normal      Review of Systems  Constitutional: negative for fevers, chills, anorexia and weight loss  Eyes:   negative for visual disturbance and irritation  ENT:   negative for tinnitus,sore throat,nasal congestion,ear pains. hoarseness  Respiratory:  negative for cough, hemoptysis, dyspnea,wheezing  CV:   negative for chest pain, palpitations, lower extremity edema  GI:   negative for nausea, vomiting, diarrhea, abdominal pain,melena  Endo:               negative for polyuria,polydipsia,polyphagia,heat intolerance  Genitourinary: negative for frequency, dysuria and hematuria  Integument:  negative for rash and pruritus  Hematologic:  negative for easy bruising and gum/nose bleeding  Musculoskel: negative for myalgias, arthralgias, back pain, muscle weakness, joint pain  Neurological:  negative for headaches, dizziness, vertigo, memory problems and gait   Behavl/Psych: negative for feelings of anxiety, depression, mood changes    Past Medical History:   Diagnosis Date    Arthritis     Asthma     Seasonal     Bipolar disorder (HCC)     GERD (gastroesophageal reflux disease)     IBS (irritable bowel syndrome)     Ill-defined condition     MIGRAINES    Migraine     Peptic ulcer     PTSD (post-traumatic stress disorder)     Snoring     Thyroiditis 10/7/2020    TIA (transient ischemic attack)     As of 12/13/19, pt states she never had one and that her migraine caused symptoms of TIA     Past Surgical History:   Procedure Laterality Date    HX CERVICAL FUSION  2011    C4-C7    HX COLONOSCOPY  2010    HX CYST REMOVAL      from under both arms    HX HEENT Left 2013    orbital    HX HERNIA REPAIR Left 2009    INGUINAL    HX HYSTERECTOMY  2019    HX KNEE ARTHROSCOPY Left     HX LAP CHOLECYSTECTOMY      HX MENISCUS REPAIR Left     x2    HX ORTHOPAEDIC Left     ORIF left fibula    HX ROTATOR CUFF REPAIR Right 06/08/2021    HX TUBAL LIGATION  1994    HYSTEROSCOPY DIAGNOSTIC      x2     Social History     Socioeconomic History    Marital status: SINGLE   Tobacco Use    Smoking status: Former Smoker     Packs/day: 0.25     Years: 20.00     Pack years: 5.00    Smokeless tobacco: Never Used   Vaping Use    Vaping Use: Every day    Substances: CBD   Substance and Sexual Activity    Alcohol use: Yes     Comment: OCCASIONALLY    Drug use: No    Sexual activity: Not Currently     Family History   Problem Relation Age of Onset   [facility] Manual Stroke Father     Hypertension Father     Diabetes Father     Diabetes Mother     Hypertension Mother     Heart Disease Maternal Grandmother     Cancer Maternal Grandmother         brain and colon    Cancer Maternal Uncle         5 w/ brain /colon    Mult Sclerosis Other         1st cousin    Bipolar Disorder Other     Anesth Problems Neg Hx      Current Outpatient Medications   Medication Sig Dispense Refill    OTHER,NON-FORMULARY, CBD oil,cream for pain      B.infantis-B.ani-B.long-B.bifi (Probiotic 4X) 10-15 mg TbEC Take  by mouth.  cyanocobalamin (VITAMIN B12) 100 mcg tablet Take 100 mcg by mouth daily.       therapeutic multivitamin (THERA) tablet Take 1 Tablet by mouth daily.  Ferrous Sulfate (SLOW FE) 47.5 mg iron TbER tablet Take 1 Tab by mouth nightly.  acetaminophen-codeine (TYLENOL #3) 300-30 mg per tablet TAKE 1 TO 2 TABLETS BY MOUTH EVERY 6 HOURS AS NEEDED FOR PAIN (Patient not taking: Reported on 3/31/2022)      albuterol (ProAir HFA) 90 mcg/actuation inhaler ProAir HFA 90 mcg/actuation aerosol inhaler   INHALE 2 PUFFS PO Q 4 H PRF WHEEZING (Patient not taking: Reported on 3/31/2022)      ibuprofen (MOTRIN) 600 mg tablet  (Patient not taking: Reported on 3/31/2022)      fluticasone propionate (Flonase Allergy Relief) 50 mcg/actuation nasal spray 2 Sprays by Both Nostrils route daily as needed for Rhinitis.  (Patient not taking: Reported on 3/31/2022) 1 Bottle 0     Allergies   Allergen Reactions    Latex Hives    Other Food Rash     All fruits except apple, oranges, and pineapple (recorded as Other Medication 2/8/2012)    Peanut Swelling    Shellfish Containing Products Anaphylaxis    Morphine Itching     She has had morphine but premedicates with benadryl    Nystatin Rash    Flagyl [Metronidazole] Contact Dermatitis    Flexeril [Cyclobenzaprine] Rash    Other Medication Rash     All fruits  Can eat apples,oranges, and pineapple    Phenergan [Promethazine] Other (comments)     Rapid hr    Robaxin [Methocarbamol] Rash       Objective:  Visit Vitals  /70 (BP 1 Location: Right arm, BP Patient Position: Sitting, BP Cuff Size: Adult)   Pulse 90   Temp 97.4 °F (36.3 °C) (Oral)   Resp 20   Ht 5' 3\" (1.6 m)   LMP 12/04/2019   SpO2 99%   BMI 52.08 kg/m²     Physical Exam:   General appearance - alert, well appearing, and in no distress  Mental status - alert, oriented to person, place, and time  EYE-BRIGIDA, EOMI, corneas normal, no foreign bodies  ENT-ENT exam normal, no neck nodes or sinus tenderness  Nose - normal and patent, no erythema, discharge or polyps  Mouth - mucous membranes moist, pharynx normal without lesions  Neck - supple, no significant adenopathy   Chest - clear to auscultation, no wheezes, rales or rhonchi, symmetric air entry   Heart - normal rate, regular rhythm, normal S1, S2, no murmurs, rubs, clicks or gallops   Abdomen - soft, nontender, nondistended, no masses or organomegaly  Lymph- no adenopathy palpable  Ext-peripheral pulses normal, no pedal edema, no clubbing or cyanosis  Skin-Warm and dry. no hyperpigmentation, vitiligo, or suspicious lesions  Neuro -alert, oriented, normal speech, no focal findings or movement disorder noted  Neck-normal C-spine, no tenderness, full ROM without pain  Feet-no nail deformities or callus formation with good pulses noted      Results for orders placed or performed in visit on 04/11/22    MAMMOGRAPHY   Result Value Ref Range    Mammography, External         Assessment/Plan:    ICD-10-CM ICD-9-CM    1. Thyroid nodule  E04.1 241.0 REFERRAL TO GENERAL SURGERY   2. Lumbar radiculopathy  M54.16 724.4    3. Lumbar spondylolysis  M43.06 738.4      Orders Placed This Encounter    REFERRAL TO GENERAL SURGERY     Referral Priority:   Routine     Referral Type:   Consultation     Referral Reason:   Specialty Services Required     Referral Location:   [facility]     Referred to Provider:   Kat Russell MD     Number of Visits Requested:   1     lose weight, increase physical activity, call if any problems,Take 81mg aspirin daily  Patient Instructions   MyChart Activation    Thank you for requesting access to [employer]. Please follow the instructions below to securely access and download your online medical record. [employer] allows you to send messages to your doctor, view your test results, renew your prescriptions, schedule appointments, and more. How Do I Sign Up? 1. In your internet browser, go to [email or web address]  2. Click on the First Time User? Click Here link in the Sign In box. You will be redirect to the New Member Sign Up page.   3. Enter your [employer] Access Code exactly as it appears below. You will not need to use this code after youve completed the sign-up process. If you do not sign up before the expiration date, you must request a new code. [employer] Access Code: Activation code not generated  Current [employer] Status: Active (This is the date your [employer] access code will )    4. Enter the last four digits of your Social Security Number (xxxx) and Date of Birth (mm/dd/yyyy) as indicated and click Submit. You will be taken to the next sign-up page. 5. Create a [employer] ID. This will be your [employer] login ID and cannot be changed, so think of one that is secure and easy to remember. 6. Create a [employer] password. You can change your password at any time. 7. Enter your Password Reset Question and Answer. This can be used at a later time if you forget your password. 8. Enter your e-mail address. You will receive e-mail notification when new information is available in [address]. 9. Click Sign Up. You can now view and download portions of your medical record. 10. Click the Download Summary menu link to download a portable copy of your medical information. Additional Information    If you have questions, please visit the Frequently Asked Questions section of the [employer] website at [email or web address] Remember, [employer] is NOT to be used for urgent needs. For medical emergencies, dial 911. Follow-up and Dispositions    · Return in about 3 months (around 2022), or if symptoms worsen or fail to improve. I have reviewed with the patient details of the assessment and plan and all questions were answered. Relevent patient education was performed. The most recent lab findings were reviewed with the patient. An After Visit Summary was printed and given to the patient.

## 2022-05-04 ENCOUNTER — TELEPHONE (OUTPATIENT)
Dept: NEUROLOGY | Age: 52
End: 2022-05-04

## 2022-05-04 NOTE — TELEPHONE ENCOUNTER
Pt has received a referral from Dr. Rima Houston in [facility] for an EMG. Would like to schedule an appt.  Please call [phone]

## 2022-05-09 ENCOUNTER — OFFICE VISIT (OUTPATIENT)
Dept: SURGERY | Age: 52
End: 2022-05-09
Payer: MEDICAID

## 2022-05-09 VITALS
HEART RATE: 80 BPM | RESPIRATION RATE: 20 BRPM | DIASTOLIC BLOOD PRESSURE: 85 MMHG | HEIGHT: 64 IN | OXYGEN SATURATION: 97 % | WEIGHT: 290 LBS | TEMPERATURE: 98.1 F | BODY MASS INDEX: 49.51 KG/M2 | SYSTOLIC BLOOD PRESSURE: 120 MMHG

## 2022-05-09 DIAGNOSIS — E06.9 THYROIDITIS: Primary | ICD-10-CM

## 2022-05-09 PROCEDURE — 99212 OFFICE O/P EST SF 10 MIN: CPT | Performed by: SURGERY

## 2022-05-09 NOTE — PROGRESS NOTES
1. Have you been to the ER, urgent care clinic since your last visit? Hospitalized since your last visit? No    2. Have you seen or consulted any other health care providers outside of the [address] [place] [address] since your last visit? Include any pap smears or colon screening.  Allika [address]

## 2022-05-09 NOTE — LETTER
5/9/2022    Patient: Naresh Lizama   YOB: 1970   Date of Visit: 5/9/2022     Keyon Anderson MD  [facility]    Dear Keyon Anderson MD,      Thank you for referring Ms. Basilio Avila to [facility] for evaluation. My notes for this consultation are attached. If you have questions, please do not hesitate to call me. I look forward to following your patient along with you.       Sincerely,    Naye Gibbs MD

## 2022-05-09 NOTE — H&P (VIEW-ONLY)
Subjective:      Kimberly Hernández  is a 47 y.o. female who presents for evaluation of thyroid. Pt was seen in office on 10/07/2020 for evaluation of multinodular goiter. Pt had C-spine MRI on 09/28/20, incidental findings showed multinodular goiter with largest nodule in the RIGHT thyroid lobe measuring 17 mm. Pt reported pain in the RIGHT side of her neck. She also noted soreness in the back of her throat. Pt had US THYROID/PARATHYROID/SOFT TISS on 4/25/2022, bilateral thyroid nodules again identified. The largest on the right measures 2.4x 2.2 x 2.2 cm, grossly unchanged in size. The largest nodule on the right now has a central cystic component but is grossly unchanged in size. The largest nodule on the left measures 1.8 x 1.7 1.6 cm. Pt reports enlarged RT thyroid causes her to feel like she is choking. She would like to proceed with thyroidectomy. Pt has longstanding lower-back pain that is evaluated by neurosurgeon in [place]. She is working to transition all of her medical care to [place], [place].      Past Medical History:   Diagnosis Date    Arthritis     Asthma     Seasonal     Bipolar disorder ([facility] [address]     GERD (gastroesophageal reflux disease)     IBS (irritable bowel syndrome)     Ill-defined condition     MIGRAINES    Migraine     Peptic ulcer     PTSD (post-traumatic stress disorder)     Snoring     Thyroiditis 10/7/2020    TIA (transient ischemic attack)     As of 12/13/19, pt states she never had one and that her migraine caused symptoms of TIA       Past Surgical History:   Procedure Laterality Date    HX CERVICAL FUSION  2011    C4-C7    HX COLONOSCOPY  2010    HX CYST REMOVAL      from under both arms    HX HEENT Left 2013    orbital    HX HERNIA REPAIR Left 2009    INGUINAL    HX HYSTERECTOMY  2019    HX KNEE ARTHROSCOPY Left     HX LAP CHOLECYSTECTOMY      HX MENISCUS REPAIR Left     x2    HX ORTHOPAEDIC Left     ORIF left fibula    HX ROTATOR CUFF REPAIR Right 06/08/2021    HX TUBAL LIGATION  1994    HYSTEROSCOPY DIAGNOSTIC      x2       Social History     Tobacco Use    Smoking status: Former Smoker     Packs/day: 0.25     Years: 20.00     Pack years: 5.00    Smokeless tobacco: Never Used   Substance Use Topics    Alcohol use: Yes     Comment: OCCASIONALLY       Family History   Problem Relation Age of Onset   [facility] Stroke Father     Hypertension Father     Diabetes Father     Diabetes Mother     Hypertension Mother     Heart Disease Maternal Grandmother     Cancer Maternal Grandmother         brain and colon    Cancer Maternal Uncle         5 w/ brain /colon    Mult Sclerosis Other         1st cousin    Bipolar Disorder Other     Anesth Problems Neg Hx        Current Outpatient Medications on File Prior to Visit   Medication Sig Dispense Refill    ibuprofen (MOTRIN) 600 mg tablet       OTHER,NON-FORMULARY, CBD oil,cream for pain      B.infantis-B.ani-B.long-B.bifi (Probiotic 4X) 10-15 mg TbEC Take  by mouth.  cyanocobalamin (VITAMIN B12) 100 mcg tablet Take 100 mcg by mouth daily.  therapeutic multivitamin (THERA) tablet Take 1 Tablet by mouth daily.  acetaminophen-codeine (TYLENOL #3) 300-30 mg per tablet TAKE 1 TO 2 TABLETS BY MOUTH EVERY 6 HOURS AS NEEDED FOR PAIN (Patient not taking: Reported on 3/31/2022)      albuterol (ProAir HFA) 90 mcg/actuation inhaler ProAir HFA 90 mcg/actuation aerosol inhaler   INHALE 2 PUFFS PO Q 4 H PRF WHEEZING (Patient not taking: Reported on 3/31/2022)      fluticasone propionate (Flonase Allergy Relief) 50 mcg/actuation nasal spray 2 Sprays by Both Nostrils route daily as needed for Rhinitis. (Patient not taking: Reported on 3/31/2022) 1 Bottle 0    Ferrous Sulfate (SLOW FE) 47.5 mg iron TbER tablet Take 1 Tab by mouth nightly. (Patient not taking: Reported on 5/9/2022)       No current facility-administered medications on file prior to visit.        Allergies   Allergen Reactions    Latex Hives    Other Food Rash     All fruits except apple, oranges, and pineapple (recorded as Other Medication 2/8/2012)    Peanut Swelling    Shellfish Containing Products Anaphylaxis    Morphine Itching     She has had morphine but premedicates with benadryl    Nystatin Rash    Flagyl [Metronidazole] Contact Dermatitis    Flexeril [Cyclobenzaprine] Rash    Other Medication Rash     All fruits  Can eat apples,oranges, and pineapple    Phenergan [Promethazine] Other (comments)     Rapid hr    Robaxin [Methocarbamol] Rash         Review of Systems:  Constitutional: No fever or chills  Endocrine: +thyroiditis  Neurologic: No headache  Eyes: No scleral icterus or irritated eyes  Nose: No nasal pain or drainage  Mouth: No oral lesions or sore throat  Cardiac: No palpations or chest pain  Pulmonary: +asthma  Gastrointestinal: +GERD, +IBS  Genitourinary: No dysuria  Musculoskeletal: +athritis  Skin: No rashes or lesions  Psychiatric: +Bipolar disorder, +PTSD    Objective:     Visit Vitals  /85   Pulse 80   Temp 98.1 °F (36.7 °C) (Oral)   Resp 20   Ht 5' 4\" (1.626 m)   Wt 290 lb (131.5 kg)   SpO2 97%   BMI 49.78 kg/m²        Physical Exam:  General: No acute distress, conversant  Eyes: PERRLA, no scleral icterus  HENT: Normocephalic without oral lesions  Neck: RT lobe thyroid is 2x normal size, slightly tender, causes coughing when applying pressure to area  Cardiac: Normal pulse rate and rhythm  Pulmonary: Symmetric chest rise with normal effort  Abdomen: Soft, NT, ND, no hernia, no splenomegaly  Skin: Warm without rash  Extremities: No edema or joint stiffness  Psych: Appropriate mood and affect    Labs: No results found for this or any previous visit (from the past 24 hour(s)). Data Review: All previous imaging, testing and lab work was reviewed and interpreted. US THYROID/PARATHYROID/SOFT TISS 4/25/2022  IMPRESSION  Bilateral thyroid nodules again identified.  The largest on the right measures 2. 4x 2.2 x 2.2 cm, grossly unchanged in size. The largest nodule on the right now has a central cystic component but is grossly unchanged in size. The largest nodule on the left measures 1.8 x 1.7 1.6 cm    Assessment and Plan:       ICD-10-CM ICD-9-CM    1. Thyroiditis  E06.9 245.9        Pt will proceed with RT thyroid lobectomy as an outpatient. Office will call pt to schedule. All questions were answered and patient is in agreement with this plan. Total face to face time with patient: 15 minutes. Greater than 50% of the time was spent in counseling. This note was scribed by Andrez Valero as dictated by Dr. Leonor Anand.      Signed By: Denys Nicole MD     05/09/22

## 2022-05-09 NOTE — PROGRESS NOTES
Subjective:      Jennifer Mason  is a 47 y.o. female who presents for evaluation of thyroid. Pt was seen in office on 10/07/2020 for evaluation of multinodular goiter. Pt had C-spine MRI on 09/28/20, incidental findings showed multinodular goiter with largest nodule in the RIGHT thyroid lobe measuring 17 mm. Pt reported pain in the RIGHT side of her neck. She also noted soreness in the back of her throat. Pt had US THYROID/PARATHYROID/SOFT TISS on 4/25/2022, bilateral thyroid nodules again identified. The largest on the right measures 2.4x 2.2 x 2.2 cm, grossly unchanged in size. The largest nodule on the right now has a central cystic component but is grossly unchanged in size. The largest nodule on the left measures 1.8 x 1.7 1.6 cm. Pt reports enlarged RT thyroid causes her to feel like she is choking. She would like to proceed with thyroidectomy. Pt has longstanding lower-back pain that is evaluated by neurosurgeon in [place]. She is working to transition all of her medical care to [address] [place] [address].      Past Medical History:   Diagnosis Date    Arthritis     Asthma     Seasonal     Bipolar disorder ([address]     GERD (gastroesophageal reflux disease)     IBS (irritable bowel syndrome)     Ill-defined condition     MIGRAINES    Migraine     Peptic ulcer     PTSD (post-traumatic stress disorder)     Snoring     Thyroiditis 10/7/2020    TIA (transient ischemic attack)     As of 12/13/19, pt states she never had one and that her migraine caused symptoms of TIA       Past Surgical History:   Procedure Laterality Date    HX CERVICAL FUSION  2011    C4-C7    HX COLONOSCOPY  2010    HX CYST REMOVAL      from under both arms    HX HEENT Left 2013    orbital    HX HERNIA REPAIR Left 2009    INGUINAL    HX HYSTERECTOMY  2019    HX KNEE ARTHROSCOPY Left     HX LAP CHOLECYSTECTOMY      HX MENISCUS REPAIR Left     x2    HX ORTHOPAEDIC Left     ORIF left fibula    HX ROTATOR CUFF REPAIR Right 06/08/2021    HX TUBAL LIGATION  1994    HYSTEROSCOPY DIAGNOSTIC      x2       Social History     Tobacco Use    Smoking status: Former Smoker     Packs/day: 0.25     Years: 20.00     Pack years: 5.00    Smokeless tobacco: Never Used   Substance Use Topics    Alcohol use: Yes     Comment: OCCASIONALLY       Family History   Problem Relation Age of Onset   [facility] Stroke Father     Hypertension Father     Diabetes Father     Diabetes Mother     Hypertension Mother     Heart Disease Maternal Grandmother     Cancer Maternal Grandmother         brain and colon    Cancer Maternal Uncle         5 w/ brain /colon    Mult Sclerosis Other         1st cousin    Bipolar Disorder Other     Anesth Problems Neg Hx        Current Outpatient Medications on File Prior to Visit   Medication Sig Dispense Refill    ibuprofen (MOTRIN) 600 mg tablet       OTHER,NON-FORMULARY, CBD oil,cream for pain      B.infantis-B.ani-B.long-B.bifi (Probiotic 4X) 10-15 mg TbEC Take  by mouth.  cyanocobalamin (VITAMIN B12) 100 mcg tablet Take 100 mcg by mouth daily.  therapeutic multivitamin (THERA) tablet Take 1 Tablet by mouth daily.  acetaminophen-codeine (TYLENOL #3) 300-30 mg per tablet TAKE 1 TO 2 TABLETS BY MOUTH EVERY 6 HOURS AS NEEDED FOR PAIN (Patient not taking: Reported on 3/31/2022)      albuterol (ProAir HFA) 90 mcg/actuation inhaler ProAir HFA 90 mcg/actuation aerosol inhaler   INHALE 2 PUFFS PO Q 4 H PRF WHEEZING (Patient not taking: Reported on 3/31/2022)      fluticasone propionate (Flonase Allergy Relief) 50 mcg/actuation nasal spray 2 Sprays by Both Nostrils route daily as needed for Rhinitis. (Patient not taking: Reported on 3/31/2022) 1 Bottle 0    Ferrous Sulfate (SLOW FE) 47.5 mg iron TbER tablet Take 1 Tab by mouth nightly. (Patient not taking: Reported on 5/9/2022)       No current facility-administered medications on file prior to visit.        Allergies   Allergen Reactions    Latex Hives    Other Food Rash     All fruits except apple, oranges, and pineapple (recorded as Other Medication 2/8/2012)    Peanut Swelling    Shellfish Containing Products Anaphylaxis    Morphine Itching     She has had morphine but premedicates with benadryl    Nystatin Rash    Flagyl [Metronidazole] Contact Dermatitis    Flexeril [Cyclobenzaprine] Rash    Other Medication Rash     All fruits  Can eat apples,oranges, and pineapple    Phenergan [Promethazine] Other (comments)     Rapid hr    Robaxin [Methocarbamol] Rash         Review of Systems:  Constitutional: No fever or chills  Endocrine: +thyroiditis  Neurologic: No headache  Eyes: No scleral icterus or irritated eyes  Nose: No nasal pain or drainage  Mouth: No oral lesions or sore throat  Cardiac: No palpations or chest pain  Pulmonary: +asthma  Gastrointestinal: +GERD, +IBS  Genitourinary: No dysuria  Musculoskeletal: +athritis  Skin: No rashes or lesions  Psychiatric: +Bipolar disorder, +PTSD    Objective:     Visit Vitals  /85   Pulse 80   Temp 98.1 °F (36.7 °C) (Oral)   Resp 20   Ht 5' 4\" (1.626 m)   Wt 290 lb (131.5 kg)   SpO2 97%   BMI 49.78 kg/m²        Physical Exam:  General: No acute distress, conversant  Eyes: PERRLA, no scleral icterus  HENT: Normocephalic without oral lesions  Neck: RT lobe thyroid is 2x normal size, slightly tender, causes coughing when applying pressure to area  Cardiac: Normal pulse rate and rhythm  Pulmonary: Symmetric chest rise with normal effort  Abdomen: Soft, NT, ND, no hernia, no splenomegaly  Skin: Warm without rash  Extremities: No edema or joint stiffness  Psych: Appropriate mood and affect    Labs: No results found for this or any previous visit (from the past 24 hour(s)). Data Review: All previous imaging, testing and lab work was reviewed and interpreted. US THYROID/PARATHYROID/SOFT TISS 4/25/2022  IMPRESSION  Bilateral thyroid nodules again identified.  The largest on the right measures 2. 4x 2.2 x 2.2 cm, grossly unchanged in size. The largest nodule on the right now has a central cystic component but is grossly unchanged in size. The largest nodule on the left measures 1.8 x 1.7 1.6 cm    Assessment and Plan:       ICD-10-CM ICD-9-CM    1. Thyroiditis  E06.9 245.9        Pt will proceed with RT thyroid lobectomy as an outpatient. Office will call pt to schedule. All questions were answered and patient is in agreement with this plan. Total face to face time with patient: 15 minutes. Greater than 50% of the time was spent in counseling. This note was scribed by Shanta Mcgraw as dictated by Dr. Alicia Del Rio.      Signed By: Roosevelt Maria MD     05/09/22

## 2022-05-16 ENCOUNTER — HOSPITAL ENCOUNTER (OUTPATIENT)
Dept: PREADMISSION TESTING | Age: 52
Discharge: HOME | End: 2022-05-16

## 2022-05-16 VITALS
SYSTOLIC BLOOD PRESSURE: 114 MMHG | BODY MASS INDEX: 50.02 KG/M2 | WEIGHT: 293 LBS | RESPIRATION RATE: 20 BRPM | DIASTOLIC BLOOD PRESSURE: 76 MMHG | HEART RATE: 73 BPM | HEIGHT: 64 IN | TEMPERATURE: 98.2 F

## 2022-05-16 RX ORDER — ASCORBIC ACID 500 MG
500 TABLET ORAL DAILY
COMMUNITY
End: 2022-09-01

## 2022-05-16 NOTE — PERIOP NOTES
[address] INSTRUCTIONS    Surgery Date:   05-    [facility] staff will call you between 4 PM- 8 PM the day before surgery with your arrival time. If your surgery is on a Monday, we will call you the preceding Friday. Please call [phone] after 8 PM if you did not receive your arrival time. 1. Please report to [facility] Patient Access/Admitting on the 1st floor. Bring your insurance card, photo identification, and any copayment ( if applicable). 2. If you are going home the same day of your surgery, you must have a responsible adult to drive you home. You need to have a responsible adult to stay with you the first 24 hours after surgery and you should not drive a car for 24 hours following your surgery. 3. Do NOT eat any solid foods after midnight the night before surgery including candy, mint or gum. You may drink clear liquids from midnight until 1 hour prior to your arrival. You may drink up to 12 ounces at one time every 4 hours. Please note special instructions, if applicable, below for medications. 4. Do NOT drink alcohol or smoke 24 hours before surgery. STOP smoking for 14 days prior as it helps with breathing and healing after surgery. 5. If you are being admitted to the hospital, please leave personal belongings/luggage in your car until you have an assigned hospital room number. 6. Please wear comfortable clothes. Wear your glasses instead of contacts. We ask that all money, jewelry and valuables be left at home. Wear no make up, particularly mascara, the day of surgery. 7.  All body piercings, rings, and jewelry need to be removed and left at home. Please remove any nail polish or artifical nails from your fingernails. Please wear your hair loose or down. Please no pony-tails, buns, or any metal hair accessories. If you shower the morning of surgery, please do not apply any lotions or powders afterwards. You may wear deodorant, unless having breast surgery.   Do not shave any body area within 24 hours of your surgery. 8. Please follow all instructions to avoid any potential surgical cancellation. 9. Should your physical condition change, (i.e. fever, cold, flu, etc.) please notify your surgeon as soon as possible. 10. It is important to be on time. If a situation occurs where you may be delayed, please call:  [phone] / [phone] on the day of surgery. 11. The Preadmission Testing staff can be reached at [phone]. 12. Special instructions: NONE      Current Outpatient Medications   Medication Sig    ascorbic acid, vitamin C, (Vitamin C) 500 mg tablet Take 500 mg by mouth daily.  albuterol (ProAir HFA) 90 mcg/actuation inhaler ProAir HFA 90 mcg/actuation aerosol inhaler   INHALE 2 PUFFS PO Q 4 H PRF WHEEZING    ibuprofen (MOTRIN) 600 mg tablet every six (6) hours as needed.  fluticasone propionate (Flonase Allergy Relief) 50 mcg/actuation nasal spray 2 Sprays by Both Nostrils route daily as needed for Rhinitis.  B.infantis-B.ani-B.long-B.bifi (Probiotic 4X) 10-15 mg TbEC Take  by mouth.  cyanocobalamin (VITAMIN B12) 100 mcg tablet Take 100 mcg by mouth as needed.  therapeutic multivitamin (THERA) tablet Take 1 Tablet by mouth daily.  OTHER,NON-FORMULARY, CBD oil,cream for pain     No current facility-administered medications for this encounter. 1. YOU MUST ONLY TAKE THESE MEDICATIONS THE MORNING OF SURGERY WITH A SIP OF WATER: NONE  2. MEDICATIONS TO TAKE THE MORNING OF SURGERY ONLY IF NEEDED: NONE  3. HOLD these prescription medications BEFORE Surgery: NONE  4. Ask your surgeon/prescribing physician about when/if to STOP taking these medications: NONE  5. Stop all vitamins, herbal medicines and Aspirin containing products 7 days prior to surgery. Stop any non-steroidal anti-inflammatory drugs (i.e. Ibuprofen, Naproxen, Advil, Aleve) 3 days before surgery. You may take Tylenol.     6. If you are currently taking Plavix, Coumadin,or any other blood-thinning/anticoagulant medication contact your prescribing physician for instructions. Eating and Drinking Before Surgery     You may eat a regular dinner at the usual time on the day before your surgery.  Do NOT eat any solid foods after midnight unless your arrival time at the hospital is 3pm or later.  You may drink clear liquids only from 12 midnight until 1 hours prior to your arrival time at the hospital on the day of your surgery. Do NOT drink alcohol.  Clear liquids include:  o Water  o Fruit juices without pulp( i.e. apple juice)  o Carbonated beverages  o Black coffee (no cream/milk)  o Tea (no cream/milk)  o Gatorade   You may drink up to 12-16 ounces at one time every 4 hours between the hours of midnight and 1 hour before your arrival time at the hospital. Example- if your arrival time at the hospital is 6am, you may drink 12-16 ounces of clear liquids no later than 5am.   If your arrival time at the hospital is 3pm or later, you may eat a light breakfast before 8am.   A light breakfast includes:  o Toast or bagel (no butter)  o Black coffee (no cream/milk)  o Tea (no cream/milk)  o Fruit juices without pulp ( i.e. apple juice)  o Do NOT eat meat, eggs, vegetables or fruit   If you have any questions, please contact your surgeon's office. Preventing Infections Before and After - Your Surgery    IMPORTANT INSTRUCTIONS    You play an important role in your health and preparation for surgery. To reduce the germs on your skin you will need to shower with CHG soap (Chorhexidine gluconate 4%) two times before surgery. CHG soap (Hibiclens, Hex-A-Clens or store brand)   CHG soap will be provided at your Preadmission Testing (PAT) appointment.  If you do not have a PAT appointment before surgery, you may arrange to  CHG soap from our office or purchase CHG soap at a pharmacy, grocery or department store.    You need to purchase TWO 4 ounce bottles to use for your 2 showers. Steps to follow:  1. Wash your hair with your normal shampoo and your body with regular soap and rinse well to remove shampoo and soap from your skin. 2. Wet a clean washcloth and turn off the shower. 3. Put CHG soap on washcloth and apply to your entire body from the neck down. Do not use on your head, face or private parts(genitals). Do not use CHG soap on open sores, wounds or areas of skin irritation. 4. Wash you body gently for 5 minutes. Do not wash your skin too hard. This soap does not create lather. Pay special attention to your underarms and from your belly button to your feet. 5. Turn the shower back on and rinse well to get CHG soap off your body. 6. Pat your skin dry with a clean, dry towel. Do not apply lotions or moisturizer. 7. Put on clean clothes and sleep on fresh bed sheets and do not allow pets to sleep with you. Shower with CHG soap 2 times before your surgery   The evening before your surgery   The morning of your surgery      Tips to help prevent infections after your surgery:  1. Protect your surgical wound from germs:  ? Hand washing is the most important thing you and your caregivers can do to prevent infections. ? Keep your bandage clean and dry! ? Do not touch your surgical wound. 2. Use clean, freshly washed towels and washcloths every time you shower; do not share bath linens with others. 3. Until your surgical wound is healed, wear clothing and sleep on bed linens each day that are clean and freshly washed. 4. Do not allow pets to sleep in your bed with you or touch your surgical wound. 5. Do not smoke - smoking delays wound healing. This may be a good time to stop smoking. 6. If you have diabetes, it is important for you to manage your blood sugar levels properly before your surgery as well as after your surgery. Poorly managed blood sugar levels slow down wound healing and prevent you from healing completely.               Patient Information Regarding COVID Restrictions      Day of Procedure     Please park in the parking deck or any designated visitor parking lot.  Enter the facility through the [facility] of the hospital.   On the day of surgery, please provide the cell phone number of the person who will be waiting for you to the Patient Access representative at the time of registration.  Please wear a mask on the day of your procedure.  We are now allowing two designated visitors per stay. Pediatric patients may have 2 designated visitors. These two people may come in with you on the day of your procedure.  No visitors under the age of 13.  The designated visitor must also wear a mask.  Once your procedure and the immediate recovery period is completed, a nurse in the recovery area will contact your designated visitor to inform them of your room number or to otherwise review other pertinent information regarding your care.  Social distancing practices are to be adhered to in waiting areas and the cafeteria. The patient was contacted  in person. She verbalized understanding of all instructions does not  need reinforcement.

## 2022-05-16 NOTE — PERIOP NOTES
PREOP AND MEDICATION INSTRUCTIONS PRINTED AND REVIEWED WITH PATIENT . TWO BOTTLES OF CHG SOAP GIVEN. INFORMATION REGARDING MRSA TESTING REVIEWED AND GIVEN.     COPY OF COVID CARD ON CHART

## 2022-05-17 LAB
BACTERIA SPEC CULT: NORMAL
BACTERIA SPEC CULT: NORMAL
SERVICE CMNT-IMP: NORMAL

## 2022-05-17 RX ORDER — OXYCODONE HCL 10 MG/1
10 TABLET, FILM COATED, EXTENDED RELEASE ORAL
COMMUNITY
End: 2022-09-01

## 2022-05-17 NOTE — PERIOP NOTES
RETURNING PT PHONE CALL, LVM WITH CALL BACK NUMBER     PATIENT CALLED BACK, INQUIRING ABOUT PAIN MEDICATION IF SHE CAN TAKE IT, SEE MAR FOR DETAILS

## 2022-05-19 ENCOUNTER — ANESTHESIA EVENT (OUTPATIENT)
Dept: SURGERY | Age: 52
End: 2022-05-19
Payer: MEDICAID

## 2022-05-20 ENCOUNTER — ANESTHESIA (OUTPATIENT)
Dept: SURGERY | Age: 52
End: 2022-05-20
Payer: MEDICAID

## 2022-05-20 ENCOUNTER — HOSPITAL ENCOUNTER (OUTPATIENT)
Age: 52
Setting detail: OUTPATIENT SURGERY
Discharge: HOME | End: 2022-05-20
Attending: SURGERY | Admitting: SURGERY
Payer: MEDICAID

## 2022-05-20 VITALS
HEIGHT: 64 IN | RESPIRATION RATE: 13 BRPM | DIASTOLIC BLOOD PRESSURE: 66 MMHG | SYSTOLIC BLOOD PRESSURE: 111 MMHG | OXYGEN SATURATION: 98 % | BODY MASS INDEX: 50.02 KG/M2 | WEIGHT: 293 LBS | TEMPERATURE: 97.7 F | HEART RATE: 86 BPM

## 2022-05-20 DIAGNOSIS — E04.2 MULTINODULAR GOITER: ICD-10-CM

## 2022-05-20 DIAGNOSIS — G89.18 POST-OP PAIN: Primary | ICD-10-CM

## 2022-05-20 PROCEDURE — 88333 PATH CONSLTJ SURG CYTO XM 1: CPT

## 2022-05-20 PROCEDURE — 77030014008 HC SPNG HEMSTAT J&J -C: Performed by: SURGERY

## 2022-05-20 PROCEDURE — 77030031753 HC SHR ENDO COAG HARM J&J -E: Performed by: SURGERY

## 2022-05-20 PROCEDURE — 60220 PARTIAL REMOVAL OF THYROID: CPT | Performed by: SURGERY

## 2022-05-20 PROCEDURE — 76010000131 HC OR TIME 2 TO 2.5 HR: Performed by: SURGERY

## 2022-05-20 PROCEDURE — 88307 TISSUE EXAM BY PATHOLOGIST: CPT

## 2022-05-20 PROCEDURE — 2709999900 HC NON-CHARGEABLE SUPPLY: Performed by: SURGERY

## 2022-05-20 PROCEDURE — 74011000250 HC RX REV CODE- 250: Performed by: SURGERY

## 2022-05-20 PROCEDURE — 74011250636 HC RX REV CODE- 250/636: Performed by: ANESTHESIOLOGY

## 2022-05-20 PROCEDURE — 76210000016 HC OR PH I REC 1 TO 1.5 HR: Performed by: SURGERY

## 2022-05-20 PROCEDURE — 74011000250 HC RX REV CODE- 250: Performed by: NURSE ANESTHETIST, CERTIFIED REGISTERED

## 2022-05-20 PROCEDURE — 74011250637 HC RX REV CODE- 250/637: Performed by: ANESTHESIOLOGY

## 2022-05-20 PROCEDURE — 77030008684 HC TU ET CUF COVD -B: Performed by: ANESTHESIOLOGY

## 2022-05-20 PROCEDURE — 77030031139 HC SUT VCRL2 J&J -A: Performed by: SURGERY

## 2022-05-20 PROCEDURE — 77030010507 HC ADH SKN DERMBND J&J -B: Performed by: SURGERY

## 2022-05-20 PROCEDURE — 76060000035 HC ANESTHESIA 2 TO 2.5 HR: Performed by: SURGERY

## 2022-05-20 PROCEDURE — 74011250636 HC RX REV CODE- 250/636: Performed by: SURGERY

## 2022-05-20 PROCEDURE — 77030002933 HC SUT MCRYL J&J -A: Performed by: SURGERY

## 2022-05-20 PROCEDURE — 74011250636 HC RX REV CODE- 250/636: Performed by: NURSE ANESTHETIST, CERTIFIED REGISTERED

## 2022-05-20 PROCEDURE — 77030042556 HC PNCL CAUT -B: Performed by: SURGERY

## 2022-05-20 PROCEDURE — 88331 PATH CONSLTJ SURG 1 BLK 1SPC: CPT

## 2022-05-20 PROCEDURE — 77030040361 HC SLV COMPR DVT MDII -B: Performed by: SURGERY

## 2022-05-20 PROCEDURE — 77030002996 HC SUT SLK J&J -A: Performed by: SURGERY

## 2022-05-20 PROCEDURE — 76210000020 HC REC RM PH II FIRST 0.5 HR: Performed by: SURGERY

## 2022-05-20 PROCEDURE — 77030010938 HC CLP LIG TELE -A: Performed by: SURGERY

## 2022-05-20 PROCEDURE — 77030026438 HC STYL ET INTUB CARD -A: Performed by: ANESTHESIOLOGY

## 2022-05-20 RX ORDER — SODIUM CHLORIDE, SODIUM LACTATE, POTASSIUM CHLORIDE, CALCIUM CHLORIDE 600; 310; 30; 20 MG/100ML; MG/100ML; MG/100ML; MG/100ML
100 INJECTION, SOLUTION INTRAVENOUS CONTINUOUS
Status: DISCONTINUED | OUTPATIENT
Start: 2022-05-20 | End: 2022-05-20 | Stop reason: HOSPADM

## 2022-05-20 RX ORDER — MIDAZOLAM HYDROCHLORIDE 1 MG/ML
0.5 INJECTION, SOLUTION INTRAMUSCULAR; INTRAVENOUS
Status: DISCONTINUED | OUTPATIENT
Start: 2022-05-20 | End: 2022-05-20 | Stop reason: HOSPADM

## 2022-05-20 RX ORDER — ROCURONIUM BROMIDE 10 MG/ML
INJECTION, SOLUTION INTRAVENOUS AS NEEDED
Status: DISCONTINUED | OUTPATIENT
Start: 2022-05-20 | End: 2022-05-20 | Stop reason: HOSPADM

## 2022-05-20 RX ORDER — PROPOFOL 10 MG/ML
INJECTION, EMULSION INTRAVENOUS AS NEEDED
Status: DISCONTINUED | OUTPATIENT
Start: 2022-05-20 | End: 2022-05-20 | Stop reason: HOSPADM

## 2022-05-20 RX ORDER — FENTANYL CITRATE 50 UG/ML
50 INJECTION, SOLUTION INTRAMUSCULAR; INTRAVENOUS AS NEEDED
Status: DISCONTINUED | OUTPATIENT
Start: 2022-05-20 | End: 2022-05-20 | Stop reason: HOSPADM

## 2022-05-20 RX ORDER — FENTANYL CITRATE 50 UG/ML
25 INJECTION, SOLUTION INTRAMUSCULAR; INTRAVENOUS
Status: COMPLETED | OUTPATIENT
Start: 2022-05-20 | End: 2022-05-20

## 2022-05-20 RX ORDER — BUPIVACAINE HYDROCHLORIDE AND EPINEPHRINE 5; 5 MG/ML; UG/ML
30 INJECTION, SOLUTION EPIDURAL; INTRACAUDAL; PERINEURAL ONCE
Status: DISCONTINUED | OUTPATIENT
Start: 2022-05-20 | End: 2022-05-20 | Stop reason: HOSPADM

## 2022-05-20 RX ORDER — ONDANSETRON 2 MG/ML
4 INJECTION INTRAMUSCULAR; INTRAVENOUS AS NEEDED
Status: DISCONTINUED | OUTPATIENT
Start: 2022-05-20 | End: 2022-05-20 | Stop reason: HOSPADM

## 2022-05-20 RX ORDER — LIDOCAINE HYDROCHLORIDE 10 MG/ML
0.1 INJECTION, SOLUTION EPIDURAL; INFILTRATION; INTRACAUDAL; PERINEURAL AS NEEDED
Status: DISCONTINUED | OUTPATIENT
Start: 2022-05-20 | End: 2022-05-20 | Stop reason: HOSPADM

## 2022-05-20 RX ORDER — BUPIVACAINE HYDROCHLORIDE AND EPINEPHRINE 2.5; 5 MG/ML; UG/ML
INJECTION, SOLUTION EPIDURAL; INFILTRATION; INTRACAUDAL; PERINEURAL AS NEEDED
Status: DISCONTINUED | OUTPATIENT
Start: 2022-05-20 | End: 2022-05-20 | Stop reason: HOSPADM

## 2022-05-20 RX ORDER — DIPHENHYDRAMINE HYDROCHLORIDE 50 MG/ML
12.5 INJECTION, SOLUTION INTRAMUSCULAR; INTRAVENOUS AS NEEDED
Status: DISCONTINUED | OUTPATIENT
Start: 2022-05-20 | End: 2022-05-20 | Stop reason: HOSPADM

## 2022-05-20 RX ORDER — LIDOCAINE HYDROCHLORIDE 20 MG/ML
INJECTION, SOLUTION EPIDURAL; INFILTRATION; INTRACAUDAL; PERINEURAL AS NEEDED
Status: DISCONTINUED | OUTPATIENT
Start: 2022-05-20 | End: 2022-05-20 | Stop reason: HOSPADM

## 2022-05-20 RX ORDER — HYDROMORPHONE HYDROCHLORIDE 1 MG/ML
0.5 INJECTION, SOLUTION INTRAMUSCULAR; INTRAVENOUS; SUBCUTANEOUS
Status: DISCONTINUED | OUTPATIENT
Start: 2022-05-20 | End: 2022-05-20 | Stop reason: HOSPADM

## 2022-05-20 RX ORDER — FENTANYL CITRATE 50 UG/ML
INJECTION, SOLUTION INTRAMUSCULAR; INTRAVENOUS AS NEEDED
Status: DISCONTINUED | OUTPATIENT
Start: 2022-05-20 | End: 2022-05-20 | Stop reason: HOSPADM

## 2022-05-20 RX ORDER — ONDANSETRON 2 MG/ML
INJECTION INTRAMUSCULAR; INTRAVENOUS AS NEEDED
Status: DISCONTINUED | OUTPATIENT
Start: 2022-05-20 | End: 2022-05-20 | Stop reason: HOSPADM

## 2022-05-20 RX ORDER — SUCCINYLCHOLINE CHLORIDE 20 MG/ML
INJECTION INTRAMUSCULAR; INTRAVENOUS AS NEEDED
Status: DISCONTINUED | OUTPATIENT
Start: 2022-05-20 | End: 2022-05-20 | Stop reason: HOSPADM

## 2022-05-20 RX ORDER — GLYCOPYRROLATE 0.2 MG/ML
INJECTION INTRAMUSCULAR; INTRAVENOUS AS NEEDED
Status: DISCONTINUED | OUTPATIENT
Start: 2022-05-20 | End: 2022-05-20 | Stop reason: HOSPADM

## 2022-05-20 RX ORDER — SODIUM CHLORIDE 0.9 % (FLUSH) 0.9 %
5-40 SYRINGE (ML) INJECTION AS NEEDED
Status: DISCONTINUED | OUTPATIENT
Start: 2022-05-20 | End: 2022-05-20 | Stop reason: HOSPADM

## 2022-05-20 RX ORDER — ROPIVACAINE HYDROCHLORIDE 5 MG/ML
30 INJECTION, SOLUTION EPIDURAL; INFILTRATION; PERINEURAL AS NEEDED
Status: DISCONTINUED | OUTPATIENT
Start: 2022-05-20 | End: 2022-05-20 | Stop reason: HOSPADM

## 2022-05-20 RX ORDER — ESMOLOL HYDROCHLORIDE 10 MG/ML
INJECTION INTRAVENOUS AS NEEDED
Status: DISCONTINUED | OUTPATIENT
Start: 2022-05-20 | End: 2022-05-20 | Stop reason: HOSPADM

## 2022-05-20 RX ORDER — SODIUM CHLORIDE 0.9 % (FLUSH) 0.9 %
5-40 SYRINGE (ML) INJECTION EVERY 8 HOURS
Status: DISCONTINUED | OUTPATIENT
Start: 2022-05-20 | End: 2022-05-20 | Stop reason: HOSPADM

## 2022-05-20 RX ORDER — DEXAMETHASONE SODIUM PHOSPHATE 4 MG/ML
INJECTION, SOLUTION INTRA-ARTICULAR; INTRALESIONAL; INTRAMUSCULAR; INTRAVENOUS; SOFT TISSUE AS NEEDED
Status: DISCONTINUED | OUTPATIENT
Start: 2022-05-20 | End: 2022-05-20 | Stop reason: HOSPADM

## 2022-05-20 RX ORDER — MORPHINE SULFATE 2 MG/ML
2 INJECTION, SOLUTION INTRAMUSCULAR; INTRAVENOUS
Status: DISCONTINUED | OUTPATIENT
Start: 2022-05-20 | End: 2022-05-20 | Stop reason: HOSPADM

## 2022-05-20 RX ORDER — PHENYLEPHRINE HCL IN 0.9% NACL 0.4MG/10ML
SYRINGE (ML) INTRAVENOUS AS NEEDED
Status: DISCONTINUED | OUTPATIENT
Start: 2022-05-20 | End: 2022-05-20 | Stop reason: HOSPADM

## 2022-05-20 RX ORDER — MIDAZOLAM HYDROCHLORIDE 1 MG/ML
1 INJECTION, SOLUTION INTRAMUSCULAR; INTRAVENOUS AS NEEDED
Status: DISCONTINUED | OUTPATIENT
Start: 2022-05-20 | End: 2022-05-20 | Stop reason: HOSPADM

## 2022-05-20 RX ORDER — ACETAMINOPHEN 325 MG/1
650 TABLET ORAL ONCE
Status: COMPLETED | OUTPATIENT
Start: 2022-05-20 | End: 2022-05-20

## 2022-05-20 RX ORDER — MIDAZOLAM HYDROCHLORIDE 1 MG/ML
INJECTION, SOLUTION INTRAMUSCULAR; INTRAVENOUS AS NEEDED
Status: DISCONTINUED | OUTPATIENT
Start: 2022-05-20 | End: 2022-05-20 | Stop reason: HOSPADM

## 2022-05-20 RX ORDER — SODIUM CHLORIDE 9 MG/ML
25 INJECTION, SOLUTION INTRAVENOUS CONTINUOUS
Status: DISCONTINUED | OUTPATIENT
Start: 2022-05-20 | End: 2022-05-20 | Stop reason: HOSPADM

## 2022-05-20 RX ORDER — OXYCODONE AND ACETAMINOPHEN 5; 325 MG/1; MG/1
1 TABLET ORAL
Qty: 20 TABLET | Refills: 0 | Status: SHIPPED | OUTPATIENT
Start: 2022-05-20 | End: 2022-05-25

## 2022-05-20 RX ADMIN — MIDAZOLAM 2 MG: 1 INJECTION INTRAMUSCULAR; INTRAVENOUS at 08:28

## 2022-05-20 RX ADMIN — FENTANYL CITRATE 25 MCG: 50 INJECTION, SOLUTION INTRAMUSCULAR; INTRAVENOUS at 11:00

## 2022-05-20 RX ADMIN — SODIUM CHLORIDE, POTASSIUM CHLORIDE, SODIUM LACTATE AND CALCIUM CHLORIDE: 600; 310; 30; 20 INJECTION, SOLUTION INTRAVENOUS at 10:24

## 2022-05-20 RX ADMIN — ONDANSETRON HYDROCHLORIDE 4 MG: 2 INJECTION, SOLUTION INTRAMUSCULAR; INTRAVENOUS at 10:24

## 2022-05-20 RX ADMIN — PROPOFOL 200 MG: 10 INJECTION, EMULSION INTRAVENOUS at 08:36

## 2022-05-20 RX ADMIN — ESMOLOL HYDROCHLORIDE 30 MG: 10 INJECTION, SOLUTION INTRAVENOUS at 08:49

## 2022-05-20 RX ADMIN — SUCCINYLCHOLINE CHLORIDE 200 MG: 20 INJECTION, SOLUTION INTRAMUSCULAR; INTRAVENOUS at 08:37

## 2022-05-20 RX ADMIN — Medication 3 G: at 08:45

## 2022-05-20 RX ADMIN — Medication 120 MCG: at 10:00

## 2022-05-20 RX ADMIN — FENTANYL CITRATE 25 MCG: 50 INJECTION, SOLUTION INTRAMUSCULAR; INTRAVENOUS at 10:55

## 2022-05-20 RX ADMIN — Medication 80 MCG: at 09:01

## 2022-05-20 RX ADMIN — Medication 120 MCG: at 09:56

## 2022-05-20 RX ADMIN — FENTANYL CITRATE 50 MCG: 50 INJECTION, SOLUTION INTRAMUSCULAR; INTRAVENOUS at 08:33

## 2022-05-20 RX ADMIN — SODIUM CHLORIDE, POTASSIUM CHLORIDE, SODIUM LACTATE AND CALCIUM CHLORIDE 100 ML/HR: 600; 310; 30; 20 INJECTION, SOLUTION INTRAVENOUS at 07:37

## 2022-05-20 RX ADMIN — GLYCOPYRROLATE 0.2 MG: 0.2 INJECTION, SOLUTION INTRAMUSCULAR; INTRAVENOUS at 08:41

## 2022-05-20 RX ADMIN — FENTANYL CITRATE 25 MCG: 50 INJECTION, SOLUTION INTRAMUSCULAR; INTRAVENOUS at 11:05

## 2022-05-20 RX ADMIN — Medication 80 MCG: at 09:04

## 2022-05-20 RX ADMIN — FENTANYL CITRATE 50 MCG: 50 INJECTION, SOLUTION INTRAMUSCULAR; INTRAVENOUS at 08:36

## 2022-05-20 RX ADMIN — PROPOFOL 100 MG: 10 INJECTION, EMULSION INTRAVENOUS at 09:08

## 2022-05-20 RX ADMIN — DEXAMETHASONE SODIUM PHOSPHATE 8 MG: 4 INJECTION, SOLUTION INTRAMUSCULAR; INTRAVENOUS at 08:44

## 2022-05-20 RX ADMIN — ROCURONIUM BROMIDE 10 MG: 10 SOLUTION INTRAVENOUS at 08:36

## 2022-05-20 RX ADMIN — MIDAZOLAM HYDROCHLORIDE 0.5 MG: 1 INJECTION, SOLUTION INTRAMUSCULAR; INTRAVENOUS at 11:19

## 2022-05-20 RX ADMIN — FENTANYL CITRATE 25 MCG: 50 INJECTION, SOLUTION INTRAMUSCULAR; INTRAVENOUS at 10:50

## 2022-05-20 RX ADMIN — LIDOCAINE HYDROCHLORIDE 60 MG: 20 INJECTION, SOLUTION EPIDURAL; INFILTRATION; INTRACAUDAL; PERINEURAL at 08:36

## 2022-05-20 RX ADMIN — MEPERIDINE HYDROCHLORIDE 25 MG: 25 INJECTION INTRAMUSCULAR; INTRAVENOUS; SUBCUTANEOUS at 08:55

## 2022-05-20 RX ADMIN — ACETAMINOPHEN 650 MG: 325 TABLET ORAL at 07:20

## 2022-05-20 RX ADMIN — Medication 120 MCG: at 10:15

## 2022-05-20 RX ADMIN — SUCCINYLCHOLINE CHLORIDE 40 MG: 20 INJECTION, SOLUTION INTRAMUSCULAR; INTRAVENOUS at 09:08

## 2022-05-20 NOTE — BRIEF OP NOTE
Brief Postoperative Note    Patient: Tamar Martinez  YOB: 1970  MRN: [id number]    Date of Procedure: 5/20/2022     Pre-Op Diagnosis: Multinodular goiter      Post-Op Diagnosis: Same as preoperative diagnosis.       Procedure(s):  RIGHT THYROID LOBECTOMY    Surgeon(s):  Ebony Vega MD    Surgical Assistant: Surg Asst-1: Snehal Garcia    Anesthesia: General     Estimated Blood Loss (mL): Minimal    Complications: None    Specimens:   ID Type Source Tests Collected by Time Destination   1 : RIGHT THYROID MASS Frozen Section Thyroid  Ebony Vega MD 5/20/2022 2010 Pathology        Implants: * No implants in log *    Drains: * No LDAs found *    Findings: 3 cm mass in a diminutive lobe    Electronically Signed by Ronen Tolentino MD on 5/20/2022 at 10:33 AM  [id number]

## 2022-05-20 NOTE — PERIOP NOTES
Contact, Tyler Rater (daughter), returned call- she is at work and has to make arrangements to leave and  her mother post surgery.

## 2022-05-20 NOTE — INTERVAL H&P NOTE
Update History & Physical    The Patient's History and Physical of May 9,   2022 was reviewed with the patient and I examined the patient. There was no change. The surgical site was confirmed by the patient and me. Plan:  The risk, benefits, expected outcome, and alternative to the recommended procedure have been discussed with the patient. Patient understands and wants to proceed with the procedure.     Electronically signed by Joan Carrillo MD on 5/20/2022 at 7:05 AM

## 2022-05-20 NOTE — DISCHARGE INSTRUCTIONS
Thyroid Surgery: What to Expect at Home  Your Recovery     Your doctor made a cut (incision) in your neck and removed part of your thyroid gland to find what is causing a lump or to remove a growth in the gland. The piece removed may have been large or small. Your doctor may have removed all of your thyroid if there was cancer or another problem. The doctor did a test on a small sample of the tissue from your thyroid and closed the incision in your neck with stitches. Keep the incision covered with the bandage and dry for 48 hours. You will likely have a tube, called a drain, in your neck. It lets fluid out of the cut. The drain is most often taken out before you go home. Ask your doctor how much drainage to expect. You may go home on the same day or stay one or more nights in the hospital after surgery. You may be able to return to work or your normal routine in 1 to 2 weeks. This depends on whether you need more treatment, how you feel, and the kind of work you do. Your doctor will check your incision about a week after surgery. You may need to take thyroid medicine. If you have thyroid cancer, you may need to have radioactive iodine therapy. Your doctor will talk to you about what happens next. You will feel some pain for several days. You may have some nausea and general muscle aches and may feel tired for 1 to 2 days. You also may have a sore throat and sound hoarse. This care sheet gives you a general idea about how long it will take for you to recover. But each person recovers at a different pace. Follow the steps below to feel better as quickly as possible. How can you care for yourself at home? Activity    · Rest when you feel tired. Getting enough sleep will help you recover.     · Most people are able to return to work a few days after surgery, but this can depend on what type of work you do. Diet    · You can eat your normal diet.  If your stomach is upset, try bland, low-fat foods like plain rice, broiled chicken, toast, and yogurt. Medicines    · Your doctor will tell you if and when you can restart your medicines. He or she will also give you instructions about taking any new medicines.     · If you take aspirin or some other blood thinner, ask your doctor if and when to start taking it again. Make sure that you understand exactly what your doctor wants you to do.     · Suck on throat lozenges or gargle with warm salt water to help your sore throat.     · Be safe with medicines. Take pain medicines exactly as directed. ? If the doctor gave you a prescription medicine for pain, take it as prescribed. ? If you are not taking a prescription pain medicine, ask your doctor if you can take an over-the-counter medicine.     · If you think your pain medicine is making you sick to your stomach:  ? Take your medicine after meals (unless your doctor has told you not to). ? Ask your doctor for a different pain medicine. Incision care    · If you have strips of tape on the cut (incision) the doctor made, leave the tape on for a week or until it falls off. Or follow your doctor's instructions for removing the tape.     · Keep the area clean and dry.     · You will have a dressing over the cut (incision). A dressing helps the incision heal and protects it. Your doctor will tell you how to take care of this. Exercise    · Avoid strenuous activities, such as bicycle riding, jogging, weight lifting, or aerobic exercise, until your doctor says it is okay. Elevation    · You may be more comfortable if you keep your head up on a pillow when you lie down. Support the back of your head and neck with both hands when you sit up to prevent discomfort. Follow-up care is a key part of your treatment and safety. Be sure to make and go to all appointments, and call your doctor if you are having problems. It's also a good idea to know your test results and keep a list of the medicines you take.   When should you call for help? Call 911 anytime you think you may need emergency care. For example, call if:    · You have severe trouble breathing. Call your doctor now or seek immediate medical care if:    · You have a lot of bleeding through the bandage.     · You have a hard time swallowing.     · You have trouble breathing.     · You have new or worsening pain.     · You have symptoms of infection, such as:  ? Increased pain, swelling, warmth, or redness. ? Red streaks leading from the incision. ? Pus draining from the incision. ? A fever. Watch closely for any changes in your health, and be sure to contact your doctor if:    · You're not getting better as expected.     · You notice a change in your voice. Where can you learn more? Go to http://www.gray.com/  Enter V203 in the search box to learn more about \"Thyroid Surgery: What to Expect at Home. \"  Current as of: 2021               Content Version: 13.2   [employer]. Care instructions adapted under license by [employer] (which disclaims liability or warranty for this information). If you have questions about a medical condition or this instruction, always ask your healthcare professional. [place] [address] any warranty or liability for your use of this information. Patient Discharge Instructions    Darby Chamorro / [id number] : 1970    Admitted 2022 Discharged: 2022     Take Home Medications            · It is important that you take the medication exactly as they are prescribed. · Keep your medication in the bottles provided by the pharmacist and keep a list of the medication names, dosages, and times to be taken in your wallet. · Do not take other medications without consulting your doctor.        What to do at Home    Recommended diet: Regular Diet,     Recommended activity: Activity as tolerated, may shower whenever you wish          Follow-up Appointments   Procedures    FOLLOW UP VISIT Appointment in: Two Weeks     Standing Status:   Standing     Number of Occurrences:   1     Order Specific Question:   Appointment in     Answer: Two Weeks           Information obtained by :  I understand that if any problems occur once I am at home I am to contact my physician. I understand and acknowledge receipt of the instructions indicated above. Physician's or R.N.'s Signature                                                                  Date/Time                                                                                                                                              Patient or Representative Signature                                                          Date/Time      ______________________________________________________________________    Anesthesia Discharge Instructions    After general anesthesia or intervenous sedation, for 24 hours or while taking prescription Narcotics:  · Limit your activities  · Do not drive or operate hazardous machinery  · If you have not urinated within 8 hours after discharge, please contact your surgeon on call. · Do not make important personal or business decisions  · Do not drink alcoholic beverages    Report the following to your surgeon:  · Excessive pain, swelling, redness or odor of or around the surgical area  · Temperature over 100.5 degrees  · Nausea and vomiting lasting longer than 4 hours or if unable to take medication  · Any signs of decreased circulation or nerve impairment to extremity:  Change in color, persistent numbness, tingling, coldness or increased pain.   · Any questions

## 2022-05-20 NOTE — PERIOP NOTES
Marianna Johnson (patient's mother) returned call. She will contact Jesús Dee (daughter) who will be picking up her mother at discharge. Luther Perla stated Jesús Dee should be at the hospital by 12:00 pm- but Jesús Dee will call PACU if that is not possible.

## 2022-05-20 NOTE — ANESTHESIA PREPROCEDURE EVALUATION
Relevant Problems   RESPIRATORY SYSTEM   (+) Pneumonia due to COVID-19 virus      NEUROLOGY   (+) Migraine      ENDOCRINE   (+) Obesity, morbid (HCC)       Anesthetic History   No history of anesthetic complications            Review of Systems / Medical History  Patient summary reviewed, nursing notes reviewed and pertinent labs reviewed    Pulmonary  Within defined limits          Asthma        Neuro/Psych   Within defined limits  seizures    Psychiatric history     Cardiovascular  Within defined limits                Exercise tolerance: >4 METS     GI/Hepatic/Renal  Within defined limits   GERD      PUD     Endo/Other  Within defined limits    Hypothyroidism  Morbid obesity and arthritis     Other Findings              Physical Exam    Airway  Mallampati: III  TM Distance: > 6 cm  Neck ROM: normal range of motion, short neck   Mouth opening: Normal     Cardiovascular  Regular rate and rhythm,  S1 and S2 normal,  no murmur, click, rub, or gallop             Dental  No notable dental hx       Pulmonary  Breath sounds clear to auscultation               Abdominal  GI exam deferred       Other Findings            Anesthetic Plan    ASA: 2  Anesthesia type: general          Induction: Intravenous  Anesthetic plan and risks discussed with: Patient

## 2022-05-21 NOTE — OP NOTES
[address]   OPERATIVE REPORT    Name:  Palma Srinivasan  MR#:  [id number]  :  1970  ACCOUNT #:  [de-identified]  DATE OF SERVICE:  2022    PREOPERATIVE DIAGNOSIS:  Multinodular goiter with right nodule causing compressive symptoms. POSTOPERATIVE DIAGNOSIS:  Multinodular goiter with right nodule causing compressive symptoms. PROCEDURE PERFORMED:  Right thyroid lobectomy. SURGEON:  Shraddha Wallace MD    ASSISTANT:  Rubi Harris    ANESTHESIA:  General, supplemented with 0.25% Marcaine. COMPLICATIONS:  None. SPECIMENS REMOVED:  Right lobe and the isthmus. IMPLANTS:  None. ESTIMATED BLOOD LOSS:  Minimal.    DRAINS:  None. FINDINGS:  A 3 cm mass replacing the right lobe. PROCEDURE:  With the patient supine and suitably anesthetized, the neck was prepared with ChloraPrep and draped as a field. The skin was anesthetized with Marcaine, and a curvilinear collar incision was made and carried down through the skin and platysma muscle. Subplatysmal flaps were elevated superiorly and inferiorly. Strap muscles were divided in the midline and dissected off the right lobe of the thyroid. The right lobe was really stuck to the trachea and the larynx. I first mobilized the superior pole, dividing the superior pole vessels with a Harmonic scalpel. I then dissected along the lateral border, never encountered the vein, but was able to identify the right lower parathyroid gland and the upper, and preserved both. I identified the nerve and kept it out of harm's way as it entered the larynx. The anterior pole vessels were then divided with the Harmonic scalpel. The gallbladder was then taken off the trachea with cautery. The isthmus was divided right at its junction with the left lobe. I sent the specimen for histologic examination and it returned showing no evidence of malignancy at this point.   I palpated the left lobe and it was somewhat nodular but nothing remarkable. I then closed the strap muscles with a running 2-0 Vicryl. The platysma was reapproximated with a running 3-0 Vicryl. The skin was closed with subcuticular Monocryl, followed by Dermabond. At the termination of the procedure, all counts were correct. The patient tolerated this well and was brought to the PACU in satisfactory condition.       Tarah Monique MD      [id number]  D:  05/20/2022 10:39  T:  05/21/2022 0:52  JOB #:  [id number]  CC:  Princess Sellers MD

## 2022-05-21 NOTE — ANESTHESIA POSTPROCEDURE EVALUATION
Procedure(s):  RIGHT THYROID LOBECTOMY. general    Anesthesia Post Evaluation        Patient location during evaluation: PACU  Note status: Adequate. Level of consciousness: responsive to verbal stimuli and sleepy but conscious  Pain management: satisfactory to patient  Airway patency: patent  Anesthetic complications: no  Cardiovascular status: acceptable  Respiratory status: acceptable  Hydration status: acceptable  Comments: +Post-Anesthesia Evaluation and Assessment    Patient: Cookie Avilez MRN: [id number]  SSN: [id number]   YOB: 1970  Age: 47 y.o. Sex: female          Cardiovascular Function/Vital Signs    /66   Pulse 86   Temp 36.5 °C (97.7 °F)   Resp 13   Ht 5' 4\" (1.626 m)   Wt 133.6 kg (294 lb 8.6 oz)   SpO2 98%   BMI 50.56 kg/m²     Patient is status post Procedure(s):  RIGHT THYROID LOBECTOMY. Nausea/Vomiting: Controlled. Postoperative hydration reviewed and adequate. Pain:  Pain Scale 1: Numeric (0 - 10) (05/20/22 1105)  Pain Intensity 1: 7 (05/20/22 1105)   Managed. Neurological Status:   Neuro (WDL): Within Defined Limits (05/20/22 1138)   At baseline. Mental Status and Level of Consciousness: Arousable. Pulmonary Status:   O2 Device: None (Room air) (05/20/22 1138)   Adequate oxygenation and airway patent. Complications related to anesthesia: None    Post-anesthesia assessment completed. No concerns. I have evaluated the patient and the patient is stable and ready to be discharged from PACU . Signed By: Trae Zayas MD    5/21/2022        INITIAL Post-op Vital signs:   Vitals Value Taken Time   /66 05/20/22 1145   Temp 36.5 °C (97.7 °F) 05/20/22 1138   Pulse 85 05/20/22 1145   Resp 13 05/20/22 1145   SpO2 98 % 05/20/22 1145   Vitals shown include unvalidated device data.

## 2022-05-31 ENCOUNTER — VIRTUAL VISIT (OUTPATIENT)
Dept: INTERNAL MEDICINE CLINIC | Age: 52
End: 2022-05-31
Payer: MEDICAID

## 2022-05-31 DIAGNOSIS — E89.0 POSTOPERATIVE HYPOTHYROIDISM: Primary | ICD-10-CM

## 2022-05-31 DIAGNOSIS — Z09 HOSPITAL DISCHARGE FOLLOW-UP: ICD-10-CM

## 2022-05-31 PROCEDURE — 99441 PR PHYS/QHP TELEPHONE EVALUATION 5-10 MIN: CPT | Performed by: INTERNAL MEDICINE

## 2022-05-31 PROCEDURE — 1111F DSCHRG MED/CURRENT MED MERGE: CPT | Performed by: INTERNAL MEDICINE

## 2022-05-31 NOTE — PROGRESS NOTES
Renea Mesa is a 47 y.o. female evaluated via telephone on 5/31/2022. Consent:  She and/or health care decision maker is aware that she may receive a bill for this telephone service, depending on her insurance coverage, and has provided verbal consent to proceed: Yes      Documentation:  I communicated with the patient and/or health care decision maker about thyroid surgery. Details of this discussion including any medical advice provided: thyroid surgery      I affirm this is a Patient Initiated Episode with an Established Patient who has not had a related appointment within my department in the past 7 days or scheduled within the next 24 hours.     Total Time: minutes: 5-10 minutes      Note: not billable if this call serves to triage the patient into an appointment for the relevant concern      Kenan Perez MD      She is s/p thyroid surgery  Needs a tsh level  Denies complaints  Review of Systems  Constitutional: negative for fevers, chills, anorexia and weight loss  Eyes:   negative for visual disturbance and irritation  ENT:   ,sore throat  Respiratory:  negative for cough, hemoptysis, dyspnea,wheezing  CV:   negative for chest pain, palpitations, lower extremity edema  GI:   negative for nausea, vomiting, diarrhea, abdominal pain,melena  Endo:               negative for polyuria,polydipsia,polyphagia,heat intolerance  Genitourinary: negative for frequency, dysuria and hematuria  Integument:  negative for rash and pruritus  Hematologic:  negative for easy bruising and gum/nose bleeding  Musculoskel: negative for myalgias, arthralgias, back pain, muscle weakness, joint pain  Neurological:  negative for headaches, dizziness, vertigo, memory problems and gait   Behavl/Psych: negative for feelings of anxiety, depression, mood changes    Past Medical History:   Diagnosis Date    Arthritis     Asthma     Seasonal     Bipolar disorder (HCC)     Chronic pain     GERD (gastroesophageal reflux disease)     IBS (irritable bowel syndrome)     Ill-defined condition     MIGRAINES    Migraine     Multinodular goiter 10/7/2020    Peptic ulcer     PTSD (post-traumatic stress disorder)     Seizures ([address] 2018    seizure as a result of migrane    Snoring     Thyroiditis 10/7/2020     Past Surgical History:   Procedure Laterality Date    HX CERVICAL FUSION  2011    C4-C7    HX COLONOSCOPY  2010    HX CYST REMOVAL      from under both arms    HX HEENT Left 2013    orbital    HX HERNIA REPAIR Left 2009    INGUINAL    HX HYSTERECTOMY  2019    HX KNEE ARTHROSCOPY Left     HX LAP CHOLECYSTECTOMY      HX MENISCUS REPAIR Left     x2    HX ORTHOPAEDIC Left     ORIF left fibula    HX ROTATOR CUFF REPAIR Right 06/08/2021    HX TUBAL LIGATION  1994    HYSTEROSCOPY DIAGNOSTIC      x2     Social History     Socioeconomic History    Marital status: SINGLE   Tobacco Use    Smoking status: Current Some Day Smoker     Packs/day: 0.25     Years: 20.00     Pack years: 5.00    Smokeless tobacco: Never Used   Vaping Use    Vaping Use: Every day    Substances: CBD   Substance and Sexual Activity    Alcohol use: Yes     Comment: OCCASIONALLY    Drug use: Yes     Types: Marijuana    Sexual activity: Not Currently     Family History   Problem Relation Age of Onset   Murrel Neither Stroke Father     Hypertension Father     Diabetes Father     Diabetes Mother     Hypertension Mother     Heart Disease Maternal Grandmother     Cancer Maternal Grandmother         brain and colon    Cancer Maternal Uncle         5 w/ brain /colon    Mult Sclerosis Other         1st cousin    Bipolar Disorder Other     No Known Problems Sister     No Known Problems Sister     Bipolar Disorder Daughter     No Known Problems Daughter     No Known Problems Daughter     No Known Problems Son     Anesth Problems Neg Hx      Current Outpatient Medications   Medication Sig Dispense Refill    oxyCODONE ER (OxyCONTIN) 10 mg ER tablet Take 10 mg by mouth every twelve (12) hours as needed for Pain.  ibuprofen (MOTRIN) 600 mg tablet every six (6) hours as needed.  ascorbic acid, vitamin C, (Vitamin C) 500 mg tablet Take 500 mg by mouth daily. (Patient not taking: Reported on 5/31/2022)      albuterol (ProAir HFA) 90 mcg/actuation inhaler ProAir HFA 90 mcg/actuation aerosol inhaler   INHALE 2 PUFFS PO Q 4 H PRF WHEEZING (Patient not taking: Reported on 5/20/2022)      fluticasone propionate (Flonase Allergy Relief) 50 mcg/actuation nasal spray 2 Sprays by Both Nostrils route daily as needed for Rhinitis. (Patient not taking: Reported on 5/31/2022) 1 Bottle 0    OTHER,NON-FORMULARY, CBD oil,cream for pain (Patient not taking: Reported on 5/31/2022)      B.infantis-B.ani-B.long-B.bifi (Probiotic 4X) 10-15 mg TbEC Take  by mouth. (Patient not taking: Reported on 5/31/2022)      cyanocobalamin (VITAMIN B12) 100 mcg tablet Take 100 mcg by mouth as needed. (Patient not taking: Reported on 5/31/2022)      therapeutic multivitamin (THERA) tablet Take 1 Tablet by mouth daily.  (Patient not taking: Reported on 5/31/2022)       Allergies   Allergen Reactions    Latex Hives    Other Food Rash     All fruits except apple, oranges, and pineapple (recorded as Other Medication 2/8/2012)    Peanut Swelling    Shellfish Containing Products Anaphylaxis    Morphine Itching     She has had morphine but premedicates with benadryl    Nystatin Rash    Flagyl [Metronidazole] Contact Dermatitis    Flexeril [Cyclobenzaprine] Rash    Other Medication Rash     All fruits  Can eat apples,oranges, and pineapple    Phenergan [Promethazine] Other (comments)     Rapid hr    Robaxin [Methocarbamol] Rash       Objective:  Visit Vitals  LMP 12/04/2019         Results for orders placed or performed during the hospital encounter of 05/16/22   CULTURE, MRSA    Specimen: Nares; Nasal   Result Value Ref Range    Special Requests: NO SPECIAL REQUESTS      Culture result: MRSA NOT PRESENT      Culture result:        Screening of patient nares for MRSA is for surveillance purposes and, if positive, to facilitate isolation considerations in high risk settings. It is not intended for automatic decolonization interventions per se as regimens are not sufficiently effective to warrant routine use. Assessment/Plan:    ICD-10-CM ICD-9-CM    1. Postoperative hypothyroidism  E89.0 244.0 T4, FREE      TSH 3RD GENERATION     Orders Placed This Encounter    T4, FREE     Standing Status:   Future     Standing Expiration Date:   5/31/2023    TSH 3RD GENERATION     Standing Status:   Future     Standing Expiration Date:   5/31/2023     call if any problems,  There are no Patient Instructions on file for this visit. I have reviewed with the patient details of the assessment and plan and all questions were answered. Relevent patient education was performed. The most recent lab findings were reviewed with the patient. An After Visit Summary was printed and given to the patient.

## 2022-05-31 NOTE — PROGRESS NOTES
1. Have you been to the ER, urgent care clinic since your last visit? Hospitalized since your last visit? Yes/H/Goiter surgery    2. Have you seen or consulted any other health care providers outside of the [address] [place] [address] since your last visit? Include any pap smears or colon screening.  No    Chief Complaint   Patient presents with   [facility]

## 2022-06-02 ENCOUNTER — HOSPITAL ENCOUNTER (OUTPATIENT)
Dept: GENERAL RADIOLOGY | Age: 52
Discharge: HOME | End: 2022-06-02
Admitting: NURSE PRACTITIONER
Payer: MEDICAID

## 2022-06-02 ENCOUNTER — TRANSCRIBE ORDER (OUTPATIENT)
Dept: REGISTRATION | Age: 52
End: 2022-06-02

## 2022-06-02 DIAGNOSIS — M54.41 ACUTE BILATERAL LOW BACK PAIN WITH BILATERAL SCIATICA: Primary | ICD-10-CM

## 2022-06-02 DIAGNOSIS — M54.42 ACUTE BILATERAL LOW BACK PAIN WITH BILATERAL SCIATICA: Primary | ICD-10-CM

## 2022-06-02 DIAGNOSIS — M54.16 LUMBAR RADICULOPATHY: ICD-10-CM

## 2022-06-02 DIAGNOSIS — M54.41 ACUTE BILATERAL LOW BACK PAIN WITH BILATERAL SCIATICA: ICD-10-CM

## 2022-06-02 DIAGNOSIS — M54.42 ACUTE BILATERAL LOW BACK PAIN WITH BILATERAL SCIATICA: ICD-10-CM

## 2022-06-02 PROCEDURE — 72110 X-RAY EXAM L-2 SPINE 4/>VWS: CPT

## 2022-06-03 ENCOUNTER — OFFICE VISIT (OUTPATIENT)
Dept: SURGERY | Age: 52
End: 2022-06-03
Payer: MEDICAID

## 2022-06-03 VITALS
HEART RATE: 95 BPM | WEIGHT: 290 LBS | HEIGHT: 64 IN | OXYGEN SATURATION: 96 % | DIASTOLIC BLOOD PRESSURE: 81 MMHG | RESPIRATION RATE: 18 BRPM | SYSTOLIC BLOOD PRESSURE: 125 MMHG | TEMPERATURE: 98.2 F | BODY MASS INDEX: 49.51 KG/M2

## 2022-06-03 DIAGNOSIS — L25.9 CONTACT DERMATITIS, UNSPECIFIED CONTACT DERMATITIS TYPE, UNSPECIFIED TRIGGER: ICD-10-CM

## 2022-06-03 DIAGNOSIS — Z09 POSTOPERATIVE EXAMINATION: Primary | ICD-10-CM

## 2022-06-03 PROCEDURE — 99024 POSTOP FOLLOW-UP VISIT: CPT

## 2022-06-03 NOTE — PATIENT INSTRUCTIONS
-  Ok to bath as normal and you may get incision wet. -  You may continue to apply coconut cream to surgical incision. May use  benadryl gel or hydrocortisone cream to help with itching and rash. -  Avoid sun exposure.  May use sunscreen     -  Follow up with your primary care doctor for your thyroid lab results     -  Please call with any questions or concerns     -  Follow up only as needed

## 2022-06-03 NOTE — PROGRESS NOTES
CC: post op     Subjective:     Crista Real is a 47 y.o. female presents for postop care 2 weeks s/p Right thyroid lobectomy. Patient states that she is doing well post op. Tolerating a soft diet. She states that sometimes she feels like if she eats pieces that are too big they \"feel stuck\". Soft food are well tolerated. No nausea/vomiting. Bowel movements are regular. The patient is voiding without difficulty. Patient takes percocet and ibuprofen for chronic back pain and states that surgical site is tender to palpation, but pain is minimal. She reports that after surgery she \"broke out into a rash b/c of the surgical glue\". She has been applying coconut cream to allieviate swelling and itching to surgical site. She believes swelling and rash have improved significantly with use. She recently saw her PCP who has ordered a thyroid panel and will manage care of her thyroid. Patient denies fever, chest pain, or shortness of breath. Review of Systems:  A comprehensive review of systems was negative except for that written above      Ms. Laverne Cantu has a reminder for a \"due or due soon\" health maintenance. I have asked that she contact her primary care provider for follow-up on this health maintenance. Objective:     Visit Vitals  /81   Pulse 95   Temp 98.2 °F (36.8 °C) (Oral)   Resp 18   Ht 5' 4\" (1.626 m)   Wt 290 lb (131.5 kg)   LMP 12/04/2019   SpO2 96%   BMI 49.78 kg/m²       General: alert, cooperative, no distress, appears stated age  CV: Regular rate and rhythm  Pulmonary: Lungs clear to auscultation   Incision:  Mild rash with solid papules surrounding surgical site extending to upper anterior neck,  minimal swelling, no erythema, Incision healing well, no drainage, no seroma, no dehiscence, incision well approximated. Surgical glue appears to have all come off. Assessment:       ICD-10-CM ICD-9-CM    1.  Postoperative examination  Z09 V67.00        Plan:   2+ weeks s/p Right thyroid lobectomy  Reviewed pathology with patient   Right thyroid mass, right thyroid lobectomy:        Colloid nodules with 2.5 cm dominant nodule (multinodular goiter)   Negative for neoplasm including no malignancy     Wound care discussed. May get incision wet. May continue to moisturize surgical incision as needed with coconut cream and may use benadryl gel OTC or hydrocortisone cream to rash alleviate any continued itching/swelling. Pt instructed to call office if rash worsens or does not improve. Instructed patient to continue with soft foods and chew food thoroughly before swallowing. Reassured that swallowing and rash will continue to improve, but to call office if she has any concerns. Instructed her to follow up with PCP for Thyroid panel results. Advance activity as tolerated. Sage Ibarra verbalized understanding and questions were answered to the best of my knowledge and ability.    Discharged from surgical care with prn follow up         > 18 minutes were spent with patient with greater than 50% of that time spent face to face counseling    Didi Ferreira NP  [facility]   6/3/2022

## 2022-06-03 NOTE — PROGRESS NOTES
1. Have you been to the ER, urgent care clinic since your last visit? Hospitalized since your last visit? no    2. Have you seen or consulted any other health care providers outside of the [address] [place] [address] since your last visit? Include any pap smears or colon screening.  no

## 2022-06-18 NOTE — ED PROVIDER NOTES
EMERGENCY DEPARTMENT HISTORY AND PHYSICAL EXAM      Date: 7/3/2020  Patient Name: Michael Luong    History of Presenting Illness     Chief Complaint   Patient presents with    Migraine     reports migraine x 2 weeks that she reports secondary to MS       History Provided By: Patient    HPI: Michael Luong, 45 y.o. female  presents to the ED with cc of migraine headache. Patient states she is had a migraine headache for about 2 weeks. She states is secondary to her multiple sclerosis. She has been taking a triptan at home with no relief. She states she went to go to her doctor's office today and while she was right in the elevator she states it got acutely worse. The pain is mostly centered behind her right eye. She states she has some blurred vision in that eye. She does have some nausea and sensitivity to light. No fevers or chills. No chest pain or shortness of breath. Past History     Past Medical History:  Past Medical History:   Diagnosis Date    Arthritis     Asthma     Seasonal     Bipolar disorder ([address]     IBS (irritable bowel syndrome)     Migraine     Peptic ulcer     PTSD (post-traumatic stress disorder)     Snoring     TIA (transient ischemic attack)     As of 12/13/19, pt states she never had one and that her migraine caused symptoms of TIA       Past Surgical History:  Past Surgical History:   Procedure Laterality Date    HX CERVICAL FUSION      C4-C7    HX CHOLECYSTECTOMY      HX COLONOSCOPY  2010    HX CYST REMOVAL      from under both arms    HX HEENT Left 2013    orbital    HX HERNIA REPAIR  2009    HX MENISCUS REPAIR Left     x2    HX ORTHOPAEDIC Left     ORIF left fibula    HX TUBAL LIGATION      HX TUBAL LIGATION  1994    HYSTEROSCOPY DIAGNOSTIC      x2       Medications:  No current facility-administered medications on file prior to encounter.       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 At baseline, patient had a creatinine of 1.04 on 5/6. His next CMP revealed an acute kidney injury with creatinine of 1.91 (6/6) -> 2.2 (6/8) -> 2.5 (6/10) and 3.56 (6/13). Retroperitoneal ultrasound was negative for hydronephrosis. UA on 5/13 was consistent with sterile pyuria, no bacteria and 1+ LE. UCx negative. He was prescribed levaquin on 6/2 for possible upper respiratory tract infection and fluconazole on 6/8 for thrush despite nystatin use. He does not take NSAIDs. He was also prescribed a PPI (pantoprazole) on 5/14. He denies any rash. No history or autoimmune disease. Â   This morning he notes fatigue that has been present for a couple weeks. He also did not sleep well because of frequent urination overnight (5 to 6 times). He has gained approximately 5 pounds in weight over past couple weeks despite poor appetite. He has daily nausea for which he takes either zofran or compazine. He cannot say whether or not this helps. He has poor appetite in the evening and attributes this to increase in fatigue, chills and temperature ~99 in the late afternoons. This began approximately two weeks ago. Â   He denies any abdominal pain. No diarrhea or constipation. No mouth pain or sores. Â   Hematology/Oncology History:   Adelbert Ahumada is a pleasant 69year old male with no significant past medical history presenting in consultation for a thigh mass. He initially noted the mass approximately two weeks ago. He denies any associated pain, surrounding edema or palpable adenopathy. No trauma.  No recent changes in energy "level or appetite. No fevers, chills or night sweats. No signs or symptoms of bleeding. He was recently in [facility] and able to hike ~18 miles. Â   MRI of the left lower extremity shows a 3.5 cm aggressive appearing enhancing soft tissue mass within the vastus medialis muscle of the left mid thigh. Primary consideration is a sarcoma. Â   On the contralateral thigh he has a small superficial lesion, non tender. He had a colonoscopy in December 2020 that was unremarkable. He has a history of chronic intermittent sinusitis but generally does not need antibiotics for this. He had an EGD years ago and was told he had ?esophageal spasm. He takes pepcid PRN. Â   4/1/22: CT CAP shows no evidence of metastatic disease. There is a tiny cyst in the pancreas and hepatic steatosis. 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 FISH negative for MDM2 suggesting against liposarcoma but polysomy seen (significance unclear). Â   4/15/22: Radiation Oncology consult   Â   4/19/22: PET shows a 1.76 x 1.39 moderately hypermetabolic liver lesion and a 2 x 1 cm hypermetabolic lesion in the distal body of the pancreas. There is also moderate activity and there prevertebral region in the right retrocrural area abutting the aorta. This is suspicious for metastatic disease, per my discussion with interventional radiology and radiology there is site would be difficult to access for biopsy but can be monitored with surveillance imaging. Â   4/28/22: EUS shows a solid mass in the pancreatic body/tail. Biopsy confirmed a high grade spindle cell neoplasm identical to left thigh sarcoma  Â   4/29/22: Liver biopsy confirms sarcoma metastasis. Â   Physical Exam  Vital Signs: Blood pressure 124/62, pulse 61, temperature 97.5 Â°F (36.4 Â°C), temperature source Oral, resp. rate 16, height 5' 8.11"" (1.73 m), weight 69 kg (152 lb 1.9 oz), SpO2 98 %.   ECOG PS:  Physical " dexAMETHasone (Decadron) 4 mg tablet Take 4 mg by mouth two (2) times daily (with meals). 20 Tab 0    tiZANidine (ZANAFLEX) 4 mg tablet Take 1 Tab by mouth nightly. 30 Tab 1    galcanezumab-gnlm (Emgality Syringe) 120 mg/mL syrg 120 mg by SubCUTAneous route every thirty (30) days. 1 Units 2    FENUGREEK SEED EXTRACT PO Take  by mouth.  KARINA EXTRACT PO Take  by mouth.  HOP-BLK COH-RHODIOLA-FLAX-PRIM PO Take  by mouth.  furosemide (Lasix) 40 mg tablet Si tab daily 30 Tab 6    albuterol (PROVENTIL HFA, VENTOLIN HFA, PROAIR HFA) 90 mcg/actuation inhaler INHALE 2 PUFFS BY MOUTH EVERY 4 HOURS AS NEEDED FOR WHEEZING 8.5 g 11    citalopram (CELEXA) 20 mg tablet Take 1 Tab by mouth daily. 30 Tab 3    fluticasone (FLONASE ALLERGY RELIEF) 50 mcg/actuation nasal spray 2 Sprays by Both Nostrils route daily as needed for Rhinitis.  potassium chloride SR (KLOR-CON 10) 10 mEq tablet Take 20 mEq by mouth daily. With Lasix.  cyanocobalamin (VITAMIN B12) 100 mcg tablet Take 100 mcg by mouth daily.  therapeutic multivitamin (THERA) tablet Take 1 Tab by mouth daily.  Ferrous Sulfate (SLOW FE) 47.5 mg iron TbER tablet Take 1 Tab by mouth nightly. Family History:  Family History   Problem Relation Age of Onset    Stroke Father     Hypertension Father     Heart Disease Maternal Grandmother     Cancer Maternal Grandmother         brain and colon    Cancer Maternal Uncle         5 w/ brain Charliene Dimes    MS Other         1st cousin    Bipolar Disorder Other        Social History:  Social History     Tobacco Use    Smoking status: Former Smoker     Packs/day: 0.25     Last attempt to quit: 12/10/2019     Years since quittin.5    Smokeless tobacco: Current User   Substance Use Topics    Alcohol use: Yes     Comment: rare    Drug use: No       Allergies:   Allergies   Allergen Reactions    Latex Hives    Other Food Rash     All fruits except apple, oranges, and pineapple (recorded Exam  Constitutional:       Appearance: Normal appearance. HENT:      Head: Normocephalic. Nose: Nose normal.      Mouth/Throat:      Mouth: Mucous membranes are moist.   Eyes:      Conjunctiva/sclera: Conjunctivae normal.   Cardiovascular:      Rate and Rhythm: Normal rate and regular rhythm. Pulmonary:      Effort: Pulmonary effort is normal. No respiratory distress. Breath sounds: Normal breath sounds. Abdominal:      General: Bowel sounds are normal. There is no distension. Tenderness: There is no abdominal tenderness. Musculoskeletal:      Right lower leg: No edema. Left lower leg: No edema. Neurological:      Mental Status: He is alert.            Labs     Recent Results (from the past 24 hour(s))   GLUCOSE, BEDSIDE - POINT OF CARE    Collection Time: 06/17/22 12:21 PM   Result Value Ref Range    GLUCOSE, BEDSIDE - POINT OF CARE 159 (H) 70 - 99 mg/dL   GLUCOSE, BEDSIDE - POINT OF CARE    Collection Time: 06/17/22  6:09 PM   Result Value Ref Range    GLUCOSE, BEDSIDE - POINT OF CARE 127 (H) 70 - 99 mg/dL   GLUCOSE, BEDSIDE - POINT OF CARE    Collection Time: 06/17/22  9:00 PM   Result Value Ref Range    GLUCOSE, BEDSIDE - POINT OF CARE 247 (H) 70 - 99 mg/dL   Comprehensive Metabolic Panel    Collection Time: 06/18/22  3:30 AM   Result Value Ref Range    Fasting Status      Sodium 141 135 - 145 mmol/L    Potassium 3.8 3.4 - 5.1 mmol/L    Chloride 108 97 - 110 mmol/L    Carbon Dioxide 27 21 - 32 mmol/L    Anion Gap 10 7 - 19 mmol/L    Glucose 161 (H) 70 - 99 mg/dL    BUN 81 (H) 6 - 20 mg/dL    Creatinine 3.16 (H) 0.67 - 1.17 mg/dL    Glomerular Filtration Rate 20 (L) >=60    BUN/ Creatinine Ratio 26 (H) 7 - 25    Calcium 8.6 8.4 - 10.2 mg/dL    Bilirubin, Total 0.6 0.2 - 1.0 mg/dL    GOT/AST 56 (H) <=37 Units/L    GPT/ALT 75 (H) <64 Units/L    Alkaline Phosphatase 107 45 - 117 Units/L    Albumin 2.4 (L) 3.6 - 5.1 g/dL    Protein, Total 6.1 (L) 6.4 - 8.2 g/dL    Globulin 3.7 2.0 - 4.0 as Other Medication 2/8/2012)    Peanut Swelling    Shellfish Containing Products Anaphylaxis    Morphine Itching     She has had morphine but premedicates with benadryl    Nystatin Rash    Flagyl [Metronidazole] Contact Dermatitis    Flexeril [Cyclobenzaprine] Rash    Other Medication Rash     All fruits except apple, oranges, and pineapple    Phenergan [Promethazine] Other (comments)     Rapid hr    Robaxin [Methocarbamol] Rash       All the above components of the past  history are auto-populated from the electronic record. They have been reviewed and the patient has been interviewed for any pertinent past history that pertains to the patient's chief complaint and reason for visit. Not all pre-populated components may be accurate at the time this note was generated. Review of Systems   Review of Systems   Constitutional: Negative for chills and fever. HENT: Negative for congestion, ear pain, rhinorrhea, sore throat and trouble swallowing. Eyes: Positive for photophobia. Negative for visual disturbance. Respiratory: Negative for cough, chest tightness and shortness of breath. Cardiovascular: Negative for chest pain and palpitations. Gastrointestinal: Positive for nausea. Negative for abdominal pain, blood in stool, constipation, diarrhea and vomiting. Genitourinary: Negative for decreased urine volume, difficulty urinating, dysuria and frequency. Musculoskeletal: Negative for back pain and neck pain. Skin: Negative for color change and rash. Neurological: Positive for headaches. Negative for dizziness, weakness and light-headedness. Physical Exam   Physical Exam  Vitals signs and nursing note reviewed. Constitutional:       General: She is not in acute distress. Appearance: She is well-developed. She is obese. She is not ill-appearing. Eyes:      Conjunctiva/sclera: Conjunctivae normal.   Neck:      Musculoskeletal: Neck supple.    Cardiovascular:      Rate and Rhythm: Normal rate and regular rhythm. Pulmonary:      Effort: Pulmonary effort is normal. No accessory muscle usage or respiratory distress. Breath sounds: Normal breath sounds. Abdominal:      General: There is no distension. Palpations: Abdomen is soft. Tenderness: There is no abdominal tenderness. Lymphadenopathy:      Cervical: No cervical adenopathy. Skin:     General: Skin is warm and dry. Neurological:      Mental Status: She is alert and oriented to person, place, and time. Cranial Nerves: No cranial nerve deficit. Sensory: No sensory deficit. Diagnostic Study Results     Labs -     Recent Results (from the past 24 hour(s))   SAMPLES BEING HELD    Collection Time: 07/03/20  5:04 PM   Result Value Ref Range    SAMPLES BEING HELD LAV,GREN     COMMENT        Add-on orders for these samples will be processed based on acceptable specimen integrity and analyte stability, which may vary by analyte. Radiologic Studies -   No orders to display     CT Results  (Last 48 hours)    None        CXR Results  (Last 48 hours)    None            Medical Decision Making     I reviewed the vital signs, available nursing notes, past medical history, past surgical history, family history and social history. Vital Signs-Reviewed the patient's vital signs. Patient Vitals for the past 24 hrs:   Temp Pulse Resp BP SpO2   07/03/20 1520 98.4 °F (36.9 °C) 94 18 126/85 99 %         Records Reviewed: Nursing notes for today's visit have been reviewed. I have also reviewed most recent medical records pertinent to today's complaints, if available in our medical record system. I have also reviewed all labs and imaging results from previous results in comparison to results obtained today. If an EKG was obtained today, it has been compared to previous EKGs, if available.   If arriving via EMS, the EMS report has been reviewed if made available to us within the patient's time in the g/dL    A/G Ratio 0.6 (L) 1.0 - 2.4   Magnesium    Collection Time: 06/18/22  3:30 AM   Result Value Ref Range    Magnesium 2.0 1.7 - 2.4 mg/dL   Phosphorus    Collection Time: 06/18/22  3:30 AM   Result Value Ref Range    Phosphorus 4.5 2.4 - 4.7 mg/dL   CBC with Automated Differential (performable only)    Collection Time: 06/18/22  3:30 AM   Result Value Ref Range    WBC 16.3 (H) 4.2 - 11.0 K/mcL    RBC 3.42 (L) 4.50 - 5.90 mil/mcL    HGB 8.9 (L) 13.0 - 17.0 g/dL    HCT 27.2 (L) 39.0 - 51.0 %    MCV 79.5 78.0 - 100.0 fl    MCH 26.0 26.0 - 34.0 pg    MCHC 32.7 32.0 - 36.5 g/dL    RDW-CV 13.0 11.0 - 15.0 %    RDW-SD 37.1 (L) 39.0 - 50.0 fL     140 - 450 K/mcL    NRBC 0 <=0 /100 WBC    Neutrophil, Percent 96 %    Lymphocytes, Percent 3 %    Mono, Percent 0 %    Eosinophils, Percent 0 %    Basophils, Percent 0 %    Immature Granulocytes 1 %    Absolute Neutrophils 15.4 (H) 1.8 - 7.7 K/mcL    Absolute Lymphocytes 0.6 (L) 1.0 - 4.0 K/mcL    Absolute Monocytes 0.1 (L) 0.3 - 0.9 K/mcL    Absolute Eosinophils  0.0 0.0 - 0.5 K/mcL    Absolute Basophils 0.0 0.0 - 0.3 K/mcL    Absolute Immmature Granulocytes 0.2 0.0 - 0.2 K/mcL   ]      Imaging  LAST CT:  === 04/01/22 ===    CT CHEST ABDOMEN PELVIS W CONTRAST    - Narrative -  EXAMINATION: CT CHEST ABDOMEN PELVIS W CONTRAST    CLINICAL INDICATION: Sarcoma    COMPARISON: None    CONTRAST: 100 mL Omnipaque 300    RADIATION MODULATION: Adjustment of the mA and/or kV was done according to  the patient's size. FINDINGS: 3D MIP reconstructions of the lungs were generated at the CT  scanner to aid in pulmonary nodule detection. The lungs are clear. No  large thoracic lymph nodes or pleural effusion. There is a mild pectus  excavatum. Liver shows fatty infiltration. No focal liver lesion.   Gallbladder, bile  ducts, adrenals, and spleen are normal.    In the body of the pancreas (series 2, image 68) there is a 3 mm cyst.  Pancreatic duct appears normal.    There are bilateral simple renal cysts. There are no large lymph nodes in  the abdomen or pelvis. No pelvic mass. There is mild colonic  diverticulosis. No bowel obstruction. No large lymph nodes in the abdomen  or pelvis. No suspicious bone lesions. There are mild degenerative  changes in the hips, sacroiliac joints, and spine.    - Impression -  1. No evidence of metastatic disease  2. Tiny cyst in the pancreas. 3.  Hepatic steatosis and mild colonic diverticulosis as well as simple  renal cysts. Electronically Signed by: Shaun Persaud M.D. Signed on: 4/1/2022 11:00 AM  LAST X-RAY:  === 06/14/22 ===    XR CHEST PA OR AP 1 VIEW    - Narrative -  EXAM: XR CHEST PA OR AP 1 VIEW    CLINICAL INDICATION: Cough. COMPARISON: Chest x-ray from 06/03/2022. FINDINGS:  Lungs are clear. No pleural effusion or pneumothorax. Heart size is  normal.  Right chest wall Mediport is stable. - Impression -  No acute pulmonary findings. Electronically Signed by: Kian Hernandez MD  Signed on: 6/14/2022 10:45 PM      === 06/03/22 ===    XR CHEST PA AND LATERAL 2 VIEWS    - Narrative -  EXAM: XR CHEST PA AND LATERAL 2 VIEWS    CLINICAL INDICATION: Cough. Sarcoma. TECHNIQUE: Two views of the chest were obtained. COMPARISON: PET/CT scan on 04/19/2022. FINDINGS:  There is a right chest Port-A-Cath in expected location. The trachea is  midline. The cardiomediastinal and hilar contours are unremarkable. The  pulmonary vasculature is unremarkable. There is no pneumothorax, pleural  effusion, or focal airspace consolidation. Radiograph does not have  optimal sensitivity for small nodules. The visualized bones are  unremarkable. - Impression -  No acute radiographic abnormality.     Electronically Signed by: Yudith Aguirre MD  Signed on: 6/3/2022 10:57 AM      === 03/16/22 ===    XR SHOULDER 4 VIEWS LEFT    - Narrative -  EXAM: XR SHOULDER 4 VIEWS LEFT    CLINICAL INDICATION: Left shoulder pain    COMPARISON: emergency department. Provider Notes (Medical Decision Making):   Patient with a history of multiple sclerosis and migraine headaches presents with a migraine headache. It is of its normal variant. No red flags such as fevers or chills. Headache resolved with a migraine cocktail of Compazine, Benadryl, Toradol, and Decadron. Recommend follow-up with her neurologist.    ED Course:   Initial assessment performed. The patients presenting problems have been discussed, and they are in agreement with the care plan formulated and outlined with them. I have encouraged them to ask questions as they arise throughout their visit. Orders Placed This Encounter    Hold Sample    INSERT PERIPHERAL IV ONE TIME STAT    sodium chloride 0.9 % bolus infusion 1,000 mL    prochlorperazine (COMPAZINE) with saline injection 10 mg    diphenhydrAMINE (BENADRYL) injection 25 mg    ketorolac (TORADOL) injection 15 mg    dexamethasone (DECADRON) 4 mg/mL injection 10 mg       Procedures      Critical Care Time:   0    Disposition:  Discharge    The patient's emergency department evaluation is now complete. I have reviewed all labs, imaging, and pertinent information. I have discussed all results with the patient and/or family. Based on our evaluation today I do believe that the patient is safe to be discharged home. The patient has been provided with at home instructions that are pertinent to their complaint today, although these may not be specific to the exact diagnosis. I have reviewed the patient's home medications and attempted to reconcile if not already done so by pharmacy or nursing staff. I have discussed all new prescriptions with the patient. The patient has been encouraged to follow-up with primary care doctor and/or specialist, and these have been discussed with the patient.   The patient has been advised that they may return to the emergency department if they have any worsening symptoms and or new None    FINDINGS: There are mild degenerative changes in the glenohumeral joint. Acromioclavicular joint is normal.  No bone lesion or fracture. No  dislocation.    - Impression -  Mild degenerative changes in the glenohumeral joint    Electronically Signed by: Ynes Solis M.D. Signed on: 3/16/2022 3:56 PM      Pathology:  No new      Problem List:  Principal Problem:    Acute renal failure (ARF) (CMS/HCC)        Impression:  Jaci Ramirez is a pleasant 69year old male with no significant past medical history presenting in consultation on 3/31/22 for a thigh mass initially noted ~two weeks prior to administration  Â   1. 3.5 cm aggressive appearing soft tissue mass within the vastus medialis muscle of the left mid thigh. 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 FISH negative for MDM2 suggesting against liposarcoma but polysomy seen (significance unclear). ONCO NGS shows no actionable mutations  2. Pancreatic body metastasis, biopsy proven  3. Liver metastasis, biopsy proven. Only one lesion present in the liver  Â   He is s/p cycle 1 of doxorubicin on 5/16 for his metastatic soft tissue sarcoma. Â   4. Acute kidney injury, concern for acute interstitial nephritis based on UA results. His baseline creatinine was 1.04 on 6/5 and increased to 1.91 on 6/6 (next CMP). Retroperitoneal ultrasound showed no evidence of hydronephrosis. Etiology may be antibiotic? He was prescribed levaquin on 6/2 for possible upper respiratory tract infection and fluconazole on 6/8 for thrush despite nystatin use. He does not take NSAIDs. He was also prescribed a PPI (pantoprazole) on 5/14  Â   Creatinine has improved from 4.72 (peak) yesterday to 3.99 today    5. Sterile pyuria seen on UA  6/13. 11-30 WBC, 1+ leukocyte esterase    Â   6. Borderline microcytic anemia.  No signs of bleeding    Plan:  Continue steroids per renal. He will start prednisone 70 mg daily today and continue this for symptoms that are of concern to them. Verbal discharge instructions may have also been provided to the patient that may not be specifically contained in the written discharge instructions. The patient has been given opportunity to ask questions prior to discharge. PLAN:  1. Current Discharge Medication List        2. Follow-up Information     Follow up With Specialties Details Why Contact Info    Chip Briseno MD Neurology Schedule an appointment as soon as possible for a visit in 1 week  [id number]  [phone]          Return to ED if worse     Diagnosis     Clinical Impression:   1. Migraine without aura and with status migrainosus, not intractable            This note will not be viewable in MyChart. two weeks before taper  Hold PPI  May use Tums for GERD symptoms  If RLQ discomfort worsens will consider CT scan   Trickled without report is available,, confirms diagnosis of AIN  Hold heparin for DVT ppx per renal  If blood pressure continues to climb may need to start amlodipine  Patient may be able to discharge today if okay with nephrology service. He will be seen in the office early next week. Nephrology 2 doses prednisone and possible taper  . His chemotherapy is on hold until he recovers from AIN and until he is on a significantly lower dose of steroids  Dayne Neither.  Kallie Schrader MD  Physician, [address] [place] [address] [facility]

## 2022-09-01 ENCOUNTER — OFFICE VISIT (OUTPATIENT)
Dept: INTERNAL MEDICINE CLINIC | Age: 52
End: 2022-09-01
Payer: MEDICAID

## 2022-09-01 VITALS
HEIGHT: 64 IN | OXYGEN SATURATION: 99 % | WEIGHT: 259 LBS | RESPIRATION RATE: 16 BRPM | TEMPERATURE: 98 F | DIASTOLIC BLOOD PRESSURE: 82 MMHG | SYSTOLIC BLOOD PRESSURE: 132 MMHG | HEART RATE: 78 BPM | BODY MASS INDEX: 44.22 KG/M2

## 2022-09-01 DIAGNOSIS — E66.01 OBESITY, MORBID (HCC): ICD-10-CM

## 2022-09-01 DIAGNOSIS — K58.0 IRRITABLE BOWEL SYNDROME WITH DIARRHEA: ICD-10-CM

## 2022-09-01 DIAGNOSIS — J45.20 MILD INTERMITTENT ASTHMA WITHOUT COMPLICATION: ICD-10-CM

## 2022-09-01 DIAGNOSIS — M43.06 LUMBAR SPONDYLOLYSIS: Primary | ICD-10-CM

## 2022-09-01 PROCEDURE — 99214 OFFICE O/P EST MOD 30 MIN: CPT | Performed by: INTERNAL MEDICINE

## 2022-09-01 NOTE — PATIENT INSTRUCTIONS
[employer] Activation    Thank you for requesting access to StarCard. Please follow the instructions below to securely access and download your online medical record. StarCard allows you to send messages to your doctor, view your test results, renew your prescriptions, schedule appointments, and more. How Do I Sign Up? In your internet browser, go to [email or web address]  Click on the First Time User? Click Here link in the Sign In box. You will be redirect to the New Member Sign Up page. Enter your StarCard Access Code exactly as it appears below. You will not need to use this code after youve completed the sign-up process. If you do not sign up before the expiration date, you must request a new code. StarCard Access Code: Activation code not generated  Current StarCard Status: Active (This is the date your StarCard access code will )    Enter the last four digits of your Social Security Number (xxxx) and Date of Birth (mm/dd/yyyy) as indicated and click Submit. You will be taken to the next sign-up page. Create a StarCard ID. This will be your StarCard login ID and cannot be changed, so think of one that is secure and easy to remember. Create a StarCard password. You can change your password at any time. Enter your Password Reset Question and Answer. This can be used at a later time if you forget your password. Enter your e-mail address. You will receive e-mail notification when new information is available in [address] Click Sign Up. You can now view and download portions of your medical record. Click the [place] link to download a portable copy of your medical information. Additional Information    If you have questions, please visit the Frequently Asked Questions section of the StarCard website at [email or web address] Remember, StarCard is NOT to be used for urgent needs. For medical emergencies, dial 911.
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
## 2022-09-06 ENCOUNTER — HOSPITAL ENCOUNTER (EMERGENCY)
Age: 52
Discharge: OTHER INSTITUTION | End: 2022-09-06
Attending: EMERGENCY MEDICINE
Payer: MEDICAID

## 2022-09-06 ENCOUNTER — NURSE TRIAGE (OUTPATIENT)
Dept: OTHER | Facility: CLINIC | Age: 52
End: 2022-09-06

## 2022-09-06 VITALS
BODY MASS INDEX: 51.91 KG/M2 | RESPIRATION RATE: 16 BRPM | HEIGHT: 63 IN | DIASTOLIC BLOOD PRESSURE: 81 MMHG | WEIGHT: 293 LBS | OXYGEN SATURATION: 99 % | SYSTOLIC BLOOD PRESSURE: 136 MMHG | TEMPERATURE: 98.4 F | HEART RATE: 67 BPM

## 2022-09-06 DIAGNOSIS — T21.32XA: Primary | ICD-10-CM

## 2022-09-06 LAB
ALBUMIN SERPL-MCNC: 3.4 G/DL (ref 3.5–5)
ALBUMIN/GLOB SERPL: 0.7 {RATIO} (ref 1.1–2.2)
ALP SERPL-CCNC: 96 U/L (ref 45–117)
ALT SERPL-CCNC: 18 U/L (ref 12–78)
ANION GAP SERPL CALC-SCNC: 4 MMOL/L (ref 5–15)
APPEARANCE UR: ABNORMAL
AST SERPL-CCNC: 10 U/L (ref 15–37)
BACTERIA URNS QL MICRO: ABNORMAL /HPF
BASOPHILS # BLD: 0 K/UL (ref 0–0.1)
BASOPHILS NFR BLD: 1 % (ref 0–1)
BILIRUB SERPL-MCNC: 0.8 MG/DL (ref 0.2–1)
BILIRUB UR QL: NEGATIVE
BUN SERPL-MCNC: 16 MG/DL (ref 6–20)
BUN/CREAT SERPL: 18 (ref 12–20)
CALCIUM SERPL-MCNC: 9 MG/DL (ref 8.5–10.1)
CHLORIDE SERPL-SCNC: 105 MMOL/L (ref 97–108)
CO2 SERPL-SCNC: 31 MMOL/L (ref 21–32)
COLOR UR: ABNORMAL
CREAT SERPL-MCNC: 0.89 MG/DL (ref 0.55–1.02)
DIFFERENTIAL METHOD BLD: NORMAL
EOSINOPHIL # BLD: 0.1 K/UL (ref 0–0.4)
EOSINOPHIL NFR BLD: 2 % (ref 0–7)
EPITH CASTS URNS QL MICRO: ABNORMAL /LPF
ERYTHROCYTE [DISTWIDTH] IN BLOOD BY AUTOMATED COUNT: 13.9 % (ref 11.5–14.5)
GLOBULIN SER CALC-MCNC: 4.6 G/DL (ref 2–4)
GLUCOSE SERPL-MCNC: 147 MG/DL (ref 65–100)
GLUCOSE UR STRIP.AUTO-MCNC: NEGATIVE MG/DL
HCT VFR BLD AUTO: 36.6 % (ref 35–47)
HGB BLD-MCNC: 11.9 G/DL (ref 11.5–16)
HGB UR QL STRIP: ABNORMAL
HYALINE CASTS URNS QL MICRO: ABNORMAL /LPF (ref 0–2)
IMM GRANULOCYTES # BLD AUTO: 0 K/UL (ref 0–0.04)
IMM GRANULOCYTES NFR BLD AUTO: 0 % (ref 0–0.5)
KETONES UR QL STRIP.AUTO: ABNORMAL MG/DL
LEUKOCYTE ESTERASE UR QL STRIP.AUTO: NEGATIVE
LYMPHOCYTES # BLD: 1.9 K/UL (ref 0.8–3.5)
LYMPHOCYTES NFR BLD: 38 % (ref 12–49)
MCH RBC QN AUTO: 31 PG (ref 26–34)
MCHC RBC AUTO-ENTMCNC: 32.5 G/DL (ref 30–36.5)
MCV RBC AUTO: 95.3 FL (ref 80–99)
MONOCYTES # BLD: 0.4 K/UL (ref 0–1)
MONOCYTES NFR BLD: 8 % (ref 5–13)
NEUTS SEG # BLD: 2.5 K/UL (ref 1.8–8)
NEUTS SEG NFR BLD: 51 % (ref 32–75)
NITRITE UR QL STRIP.AUTO: NEGATIVE
NRBC # BLD: 0 K/UL (ref 0–0.01)
NRBC BLD-RTO: 0 PER 100 WBC
PH UR STRIP: 6 [PH] (ref 5–8)
PLATELET # BLD AUTO: 258 K/UL (ref 150–400)
PMV BLD AUTO: 11.4 FL (ref 8.9–12.9)
POTASSIUM SERPL-SCNC: 3.9 MMOL/L (ref 3.5–5.1)
PROT SERPL-MCNC: 8 G/DL (ref 6.4–8.2)
PROT UR STRIP-MCNC: ABNORMAL MG/DL
RBC # BLD AUTO: 3.84 M/UL (ref 3.8–5.2)
RBC #/AREA URNS HPF: ABNORMAL /HPF (ref 0–5)
SODIUM SERPL-SCNC: 140 MMOL/L (ref 136–145)
SP GR UR REFRACTOMETRY: >1.03 (ref 1–1.03)
UA: UC IF INDICATED,UAUC: ABNORMAL
UROBILINOGEN UR QL STRIP.AUTO: 1 EU/DL (ref 0.2–1)
WBC # BLD AUTO: 4.9 K/UL (ref 3.6–11)
WBC URNS QL MICRO: ABNORMAL /HPF (ref 0–4)

## 2022-09-06 PROCEDURE — 80053 COMPREHEN METABOLIC PANEL: CPT

## 2022-09-06 PROCEDURE — 99285 EMERGENCY DEPT VISIT HI MDM: CPT

## 2022-09-06 PROCEDURE — 90715 TDAP VACCINE 7 YRS/> IM: CPT | Performed by: EMERGENCY MEDICINE

## 2022-09-06 PROCEDURE — 74011250636 HC RX REV CODE- 250/636: Performed by: EMERGENCY MEDICINE

## 2022-09-06 PROCEDURE — 81001 URINALYSIS AUTO W/SCOPE: CPT

## 2022-09-06 PROCEDURE — 90471 IMMUNIZATION ADMIN: CPT

## 2022-09-06 PROCEDURE — 85025 COMPLETE CBC W/AUTO DIFF WBC: CPT

## 2022-09-06 PROCEDURE — 36415 COLL VENOUS BLD VENIPUNCTURE: CPT

## 2022-09-06 RX ADMIN — TETANUS TOXOID, REDUCED DIPHTHERIA TOXOID AND ACELLULAR PERTUSSIS VACCINE, ADSORBED 0.5 ML: 5; 2.5; 8; 8; 2.5 SUSPENSION INTRAMUSCULAR at 14:26

## 2022-09-06 NOTE — ED NOTES
Pt presents to ER w/ open wound to pubic area, abdomen. She reports she was applying Joy Clause to the area on Sunday when she became distracted and left it on too long. Assisted to position of comfort, call bell within reach.

## 2022-09-06 NOTE — ED NOTES
Pt transported to [facility] by [facility] BLS transport.   Alert and stable for discharge
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
## 2022-10-08 ENCOUNTER — HOSPITAL ENCOUNTER (EMERGENCY)
Age: 52
Discharge: HOME | End: 2022-10-08
Attending: STUDENT IN AN ORGANIZED HEALTH CARE EDUCATION/TRAINING PROGRAM

## 2022-10-08 ENCOUNTER — APPOINTMENT (OUTPATIENT)
Dept: CT IMAGING | Age: 52
End: 2022-10-08
Attending: STUDENT IN AN ORGANIZED HEALTH CARE EDUCATION/TRAINING PROGRAM

## 2022-10-08 VITALS
BODY MASS INDEX: 42.68 KG/M2 | HEART RATE: 102 BPM | TEMPERATURE: 98 F | DIASTOLIC BLOOD PRESSURE: 74 MMHG | WEIGHT: 250 LBS | RESPIRATION RATE: 16 BRPM | HEIGHT: 64 IN | OXYGEN SATURATION: 100 % | SYSTOLIC BLOOD PRESSURE: 127 MMHG

## 2022-10-08 DIAGNOSIS — T14.8XXA MUSCLE STRAIN: Primary | ICD-10-CM

## 2022-10-08 DIAGNOSIS — S39.012A STRAIN OF LUMBAR REGION, INITIAL ENCOUNTER: ICD-10-CM

## 2022-10-08 PROCEDURE — 72192 CT PELVIS W/O DYE: CPT

## 2022-10-08 PROCEDURE — 99284 EMERGENCY DEPT VISIT MOD MDM: CPT

## 2022-10-08 PROCEDURE — 74011250636 HC RX REV CODE- 250/636: Performed by: STUDENT IN AN ORGANIZED HEALTH CARE EDUCATION/TRAINING PROGRAM

## 2022-10-08 PROCEDURE — 74011250637 HC RX REV CODE- 250/637: Performed by: STUDENT IN AN ORGANIZED HEALTH CARE EDUCATION/TRAINING PROGRAM

## 2022-10-08 PROCEDURE — 96375 TX/PRO/DX INJ NEW DRUG ADDON: CPT

## 2022-10-08 PROCEDURE — 96374 THER/PROPH/DIAG INJ IV PUSH: CPT

## 2022-10-08 PROCEDURE — 72131 CT LUMBAR SPINE W/O DYE: CPT

## 2022-10-08 PROCEDURE — 74011000250 HC RX REV CODE- 250: Performed by: STUDENT IN AN ORGANIZED HEALTH CARE EDUCATION/TRAINING PROGRAM

## 2022-10-08 RX ORDER — HYDROMORPHONE HYDROCHLORIDE 1 MG/ML
0.5 INJECTION, SOLUTION INTRAMUSCULAR; INTRAVENOUS; SUBCUTANEOUS ONCE
Status: COMPLETED | OUTPATIENT
Start: 2022-10-08 | End: 2022-10-08

## 2022-10-08 RX ORDER — ACETAMINOPHEN 325 MG/1
650 TABLET ORAL ONCE
Status: COMPLETED | OUTPATIENT
Start: 2022-10-08 | End: 2022-10-08

## 2022-10-08 RX ORDER — KETOROLAC TROMETHAMINE 30 MG/ML
15 INJECTION, SOLUTION INTRAMUSCULAR; INTRAVENOUS ONCE
Status: COMPLETED | OUTPATIENT
Start: 2022-10-08 | End: 2022-10-08

## 2022-10-08 RX ORDER — DICLOFENAC SODIUM 10 MG/G
GEL TOPICAL 4 TIMES DAILY
Qty: 50 G | Refills: 0 | Status: SHIPPED | OUTPATIENT
Start: 2022-10-08 | End: 2022-11-07

## 2022-10-08 RX ORDER — OXYCODONE HYDROCHLORIDE 5 MG/1
5 TABLET ORAL
Qty: 12 TABLET | Refills: 0 | Status: SHIPPED | OUTPATIENT
Start: 2022-10-08 | End: 2022-10-11

## 2022-10-08 RX ORDER — LIDOCAINE 4 G/100G
1 PATCH TOPICAL EVERY 24 HOURS
Status: DISCONTINUED | OUTPATIENT
Start: 2022-10-08 | End: 2022-10-09 | Stop reason: HOSPADM

## 2022-10-08 RX ADMIN — ACETAMINOPHEN 650 MG: 325 TABLET ORAL at 18:28

## 2022-10-08 RX ADMIN — HYDROMORPHONE HYDROCHLORIDE 0.5 MG: 1 INJECTION, SOLUTION INTRAMUSCULAR; INTRAVENOUS; SUBCUTANEOUS at 18:28

## 2022-10-08 RX ADMIN — KETOROLAC TROMETHAMINE 15 MG: 30 INJECTION, SOLUTION INTRAMUSCULAR at 18:28
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
## 2022-10-08 NOTE — Clinical Note
[address]  [facility] EMERGENCY DEPT  [address] [facility]  [place] [id number] [phone]    Work/School Note    Date: 10/8/2022    To Whom It May concern:    Claudia Rodríguez was seen and treated today in the emergency room by the following provider(s):  Attending Provider: Stacie Campbell MD.      Claudia Rodríguez is excused from work/school on 10/8/2022 through 10/10/2022. She is medically clear to return to work/school on 10/11/2022.          Sincerely,          Dolly Storm MD

## 2022-10-09 NOTE — ED NOTES
Pt able to sit up on side of bed and get dressed. This RN assisted for comfort. Pt amb out without difficulty.

## 2022-10-09 NOTE — ED NOTES
Pt c/o pain returning to right side of back to knee. No change for movement in leg. Pt has leg propped up on side rail.

## 2023-03-08 LAB — MAMMOGRAPHY, EXTERNAL: NEGATIVE

## 2023-07-06 ENCOUNTER — TRANSCRIBE ORDERS (OUTPATIENT)
Facility: HOSPITAL | Age: 53
End: 2023-07-06

## 2023-07-06 DIAGNOSIS — S33.5XXA LUMBAR SPRAIN, INITIAL ENCOUNTER: Primary | ICD-10-CM

## 2023-07-31 ENCOUNTER — OFFICE VISIT (OUTPATIENT)
Age: 53
End: 2023-07-31
Payer: COMMERCIAL

## 2023-07-31 ENCOUNTER — TELEPHONE (OUTPATIENT)
Age: 53
End: 2023-07-31

## 2023-07-31 VITALS
DIASTOLIC BLOOD PRESSURE: 78 MMHG | TEMPERATURE: 98.1 F | HEART RATE: 76 BPM | WEIGHT: 267.2 LBS | HEIGHT: 64 IN | RESPIRATION RATE: 18 BRPM | SYSTOLIC BLOOD PRESSURE: 116 MMHG | BODY MASS INDEX: 45.62 KG/M2 | OXYGEN SATURATION: 98 %

## 2023-07-31 DIAGNOSIS — E06.9 THYROIDITIS, UNSPECIFIED: Primary | ICD-10-CM

## 2023-07-31 PROCEDURE — 99212 OFFICE O/P EST SF 10 MIN: CPT | Performed by: SURGERY

## 2023-07-31 RX ORDER — METHYLPREDNISOLONE 4 MG/1
TABLET ORAL
Qty: 1 KIT | Refills: 0 | Status: SHIPPED | OUTPATIENT
Start: 2023-07-31 | End: 2023-08-06

## 2023-07-31 RX ORDER — MULTIVITAMIN WITH FOLIC ACID 400 MCG
TABLET ORAL
COMMUNITY

## 2023-07-31 RX ORDER — TOPIRAMATE 100 MG/1
100 TABLET, FILM COATED ORAL DAILY
COMMUNITY

## 2023-07-31 RX ORDER — FERROUS SULFATE 325(65) MG
325 TABLET ORAL
COMMUNITY

## 2023-07-31 ASSESSMENT — ANXIETY QUESTIONNAIRES
2. NOT BEING ABLE TO STOP OR CONTROL WORRYING: 0
4. TROUBLE RELAXING: 0
GAD7 TOTAL SCORE: 0
6. BECOMING EASILY ANNOYED OR IRRITABLE: 0
2. NOT BEING ABLE TO STOP OR CONTROL WORRYING: 0
1. FEELING NERVOUS, ANXIOUS, OR ON EDGE: 0
6. BECOMING EASILY ANNOYED OR IRRITABLE: 0
GAD7 TOTAL SCORE: 0
7. FEELING AFRAID AS IF SOMETHING AWFUL MIGHT HAPPEN: 0
3. WORRYING TOO MUCH ABOUT DIFFERENT THINGS: 0
4. TROUBLE RELAXING: 0
5. BEING SO RESTLESS THAT IT IS HARD TO SIT STILL: 0
7. FEELING AFRAID AS IF SOMETHING AWFUL MIGHT HAPPEN: 0
IF YOU CHECKED OFF ANY PROBLEMS ON THIS QUESTIONNAIRE, HOW DIFFICULT HAVE THESE PROBLEMS MADE IT FOR YOU TO DO YOUR WORK, TAKE CARE OF THINGS AT HOME, OR GET ALONG WITH OTHER PEOPLE: NOT DIFFICULT AT ALL
5. BEING SO RESTLESS THAT IT IS HARD TO SIT STILL: 0
IF YOU CHECKED OFF ANY PROBLEMS ON THIS QUESTIONNAIRE, HOW DIFFICULT HAVE THESE PROBLEMS MADE IT FOR YOU TO DO YOUR WORK, TAKE CARE OF THINGS AT HOME, OR GET ALONG WITH OTHER PEOPLE: NOT DIFFICULT AT ALL
1. FEELING NERVOUS, ANXIOUS, OR ON EDGE: 0
3. WORRYING TOO MUCH ABOUT DIFFERENT THINGS: 0

## 2023-07-31 ASSESSMENT — PATIENT HEALTH QUESTIONNAIRE - PHQ9
SUM OF ALL RESPONSES TO PHQ QUESTIONS 1-9: 0
SUM OF ALL RESPONSES TO PHQ QUESTIONS 1-9: 0
SUM OF ALL RESPONSES TO PHQ9 QUESTIONS 1 & 2: 0
2. FEELING DOWN, DEPRESSED OR HOPELESS: 0
2. FEELING DOWN, DEPRESSED OR HOPELESS: 0
SUM OF ALL RESPONSES TO PHQ QUESTIONS 1-9: 0
1. LITTLE INTEREST OR PLEASURE IN DOING THINGS: 0
SUM OF ALL RESPONSES TO PHQ QUESTIONS 1-9: 0

## 2023-07-31 NOTE — TELEPHONE ENCOUNTER
Pt called and stated that she thought she had the disc in her car of the images for Dr Ashlie Evans but she does not. Stated she will get the disc then drop it off. Wanted Dr Ashlie Evans to be aware. Made Dr Ashlie Evans and he understood.
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
## 2023-08-03 ENCOUNTER — TELEPHONE (OUTPATIENT)
Age: 53
End: 2023-08-03

## 2023-08-03 DIAGNOSIS — E06.9 THYROIDITIS, UNSPECIFIED: Primary | ICD-10-CM

## 2023-08-03 NOTE — TELEPHONE ENCOUNTER
Patient called asking if their ultrasound order had been sent in by MD yet. I informed them that it appears to be sent, and gave them the number for [facility].

## 2023-08-03 NOTE — TELEPHONE ENCOUNTER
She still hurts in her neck. I reviewed her CT of the neck and it was not too helpful with only limited exposure of the thyroid. Will get a dedicated US of the thyroid to see what that can help with.

## 2023-08-09 ENCOUNTER — HOSPITAL ENCOUNTER (OUTPATIENT)
Facility: HOSPITAL | Age: 53
Discharge: HOME | End: 2023-08-12
Attending: SURGERY
Payer: COMMERCIAL

## 2023-08-09 DIAGNOSIS — E06.9 THYROIDITIS, UNSPECIFIED: ICD-10-CM

## 2023-08-09 PROCEDURE — 76536 US EXAM OF HEAD AND NECK: CPT

## 2023-08-11 ENCOUNTER — TELEPHONE (OUTPATIENT)
Age: 53
End: 2023-08-11

## 2023-08-11 DIAGNOSIS — E04.2 NONTOXIC MULTINODULAR GOITER: Primary | ICD-10-CM

## 2023-08-11 NOTE — TELEPHONE ENCOUNTER
Patient called stating that she saw the results of her thyroid ultrasound were back in and patient wanted to know if Dr. Silvestre Newman would like for to come in to discuss removal or if he wanted to discuss anything over the phone with her.

## 2023-08-11 NOTE — TELEPHONE ENCOUNTER
Feeling a litle beter but having more episodes of feeling like choking when eating. She would like to get the left lobe out to relieve her symptoms. Seems like a reasonable approach. Will arrange.

## 2023-08-14 ENCOUNTER — TELEPHONE (OUTPATIENT)
Age: 53
End: 2023-08-14

## 2023-08-15 ENCOUNTER — PREP FOR PROCEDURE (OUTPATIENT)
Age: 53
End: 2023-08-15

## 2023-08-15 DIAGNOSIS — E04.2 NONTOXIC MULTINODULAR GOITER: Primary | ICD-10-CM

## 2023-08-18 ENCOUNTER — HOSPITAL ENCOUNTER (OUTPATIENT)
Facility: HOSPITAL | Age: 53
End: 2023-08-18
Payer: COMMERCIAL

## 2023-08-18 VITALS
BODY MASS INDEX: 44.73 KG/M2 | HEART RATE: 60 BPM | HEIGHT: 64 IN | DIASTOLIC BLOOD PRESSURE: 80 MMHG | SYSTOLIC BLOOD PRESSURE: 122 MMHG | WEIGHT: 262 LBS | TEMPERATURE: 98 F

## 2023-08-18 LAB
ANION GAP SERPL CALC-SCNC: 5 MMOL/L (ref 5–15)
BASOPHILS # BLD: 0 K/UL (ref 0–0.1)
BASOPHILS NFR BLD: 1 % (ref 0–1)
BUN SERPL-MCNC: 19 MG/DL (ref 6–20)
BUN/CREAT SERPL: 18 (ref 12–20)
CALCIUM SERPL-MCNC: 8.9 MG/DL (ref 8.5–10.1)
CHLORIDE SERPL-SCNC: 113 MMOL/L (ref 97–108)
CO2 SERPL-SCNC: 26 MMOL/L (ref 21–32)
CREAT SERPL-MCNC: 1.04 MG/DL (ref 0.55–1.02)
DIFFERENTIAL METHOD BLD: ABNORMAL
EOSINOPHIL # BLD: 0.2 K/UL (ref 0–0.4)
EOSINOPHIL NFR BLD: 3 % (ref 0–7)
ERYTHROCYTE [DISTWIDTH] IN BLOOD BY AUTOMATED COUNT: 13.6 % (ref 11.5–14.5)
GLUCOSE SERPL-MCNC: 102 MG/DL (ref 65–100)
HCT VFR BLD AUTO: 35.8 % (ref 35–47)
HGB BLD-MCNC: 11.4 G/DL (ref 11.5–16)
IMM GRANULOCYTES # BLD AUTO: 0 K/UL (ref 0–0.04)
IMM GRANULOCYTES NFR BLD AUTO: 0 % (ref 0–0.5)
LYMPHOCYTES # BLD: 2 K/UL (ref 0.8–3.5)
LYMPHOCYTES NFR BLD: 34 % (ref 12–49)
MCH RBC QN AUTO: 31.3 PG (ref 26–34)
MCHC RBC AUTO-ENTMCNC: 31.8 G/DL (ref 30–36.5)
MCV RBC AUTO: 98.4 FL (ref 80–99)
MONOCYTES # BLD: 0.5 K/UL (ref 0–1)
MONOCYTES NFR BLD: 8 % (ref 5–13)
NEUTS SEG # BLD: 3.3 K/UL (ref 1.8–8)
NEUTS SEG NFR BLD: 54 % (ref 32–75)
NRBC # BLD: 0 K/UL (ref 0–0.01)
NRBC BLD-RTO: 0 PER 100 WBC
PLATELET # BLD AUTO: 238 K/UL (ref 150–400)
PMV BLD AUTO: 12.2 FL (ref 8.9–12.9)
POTASSIUM SERPL-SCNC: 4 MMOL/L (ref 3.5–5.1)
RBC # BLD AUTO: 3.64 M/UL (ref 3.8–5.2)
SODIUM SERPL-SCNC: 144 MMOL/L (ref 136–145)
WBC # BLD AUTO: 6 K/UL (ref 3.6–11)

## 2023-08-18 PROCEDURE — 36415 COLL VENOUS BLD VENIPUNCTURE: CPT

## 2023-08-18 PROCEDURE — 80048 BASIC METABOLIC PNL TOTAL CA: CPT

## 2023-08-18 PROCEDURE — 85025 COMPLETE CBC W/AUTO DIFF WBC: CPT

## 2023-08-18 NOTE — PERIOP NOTE
[facility]  PREOPERATIVE INSTRUCTIONS    Surgery Date:   8/25/23     Your surgeon's office or [facility] staff will call you between 4 PM- 8 PM the day before surgery with your arrival time. If your surgery is on a Monday, you will receive a call the preceding Friday. Please report to [facility] Patient Access/Admitting on the 1st floor. Bring your insurance card, photo identification, and any copayment ( if applicable). If you are going home the same day of your surgery, you must have a responsible adult to drive you home. You need to have a responsible adult to stay with you the first 24 hours after surgery and you should not drive a car for 24 hours following your surgery. Nothing to eat or drink after midnight the night before surgery. This includes no water, gum, mints, coffee, juice, etc.  Please note special instructions, if applicable, below for medications. Do NOT drink alcohol or smoke 24 hours before surgery. STOP smoking for 14 days prior as it helps with breathing and healing after surgery. If you are being admitted to the hospital, please leave personal belongings/luggage in your car until you have an assigned hospital room number. Please wear comfortable clothes. Wear your glasses instead of contacts. We ask that all money, jewelry and valuables be left at home. Wear no make up, particularly mascara, the day of surgery. All body piercings, rings, and jewelry need to be removed and left at home. Please remove any nail polish or artificial nails from your fingernails. Please wear your hair loose or down. Please no pony-tails, buns, or any metal hair accessories. If you shower the morning of surgery, please do not apply any lotions or powders afterwards. You may wear deodorant, unless having breast surgery. Do not shave any body area within 24 hours of your surgery. Please follow all instructions to avoid any potential surgical cancellation.   Should your physical condition from germs:  Hand washing is the most important thing you and your caregivers can do to prevent infections. Keep your bandage clean and dry! Do not touch your surgical wound. Use clean, freshly washed towels and washcloths every time you shower; do not share bath linens with others. Until your surgical wound is healed, wear clothing and sleep on bed linens each day that are clean and freshly washed. Do not allow pets to sleep in your bed with you or touch your surgical wound. Do not smoke - smoking delays wound healing. This may be a good time to stop smoking. If you have diabetes, it is important for you to manage your blood sugar levels properly before your surgery as well as after your surgery. Poorly managed blood sugar levels slow down wound healing and prevent you from healing completely. Patient Information Regarding COVID Restrictions    Day of Procedure    Please park in the parking deck or any designated visitor parking lot. Enter the facility through the Main Entrance of the hospital.  On the day of surgery, please provide the cell phone number of the person who will be waiting for you to the Patient Access representative at the time of registration. Masks are highly recommended in the hospital, but not required. Once your procedure and the immediate recovery period is completed, a nurse in the recovery area will contact your designated visitor to inform them of your room number or to otherwise review other pertinent information regarding your care. Social distancing practices are strongly encouraged in waiting areas and the cafeteria. The patient was contacted in person. She verbalized understanding of all instructions does not need reinforcement.

## 2023-08-24 ENCOUNTER — TELEPHONE (OUTPATIENT)
Age: 53
End: 2023-08-24

## 2023-08-24 NOTE — TELEPHONE ENCOUNTER
Called patient to notify her of time change for her procedure tomorrow, 8/25/2023. She is to arrive at 5:30 am with 7:30 a start time. Patient agreed.

## 2023-08-25 ENCOUNTER — ANESTHESIA (OUTPATIENT)
Facility: HOSPITAL | Age: 53
End: 2023-08-25
Payer: COMMERCIAL

## 2023-08-25 ENCOUNTER — ANESTHESIA EVENT (OUTPATIENT)
Facility: HOSPITAL | Age: 53
End: 2023-08-25
Payer: COMMERCIAL

## 2023-08-25 ENCOUNTER — HOSPITAL ENCOUNTER (OUTPATIENT)
Facility: HOSPITAL | Age: 53
Setting detail: OBSERVATION
Discharge: HOME | End: 2023-08-26
Attending: SURGERY | Admitting: SURGERY
Payer: COMMERCIAL

## 2023-08-25 DIAGNOSIS — E04.2 MULTINODULAR GOITER: Primary | ICD-10-CM

## 2023-08-25 PROCEDURE — 3600000004 HC SURGERY LEVEL 4 BASE: Performed by: SURGERY

## 2023-08-25 PROCEDURE — 3700000001 HC ADD 15 MINUTES (ANESTHESIA): Performed by: SURGERY

## 2023-08-25 PROCEDURE — G0378 HOSPITAL OBSERVATION PER HR: HCPCS

## 2023-08-25 PROCEDURE — 6360000002 HC RX W HCPCS: Performed by: SURGERY

## 2023-08-25 PROCEDURE — 7100000001 HC PACU RECOVERY - ADDTL 15 MIN: Performed by: SURGERY

## 2023-08-25 PROCEDURE — 2709999900 HC NON-CHARGEABLE SUPPLY: Performed by: SURGERY

## 2023-08-25 PROCEDURE — 2580000003 HC RX 258: Performed by: SURGERY

## 2023-08-25 PROCEDURE — 2580000003 HC RX 258: Performed by: ANESTHESIOLOGY

## 2023-08-25 PROCEDURE — 7100000000 HC PACU RECOVERY - FIRST 15 MIN: Performed by: SURGERY

## 2023-08-25 PROCEDURE — 60220 PARTIAL REMOVAL OF THYROID: CPT | Performed by: SURGERY

## 2023-08-25 PROCEDURE — 88307 TISSUE EXAM BY PATHOLOGIST: CPT

## 2023-08-25 PROCEDURE — 6360000002 HC RX W HCPCS: Performed by: ANESTHESIOLOGY

## 2023-08-25 PROCEDURE — 2720000010 HC SURG SUPPLY STERILE: Performed by: SURGERY

## 2023-08-25 PROCEDURE — 6370000000 HC RX 637 (ALT 250 FOR IP): Performed by: SURGERY

## 2023-08-25 PROCEDURE — 3700000000 HC ANESTHESIA ATTENDED CARE: Performed by: SURGERY

## 2023-08-25 PROCEDURE — 3600000014 HC SURGERY LEVEL 4 ADDTL 15MIN: Performed by: SURGERY

## 2023-08-25 PROCEDURE — 2500000003 HC RX 250 WO HCPCS: Performed by: NURSE ANESTHETIST, CERTIFIED REGISTERED

## 2023-08-25 PROCEDURE — 6360000002 HC RX W HCPCS: Performed by: NURSE ANESTHETIST, CERTIFIED REGISTERED

## 2023-08-25 RX ORDER — MIDAZOLAM HYDROCHLORIDE 2 MG/2ML
2 INJECTION, SOLUTION INTRAMUSCULAR; INTRAVENOUS
Status: COMPLETED | OUTPATIENT
Start: 2023-08-25 | End: 2023-08-25

## 2023-08-25 RX ORDER — HYDRALAZINE HYDROCHLORIDE 20 MG/ML
10 INJECTION INTRAMUSCULAR; INTRAVENOUS
Status: DISCONTINUED | OUTPATIENT
Start: 2023-08-25 | End: 2023-08-25 | Stop reason: HOSPADM

## 2023-08-25 RX ORDER — SODIUM CHLORIDE 0.9 % (FLUSH) 0.9 %
5-40 SYRINGE (ML) INJECTION EVERY 12 HOURS SCHEDULED
Status: DISCONTINUED | OUTPATIENT
Start: 2023-08-25 | End: 2023-08-26 | Stop reason: HOSPADM

## 2023-08-25 RX ORDER — SODIUM CHLORIDE 9 MG/ML
INJECTION, SOLUTION INTRAVENOUS PRN
Status: DISCONTINUED | OUTPATIENT
Start: 2023-08-25 | End: 2023-08-25 | Stop reason: HOSPADM

## 2023-08-25 RX ORDER — SODIUM CHLORIDE 9 MG/ML
INJECTION, SOLUTION INTRAVENOUS PRN
Status: DISCONTINUED | OUTPATIENT
Start: 2023-08-25 | End: 2023-08-26 | Stop reason: HOSPADM

## 2023-08-25 RX ORDER — OXYCODONE HYDROCHLORIDE 5 MG/1
5 TABLET ORAL EVERY 4 HOURS PRN
Status: DISCONTINUED | OUTPATIENT
Start: 2023-08-25 | End: 2023-08-26 | Stop reason: HOSPADM

## 2023-08-25 RX ORDER — ACETAMINOPHEN 325 MG/1
650 TABLET ORAL EVERY 4 HOURS PRN
Status: DISCONTINUED | OUTPATIENT
Start: 2023-08-25 | End: 2023-08-26 | Stop reason: HOSPADM

## 2023-08-25 RX ORDER — KETAMINE HCL IN NACL, ISO-OSM 100MG/10ML
SYRINGE (ML) INJECTION PRN
Status: DISCONTINUED | OUTPATIENT
Start: 2023-08-25 | End: 2023-08-25 | Stop reason: SDUPTHER

## 2023-08-25 RX ORDER — BUPIVACAINE HYDROCHLORIDE 2.5 MG/ML
INJECTION, SOLUTION EPIDURAL; INFILTRATION; INTRACAUDAL PRN
Status: DISCONTINUED | OUTPATIENT
Start: 2023-08-25 | End: 2023-08-25 | Stop reason: HOSPADM

## 2023-08-25 RX ORDER — ONDANSETRON 2 MG/ML
4 INJECTION INTRAMUSCULAR; INTRAVENOUS
Status: DISCONTINUED | OUTPATIENT
Start: 2023-08-25 | End: 2023-08-25 | Stop reason: HOSPADM

## 2023-08-25 RX ORDER — PROPOFOL 10 MG/ML
INJECTION, EMULSION INTRAVENOUS PRN
Status: DISCONTINUED | OUTPATIENT
Start: 2023-08-25 | End: 2023-08-25 | Stop reason: SDUPTHER

## 2023-08-25 RX ORDER — DEXMEDETOMIDINE HYDROCHLORIDE 100 UG/ML
INJECTION, SOLUTION INTRAVENOUS PRN
Status: DISCONTINUED | OUTPATIENT
Start: 2023-08-25 | End: 2023-08-25 | Stop reason: SDUPTHER

## 2023-08-25 RX ORDER — ROCURONIUM BROMIDE 10 MG/ML
INJECTION, SOLUTION INTRAVENOUS PRN
Status: DISCONTINUED | OUTPATIENT
Start: 2023-08-25 | End: 2023-08-25 | Stop reason: SDUPTHER

## 2023-08-25 RX ORDER — SODIUM CHLORIDE, SODIUM LACTATE, POTASSIUM CHLORIDE, CALCIUM CHLORIDE 600; 310; 30; 20 MG/100ML; MG/100ML; MG/100ML; MG/100ML
INJECTION, SOLUTION INTRAVENOUS CONTINUOUS
Status: DISCONTINUED | OUTPATIENT
Start: 2023-08-25 | End: 2023-08-25 | Stop reason: HOSPADM

## 2023-08-25 RX ORDER — ONDANSETRON 2 MG/ML
INJECTION INTRAMUSCULAR; INTRAVENOUS PRN
Status: DISCONTINUED | OUTPATIENT
Start: 2023-08-25 | End: 2023-08-25 | Stop reason: SDUPTHER

## 2023-08-25 RX ORDER — LIDOCAINE HYDROCHLORIDE 20 MG/ML
INJECTION, SOLUTION EPIDURAL; INFILTRATION; INTRACAUDAL; PERINEURAL PRN
Status: DISCONTINUED | OUTPATIENT
Start: 2023-08-25 | End: 2023-08-25 | Stop reason: SDUPTHER

## 2023-08-25 RX ORDER — SODIUM CHLORIDE 0.9 % (FLUSH) 0.9 %
5-40 SYRINGE (ML) INJECTION PRN
Status: DISCONTINUED | OUTPATIENT
Start: 2023-08-25 | End: 2023-08-26 | Stop reason: HOSPADM

## 2023-08-25 RX ORDER — SODIUM CHLORIDE 0.9 % (FLUSH) 0.9 %
5-40 SYRINGE (ML) INJECTION EVERY 12 HOURS SCHEDULED
Status: DISCONTINUED | OUTPATIENT
Start: 2023-08-25 | End: 2023-08-25 | Stop reason: HOSPADM

## 2023-08-25 RX ORDER — LIDOCAINE HYDROCHLORIDE 10 MG/ML
1 INJECTION, SOLUTION EPIDURAL; INFILTRATION; INTRACAUDAL; PERINEURAL
Status: DISCONTINUED | OUTPATIENT
Start: 2023-08-25 | End: 2023-08-25 | Stop reason: HOSPADM

## 2023-08-25 RX ORDER — HYDROMORPHONE HYDROCHLORIDE 1 MG/ML
0.5 INJECTION, SOLUTION INTRAMUSCULAR; INTRAVENOUS; SUBCUTANEOUS
Status: DISCONTINUED | OUTPATIENT
Start: 2023-08-25 | End: 2023-08-26 | Stop reason: HOSPADM

## 2023-08-25 RX ORDER — OXYCODONE HYDROCHLORIDE 5 MG/1
5 TABLET ORAL EVERY 6 HOURS PRN
Qty: 12 TABLET | Refills: 0 | Status: SHIPPED | OUTPATIENT
Start: 2023-08-25 | End: 2023-08-28

## 2023-08-25 RX ORDER — OXYCODONE HYDROCHLORIDE 5 MG/1
5 TABLET ORAL
Status: DISCONTINUED | OUTPATIENT
Start: 2023-08-25 | End: 2023-08-25 | Stop reason: HOSPADM

## 2023-08-25 RX ORDER — HYDROMORPHONE HYDROCHLORIDE 1 MG/ML
0.5 INJECTION, SOLUTION INTRAMUSCULAR; INTRAVENOUS; SUBCUTANEOUS EVERY 5 MIN PRN
Status: COMPLETED | OUTPATIENT
Start: 2023-08-25 | End: 2023-08-25

## 2023-08-25 RX ORDER — FENTANYL CITRATE 50 UG/ML
100 INJECTION, SOLUTION INTRAMUSCULAR; INTRAVENOUS
Status: DISCONTINUED | OUTPATIENT
Start: 2023-08-25 | End: 2023-08-25 | Stop reason: HOSPADM

## 2023-08-25 RX ORDER — PHENYLEPHRINE HCL IN 0.9% NACL 0.4MG/10ML
SYRINGE (ML) INTRAVENOUS PRN
Status: DISCONTINUED | OUTPATIENT
Start: 2023-08-25 | End: 2023-08-25 | Stop reason: SDUPTHER

## 2023-08-25 RX ORDER — MIDAZOLAM HYDROCHLORIDE 1 MG/ML
INJECTION INTRAMUSCULAR; INTRAVENOUS PRN
Status: DISCONTINUED | OUTPATIENT
Start: 2023-08-25 | End: 2023-08-25 | Stop reason: SDUPTHER

## 2023-08-25 RX ORDER — ONDANSETRON 2 MG/ML
4 INJECTION INTRAMUSCULAR; INTRAVENOUS EVERY 6 HOURS PRN
Status: DISCONTINUED | OUTPATIENT
Start: 2023-08-25 | End: 2023-08-26 | Stop reason: HOSPADM

## 2023-08-25 RX ORDER — ONDANSETRON 4 MG/1
4 TABLET, ORALLY DISINTEGRATING ORAL EVERY 8 HOURS PRN
Status: DISCONTINUED | OUTPATIENT
Start: 2023-08-25 | End: 2023-08-26 | Stop reason: HOSPADM

## 2023-08-25 RX ORDER — ACETAMINOPHEN 500 MG
1000 TABLET ORAL ONCE
Status: DISCONTINUED | OUTPATIENT
Start: 2023-08-25 | End: 2023-08-25 | Stop reason: HOSPADM

## 2023-08-25 RX ORDER — FENTANYL CITRATE 50 UG/ML
INJECTION, SOLUTION INTRAMUSCULAR; INTRAVENOUS PRN
Status: DISCONTINUED | OUTPATIENT
Start: 2023-08-25 | End: 2023-08-25 | Stop reason: SDUPTHER

## 2023-08-25 RX ORDER — SODIUM CHLORIDE, SODIUM LACTATE, POTASSIUM CHLORIDE, CALCIUM CHLORIDE 600; 310; 30; 20 MG/100ML; MG/100ML; MG/100ML; MG/100ML
INJECTION, SOLUTION INTRAVENOUS CONTINUOUS
Status: DISCONTINUED | OUTPATIENT
Start: 2023-08-25 | End: 2023-08-26 | Stop reason: HOSPADM

## 2023-08-25 RX ORDER — SODIUM CHLORIDE 0.9 % (FLUSH) 0.9 %
5-40 SYRINGE (ML) INJECTION PRN
Status: DISCONTINUED | OUTPATIENT
Start: 2023-08-25 | End: 2023-08-25 | Stop reason: HOSPADM

## 2023-08-25 RX ORDER — PROCHLORPERAZINE EDISYLATE 5 MG/ML
5 INJECTION INTRAMUSCULAR; INTRAVENOUS
Status: DISCONTINUED | OUTPATIENT
Start: 2023-08-25 | End: 2023-08-25 | Stop reason: HOSPADM

## 2023-08-25 RX ORDER — SUCCINYLCHOLINE/SOD CL,ISO/PF 200MG/10ML
SYRINGE (ML) INTRAVENOUS PRN
Status: DISCONTINUED | OUTPATIENT
Start: 2023-08-25 | End: 2023-08-25 | Stop reason: SDUPTHER

## 2023-08-25 RX ORDER — DEXAMETHASONE SODIUM PHOSPHATE 4 MG/ML
INJECTION, SOLUTION INTRA-ARTICULAR; INTRALESIONAL; INTRAMUSCULAR; INTRAVENOUS; SOFT TISSUE PRN
Status: DISCONTINUED | OUTPATIENT
Start: 2023-08-25 | End: 2023-08-25 | Stop reason: SDUPTHER

## 2023-08-25 RX ORDER — FENTANYL CITRATE 50 UG/ML
25 INJECTION, SOLUTION INTRAMUSCULAR; INTRAVENOUS EVERY 5 MIN PRN
Status: COMPLETED | OUTPATIENT
Start: 2023-08-25 | End: 2023-08-25

## 2023-08-25 RX ORDER — ONDANSETRON 2 MG/ML
4 INJECTION INTRAMUSCULAR; INTRAVENOUS AS NEEDED
Status: DISCONTINUED | OUTPATIENT
Start: 2023-08-25 | End: 2023-08-25 | Stop reason: HOSPADM

## 2023-08-25 RX ADMIN — DEXAMETHASONE SODIUM PHOSPHATE 12 MG: 4 INJECTION, SOLUTION INTRAMUSCULAR; INTRAVENOUS at 08:02

## 2023-08-25 RX ADMIN — HYDROMORPHONE HYDROCHLORIDE 0.5 MG: 1 INJECTION, SOLUTION INTRAMUSCULAR; INTRAVENOUS; SUBCUTANEOUS at 21:13

## 2023-08-25 RX ADMIN — HYDROMORPHONE HYDROCHLORIDE 0.5 MG: 1 INJECTION, SOLUTION INTRAMUSCULAR; INTRAVENOUS; SUBCUTANEOUS at 11:48

## 2023-08-25 RX ADMIN — HYDROMORPHONE HYDROCHLORIDE 0.5 MG: 1 INJECTION, SOLUTION INTRAMUSCULAR; INTRAVENOUS; SUBCUTANEOUS at 15:14

## 2023-08-25 RX ADMIN — HYDROMORPHONE HYDROCHLORIDE 0.5 MG: 1 INJECTION, SOLUTION INTRAMUSCULAR; INTRAVENOUS; SUBCUTANEOUS at 10:29

## 2023-08-25 RX ADMIN — ONDANSETRON HYDROCHLORIDE 4 MG: 2 INJECTION, SOLUTION INTRAMUSCULAR; INTRAVENOUS at 08:43

## 2023-08-25 RX ADMIN — HYDROMORPHONE HYDROCHLORIDE 0.5 MG: 1 INJECTION, SOLUTION INTRAMUSCULAR; INTRAVENOUS; SUBCUTANEOUS at 09:39

## 2023-08-25 RX ADMIN — FENTANYL CITRATE 25 MCG: 0.05 INJECTION, SOLUTION INTRAMUSCULAR; INTRAVENOUS at 09:27

## 2023-08-25 RX ADMIN — FENTANYL CITRATE 25 MCG: 0.05 INJECTION, SOLUTION INTRAMUSCULAR; INTRAVENOUS at 09:32

## 2023-08-25 RX ADMIN — ACETAMINOPHEN 650 MG: 325 TABLET ORAL at 18:08

## 2023-08-25 RX ADMIN — DEXMEDETOMIDINE HYDROCHLORIDE 4 MCG: 100 INJECTION, SOLUTION, CONCENTRATE INTRAVENOUS at 08:43

## 2023-08-25 RX ADMIN — WATER 3000 MG: 1 INJECTION INTRAMUSCULAR; INTRAVENOUS; SUBCUTANEOUS at 07:58

## 2023-08-25 RX ADMIN — SODIUM CHLORIDE, POTASSIUM CHLORIDE, SODIUM LACTATE AND CALCIUM CHLORIDE: 600; 310; 30; 20 INJECTION, SOLUTION INTRAVENOUS at 07:27

## 2023-08-25 RX ADMIN — FENTANYL CITRATE 25 MCG: 0.05 INJECTION, SOLUTION INTRAMUSCULAR; INTRAVENOUS at 09:37

## 2023-08-25 RX ADMIN — FENTANYL CITRATE 50 MCG: 50 INJECTION, SOLUTION INTRAMUSCULAR; INTRAVENOUS at 07:27

## 2023-08-25 RX ADMIN — MIDAZOLAM 2 MG: 1 INJECTION INTRAMUSCULAR; INTRAVENOUS at 07:24

## 2023-08-25 RX ADMIN — DEXMEDETOMIDINE HYDROCHLORIDE 8 MCG: 100 INJECTION, SOLUTION, CONCENTRATE INTRAVENOUS at 08:24

## 2023-08-25 RX ADMIN — FENTANYL CITRATE 50 MCG: 50 INJECTION, SOLUTION INTRAMUSCULAR; INTRAVENOUS at 07:41

## 2023-08-25 RX ADMIN — Medication 25 MG: at 07:42

## 2023-08-25 RX ADMIN — PROPOFOL 50 MCG/KG/MIN: 10 INJECTION, EMULSION INTRAVENOUS at 07:54

## 2023-08-25 RX ADMIN — PROPOFOL 130 MG: 10 INJECTION, EMULSION INTRAVENOUS at 07:45

## 2023-08-25 RX ADMIN — ROCURONIUM BROMIDE 10 MG: 10 SOLUTION INTRAVENOUS at 07:42

## 2023-08-25 RX ADMIN — Medication 80 MCG: at 08:36

## 2023-08-25 RX ADMIN — LIDOCAINE HYDROCHLORIDE 50 MG: 20 INJECTION, SOLUTION EPIDURAL; INFILTRATION; INTRACAUDAL; PERINEURAL at 07:27

## 2023-08-25 RX ADMIN — Medication 25 MG: at 07:53

## 2023-08-25 RX ADMIN — MIDAZOLAM 2 MG: 1 INJECTION INTRAMUSCULAR; INTRAVENOUS at 07:27

## 2023-08-25 RX ADMIN — Medication 160 MG: at 07:42

## 2023-08-25 RX ADMIN — FENTANYL CITRATE 25 MCG: 0.05 INJECTION, SOLUTION INTRAMUSCULAR; INTRAVENOUS at 09:45

## 2023-08-25 ASSESSMENT — PAIN DESCRIPTION - LOCATION
LOCATION: NECK
LOCATION: ABDOMEN
LOCATION: NECK
LOCATION: NECK;THROAT
LOCATION: NECK

## 2023-08-25 ASSESSMENT — PAIN DESCRIPTION - DESCRIPTORS
DESCRIPTORS: ACHING
DESCRIPTORS: ACHING;SORE

## 2023-08-25 ASSESSMENT — PAIN DESCRIPTION - PAIN TYPE
TYPE: SURGICAL PAIN

## 2023-08-25 ASSESSMENT — PAIN DESCRIPTION - ONSET
ONSET: SUDDEN
ONSET: GRADUAL
ONSET: GRADUAL

## 2023-08-25 ASSESSMENT — PAIN - FUNCTIONAL ASSESSMENT
PAIN_FUNCTIONAL_ASSESSMENT: PREVENTS OR INTERFERES SOME ACTIVE ACTIVITIES AND ADLS
PAIN_FUNCTIONAL_ASSESSMENT: 0-10
PAIN_FUNCTIONAL_ASSESSMENT: PREVENTS OR INTERFERES SOME ACTIVE ACTIVITIES AND ADLS

## 2023-08-25 ASSESSMENT — PAIN SCALES - GENERAL
PAINLEVEL_OUTOF10: 8
PAINLEVEL_OUTOF10: 5
PAINLEVEL_OUTOF10: 6
PAINLEVEL_OUTOF10: 9
PAINLEVEL_OUTOF10: 5
PAINLEVEL_OUTOF10: 10
PAINLEVEL_OUTOF10: 7
PAINLEVEL_OUTOF10: 8
PAINLEVEL_OUTOF10: 6
PAINLEVEL_OUTOF10: 10

## 2023-08-25 ASSESSMENT — PAIN DESCRIPTION - ORIENTATION
ORIENTATION: LEFT
ORIENTATION: MID

## 2023-08-25 NOTE — PERIOP NOTE
Preoperative assessment completed by SAMIRA Zhang. Patient verbalized understanding of surgical plan and consent reviewed. Allergies verified. NPO status confirmed. Intraoperative education given by RN. Opportunity given to ask questions.

## 2023-08-25 NOTE — PERIOP NOTE
TRANSFER - OUT REPORT:    Verbal report given to SAMIRA Mccoy on [facility]  being transferred to [address] [place] [address] for routine post-op       Report consisted of patient's Situation, Background, Assessment and   Recommendations(SBAR). Information from the following report(s) Nurse Handoff Report, Adult Overview, Surgery Report, MAR, and Recent Results was reviewed with the receiving nurse. Lines:   Peripheral IV 08/25/23 Right External Jugular (Active)   Site Assessment Clean, dry & intact 08/25/23 0912   Line Status Infusing 08/25/23 0912   Line Care Connections checked and tightened 08/25/23 0912   Phlebitis Assessment No symptoms 08/25/23 0912   Infiltration Assessment 0 08/25/23 0912   Alcohol Cap Used Yes 08/25/23 0912   Dressing Status Clean, dry & intact 08/25/23 0912   Dressing Type Transparent 08/25/23 0912        Opportunity for questions and clarification was provided.

## 2023-08-25 NOTE — DISCHARGE INSTRUCTIONS
Patient Discharge Instructions    Irvin Hughes / [id number] : 1970    Admitted 2023 Discharged: 2023     Take Home Medications            It is important that you take the medication exactly as they are prescribed. Keep your medication in the bottles provided by the pharmacist and keep a list of the medication names, dosages, and times to be taken in your wallet. Do not take other medications without consulting your doctor. What to do at Home    Recommended diet: regular diet and take 2 TUMS twice a day for a month ,     Recommended activity: activity as tolerated, may shower whenever you wish          [unfilled]        Information obtained by :  I understand that if any problems occur once I am at home I am to contact my physician. I understand and acknowledge receipt of the instructions indicated above.                                                                                                                                            Physician's or R.N.'s Signature                                                                  Date/Time                                                                                                                                              Patient or Representative Signature                                                          Date/Time

## 2023-08-25 NOTE — PROGRESS NOTES
Attempted to deliver and verbally explain the [facility]  with patient. Patient was with clinical staff.  Heather Tellez RN- Care Management Assistant

## 2023-08-25 NOTE — OP NOTE
[facility]  OPERATIVE REPORT    Name:  Janay Patel  MR#:  [id number]  :  1970  ACCOUNT #:  [de-identified]  DATE OF SERVICE:  2023    PREOPERATIVE DIAGNOSIS:  Multinodular goiter. POSTOPERATIVE DIAGNOSIS:  Multinodular goiter. PROCEDURE PERFORMED:  Left thyroid lobectomy (completion thyroidectomy). SURGEON:  Roseline Heller MD    ASSISTANT:  BLANQUITA Carias    ANESTHESIA:  General.    COMPLICATIONS:  None. SPECIMENS REMOVED:  Left thyroid lobe. IMPLANTS:  None. ESTIMATED BLOOD LOSS:  Minimal.    DRAINS:  None. FINDINGS:  Findings were that of a normal size left thyroid lobe. PROCEDURE:  With the patient supine and suitably anesthetized, the neck was prepared with ChloraPrep and draped as a sterile field. 0.25% Marcaine was infiltrated into the skin and a more of the left than the right side of the collar incision was made and carried through the skin down through the platysma to the musculature. She has had a previous right thyroid lobectomy, so the anatomy was a little skewed. Subplatysmal flaps were elevated superiorly and inferiorly. The strap muscles were identified and divided in the midline and I was able to get underneath the strap muscles to try to expose the left thyroid lobe. By careful dissection, I was able to do this and approach the superior pole first by  the medial aspect of the thyroid from the underlying trachea using a Tess clamp and then thereby isolating out the superior pole vessels, which were divided with the LigaSure. This afforded better mobilization medially as well. Inferiorly, I was able to mobilize this well. Rotating it up, I was able to identify the nerve and also the superior parathyroid, which was ***. This was preserved throughout. Inferiorly, I identified the inferior gland, which was in the thyrothymic ligament and this gland was also preserved.   Having safely preserved the nerve, I then

## 2023-08-25 NOTE — BRIEF OP NOTE
Brief Postoperative Note      Patient: Billie Muniz  YOB: 1970  MRN: [id number]    Date of Procedure: 8/25/2023    Pre-Op Diagnosis Codes:     * Multinodular goiter [E04.2]    Post-Op Diagnosis: Same       Procedure(s):  LEFT THYROID LOBECTOMY    Surgeon(s):  Katiusak Martinez MD    Assistant:  Surgical Assistant: Aida Stahl    Anesthesia: General    Estimated Blood Loss (mL): Minimal    Complications: None    Specimens:   ID Type Source Tests Collected by Time Destination   A : Left Thyroid Lobe Tissue Thyroid SURGICAL PATHOLOGY Katiuska Martinez MD 8/25/2023 9878        Implants:  * No implants in log *      Drains: * No LDAs found *    Findings: goiter    [id number]  Electronically signed by Rahul Ji MD on 8/25/2023 at 8:54 AM

## 2023-08-26 ENCOUNTER — PATIENT MESSAGE (OUTPATIENT)
Age: 53
End: 2023-08-26

## 2023-08-26 VITALS
HEIGHT: 64 IN | TEMPERATURE: 98.8 F | BODY MASS INDEX: 44.73 KG/M2 | HEART RATE: 63 BPM | WEIGHT: 262 LBS | OXYGEN SATURATION: 100 % | DIASTOLIC BLOOD PRESSURE: 78 MMHG | RESPIRATION RATE: 16 BRPM | SYSTOLIC BLOOD PRESSURE: 127 MMHG

## 2023-08-26 LAB — CALCIUM SERPL-MCNC: 8.2 MG/DL (ref 8.5–10.1)

## 2023-08-26 PROCEDURE — 82310 ASSAY OF CALCIUM: CPT

## 2023-08-26 PROCEDURE — 36415 COLL VENOUS BLD VENIPUNCTURE: CPT

## 2023-08-26 PROCEDURE — 96374 THER/PROPH/DIAG INJ IV PUSH: CPT

## 2023-08-26 PROCEDURE — G0378 HOSPITAL OBSERVATION PER HR: HCPCS

## 2023-08-26 PROCEDURE — 99024 POSTOP FOLLOW-UP VISIT: CPT | Performed by: SURGERY

## 2023-08-26 PROCEDURE — 6360000002 HC RX W HCPCS: Performed by: SURGERY

## 2023-08-26 PROCEDURE — 6370000000 HC RX 637 (ALT 250 FOR IP): Performed by: SURGERY

## 2023-08-26 PROCEDURE — 2580000003 HC RX 258: Performed by: SURGERY

## 2023-08-26 RX ADMIN — ACETAMINOPHEN 650 MG: 325 TABLET ORAL at 03:14

## 2023-08-26 RX ADMIN — OXYCODONE HYDROCHLORIDE 5 MG: 5 TABLET ORAL at 11:09

## 2023-08-26 RX ADMIN — SODIUM CHLORIDE, PRESERVATIVE FREE 10 ML: 5 INJECTION INTRAVENOUS at 08:23

## 2023-08-26 RX ADMIN — ACETAMINOPHEN 650 MG: 325 TABLET ORAL at 08:54

## 2023-08-26 RX ADMIN — HYDROMORPHONE HYDROCHLORIDE 0.5 MG: 1 INJECTION, SOLUTION INTRAMUSCULAR; INTRAVENOUS; SUBCUTANEOUS at 03:14

## 2023-08-26 RX ADMIN — OXYCODONE HYDROCHLORIDE 5 MG: 5 TABLET ORAL at 06:33

## 2023-08-26 ASSESSMENT — PAIN DESCRIPTION - ORIENTATION: ORIENTATION: LEFT

## 2023-08-26 ASSESSMENT — PAIN DESCRIPTION - DESCRIPTORS: DESCRIPTORS: ACHING;SORE

## 2023-08-26 ASSESSMENT — PAIN DESCRIPTION - LOCATION
LOCATION: NECK
LOCATION: NECK;HEAD

## 2023-08-26 ASSESSMENT — PAIN SCALES - GENERAL: PAINLEVEL_OUTOF10: 8

## 2023-08-27 ENCOUNTER — PATIENT MESSAGE (OUTPATIENT)
Age: 53
End: 2023-08-27

## 2023-08-28 ENCOUNTER — TELEPHONE (OUTPATIENT)
Age: 53
End: 2023-08-28

## 2023-08-28 ENCOUNTER — APPOINTMENT (OUTPATIENT)
Facility: HOSPITAL | Age: 53
End: 2023-08-28
Payer: COMMERCIAL

## 2023-08-28 ENCOUNTER — PATIENT MESSAGE (OUTPATIENT)
Age: 53
End: 2023-08-28

## 2023-08-28 ENCOUNTER — HOSPITAL ENCOUNTER (EMERGENCY)
Facility: HOSPITAL | Age: 53
Discharge: LEFT BEFORE DISCHARGE | End: 2023-08-28
Attending: STUDENT IN AN ORGANIZED HEALTH CARE EDUCATION/TRAINING PROGRAM
Payer: COMMERCIAL

## 2023-08-28 VITALS
RESPIRATION RATE: 10 BRPM | DIASTOLIC BLOOD PRESSURE: 74 MMHG | OXYGEN SATURATION: 99 % | HEART RATE: 54 BPM | TEMPERATURE: 97.7 F | SYSTOLIC BLOOD PRESSURE: 133 MMHG

## 2023-08-28 DIAGNOSIS — E04.2 NONTOXIC MULTINODULAR GOITER: Primary | ICD-10-CM

## 2023-08-28 DIAGNOSIS — R20.0 NUMBNESS AND TINGLING IN BOTH HANDS: ICD-10-CM

## 2023-08-28 DIAGNOSIS — R20.2 NUMBNESS AND TINGLING IN BOTH HANDS: ICD-10-CM

## 2023-08-28 DIAGNOSIS — R07.9 ACUTE CHEST PAIN: Primary | ICD-10-CM

## 2023-08-28 DIAGNOSIS — R00.2 PALPITATIONS: ICD-10-CM

## 2023-08-28 LAB
ALBUMIN SERPL-MCNC: 3.1 G/DL (ref 3.5–5)
ALBUMIN/GLOB SERPL: 0.7 (ref 1.1–2.2)
ALP SERPL-CCNC: 94 U/L (ref 45–117)
ALT SERPL-CCNC: 16 U/L (ref 12–78)
ANION GAP SERPL CALC-SCNC: 3 MMOL/L (ref 5–15)
AST SERPL-CCNC: 10 U/L (ref 15–37)
BASOPHILS # BLD: 0 K/UL (ref 0–0.1)
BASOPHILS NFR BLD: 1 % (ref 0–1)
BILIRUB SERPL-MCNC: 0.4 MG/DL (ref 0.2–1)
BUN SERPL-MCNC: 12 MG/DL (ref 6–20)
BUN/CREAT SERPL: 12 (ref 12–20)
CALCIUM SERPL-MCNC: 8.1 MG/DL (ref 8.5–10.1)
CHLORIDE SERPL-SCNC: 110 MMOL/L (ref 97–108)
CO2 SERPL-SCNC: 28 MMOL/L (ref 21–32)
COMMENT:: NORMAL
CREAT SERPL-MCNC: 1 MG/DL (ref 0.55–1.02)
DIFFERENTIAL METHOD BLD: ABNORMAL
EKG ATRIAL RATE: 63 BPM
EKG DIAGNOSIS: NORMAL
EKG P AXIS: 58 DEGREES
EKG P-R INTERVAL: 152 MS
EKG Q-T INTERVAL: 394 MS
EKG QRS DURATION: 66 MS
EKG QTC CALCULATION (BAZETT): 403 MS
EKG R AXIS: 69 DEGREES
EKG T AXIS: 8 DEGREES
EKG VENTRICULAR RATE: 63 BPM
EOSINOPHIL # BLD: 0.1 K/UL (ref 0–0.4)
EOSINOPHIL NFR BLD: 2 % (ref 0–7)
ERYTHROCYTE [DISTWIDTH] IN BLOOD BY AUTOMATED COUNT: 13.7 % (ref 11.5–14.5)
GLOBULIN SER CALC-MCNC: 4.7 G/DL (ref 2–4)
GLUCOSE SERPL-MCNC: 84 MG/DL (ref 65–100)
HCT VFR BLD AUTO: 36.8 % (ref 35–47)
HGB BLD-MCNC: 11.5 G/DL (ref 11.5–16)
IMM GRANULOCYTES # BLD AUTO: 0 K/UL (ref 0–0.04)
IMM GRANULOCYTES NFR BLD AUTO: 0 % (ref 0–0.5)
LIPASE SERPL-CCNC: 79 U/L (ref 73–393)
LYMPHOCYTES # BLD: 2.4 K/UL (ref 0.8–3.5)
LYMPHOCYTES NFR BLD: 45 % (ref 12–49)
MAGNESIUM SERPL-MCNC: 1.7 MG/DL (ref 1.6–2.4)
MCH RBC QN AUTO: 30.8 PG (ref 26–34)
MCHC RBC AUTO-ENTMCNC: 31.3 G/DL (ref 30–36.5)
MCV RBC AUTO: 98.7 FL (ref 80–99)
MONOCYTES # BLD: 0.4 K/UL (ref 0–1)
MONOCYTES NFR BLD: 8 % (ref 5–13)
NEUTS SEG # BLD: 2.3 K/UL (ref 1.8–8)
NEUTS SEG NFR BLD: 44 % (ref 32–75)
NRBC # BLD: 0 K/UL (ref 0–0.01)
NRBC BLD-RTO: 0 PER 100 WBC
PHOSPHATE SERPL-MCNC: 3.1 MG/DL (ref 2.6–4.7)
PLATELET # BLD AUTO: 225 K/UL (ref 150–400)
PMV BLD AUTO: 11.7 FL (ref 8.9–12.9)
POTASSIUM SERPL-SCNC: 3.5 MMOL/L (ref 3.5–5.1)
PROT SERPL-MCNC: 7.8 G/DL (ref 6.4–8.2)
RBC # BLD AUTO: 3.73 M/UL (ref 3.8–5.2)
SODIUM SERPL-SCNC: 141 MMOL/L (ref 136–145)
SPECIMEN HOLD: NORMAL
T4 FREE SERPL-MCNC: 0.8 NG/DL (ref 0.8–1.5)
TROPONIN I SERPL HS-MCNC: 6 NG/L (ref 0–51)
TSH SERPL DL<=0.05 MIU/L-ACNC: 5.19 UIU/ML (ref 0.36–3.74)
WBC # BLD AUTO: 5.3 K/UL (ref 3.6–11)

## 2023-08-28 PROCEDURE — 84100 ASSAY OF PHOSPHORUS: CPT

## 2023-08-28 PROCEDURE — 85025 COMPLETE CBC W/AUTO DIFF WBC: CPT

## 2023-08-28 PROCEDURE — 71046 X-RAY EXAM CHEST 2 VIEWS: CPT

## 2023-08-28 PROCEDURE — 83690 ASSAY OF LIPASE: CPT

## 2023-08-28 PROCEDURE — 99285 EMERGENCY DEPT VISIT HI MDM: CPT

## 2023-08-28 PROCEDURE — 84439 ASSAY OF FREE THYROXINE: CPT

## 2023-08-28 PROCEDURE — 84443 ASSAY THYROID STIM HORMONE: CPT

## 2023-08-28 PROCEDURE — 84484 ASSAY OF TROPONIN QUANT: CPT

## 2023-08-28 PROCEDURE — 6370000000 HC RX 637 (ALT 250 FOR IP): Performed by: STUDENT IN AN ORGANIZED HEALTH CARE EDUCATION/TRAINING PROGRAM

## 2023-08-28 PROCEDURE — 83735 ASSAY OF MAGNESIUM: CPT

## 2023-08-28 PROCEDURE — 80053 COMPREHEN METABOLIC PANEL: CPT

## 2023-08-28 PROCEDURE — 93005 ELECTROCARDIOGRAM TRACING: CPT | Performed by: EMERGENCY MEDICINE

## 2023-08-28 PROCEDURE — 36415 COLL VENOUS BLD VENIPUNCTURE: CPT

## 2023-08-28 RX ORDER — LEVOTHYROXINE SODIUM 0.1 MG/1
100 TABLET ORAL DAILY
Qty: 90 TABLET | Refills: 3 | Status: SHIPPED | OUTPATIENT
Start: 2023-08-28 | End: 2023-08-28

## 2023-08-28 RX ORDER — LEVOTHYROXINE SODIUM 0.1 MG/1
100 TABLET ORAL DAILY
Qty: 90 TABLET | Refills: 0 | Status: SHIPPED | OUTPATIENT
Start: 2023-08-28 | End: 2023-09-11

## 2023-08-28 RX ORDER — HYDROXYZINE HYDROCHLORIDE 25 MG/1
25 TABLET, FILM COATED ORAL ONCE
Status: COMPLETED | OUTPATIENT
Start: 2023-08-28 | End: 2023-08-28

## 2023-08-28 RX ORDER — LEVOTHYROXINE SODIUM 0.1 MG/1
100 TABLET ORAL DAILY
Qty: 90 TABLET | Refills: 3 | Status: SHIPPED | OUTPATIENT
Start: 2023-08-28 | End: 2023-09-11 | Stop reason: SINTOL

## 2023-08-28 RX ORDER — HYDRALAZINE HYDROCHLORIDE 20 MG/ML
10 INJECTION INTRAMUSCULAR; INTRAVENOUS ONCE
Status: DISCONTINUED | OUTPATIENT
Start: 2023-08-28 | End: 2023-08-28 | Stop reason: HOSPADM

## 2023-08-28 RX ADMIN — HYDROXYZINE HYDROCHLORIDE 25 MG: 25 TABLET, FILM COATED ORAL at 12:37

## 2023-08-28 ASSESSMENT — PAIN DESCRIPTION - ORIENTATION
ORIENTATION: LEFT;MID
ORIENTATION: LEFT;MID

## 2023-08-28 ASSESSMENT — PAIN DESCRIPTION - PAIN TYPE: TYPE: ACUTE PAIN

## 2023-08-28 ASSESSMENT — PAIN SCALES - GENERAL
PAINLEVEL_OUTOF10: 8
PAINLEVEL_OUTOF10: 8

## 2023-08-28 ASSESSMENT — PAIN DESCRIPTION - DESCRIPTORS
DESCRIPTORS: PRESSURE
DESCRIPTORS: PRESSURE

## 2023-08-28 ASSESSMENT — PAIN DESCRIPTION - FREQUENCY: FREQUENCY: CONTINUOUS

## 2023-08-28 ASSESSMENT — PAIN DESCRIPTION - LOCATION
LOCATION: CHEST
LOCATION: CHEST

## 2023-08-28 ASSESSMENT — PAIN DESCRIPTION - ONSET: ONSET: ON-GOING

## 2023-08-28 ASSESSMENT — PAIN - FUNCTIONAL ASSESSMENT: PAIN_FUNCTIONAL_ASSESSMENT: 0-10

## 2023-08-28 NOTE — TELEPHONE ENCOUNTER
Spoke with patient on phone, she stated she has already spoken with her insurance and we don't have to worry about that.

## 2023-08-28 NOTE — ED NOTES
Patient ambulated from ed without being able to obtain last set of vital signs or pain assessment.         Ashlie Traylor RN  08/28/23 2923

## 2023-08-28 NOTE — TELEPHONE ENCOUNTER
Please call pt back. Pt stated that her feet and hands are swollen/tingling with a headache. Would like to speak with nurse.

## 2023-08-28 NOTE — TELEPHONE ENCOUNTER
Returned call to patient. Two patient identifiers used. Patient stated she up doing some things yesterday and was not sure if she over did it, but noticed her heart started to race really fast and slowed down, she stated her mother told her to go lay down because maybe she was over doing it. Patient stated she laid down and woke up this morning with her hands, and feet swollen along with a headache, she stated her daughter came to check on her and noticed the patient face was swollen as well, patient stated has only took 2 oxycodone since being home so she feels it's not the narcotics, and she been just taking Tylenol every 8 hours. Patient stated she just feels really bad, made patient aware she may need t to go to the ER, but will speak with a provider. Patient verbalized understanding. Laura Griffith NP aware of message above, Fara Carrasco NP agreed with ER. Returned call to patient. Two patient identifiers used. Made patient aware she would need to go to ER, Patient verbalized understanding and agreed. Patient stated she will come to [facility].

## 2023-08-28 NOTE — ED NOTES
Per MD, patient to be dc'd as AMA. Patient did not sign paperwork. MD aware.         Mitali Riley, RN  08/28/23 6555

## 2023-08-28 NOTE — TELEPHONE ENCOUNTER
Patient called and stated she just left the ER and they told her that her thyroid levels are very low. Patient would like to know if Dr. Raheel Downing could put her on thyroid medication.
Returned call to patient. Two patient identifiers used. Made patient aware I made the provider aware of everything, Per Dr. Madonna Francis he will be prescribing medication for Thyroid. Patient verbalized understanding and requested medication to be sent to [address] [place] [address]. Will make patient aware, asked patient about Critical illness form she stated about in [employer] message, patient stated she already spoke with her insurance and we do not have to worry about that. Patient thanked for the return call.
no

## 2023-08-28 NOTE — ED NOTES
Dr. Sonia Winters made aware that the patient is not willing to have staff attempt an IV.       Brittany Quick RN  08/28/23 0227

## 2023-08-28 NOTE — TELEPHONE ENCOUNTER
From: Ifrah Dean  To: Dr. Toro Ahmadi: 8/26/2023 12:38 PM EDT  Subject: Critical Illness Form     Dr. Maria Luisa Reynolds will you please fill out the Critical Illness Form so that I can submit it to [facility]

## 2023-08-28 NOTE — TELEPHONE ENCOUNTER
From: Chung Abdul  To: Dr. Carlos Mosleyr: 8/27/2023 11:53 PM EDT  Subject: Balance     Good morning Dr Concepcion Martin  I noticed my balance is slightly off since I have been at home, it's not the pain pills because I am taking Tylenol 8 hours for pain and only use those as PRN. My heart feels like it's racing then slowing down. Are these changes normal or should I be alarmed? I will let my physician know as well, I return back to [facility] on Tuesday.      Mrs. Cameron Dickey

## 2023-08-28 NOTE — ED NOTES
Pt stating she wants to leave because she does not want to be \"stuck anymore. \" Pt continues with CP. EKG given to Dr Victorino Tejada and he was informed of pt stating she wants to leave. He states he will come speak with pt. X-ray here to take pt. Advised pt not to stand as she is feeling weak. Pt is in a wheelchair.          Dionte Mcdonald, SAMIRA  08/28/23 9814

## 2023-08-28 NOTE — ED NOTES
Patient displeased that she has not been able to eat while in the ed, this nurse explains that she has been busy and did not realize that she had requested to have something to eat. Patient unwilling to stay at this time. MD aware.         Neal Medina, SAMIRA  08/28/23 2037

## 2023-08-28 NOTE — ED TRIAGE NOTES
Pt comes to ED with reports of calling Dr Pamela Guy referring her to the ED. Pt reports having a thyroidectomy on Friday and reports today waking up with face, hands, and face were swollen and tingling. Pt reports chest pressure since surgery. Tonny Chapman NP in triage assessing pt.

## 2023-08-29 NOTE — TELEPHONE ENCOUNTER
Patient came into office today and /requested to speak with nurse, I went to speak with patient and she stated she made her job aware she would need to be out of work longer due to having to go to ER yesterday 08/28/2023 and has not been feeling well with this recent surgery. Patient stated her post-op appt is scheduled for 09/11/2023 and she would llike to stay out until then, which she expressed to her job. She stated her job threaten to terminate her position and stated FMLA forms need to be completed but patient stated she would like a note to be written in the meantime. Made patient aware her job would need to send the forms, fax number was given to patient, and patient was given a medical release form to complete. Augie Mckeon NP aware, which she called Dr. Bernarda Marcum to get the okay. Per Dr. Bernarda Marcum it is fine to excuse patient from work until post-op appt. Letter was completed and given to patient, In the meantime patient will follow with employer to send over FMLA forms.

## 2023-08-29 NOTE — TELEPHONE ENCOUNTER
From: Chung Abdul  To: Dr. Carlos Quintanilla: 8/28/2023 5:29 PM EDT  Subject: Return to work     Dr. Concepcion Martin thank you for the medicine, I think in my best interest I will stay out of work until the day of our appointment September 11. This will allow me time to heal and medication in the right direction. See you soon.

## 2023-08-30 ASSESSMENT — HEART SCORE: ECG: 0

## 2023-08-31 ENCOUNTER — CLINICAL DOCUMENTATION (OUTPATIENT)
Age: 53
End: 2023-08-31

## 2023-09-06 ENCOUNTER — TELEPHONE (OUTPATIENT)
Age: 53
End: 2023-09-06

## 2023-09-06 ENCOUNTER — PATIENT MESSAGE (OUTPATIENT)
Age: 53
End: 2023-09-06

## 2023-09-06 RX ORDER — FLUCONAZOLE 150 MG/1
150 TABLET ORAL DAILY
Qty: 2 TABLET | Refills: 0 | Status: SHIPPED | OUTPATIENT
Start: 2023-09-06

## 2023-09-06 NOTE — TELEPHONE ENCOUNTER
From: Rafael Pérez  To: Dr. Mali Hanna: 9/6/2023 2:34 AM EDT  Subject: Allergic reaction     Dr Francisco Dominguez  I have been noticing for for a couple of days I have developed a really bad rash at the surgery site, very red in color, very sore , painful, selling at my throat and very itchy and all over my skin. I have changed my soap back to [place] and I'm using Coconut oil bto heal to site like I always use on my skin. I have a vaginal yeast something that is not normal for me. I looked up the side effects to the medicine and it does give patients this issue. I been home in [place] for 2 days [facility] street [facility] home. [facility] [address] . Is there anything we can change to with less side effects like this and won't cause weight gain? Can you send for yeast infection, change the medication and pain please.    Thank you

## 2023-09-06 NOTE — TELEPHONE ENCOUNTER
Returned call to patient. Two patient identifiers used. Patient stated she is having a reaction to the Synthroid, she stated she has been breaking out in rashes and hives, she stated she has been scratching her neck a lot which caused the surgical glue to come off, and also caused a lot of redness, and also redness and itchiness at the sites where IV were placed. Patient stated she has been taking Benadryl which has not really been working. Made patient aware Dr. Raul Jackson is out the office this week and I spoke with providers in our office. Per Dr. Gene Liu patient can stop taking the medication or try a half of a tab, Per Colman Gottron, NP patient can use monistat for yeast infection     Made patient aware of message above. Patient stated she think its best if she stops the medication for right now due to the reaction she is having with medication, patient stated she is scheduled for post-op on 09/11/2023 and would like to discuss the possibly of a different med with Dr. Raul Jackson. Patient stated as far as the monistat goes, she tried that and it burned her vagina and she had to soak in water. Would like to know if something can be prescribed. Made patient aware I'll speak with the provider about medication.      Preferred pharmacy; is [address]

## 2023-09-06 NOTE — TELEPHONE ENCOUNTER
Patient called stating that she is having an allergic reaction to the medication that she is on for her thyroid. Patient states that she has rash/hives all over her body, around her incision and on her neck. Patient stated that she has also scratched the surgical glue off. Patient stated that she did send a [employer] message this morning about it also.

## 2023-09-11 ENCOUNTER — OFFICE VISIT (OUTPATIENT)
Age: 53
End: 2023-09-11

## 2023-09-11 ENCOUNTER — TELEPHONE (OUTPATIENT)
Age: 53
End: 2023-09-11

## 2023-09-11 ENCOUNTER — OFFICE VISIT (OUTPATIENT)
Facility: CLINIC | Age: 53
End: 2023-09-11
Payer: COMMERCIAL

## 2023-09-11 VITALS
TEMPERATURE: 97.5 F | RESPIRATION RATE: 16 BRPM | DIASTOLIC BLOOD PRESSURE: 83 MMHG | HEIGHT: 64 IN | WEIGHT: 247.6 LBS | HEART RATE: 72 BPM | OXYGEN SATURATION: 98 % | SYSTOLIC BLOOD PRESSURE: 137 MMHG | BODY MASS INDEX: 42.27 KG/M2

## 2023-09-11 VITALS
DIASTOLIC BLOOD PRESSURE: 73 MMHG | SYSTOLIC BLOOD PRESSURE: 126 MMHG | OXYGEN SATURATION: 97 % | HEIGHT: 64 IN | HEART RATE: 67 BPM | BODY MASS INDEX: 45.24 KG/M2 | WEIGHT: 265 LBS | TEMPERATURE: 98 F | RESPIRATION RATE: 16 BRPM

## 2023-09-11 DIAGNOSIS — Z09 POSTOPERATIVE EXAMINATION: Primary | ICD-10-CM

## 2023-09-11 DIAGNOSIS — T50.905A URTICARIA DUE TO DRUG ALLERGY: Primary | ICD-10-CM

## 2023-09-11 DIAGNOSIS — L50.0 URTICARIA DUE TO DRUG ALLERGY: Primary | ICD-10-CM

## 2023-09-11 DIAGNOSIS — Z51.89 VISIT FOR WOUND CHECK: ICD-10-CM

## 2023-09-11 DIAGNOSIS — E89.0 POSTPROCEDURAL HYPOTHYROIDISM: ICD-10-CM

## 2023-09-11 PROCEDURE — 99213 OFFICE O/P EST LOW 20 MIN: CPT | Performed by: INTERNAL MEDICINE

## 2023-09-11 ASSESSMENT — PATIENT HEALTH QUESTIONNAIRE - PHQ9
2. FEELING DOWN, DEPRESSED OR HOPELESS: 0
SUM OF ALL RESPONSES TO PHQ QUESTIONS 1-9: 0
2. FEELING DOWN, DEPRESSED OR HOPELESS: 0
1. LITTLE INTEREST OR PLEASURE IN DOING THINGS: 0
SUM OF ALL RESPONSES TO PHQ QUESTIONS 1-9: 0
SUM OF ALL RESPONSES TO PHQ9 QUESTIONS 1 & 2: 0
SUM OF ALL RESPONSES TO PHQ QUESTIONS 1-9: 0
1. LITTLE INTEREST OR PLEASURE IN DOING THINGS: 0
SUM OF ALL RESPONSES TO PHQ QUESTIONS 1-9: 0
SUM OF ALL RESPONSES TO PHQ9 QUESTIONS 1 & 2: 0

## 2023-09-11 NOTE — PROGRESS NOTES
Ho Coy is a 46 y.o. female and presents with Allergic Reaction  . Subjective:  She had a recent allergy to levothyroxine with a generalized reported  She has hypothyroidism and needs a further evaluation    Review of Systems  Constitutional: negative for fevers, chills, anorexia and weight loss  Eyes:   negative for visual disturbance and irritation  ENT:   negative for tinnitus,sore throat,nasal congestion,ear pains. hoarseness  Respiratory:  negative for cough, hemoptysis, dyspnea,wheezing  CV:   negative for chest pain, palpitations, lower extremity edema  GI:   negative for nausea, vomiting, diarrhea, abdominal pain,melena  Endo:               negative for polyuria,polydipsia,polyphagia,heat intolerance  Genitourinary: negative for frequency, dysuria and hematuria  Integument:  negative for rash and pruritus  Hematologic:  negative for easy bruising and gum/nose bleeding  Musculoskel: negative for myalgias, arthralgias, back pain, muscle weakness, joint pain  Neurological:  negative for headaches, dizziness, vertigo, memory problems and gait   Behavl/Psych: negative for feelings of anxiety, depression, mood changes    Past Medical History:   Diagnosis Date    Arthritis     Asthma     Seasonal     Bipolar disorder (HCC)     Chronic pain     GERD (gastroesophageal reflux disease)     History of MRSA infection 2008    INSIDE HER KNEE, RECIEVED IV ANTBX - HAS NEG NASAL MRSA SWAB IN CC FROM 2022    IBS (irritable bowel syndrome)     Ill-defined condition     MIGRAINES    Migraine     Multinodular goiter 10/7/2020    Peptic ulcer     PTSD (post-traumatic stress disorder)     Seizures ([address]) 2018    seizure as a result of migrane    Snoring     Thyroiditis 10/7/2020     Past Surgical History:   Procedure Laterality Date    CERVICAL FUSION  2011    C4-C7    CHOLECYSTECTOMY, LAPAROSCOPIC      COLONOSCOPY  2010    CYST REMOVAL      from under both arms    HEENT Left 2013    orbital    HERNIA REPAIR Left 2009

## 2023-09-11 NOTE — PROGRESS NOTES
1. Have you been to the ER, urgent care clinic since your last visit? Hospitalized since your last visit? No    2. Have you seen or consulted any other health care providers outside of the [address] [place] [address] since your last visit? Include any pap smears or colon screening.  No     Medication review for thyroid      PHQ-9 Total Score: 0 (9/11/2023 12:58 PM)

## 2023-09-12 ENCOUNTER — TELEPHONE (OUTPATIENT)
Age: 53
End: 2023-09-12

## 2023-09-12 NOTE — TELEPHONE ENCOUNTER
Patient is requesting that the short term disability forms need to be re-faxed with Employee HA#[id number] must say short term disability. Patient also states she has been diagnosed with autoimmune disease.

## 2023-09-12 NOTE — TELEPHONE ENCOUNTER
Returned call to patient. Two patient identifiers used. Patient called back made her aware once office note is completed I will be able to fax off form, patient stated to please write new short term disability form on cover sheet when faxing. Patient also stated she would like to update Dr. Yaya Diaz on what happen recently. Patient stated she went to go see Dr. José Miguel Lr and he will be sending her to a allergist Dr. Emili Chavarria here at [facility], she stated she has a appt with the provider on 09/13/2023 at 630 am, she stated Dr. José Miguel Lr personally called Dr. Mesfin Loabto. She stated Dr. José Miguel Lr is not comfortable with prescribing a thyroid medication until they figure out what she is allergic to because her body is fighting against the thyroid medication, patient stated they had diagnosed her with a autoimmune disease. Made patient aware I'll make Dr. Yaya Diaz aware, patient verbalized understanding and stated she will continue to update the office.

## 2023-09-13 ENCOUNTER — TELEPHONE (OUTPATIENT)
Facility: CLINIC | Age: 53
End: 2023-09-13

## 2023-09-13 NOTE — TELEPHONE ENCOUNTER
Pt called and stated that she went to see Dr. Bunny Mcgee and she is allergic to her thyroid medication. Pt stated that she needs a referral to an Endocrinologist.  Pt stated that she is having panic attacks and anxiety again. Pt can be reached @ [phone].

## 2023-09-20 ENCOUNTER — TELEPHONE (OUTPATIENT)
Age: 53
End: 2023-09-20

## 2023-09-20 NOTE — TELEPHONE ENCOUNTER
Returned call to patient. Two patient identifiers used. Made patient aware I received a different from from [facility] and I would need a medical release form filled out. Patient verbalized understanding.

## 2023-09-29 ENCOUNTER — PATIENT MESSAGE (OUTPATIENT)
Age: 53
End: 2023-09-29

## 2023-09-29 ENCOUNTER — TELEPHONE (OUTPATIENT)
Age: 53
End: 2023-09-29

## 2023-09-29 NOTE — TELEPHONE ENCOUNTER
Called patient and advised her if she is still having trouble getting the release form back to us through [facility] she will either need to fax it to us or bring it into the office. No answer, left a voicemail for a returned call.

## 2023-09-29 NOTE — TELEPHONE ENCOUNTER
Returned call to patient. Two patient identifiers used. Called patient to check status on medical release form so I can fax rest of forms, patient stated it will not allow her to open the file for some reason, she stated she called back up to the office and was told by  staff that they will e-mail her the form, she stated she has been waiting on the form but nothing has been sent to her email. Made patient aware we are not allowed to e-mail and I will get the form resent through [address] [place] [address]. Patient verbalized understanding and thanked for call.

## 2023-10-04 ENCOUNTER — CLINICAL DOCUMENTATION (OUTPATIENT)
Age: 53
End: 2023-10-04

## 2023-10-11 ENCOUNTER — CLINICAL DOCUMENTATION (OUTPATIENT)
Age: 53
End: 2023-10-11

## 2023-11-28 ENCOUNTER — COMMUNITY OUTREACH (OUTPATIENT)
Facility: CLINIC | Age: 53
End: 2023-11-28

## (undated) DEVICE — (D)PREP SKN CHLRAPRP APPL 26ML -- CONVERT TO ITEM 371833

## (undated) DEVICE — SUT ETHLN 3-0 18IN PS1 BLK --

## (undated) DEVICE — BASIC: Brand: MEDLINE INDUSTRIES, INC.

## (undated) DEVICE — Z DISCONTINUED GLOVE SURG SZ 7.5 L12IN FNGR THK13MIL WHT ISOLEX

## (undated) DEVICE — 4.5 MM HELICUT STRAIGHT BURRS,                                    POWER/EP-1, SLATE, 5000 MAXIMUM RPM,                                    PACKAGED 6 PER BOX, STERILE: Brand: DYONICS HELICUT

## (undated) DEVICE — MAGNETIC INSTR DRAPE 20X16: Brand: MEDLINE INDUSTRIES, INC.

## (undated) DEVICE — AIRSEAL 5 MM ACCESS PORT AND LOW PROFILE OBTURATOR WITH BLADELESS OPTICAL TIP, 100 MM LENGTH: Brand: AIRSEAL

## (undated) DEVICE — SEALER TISS L35CM DIA5MM CRV JAW ARTC ADV BPLR ENSEAL G2

## (undated) DEVICE — TOTAL TRAY, 16FR 10ML SIL FOLEY, URN: Brand: MEDLINE

## (undated) DEVICE — GARMENT,MEDLINE,DVT,INT,CALF,MED, GEN2: Brand: MEDLINE

## (undated) DEVICE — DRSG GZ OIL EMUL CURAD 3X3 --

## (undated) DEVICE — GLOVE ORANGE PI 7 1/2   MSG9075

## (undated) DEVICE — INTENDED FOR TISSUE SEPARATION, AND OTHER PROCEDURES THAT REQUIRE A SHARP SURGICAL BLADE TO PUNCTURE OR CUT.: Brand: BARD-PARKER ® CARBON RIB-BACK BLADES

## (undated) DEVICE — ASTOUND STANDARD SURGICAL GOWN, XXL: Brand: CONVERTORS

## (undated) DEVICE — DISSECTOR ENDOSCP L21CM TIP CURVATURE 40DEG FN CRV JAW VES

## (undated) DEVICE — DRAPE,UTILTY,TAPE,15X26, 4EA/PK: Brand: MEDLINE

## (undated) DEVICE — SUTURE VCRL SZ 2-0 L27IN ABSRB UD L26MM SH 1/2 CIR J417H

## (undated) DEVICE — STERILE SAFETY PINS #2 2/PK: Brand: MEDLINE

## (undated) DEVICE — CLIP LIG M BLU TI HRT SHP WIRE HORZ 180 PER BX

## (undated) DEVICE — INFECTION CONTROL KIT SYS

## (undated) DEVICE — SOLUTION IRRIG 3000ML LAC RINGERS ARTHROMTC PLAS CONT

## (undated) DEVICE — TRI-LUMEN FILTERED TUBE SET WITH ACTIVATED CHARCOAL FILTER: Brand: AIRSEAL

## (undated) DEVICE — SYR 5ML 1/5 GRAD LL NSAF LF --

## (undated) DEVICE — GOWN,SIRUS,FABRNF,XL,20/CS: Brand: MEDLINE

## (undated) DEVICE — PAD BD MATTRESS 73X32 IN STD CONVOLUTED FOAM LTX FREE

## (undated) DEVICE — 4.5 MM INCISOR PLUS STRAIGHT                                    BLADES, POWER/EP-1, VIOLET, PACKAGED                                    6 PER BOX, STERILE

## (undated) DEVICE — Z INACTIVE PER BARD SET TBNG L8FT IN FLO GRAV W/ TWO SPIK

## (undated) DEVICE — REM POLYHESIVE ADULT PATIENT RETURN ELECTRODE: Brand: VALLEYLAB

## (undated) DEVICE — AGENT HEMSTAT 5GM ARISTA AH

## (undated) DEVICE — SURGICAL PROCEDURE PACK BASIN MAJ SET CUST NO CAUT

## (undated) DEVICE — BASIN ST MAJOR-NO CAUTERY: Brand: MEDLINE INDUSTRIES, INC.

## (undated) DEVICE — PREMIUM WET SKIN PREP TRAY: Brand: MEDLINE INDUSTRIES, INC.

## (undated) DEVICE — NEEDLE HYPO 25GA L1.5IN BVL ORIENTED ECLIPSE

## (undated) DEVICE — SUTURE VCRL SZ 3-0 L27IN ABSRB UD L26MM SH 1/2 CIR J416H

## (undated) DEVICE — SHOULDER SUSPENSION KIT 6 PER BOX

## (undated) DEVICE — SUTURE VCRL SZ 4-0 L27IN ABSRB UD L26MM SH 1/2 CIR J415H

## (undated) DEVICE — TRAY PREP DRY W/ PREM GLV 2 APPL 6 SPNG 2 UNDPD 1 OVERWRAP

## (undated) DEVICE — DRAPE,EXTREMITY,89X128,STERILE: Brand: MEDLINE

## (undated) DEVICE — HAR9FM @HARMONIC FOCUS+ SHEARS, W/O ADAP: Brand: MEDLINE

## (undated) DEVICE — SUTURE PERMAHAND SZ 3-0 L18IN NONABSORBABLE BLK L26MM SH C013D

## (undated) DEVICE — SYRINGE MED 10ML LUERLOCK TIP W/O SFTY DISP

## (undated) DEVICE — SPONGE: SPECIALTY PEANUT XR 100/CS: Brand: MEDICAL ACTION INDUSTRIES

## (undated) DEVICE — COVER LT HNDL PLAS RIG 1 PER PK

## (undated) DEVICE — DRAPE,REIN 53X77,STERILE: Brand: MEDLINE

## (undated) DEVICE — HYPODERMIC SAFETY NEEDLE: Brand: MONOJECT

## (undated) DEVICE — SOLUTION SCRB 4OZ 4% CHG H2O AIDED FOR PREOPERATIVE SKIN

## (undated) DEVICE — PAD,SANITARY,11 IN,MAXI,N-STRL,IND WRAP: Brand: MEDLINE

## (undated) DEVICE — SHEAR RMFG HARMONIC FOCUS 9CM -- OEM ITEM L#322125

## (undated) DEVICE — VCARE MEDIUM, UTERINE MANIPULATOR, VAGINAL-CERVICAL-AHLUWALIA'S-RETRACTOR-ELEVATOR: Brand: VCARE

## (undated) DEVICE — PENCIL SMK EVAC 10 FT BLADE ELECTRD ROCKER FOR TELSCP

## (undated) DEVICE — ARTHROSCOPY RICHMOND-LF: Brand: MEDLINE INDUSTRIES, INC.

## (undated) DEVICE — Device

## (undated) DEVICE — SHEET,T,THYROID,STERILE: Brand: MEDLINE

## (undated) DEVICE — SUTURE MCRYL SZ 4-0 L27IN ABSRB UD L19MM PS-2 1/2 CIR PRIM Y426H

## (undated) DEVICE — IMMOB SHLDR W/WAIST STRP M --

## (undated) DEVICE — SOLUTION IRRIG 1000ML 0.9% SOD CHL USP POUR PLAS BTL

## (undated) DEVICE — SOLUTION LACTATED RINGERS INJECTION USP

## (undated) DEVICE — GRASPER SUT 60DEG SHRP TIP LO PROF FOR RAP ACCS IN SHLDR

## (undated) DEVICE — DERMABOND SKIN ADH 0.7ML -- DERMABOND ADVANCED 12/BX

## (undated) DEVICE — APPLICATOR MEDICATED 26 CC SOLUTION CLR STRL CHLORAPREP

## (undated) DEVICE — STERILE POLYISOPRENE POWDER-FREE SURGICAL GLOVES: Brand: PROTEXIS

## (undated) DEVICE — AGENT HEMSTAT W4XL4IN OXIDIZED REGENERATED CELOS STRUCTURED

## (undated) DEVICE — SYR 10ML LUER LOK 1/5ML GRAD --

## (undated) DEVICE — KIT DISP W/ SPEAR TRCR TIP OBT AND 2.6MM DRL FOR 2.6

## (undated) DEVICE — LIQUIBAND RAPID ADHESIVE 36/CS 0.8ML: Brand: MEDLINE

## (undated) DEVICE — HANDLE LT SNAP ON ULT DURABLE LENS FOR TRUMPF ALC DISPOSABLE

## (undated) DEVICE — ELECTRO LUBE IS A SINGLE PATIENT USE DEVICE THAT IS INTENDED TO BE USED ON ELECTROSURGICAL ELECTRODES TO REDUCE STICKING.: Brand: KEY SURGICAL ELECTRO LUBE

## (undated) DEVICE — CLIP INT SM WIDE RED TI TRNSVRS GRV CHEVRON SHP W/ PRECIS

## (undated) DEVICE — TOWEL SURG W17XL27IN STD BLU COT NONFENESTRATED PREWASHED

## (undated) DEVICE — ELECTRODE PT RET AD L9FT HI MOIST COND ADH HYDRGEL CORDED

## (undated) DEVICE — SURGICAL PROCEDURE PACK GYN LAPAROSCOPY CUST SMH LF

## (undated) DEVICE — SUTURE STRATAFIX SPRL PDS + SZ 2-0 L9IN ABSRB VLT CT-1 SXPP1B456

## (undated) DEVICE — TROCAR: Brand: KII SLEEVE

## (undated) DEVICE — KENDALL DL ECG CABLE AND LEAD WIRE SYSTEM, 3-LEAD, SINGLE PATIENT USE: Brand: KENDALL

## (undated) DEVICE — APPLICATOR SURG XL L38CM FOR ARISTA ABSRB HEMSTAT FLEXITIP

## (undated) DEVICE — APPLICATOR MEDICATED 26 CC SOLUTION HI LT ORNG CHLORAPREP

## (undated) DEVICE — INTENT OT USE PROVIDES A STERILE INTERFACE BETWEEN THE OPERATING ROOM SURGICAL LAMPS (NON-STERILE) AND THE SURGEON OR STAFF WORKING IN THE STERILE FIELD.: Brand: ASPEN® ALC PLUS LIGHT HANDLE COVER

## (undated) DEVICE — ENT-SMH: Brand: MEDLINE INDUSTRIES, INC.

## (undated) DEVICE — 5.0 MM I.D. UNIVERSAL CANNULA, 76                                    MM LONG, BLUE, LATEX SEAL,                                    SINGLE-USE STERILE PACKS, BOX OF 10.                                    INCLUDES OBTURATOR AND TROCAR

## (undated) DEVICE — STERILE POLYISOPRENE POWDER-FREE SURGICAL GLOVES WITH EMOLLIENT COATING: Brand: PROTEXIS

## (undated) DEVICE — BLADE ES ELASTOMERIC COAT INSUL DURABLE BEND UPTO 90DEG

## (undated) DEVICE — ADPT HND SWCH HARM SCALP DISP --

## (undated) DEVICE — GARMENT,MEDLINE,DVT,INT,CALF,LG, GEN2: Brand: MEDLINE

## (undated) DEVICE — TROCAR: Brand: KII FIOS FIRST ENTRY

## (undated) DEVICE — VISUALIZATION SYSTEM: Brand: CLEARIFY

## (undated) DEVICE — 4-PORT MANIFOLD: Brand: NEPTUNE 2